# Patient Record
Sex: MALE | Race: WHITE | Employment: OTHER | URBAN - METROPOLITAN AREA
[De-identification: names, ages, dates, MRNs, and addresses within clinical notes are randomized per-mention and may not be internally consistent; named-entity substitution may affect disease eponyms.]

---

## 2017-06-26 PROBLEM — S46.112S STRAIN OF MUSCLE, FASCIA AND TENDON OF LONG HEAD OF BICEPS, LEFT ARM, SEQUELA: Status: ACTIVE | Noted: 2017-06-26

## 2017-06-26 PROBLEM — S46.012S: Status: ACTIVE | Noted: 2017-06-26

## 2017-06-26 PROBLEM — S43.432A SLAP LESION OF LEFT SHOULDER: Status: ACTIVE | Noted: 2017-06-26

## 2017-06-26 PROBLEM — M19.012 DEGENERATIVE JOINT DISEASE OF LEFT ACROMIOCLAVICULAR JOINT: Status: ACTIVE | Noted: 2017-06-26

## 2017-06-26 PROBLEM — M75.02 ADHESIVE CAPSULITIS OF LEFT SHOULDER: Status: ACTIVE | Noted: 2017-06-26

## 2017-06-26 NOTE — BRIEF OP NOTE
BRIEF OPERATIVE NOTE    Date of Procedure: 7/6/2017     Preoperative Diagnosis:  Strain of rotator cuff capsule, left, sequela [S46.012S]      Biceps strain, left, sequela [S46.112S]      SLAP TEAR LEFT SHOULDER  [S43.432S]      AC OA LEFT SHOULDER  [M19.012]      POST-OPERATIVE LEFT FROZEN SHOULDER  [M75.02]    Postoperative Diagnosis:  SAME    Procedure(s): EXAMINATION AND MANIPULATION LEFT SHOULDER ARTHROSCOPY LEFT SHOULDER ARTHROSCOPIC REVISION SUBACROMIAL DECOMPRESSION, DISTAL CLAVICLE RESECTION, LYSIS OF ADHESIONS, MINI OPEN REVISION ROTATOR CUFF REPAIR, BICEPS TENODESIS      Surgeon(s) and Role:     * Maikel Victor MD - Primary           Anesthesia: General WITH INTERSCALENE BLOCK    Complications: NONE    Implants:     Implant Name Type Inv.  Item Serial No.  Lot No. LRB No. Used Action   ICONIX 2.3MM ANCHOR FORCE FIBER SUTURE WITH NEEDLES   [facility] 36 [id number] Left 4 Implanted        Maikel Victor MD

## 2017-06-26 NOTE — H&P
[facility] HISTORY AND PHYSICAL    Subjective:     Patient is a 61 y.o. AMBIDEXTROUS MALE WITH LEFT SHOULDER PAIN. SEE OFFICE NOTE. Patient Active Problem List    Diagnosis Date Noted    Adhesive capsulitis of left shoulder 06/26/2017    Strain of muscle(s) and tendon(s) of the rotator cuff of left shoulder, sequela 06/26/2017    Strain of muscle, fascia and tendon of long head of biceps, left arm, sequela 06/26/2017    SLAP lesion of left shoulder 06/26/2017    Degenerative joint disease of left acromioclavicular joint 06/26/2017    Coronary atherosclerosis of native coronary vessel 11/15/2016    Abnormal EKG     History of tobacco abuse 07/11/2014    ESPINOZA (dyspnea on exertion) 07/11/2014    HTN (hypertension) 10/14/2013    Hyperlipidemia 10/14/2013    Inguinal hernia unilateral, non-recurrent 10/14/2013     Past Medical History:   Diagnosis Date    Abnormal EKG     1. Cardiac MRI (5/14/15):  EF 68%. The left ventricular myocardium appear symmetric at the base. There appears to be slightly thicker left ventricular myocardium at the mid and apical lateral wall compared to other segments. No wall motion advised. EF 68%. Calculated left ventricular mass is within normal limits.  Chronic obstructive pulmonary disease (HCC)     Contact dermatitis and other eczema, due to unspecified cause     Depression     daily meds    Hypercholesteremia     controlled by lipitor    Hyperlipidemia     Hypertension     daily meds    Inguinal hernia     Left ventricular hypertrophy     ECHO 2014, none since     Unstable angina ([facility] [address] 7/11/2014      Past Surgical History:   Procedure Laterality Date    CARDIAC SURG PROCEDURE UNLIST      CCL, clean/no interventions    HX HERNIA REPAIR Right 10/24/13    inguinal    HX ORTHOPAEDIC Left     carpal tunnel    HX ROTATOR CUFF REPAIR        Prior to Admission medications    Medication Sig Start Date End Date Taking?  Authorizing Provider atenolol (TENORMIN) 50 mg tablet Take 1 Tab by mouth daily. 5/8/17   Bhavna Mathis MD   AMOXICILLIN-POT CLAVULANATE 875 MG-125 MG TAB     Historical Provider   atorvastatin (LIPITOR) 10 mg tablet Take 1 Tab by mouth every morning. Indications: hypercholesterolemia 12/23/16   Bhavna Mathis MD   traZODone (DESYREL) 50 mg tablet Take 50 mg by mouth nightly. Historical Provider   sertraline (ZOLOFT) 50 mg tablet Take 50 mg by mouth every morning. Historical Provider   multivitamin (ONE A DAY) tablet Take 1 Tab by mouth daily. Historical Provider   omega-3 fatty acids (FISH OIL) cap Take  by mouth daily. Kolby Whatley MD   albuterol (VENTOLIN HFA) 90 mcg/actuation inhaler Take 2 Puffs by inhalation as needed for Wheezing. Kolby Whatley MD   benazepril (LOTENSIN) 20 mg tablet Take 20 mg by mouth every morning. Indications: HYPERTENSION    Historical Provider     No Known Allergies   Social History   Substance Use Topics    Smoking status: Former Smoker     Packs/day: 1.00     Years: 35.00     Quit date: 7/14/2013    Smokeless tobacco: Never Used    Alcohol use 12.0 oz/week     24 Cans of beer per week      Family History   Problem Relation Age of Onset    Heart Disease Sister     Cancer Brother      lymphoma    Lung Disease Mother     Hypertension Sister     Hypertension Sister     Heart Disease Brother       Review of Systems  A comprehensive review of systems was negative except for that written in the HPI. Objective:     No data found. There were no vitals taken for this visit. General:  Alert, cooperative, no distress, appears stated age. Head:  Normocephalic, without obvious abnormality, atraumatic. Back:   Symmetric, no curvature. ROM normal. No CVA tenderness. Lungs:   Clear to auscultation bilaterally. Chest wall:  No tenderness or deformity. Heart:  Regular rate and rhythm, S1, S2 normal, no murmur, click, rub or gallop. Extremities: Extremities normal, atraumatic, no cyanosis or edema. Pulses: 2+ and symmetric all extremities. Skin: Skin color, texture, turgor normal. No rashes or lesions   Lymph nodes: Cervical, supraclavicular, and axillary nodes normal.   Neurologic: CNII-XII intact. Normal strength, sensation and reflexes throughout. Assessment:   Principal Problem:    Adhesive capsulitis of left shoulder (6/26/2017)    Active Problems:    Strain of muscle(s) and tendon(s) of the rotator cuff of left shoulder, sequela (6/26/2017)      Strain of muscle, fascia and tendon of long head of biceps, left arm, sequela (6/26/2017)      SLAP lesion of left shoulder (6/26/2017)      Degenerative joint disease of left acromioclavicular joint (6/26/2017)        Plan:     The various methods of treatment have been discussed with the patient and family. PATIENT HAS EXHAUSTED NON-OPERATIVE MODALITIES. After consideration of risks, benefits and other options for treatment, the patient has consented to surgical intervention. SEE OFFICE NOTE.         Joseph Bashir MD

## 2017-06-29 ENCOUNTER — HOSPITAL ENCOUNTER (OUTPATIENT)
Dept: SURGERY | Age: 57
Discharge: HOME | End: 2017-06-29
Attending: ORTHOPAEDIC SURGERY

## 2017-06-29 VITALS
HEIGHT: 67 IN | DIASTOLIC BLOOD PRESSURE: 88 MMHG | WEIGHT: 172.6 LBS | SYSTOLIC BLOOD PRESSURE: 136 MMHG | TEMPERATURE: 96.8 F | OXYGEN SATURATION: 97 % | BODY MASS INDEX: 27.09 KG/M2 | HEART RATE: 76 BPM

## 2017-06-29 RX ORDER — MELOXICAM 15 MG/1
15 TABLET ORAL
COMMUNITY
End: 2018-01-04

## 2017-06-29 RX ORDER — ASPIRIN 81 MG/1
81 TABLET ORAL DAILY
COMMUNITY
End: 2020-01-09

## 2017-06-29 RX ORDER — FLUTICASONE PROPIONATE 50 MCG
2 SPRAY, SUSPENSION (ML) NASAL
COMMUNITY
End: 2018-01-04

## 2017-06-29 NOTE — PERIOP NOTES
Patient verified name, , and surgery as listed in [facility]. Type 1B surgery, walk in assessment complete. Labs per surgeon: none needed pre-op. HGB-A1C & FBS ordered for dos. Labs per anesthesia protocol: none needed. EKG: not needed per [facility] protocols. Most recent [facility] cardiology note dated 16 on chart for reference if needed. All other cardiac records in EMR if needed. Hibiclens and instructions given per hospital policy. Patient provided with handouts including Guide to Surgery, Pain Management, Hand Hygiene, Blood Transfusion Education, and [facility] Brochure. Patient answered medical/surgical history questions at their best of ability. All prior to admission medications documented in [facility]. Original medication prescription bottles not visualized during patient appointment. Patient instructed to hold all vitamins 7 days prior to surgery and NSAIDS 5 days prior to surgery, patient verbalized understanding. Medications to be held: aleve, vitamins, meloxicam.    Patient instructed to continue previous medications as prescribed prior to surgery and to take the following medications the day of surgery according to anesthesia guidelines with a small sip of water: ventolin if needed, 81 mg aspirin, atenolol, atorvastatin, sertraline. Instructed to bring inhaler dos. Patient taught back and verbalized understanding.

## 2017-07-05 ENCOUNTER — ANESTHESIA EVENT (OUTPATIENT)
Dept: SURGERY | Age: 57
End: 2017-07-05
Payer: OTHER MISCELLANEOUS

## 2017-07-06 ENCOUNTER — APPOINTMENT (OUTPATIENT)
Dept: GENERAL RADIOLOGY | Age: 57
End: 2017-07-06
Attending: ORTHOPAEDIC SURGERY
Payer: OTHER MISCELLANEOUS

## 2017-07-06 ENCOUNTER — HOSPITAL ENCOUNTER (OUTPATIENT)
Age: 57
Setting detail: OBSERVATION
Discharge: HOME | End: 2017-07-07
Attending: ORTHOPAEDIC SURGERY | Admitting: ORTHOPAEDIC SURGERY
Payer: OTHER MISCELLANEOUS

## 2017-07-06 ENCOUNTER — ANESTHESIA (OUTPATIENT)
Dept: SURGERY | Age: 57
End: 2017-07-06
Payer: OTHER MISCELLANEOUS

## 2017-07-06 PROBLEM — F32.A DEPRESSION: Status: ACTIVE | Noted: 2017-07-06

## 2017-07-06 LAB
EST. AVERAGE GLUCOSE BLD GHB EST-MCNC: 111 MG/DL
GLUCOSE BLD STRIP.AUTO-MCNC: 94 MG/DL (ref 65–100)
HBA1C MFR BLD: 5.5 % (ref 4.8–6)

## 2017-07-06 PROCEDURE — 77030003602 HC NDL NRV BLK BBMI -B: Performed by: ANESTHESIOLOGY

## 2017-07-06 PROCEDURE — 76060000034 HC ANESTHESIA 1.5 TO 2 HR: Performed by: ORTHOPAEDIC SURGERY

## 2017-07-06 PROCEDURE — 73030 X-RAY EXAM OF SHOULDER: CPT

## 2017-07-06 PROCEDURE — 77030032490 HC SLV COMPR SCD KNE COVD -B

## 2017-07-06 PROCEDURE — 77030033073 HC TBNG ARTHSC PMP OUTFLO STRY -B: Performed by: ORTHOPAEDIC SURGERY

## 2017-07-06 PROCEDURE — 77030006891 HC BLD SHV RESECT STRY -B: Performed by: ORTHOPAEDIC SURGERY

## 2017-07-06 PROCEDURE — 94760 N-INVAS EAR/PLS OXIMETRY 1: CPT

## 2017-07-06 PROCEDURE — 77030020269 HC MISC IMPL: Performed by: ORTHOPAEDIC SURGERY

## 2017-07-06 PROCEDURE — 74011250636 HC RX REV CODE- 250/636: Performed by: ANESTHESIOLOGY

## 2017-07-06 PROCEDURE — 74011000250 HC RX REV CODE- 250

## 2017-07-06 PROCEDURE — 77030033005 HC TBNG ARTHSC PMP STRY -B: Performed by: ORTHOPAEDIC SURGERY

## 2017-07-06 PROCEDURE — 74011250636 HC RX REV CODE- 250/636

## 2017-07-06 PROCEDURE — 77030002986 HC SUT PROL J&J -A: Performed by: ORTHOPAEDIC SURGERY

## 2017-07-06 PROCEDURE — 77030008703 HC TU ET UNCUF COVD -A: Performed by: ANESTHESIOLOGY

## 2017-07-06 PROCEDURE — 74011000258 HC RX REV CODE- 258: Performed by: ORTHOPAEDIC SURGERY

## 2017-07-06 PROCEDURE — 76010000162 HC OR TIME 1.5 TO 2 HR INTENSV-TIER 1: Performed by: ORTHOPAEDIC SURGERY

## 2017-07-06 PROCEDURE — 82962 GLUCOSE BLOOD TEST: CPT

## 2017-07-06 PROCEDURE — 74011250636 HC RX REV CODE- 250/636: Performed by: ORTHOPAEDIC SURGERY

## 2017-07-06 PROCEDURE — 77030027384 HC PRB ARTHSCP SERFAS STRY -C: Performed by: ORTHOPAEDIC SURGERY

## 2017-07-06 PROCEDURE — 74011000250 HC RX REV CODE- 250: Performed by: ORTHOPAEDIC SURGERY

## 2017-07-06 PROCEDURE — 77030006668 HC BLD SHV MENSCS STRY -B: Performed by: ORTHOPAEDIC SURGERY

## 2017-07-06 PROCEDURE — 77030020782 HC GWN BAIR PAWS FLX 3M -B: Performed by: ANESTHESIOLOGY

## 2017-07-06 PROCEDURE — 77030003666 HC NDL SPINAL BD -A: Performed by: ORTHOPAEDIC SURGERY

## 2017-07-06 PROCEDURE — 77030018836 HC SOL IRR NACL ICUM -A: Performed by: ORTHOPAEDIC SURGERY

## 2017-07-06 PROCEDURE — 77030008477 HC STYL SATN SLP COVD -A: Performed by: ANESTHESIOLOGY

## 2017-07-06 PROCEDURE — 76010010054 HC POST OP PAIN BLOCK: Performed by: ORTHOPAEDIC SURGERY

## 2017-07-06 PROCEDURE — 77030018986 HC SUT ETHBND4 J&J -B: Performed by: ORTHOPAEDIC SURGERY

## 2017-07-06 PROCEDURE — 74011250637 HC RX REV CODE- 250/637: Performed by: ORTHOPAEDIC SURGERY

## 2017-07-06 PROCEDURE — 76210000016 HC OR PH I REC 1 TO 1.5 HR: Performed by: ORTHOPAEDIC SURGERY

## 2017-07-06 PROCEDURE — 83036 HEMOGLOBIN GLYCOSYLATED A1C: CPT | Performed by: ORTHOPAEDIC SURGERY

## 2017-07-06 PROCEDURE — 77030031139 HC SUT VCRL2 J&J -A: Performed by: ORTHOPAEDIC SURGERY

## 2017-07-06 PROCEDURE — 77030004453 HC BUR SHV STRY -B: Performed by: ORTHOPAEDIC SURGERY

## 2017-07-06 PROCEDURE — 77030002916 HC SUT ETHLN J&J -A: Performed by: ORTHOPAEDIC SURGERY

## 2017-07-06 PROCEDURE — 99218 HC RM OBSERVATION: CPT

## 2017-07-06 PROCEDURE — 76942 ECHO GUIDE FOR BIOPSY: CPT | Performed by: ORTHOPAEDIC SURGERY

## 2017-07-06 RX ORDER — ONDANSETRON 2 MG/ML
INJECTION INTRAMUSCULAR; INTRAVENOUS AS NEEDED
Status: DISCONTINUED | OUTPATIENT
Start: 2017-07-06 | End: 2017-07-06 | Stop reason: HOSPADM

## 2017-07-06 RX ORDER — SUCCINYLCHOLINE CHLORIDE 20 MG/ML
INJECTION INTRAMUSCULAR; INTRAVENOUS AS NEEDED
Status: DISCONTINUED | OUTPATIENT
Start: 2017-07-06 | End: 2017-07-06 | Stop reason: HOSPADM

## 2017-07-06 RX ORDER — LIDOCAINE HYDROCHLORIDE 10 MG/ML
0.1 INJECTION INFILTRATION; PERINEURAL AS NEEDED
Status: DISCONTINUED | OUTPATIENT
Start: 2017-07-06 | End: 2017-07-06 | Stop reason: HOSPADM

## 2017-07-06 RX ORDER — SODIUM CHLORIDE, SODIUM LACTATE, POTASSIUM CHLORIDE, CALCIUM CHLORIDE 600; 310; 30; 20 MG/100ML; MG/100ML; MG/100ML; MG/100ML
75 INJECTION, SOLUTION INTRAVENOUS CONTINUOUS
Status: DISCONTINUED | OUTPATIENT
Start: 2017-07-06 | End: 2017-07-06 | Stop reason: HOSPADM

## 2017-07-06 RX ORDER — LIDOCAINE HYDROCHLORIDE 10 MG/ML
INJECTION INFILTRATION; PERINEURAL AS NEEDED
Status: DISCONTINUED | OUTPATIENT
Start: 2017-07-06 | End: 2017-07-06 | Stop reason: HOSPADM

## 2017-07-06 RX ORDER — HYDROMORPHONE HYDROCHLORIDE 2 MG/ML
0.5 INJECTION, SOLUTION INTRAMUSCULAR; INTRAVENOUS; SUBCUTANEOUS
Status: DISCONTINUED | OUTPATIENT
Start: 2017-07-06 | End: 2017-07-06 | Stop reason: HOSPADM

## 2017-07-06 RX ORDER — MIDAZOLAM HYDROCHLORIDE 1 MG/ML
2 INJECTION, SOLUTION INTRAMUSCULAR; INTRAVENOUS
Status: DISCONTINUED | OUTPATIENT
Start: 2017-07-06 | End: 2017-07-06 | Stop reason: HOSPADM

## 2017-07-06 RX ORDER — OXYCODONE HYDROCHLORIDE 5 MG/1
5 TABLET ORAL
Status: CANCELLED | OUTPATIENT
Start: 2017-07-06

## 2017-07-06 RX ORDER — NEOSTIGMINE METHYLSULFATE 1 MG/ML
INJECTION INTRAVENOUS AS NEEDED
Status: DISCONTINUED | OUTPATIENT
Start: 2017-07-06 | End: 2017-07-06 | Stop reason: HOSPADM

## 2017-07-06 RX ORDER — ATORVASTATIN CALCIUM 10 MG/1
10 TABLET, FILM COATED ORAL DAILY
Status: DISCONTINUED | OUTPATIENT
Start: 2017-07-07 | End: 2017-07-07 | Stop reason: HOSPADM

## 2017-07-06 RX ORDER — HYDROMORPHONE HYDROCHLORIDE 2 MG/1
2 TABLET ORAL
Status: DISCONTINUED | OUTPATIENT
Start: 2017-07-06 | End: 2017-07-07 | Stop reason: HOSPADM

## 2017-07-06 RX ORDER — NALOXONE HYDROCHLORIDE 0.4 MG/ML
0.2 INJECTION, SOLUTION INTRAMUSCULAR; INTRAVENOUS; SUBCUTANEOUS AS NEEDED
Status: DISCONTINUED | OUTPATIENT
Start: 2017-07-06 | End: 2017-07-06 | Stop reason: HOSPADM

## 2017-07-06 RX ORDER — OXYCODONE HYDROCHLORIDE 5 MG/1
5 TABLET ORAL
Status: DISCONTINUED | OUTPATIENT
Start: 2017-07-06 | End: 2017-07-06 | Stop reason: HOSPADM

## 2017-07-06 RX ORDER — MIDAZOLAM HYDROCHLORIDE 1 MG/ML
2 INJECTION, SOLUTION INTRAMUSCULAR; INTRAVENOUS
Status: COMPLETED | OUTPATIENT
Start: 2017-07-06 | End: 2017-07-06

## 2017-07-06 RX ORDER — LIDOCAINE HYDROCHLORIDE 20 MG/ML
INJECTION, SOLUTION EPIDURAL; INFILTRATION; INTRACAUDAL; PERINEURAL AS NEEDED
Status: DISCONTINUED | OUTPATIENT
Start: 2017-07-06 | End: 2017-07-06 | Stop reason: HOSPADM

## 2017-07-06 RX ORDER — HYDROMORPHONE HYDROCHLORIDE 2 MG/ML
0.5 INJECTION, SOLUTION INTRAMUSCULAR; INTRAVENOUS; SUBCUTANEOUS
Status: CANCELLED | OUTPATIENT
Start: 2017-07-06

## 2017-07-06 RX ORDER — GLYCOPYRROLATE 0.2 MG/ML
INJECTION INTRAMUSCULAR; INTRAVENOUS AS NEEDED
Status: DISCONTINUED | OUTPATIENT
Start: 2017-07-06 | End: 2017-07-06 | Stop reason: HOSPADM

## 2017-07-06 RX ORDER — CEFAZOLIN SODIUM IN 0.9 % NACL 2 G/50 ML
2 INTRAVENOUS SOLUTION, PIGGYBACK (ML) INTRAVENOUS ONCE
Status: COMPLETED | OUTPATIENT
Start: 2017-07-06 | End: 2017-07-06

## 2017-07-06 RX ORDER — DOCUSATE SODIUM 100 MG/1
100 CAPSULE, LIQUID FILLED ORAL 2 TIMES DAILY
Status: DISCONTINUED | OUTPATIENT
Start: 2017-07-07 | End: 2017-07-07 | Stop reason: HOSPADM

## 2017-07-06 RX ORDER — FLUMAZENIL 0.1 MG/ML
0.2 INJECTION INTRAVENOUS AS NEEDED
Status: CANCELLED | OUTPATIENT
Start: 2017-07-06

## 2017-07-06 RX ORDER — NALOXONE HYDROCHLORIDE 0.4 MG/ML
0.1 INJECTION, SOLUTION INTRAMUSCULAR; INTRAVENOUS; SUBCUTANEOUS
Status: CANCELLED | OUTPATIENT
Start: 2017-07-06

## 2017-07-06 RX ORDER — BENAZEPRIL HYDROCHLORIDE 10 MG/1
20 TABLET ORAL DAILY
Status: DISCONTINUED | OUTPATIENT
Start: 2017-07-07 | End: 2017-07-07 | Stop reason: HOSPADM

## 2017-07-06 RX ORDER — DEXAMETHASONE SODIUM PHOSPHATE 4 MG/ML
INJECTION, SOLUTION INTRA-ARTICULAR; INTRALESIONAL; INTRAMUSCULAR; INTRAVENOUS; SOFT TISSUE AS NEEDED
Status: DISCONTINUED | OUTPATIENT
Start: 2017-07-06 | End: 2017-07-06 | Stop reason: HOSPADM

## 2017-07-06 RX ORDER — PROPOFOL 10 MG/ML
INJECTION, EMULSION INTRAVENOUS AS NEEDED
Status: DISCONTINUED | OUTPATIENT
Start: 2017-07-06 | End: 2017-07-06 | Stop reason: HOSPADM

## 2017-07-06 RX ORDER — TEMAZEPAM 15 MG/1
15 CAPSULE ORAL
Status: DISCONTINUED | OUTPATIENT
Start: 2017-07-06 | End: 2017-07-07 | Stop reason: HOSPADM

## 2017-07-06 RX ORDER — SODIUM CHLORIDE, SODIUM LACTATE, POTASSIUM CHLORIDE, CALCIUM CHLORIDE 600; 310; 30; 20 MG/100ML; MG/100ML; MG/100ML; MG/100ML
75 INJECTION, SOLUTION INTRAVENOUS CONTINUOUS
Status: CANCELLED | OUTPATIENT
Start: 2017-07-06

## 2017-07-06 RX ORDER — BUPIVACAINE HYDROCHLORIDE 5 MG/ML
INJECTION, SOLUTION EPIDURAL; INTRACAUDAL AS NEEDED
Status: DISCONTINUED | OUTPATIENT
Start: 2017-07-06 | End: 2017-07-06 | Stop reason: HOSPADM

## 2017-07-06 RX ORDER — FENTANYL CITRATE 50 UG/ML
100 INJECTION, SOLUTION INTRAMUSCULAR; INTRAVENOUS ONCE
Status: DISCONTINUED | OUTPATIENT
Start: 2017-07-06 | End: 2017-07-06 | Stop reason: HOSPADM

## 2017-07-06 RX ORDER — ALBUTEROL SULFATE 0.83 MG/ML
2.5 SOLUTION RESPIRATORY (INHALATION)
Status: DISCONTINUED | OUTPATIENT
Start: 2017-07-06 | End: 2017-07-07 | Stop reason: HOSPADM

## 2017-07-06 RX ORDER — EPINEPHRINE 1 MG/ML
INJECTION, SOLUTION, CONCENTRATE INTRAVENOUS AS NEEDED
Status: DISCONTINUED | OUTPATIENT
Start: 2017-07-06 | End: 2017-07-06 | Stop reason: HOSPADM

## 2017-07-06 RX ORDER — OXYCODONE HYDROCHLORIDE 5 MG/1
10 TABLET ORAL
Status: CANCELLED | OUTPATIENT
Start: 2017-07-06

## 2017-07-06 RX ORDER — MIDAZOLAM HYDROCHLORIDE 1 MG/ML
2 INJECTION, SOLUTION INTRAMUSCULAR; INTRAVENOUS ONCE
Status: DISCONTINUED | OUTPATIENT
Start: 2017-07-06 | End: 2017-07-06 | Stop reason: HOSPADM

## 2017-07-06 RX ORDER — FACIAL-BODY WIPES
10 EACH TOPICAL DAILY PRN
Status: DISCONTINUED | OUTPATIENT
Start: 2017-07-06 | End: 2017-07-07 | Stop reason: HOSPADM

## 2017-07-06 RX ORDER — ATENOLOL 50 MG/1
50 TABLET ORAL DAILY
Status: DISCONTINUED | OUTPATIENT
Start: 2017-07-07 | End: 2017-07-07 | Stop reason: HOSPADM

## 2017-07-06 RX ORDER — DIPHENHYDRAMINE HYDROCHLORIDE 50 MG/ML
12.5 INJECTION, SOLUTION INTRAMUSCULAR; INTRAVENOUS
Status: CANCELLED | OUTPATIENT
Start: 2017-07-06

## 2017-07-06 RX ORDER — HYDROMORPHONE HYDROCHLORIDE 1 MG/ML
1 INJECTION, SOLUTION INTRAMUSCULAR; INTRAVENOUS; SUBCUTANEOUS
Status: DISCONTINUED | OUTPATIENT
Start: 2017-07-06 | End: 2017-07-07 | Stop reason: HOSPADM

## 2017-07-06 RX ORDER — SODIUM CHLORIDE 0.9 % (FLUSH) 0.9 %
5-10 SYRINGE (ML) INJECTION AS NEEDED
Status: DISCONTINUED | OUTPATIENT
Start: 2017-07-06 | End: 2017-07-07 | Stop reason: HOSPADM

## 2017-07-06 RX ORDER — PROMETHAZINE HYDROCHLORIDE 25 MG/1
25 TABLET ORAL
Status: DISCONTINUED | OUTPATIENT
Start: 2017-07-06 | End: 2017-07-07 | Stop reason: HOSPADM

## 2017-07-06 RX ORDER — HYDROMORPHONE HYDROCHLORIDE 4 MG/1
4 TABLET ORAL
Status: DISCONTINUED | OUTPATIENT
Start: 2017-07-06 | End: 2017-07-07 | Stop reason: HOSPADM

## 2017-07-06 RX ORDER — SODIUM CHLORIDE 9 MG/ML
75 INJECTION, SOLUTION INTRAVENOUS CONTINUOUS
Status: DISCONTINUED | OUTPATIENT
Start: 2017-07-06 | End: 2017-07-07 | Stop reason: HOSPADM

## 2017-07-06 RX ORDER — FENTANYL CITRATE 50 UG/ML
INJECTION, SOLUTION INTRAMUSCULAR; INTRAVENOUS AS NEEDED
Status: DISCONTINUED | OUTPATIENT
Start: 2017-07-06 | End: 2017-07-06 | Stop reason: HOSPADM

## 2017-07-06 RX ORDER — VECURONIUM BROMIDE FOR INJECTION 1 MG/ML
INJECTION, POWDER, LYOPHILIZED, FOR SOLUTION INTRAVENOUS AS NEEDED
Status: DISCONTINUED | OUTPATIENT
Start: 2017-07-06 | End: 2017-07-06 | Stop reason: HOSPADM

## 2017-07-06 RX ORDER — SERTRALINE HYDROCHLORIDE 50 MG/1
50 TABLET, FILM COATED ORAL DAILY
Status: DISCONTINUED | OUTPATIENT
Start: 2017-07-07 | End: 2017-07-07 | Stop reason: HOSPADM

## 2017-07-06 RX ADMIN — MIDAZOLAM HYDROCHLORIDE 2 MG: 1 INJECTION, SOLUTION INTRAMUSCULAR; INTRAVENOUS at 12:24

## 2017-07-06 RX ADMIN — HYDROMORPHONE HYDROCHLORIDE 4 MG: 4 TABLET ORAL at 23:23

## 2017-07-06 RX ADMIN — SODIUM CHLORIDE, SODIUM LACTATE, POTASSIUM CHLORIDE, AND CALCIUM CHLORIDE 75 ML/HR: 600; 310; 30; 20 INJECTION, SOLUTION INTRAVENOUS at 09:42

## 2017-07-06 RX ADMIN — CEFAZOLIN SODIUM 1 G: 1 INJECTION, POWDER, FOR SOLUTION INTRAMUSCULAR; INTRAVENOUS at 21:42

## 2017-07-06 RX ADMIN — GLYCOPYRROLATE 0.4 MG: 0.2 INJECTION INTRAMUSCULAR; INTRAVENOUS at 14:43

## 2017-07-06 RX ADMIN — PROPOFOL 180 MG: 10 INJECTION, EMULSION INTRAVENOUS at 13:28

## 2017-07-06 RX ADMIN — CEFAZOLIN 2 G: 1 INJECTION, POWDER, FOR SOLUTION INTRAMUSCULAR; INTRAVENOUS; PARENTERAL at 13:21

## 2017-07-06 RX ADMIN — VECURONIUM BROMIDE FOR INJECTION 1 MG: 1 INJECTION, POWDER, LYOPHILIZED, FOR SOLUTION INTRAVENOUS at 13:28

## 2017-07-06 RX ADMIN — FENTANYL CITRATE 100 MCG: 50 INJECTION, SOLUTION INTRAMUSCULAR; INTRAVENOUS at 13:28

## 2017-07-06 RX ADMIN — VECURONIUM BROMIDE FOR INJECTION 4 MG: 1 INJECTION, POWDER, LYOPHILIZED, FOR SOLUTION INTRAVENOUS at 13:35

## 2017-07-06 RX ADMIN — BUPIVACAINE HYDROCHLORIDE 30 ML: 5 INJECTION, SOLUTION EPIDURAL; INTRACAUDAL at 12:27

## 2017-07-06 RX ADMIN — LIDOCAINE HYDROCHLORIDE 60 MG: 20 INJECTION, SOLUTION EPIDURAL; INFILTRATION; INTRACAUDAL; PERINEURAL at 13:28

## 2017-07-06 RX ADMIN — SODIUM CHLORIDE, SODIUM LACTATE, POTASSIUM CHLORIDE, AND CALCIUM CHLORIDE: 600; 310; 30; 20 INJECTION, SOLUTION INTRAVENOUS at 13:40

## 2017-07-06 RX ADMIN — TEMAZEPAM 15 MG: 15 CAPSULE ORAL at 23:17

## 2017-07-06 RX ADMIN — ONDANSETRON 4 MG: 2 INJECTION INTRAMUSCULAR; INTRAVENOUS at 13:46

## 2017-07-06 RX ADMIN — DEXAMETHASONE SODIUM PHOSPHATE 10 MG: 4 INJECTION, SOLUTION INTRA-ARTICULAR; INTRALESIONAL; INTRAMUSCULAR; INTRAVENOUS; SOFT TISSUE at 13:44

## 2017-07-06 RX ADMIN — HYDROMORPHONE HYDROCHLORIDE 2 MG: 2 TABLET ORAL at 19:34

## 2017-07-06 RX ADMIN — SODIUM CHLORIDE 75 ML/HR: 900 INJECTION, SOLUTION INTRAVENOUS at 17:00

## 2017-07-06 RX ADMIN — SUCCINYLCHOLINE CHLORIDE 160 MG: 20 INJECTION INTRAMUSCULAR; INTRAVENOUS at 13:28

## 2017-07-06 RX ADMIN — NEOSTIGMINE METHYLSULFATE 3 MG: 1 INJECTION INTRAVENOUS at 14:43

## 2017-07-06 NOTE — ANESTHESIA PREPROCEDURE EVALUATION
Anesthetic History   No history of anesthetic complications            Review of Systems / Medical History  Patient summary reviewed and pertinent labs reviewed    Pulmonary    COPD: mild      Shortness of breath and smoker (26 yo)         Neuro/Psych         Psychiatric history (Depression)     Cardiovascular    Hypertension: well controlled          CAD (mild, non-obstructive 2014) and hyperlipidemia    Exercise tolerance: >4 METS  Comments: 2016 ECHO: Nml EF  Denies CP, SOB or changes in functional status   GI/Hepatic/Renal  Within defined limits              Endo/Other        Obesity and arthritis     Other Findings            Physical Exam    Airway  Mallampati: II  TM Distance: 4 - 6 cm  Neck ROM: normal range of motion   Mouth opening: Normal     Cardiovascular  Regular rate and rhythm,  S1 and S2 normal,  no murmur, click, rub, or gallop  Rhythm: regular  Rate: normal         Dental    Dentition: Upper partial plate     Pulmonary  Breath sounds clear to auscultation               Abdominal  GI exam deferred       Other Findings            Anesthetic Plan    ASA: 3  Anesthesia type: general      Post-op pain plan if not by surgeon: peripheral nerve block single    Induction: Intravenous  Anesthetic plan and risks discussed with: Patient and Son / Daughter      Pt took atenolol today.

## 2017-07-06 NOTE — H&P
Date of Surgery Update:  Bisi Reynolds was seen and examined. History and physical has been reviewed. The patient has been examined.  There have been no significant clinical changes since the completion of the originally dated History and Physical.    Signed By: Omar Perez MD     July 6, 2017 9:07 AM

## 2017-07-06 NOTE — CONSULTS
HOSPITALIST CONSULT  NAME:  Rodriguez Sierra   Age:  61 y.o.  :   1960   MRN:   [id number]  PCP: Nneka Saucedo MD  Consulting MD:  Treatment Team: Attending Provider: Jeri Restrepo MD; Consulting Provider: Coty Churchill MD  HPI:   Carmelina Springer is a 60 yo C M with pmhx of shoulder arthritis/adhesive capsulitis, nonobstructive CAD, HTN, depression, and COPD, who is POD 0 from a L shoulder arthroscopy. He is feeling well at the moment without pain. Seen with his son, ex-wife, and sister at bedside. His medication list was reviewed and he confirmed his medications. His blood pressure is currently well controlled and he denies any chest pain or shortness of breath. Hospitalist service consulted for history of hypertension and medication management. Complete ROS done and is as stated in HPI or otherwise negative  Past Medical History:   Diagnosis Date    Abnormal EKG     1. Cardiac MRI (5/14/15):  EF 68%. The left ventricular myocardium appear symmetric at the base. There appears to be slightly thicker left ventricular myocardium at the mid and apical lateral wall compared to other segments. No wall motion advised. EF 68%. Calculated left ventricular mass is within normal limits.     Arrhythmia     occasionally has heart palpitations     Chronic obstructive pulmonary disease (HCC)     ventolin prn     Contact dermatitis and other eczema, due to unspecified cause     Depression     Former smoker     Hypercholesteremia     controlled by lipitor    Hyperlipidemia     Hypertension     daily meds    Inguinal hernia     Left ventricular hypertrophy     ECHO 2016    Unstable angina ([address] 2014    cath in 2014=mild nonobstructive CAD      Past Surgical History:   Procedure Laterality Date    CARDIAC SURG PROCEDURE UNLIST      CCL, clean/no interventions    HX CARPAL TUNNEL RELEASE Left     HX HEART CATHETERIZATION  2014    no stents    HX HERNIA REPAIR Right 10/24/13 inguinal    HX ORTHOPAEDIC Left 10/2017    shoulder manipulation     HX SHOULDER ARTHROSCOPY Left 2016      Prior to Admission Medications   Prescriptions Last Dose Informant Patient Reported? Taking? NAPROXEN SODIUM (ALEVE PO) 2017 at Unknown time  Yes Yes   Sig: Take 2 Tabs by mouth daily as needed. albuterol (VENTOLIN HFA) 90 mcg/actuation inhaler Unknown at Unknown time  Yes No   Sig: Take 2 Puffs by inhalation as needed for Wheezing. aspirin delayed-release 81 mg tablet 2017 at Unknown time  Yes Yes   Sig: Take 81 mg by mouth daily. Take / use AM day of surgery  per anesthesia protocols. atenolol (TENORMIN) 50 mg tablet 2017 at 0700  No Yes   Sig: Take 1 Tab by mouth daily. atorvastatin (LIPITOR) 10 mg tablet 2017 at Unknown time  No Yes   Sig: Take 1 Tab by mouth every morning. Indications: hypercholesterolemia   benazepril (LOTENSIN) 20 mg tablet 2017 at Unknown time  Yes Yes   Sig: Take 20 mg by mouth every morning. Indications: HYPERTENSION   fluticasone (FLONASE) 50 mcg/actuation nasal spray 2017 at Unknown time  Yes Yes   Si Sprays by Both Nostrils route daily as needed for Rhinitis. meloxicam (MOBIC) 15 mg tablet 2017 at Unknown time  Yes Yes   Sig: Take 15 mg by mouth daily as needed for Pain.   multivitamin (ONE A DAY) tablet 2017 at Unknown time  Yes Yes   Sig: Take 1 Tab by mouth daily. omega-3 fatty acids (FISH OIL) cap 2017 at Unknown time  Yes Yes   Sig: Take 1 Cap by mouth daily. sertraline (ZOLOFT) 50 mg tablet   Yes No   Sig: Take 50 mg by mouth every morning.       Facility-Administered Medications: None     No Known Allergies   Social History   Substance Use Topics    Smoking status: Former Smoker     Packs/day: 1.00     Years: 35.00     Quit date: 2013    Smokeless tobacco: Never Used    Alcohol use 12.0 oz/week     24 Cans of beer per week      Family History   Problem Relation Age of Onset    Heart Disease Sister  Stroke Sister     Cancer Brother      lymphoma    Lung Disease Mother     Hypertension Sister     Hypertension Sister     Lung Disease Sister     Heart Disease Brother       Objective:     Visit Vitals    /77    Pulse 82    Temp 96 °F (35.6 °C)    Resp 16    Ht 5' 7\" (1.702 m)    Wt 78 kg (172 lb)    SpO2 95%    BMI 26.94 kg/m2      Temp (24hrs), Av.4 °F (36.3 °C), Min:96 °F (35.6 °C), Max:98.4 °F (36.9 °C)    Oxygen Therapy  O2 Sat (%): 95 % (17 1635)  O2 Device: Nasal cannula (17 1558)  O2 Flow Rate (L/min): 2 l/min (17 1558)  Physical Exam:  General:    Alert, cooperative, no distress, appears stated age. Head:   Normocephalic, without obvious abnormality, atraumatic. Nose:  Nares normal. No drainage or sinus tenderness. Lungs:   Clear to auscultation bilaterally. No Wheezing or Rhonchi. No rales. Heart:   Regular rate and rhythm,  no murmur, rub or gallop. Abdomen:   Soft, non-tender. Not distended. Bowel sounds normal.   Extremities: No cyanosis. No edema. No clubbing. L arm/shoulder in sling. Skin:     Texture, turgor normal. No rashes or lesions. Not Jaundiced  Neurologic: Alert and oriented x 3, no focal deficits   Data Review:   Recent Results (from the past 24 hour(s))   HEMOGLOBIN A1C WITH EAG    Collection Time: 17  9:45 AM   Result Value Ref Range    Hemoglobin A1c 5.5 4.8 - 6.0 %    Est. average glucose 111 mg/dL   GLUCOSE, POC    Collection Time: 17  9:46 AM   Result Value Ref Range    Glucose (POC) 94 65 - 100 mg/dL     Imaging /Procedures /Studies   XR SHOULDER LT AP/LAT MIN 2 V   Final Result   IMPRESSION: Status post shoulder arthroplasty. Assessment and Plan:      Active Hospital Problems    Diagnosis Date Noted    Depression 2017    Adhesive capsulitis of left shoulder 2017    Strain of muscle(s) and tendon(s) of the rotator cuff of left shoulder, sequela 2017    Strain of muscle, fascia and tendon of long head of biceps, left arm, sequela 06/26/2017    SLAP lesion of left shoulder 06/26/2017    Degenerative joint disease of left acromioclavicular joint 06/26/2017    Coronary atherosclerosis of native coronary vessel 11/15/2016     [facility] 7/2014: mild CAD up to 20% ostial LM and 30% ostial RCA, normal EF        HTN (hypertension) 10/14/2013    Hyperlipidemia 10/14/2013       PLAN  ·  HTN- continue home dose of metoprolol and benazepril. · CAD/HLD- continue atorvastatin. Restart aspirin when okay with ortho. · COPD- no issue currently. PRN albuterol nebs. · Depression- continue sertraline. Thank you for this consult. Will sign off. Please call if needed. Signed By: Sylvie Lobo MD     July 6, 2017

## 2017-07-06 NOTE — DISCHARGE SUMMARY
[facility] Discharge Summary      Patient ID:  Rodriguez Sierra  [id number]  56 y.o.  1960    Admit date: 7/6/2017    Discharge date and time: 7/7/2017    Admitting Physician: Jeri Restrepo MD     Discharge Physician: Jeri Restrepo MD      Admission Diagnoses: Strain of rotator cuff capsule, left, sequela [S46.012S]  Biceps strain, left, sequela [S46.112S]  Glenoid labrum tear, left, sequela [S43.432S]  Arthritis of shoulder region, left [M19.012]  Bursitis of shoulder, left, adhesive [M75.02]  Strain of rotator cuff capsule, left, sequela [S46.012S]  Biceps strain, left, sequela [S46.112S]  Glenoid labrum tear, left, sequela [S43.432S]  Arthritis of shoulder region, left [M19.012]  Bursitis of shoulder, left, adhesive [M75.02]    Discharge Diagnoses: Principal Problem:    Adhesive capsulitis of left shoulder (6/26/2017)    Active Problems:    HTN (hypertension) (10/14/2013)      Hyperlipidemia (10/14/2013)      Coronary atherosclerosis of native coronary vessel (11/15/2016)      Overview: [facility] 7/2014: mild CAD up to 20% ostial LM and 30% ostial RCA, normal EF            Strain of muscle(s) and tendon(s) of the rotator cuff of left shoulder, sequela (6/26/2017)      Strain of muscle, fascia and tendon of long head of biceps, left arm, sequela (6/26/2017)      SLAP lesion of left shoulder (6/26/2017)      Degenerative joint disease of left acromioclavicular joint (6/26/2017)      Depression (7/6/2017)      Hypomagnesemia (7/7/2017)        Surgeon: Jeri Restrepo MD                                Perioperative Antibiotics: Ancef  _X__                                                Vancomycin  ___          Post Op complications: none        Discharged to: Home    Discharge instructions:  -Resume pre hospital diet             -Resume home medications per medical continuation form     SLING LEFT SHOULDER  CONTINUE PHYSICAL THERAPY  -Follow up in office as scheduled       Signed:  Maureen Duarte Magdaleno Holt MD  7/7/2017  12:29 PM

## 2017-07-06 NOTE — ANESTHESIA PROCEDURE NOTES
Peripheral Block    Start time: 7/6/2017 12:24 PM  End time: 7/6/2017 12:27 PM  Performed by: Anshul Rebollar  Authorized by: Anshul Rebollar       Pre-procedure: Indications: at surgeon's request and post-op pain management    Preanesthetic Checklist: patient identified, risks and benefits discussed, site marked, timeout performed, anesthesia consent given and patient being monitored    Timeout Time: 12:24          Block Type:   Block Type:   Interscalene  Laterality:  Left  Monitoring:  Standard ASA monitoring, responsive to questions, continuous pulse ox, oxygen, frequent vital sign checks and heart rate  Injection Technique:  Single shot  Procedures: ultrasound guided and nerve stimulator    Patient Position: supine  Prep: chlorhexidine    Location:  Interscalene  Needle Type:  Stimuplex  Needle Gauge:  22 G  Needle Localization:  Nerve stimulator and ultrasound guidance  Motor Response: minimal motor response >0.4 mA    Medication Injected:  0.5%  bupivacaine  Adds:  Epi 1:200K  Volume (mL):  30    Assessment:  Number of attempts:  1  Injection Assessment:  Incremental injection every 5 mL, negative aspiration for CSF, ultrasound image on chart, no paresthesia, local visualized surrounding nerve on ultrasound, negative aspiration for blood and no intravascular symptoms  Patient tolerance:  Patient tolerated the procedure well with no immediate complications

## 2017-07-06 NOTE — PROGRESS NOTES
Admission Assessment Complete. Pt had Left shoulder manipulation and arthroscopy. Pt will be NPO at midnight for reblock in AM.. Pt is A&Ox 3.  +2 radial pulses with bilateral Upper Extremities pharmacologically. Dressing is clean, dry and intact. IVF inusing. Pt denies any pain or need at this time. Bed low and locked. Side rails x3. Call light with in reach. Pt verbalizes understanding of call light.

## 2017-07-06 NOTE — IP AVS SNAPSHOT
[address] 
[phone] Patient: Cas Henderson MRN: [id number] LYP:0/77/3427 You are allergic to the following No active allergies Recent Documentation Height Weight BMI Smoking Status 1.702 m 78 kg 26.94 kg/m2 Former Smoker Emergency Contacts Name Discharge Info Relation Home Work Mobile [place] CAREGIVER [3] Son [22]   [phone] About your hospitalization You were admitted on:  July 6, 2017 [facility] You were discharged on:  July 7, 2017 Unit phone number:  [phone] Why you were hospitalized Your primary diagnosis was: Adhesive Capsulitis Of Left Shoulder Your diagnoses also included:  Strain Of Muscle(S) And Tendon(S) Of The Rotator Cuff Of Left Shoulder, Sequela, Strain Of Muscle, Fascia And Tendon Of Long Head Of Biceps, Left Arm, Sequela, Slap Lesion Of Left Shoulder, Degenerative Joint Disease Of Left Acromioclavicular Joint, Coronary Atherosclerosis Of Native Coronary Vessel, Htn (Hypertension), Hyperlipidemia, Depression, Hypomagnesemia Providers Seen During Your Hospitalizations Provider Role Specialty Primary office phone Jaja Wei MD Attending Provider Orthopedic Surgery [phone] Your Primary Care Physician (PCP) Primary Care Physician Office Phone Office Fax Fanny Sood, 45 [facility] [phone] Follow-up Information Follow up With Details Comments Contact Info Emmanuel Molina MD  As needed [id number] [facility] [address] 
[phone] Jaja Wei MD In 2 weeks Call office if not already scheduled [facility] 10 [address] [place] [address] [address] Current Discharge Medication List  
  
ASK your doctor about these medications Dose & Instructions Dispensing Information Comments Morning Noon Evening Bedtime ALEVE PO Your next dose is:  Tomorrow Dose:  2 Tab Take 2 Tabs by mouth daily as needed. Refills:  0  
     
   
   
  
   
  
 aspirin delayed-release 81 mg tablet Your next dose is:  Tomorrow Dose:  81 mg Take 81 mg by mouth daily. Take / use AM day of surgery  per anesthesia protocols. Refills:  0  
     
  
   
   
   
  
 atenolol 50 mg tablet Commonly known as:  TENORMIN Your next dose is:  Tomorrow Dose:  50 mg Take 1 Tab by mouth daily. Quantity:  30 Tab Refills:  5  
     
  
   
   
   
  
 atorvastatin 10 mg tablet Commonly known as:  LIPITOR Your next dose is:  Tomorrow Dose:  10 mg Take 1 Tab by mouth every morning. Indications: hypercholesterolemia Quantity:  30 Tab Refills:  5  
     
  
   
   
   
  
 benazepril 20 mg tablet Commonly known as:  LOTENSIN Your next dose is:  Tomorrow Dose:  20 mg Take 20 mg by mouth every morning. Indications: HYPERTENSION Refills:  0  
     
  
   
   
   
  
 FISH OIL Cap Generic drug:  omega-3 fatty acids Your next dose is:  Tomorrow Dose:  1 Cap Take 1 Cap by mouth daily. Refills:  0  
     
  
   
   
   
  
 FLONASE 50 mcg/actuation nasal spray Generic drug:  fluticasone Your next dose is:  Tomorrow Dose:  2 Spray 2 Sprays by Both Nostrils route daily as needed for Rhinitis. Refills:  0  
     
  
   
   
   
  
 meloxicam 15 mg tablet Commonly known as:  MOBIC Your next dose is:  Tomorrow Dose:  15 mg Take 15 mg by mouth daily as needed for Pain. Refills:  0  
     
  
   
   
   
  
 multivitamin tablet Commonly known as:  ONE A DAY Your next dose is:  Tomorrow Dose:  1 Tab Take 1 Tab by mouth daily. Refills:  0  
     
  
   
   
   
  
 sertraline 50 mg tablet Commonly known as:  ZOLOFT Your next dose is:  Tomorrow Dose:  50 mg Take 50 mg by mouth every morning. Refills:  0 VENTOLIN HFA 90 mcg/actuation inhaler Generic drug:  albuterol Your next dose is:  Tomorrow Dose:  2 Puff Take 2 Puffs by inhalation as needed for Wheezing. Refills:  0 Discharge Instructions DISCHARGE SUMMARY from Nurse The following personal items collected during your admission are returned to you:  
Dental Appliance: Dental Appliances: Lowers; Other (comment) (bridge ) Vision: Visual Aid: Glasses Hearing Aid:   na 
Jewelry: Jewelry: None Clothing: Clothing: At bedside Other Valuables: Other Valuables: None Valuables sent to safe:   na 
 
 
 
 
PATIENT INSTRUCTIONS: 
 
New Medications: 
 
Dilaudid 2 mg tabs Take 1-2 tabs every 4-6 hrs as needed for pain. Toradol 10 mg tabs Take 1 tab every 6 hrs x 5 days. Phenergan 25 mg tabs Take 1 tab every 8 hrs as needed for nausea. Restoril 15 mg tabs Take 1 tab at bedtime as needed for sleep. After general anesthesia or intravenous sedation, for 24 hours or while taking prescription Narcotics: · Limit your activities · Do not drive and operate hazardous machinery · Do not make important personal or business decisions · Do  not drink alcoholic beverages · If you have not urinated within 8 hours after discharge, please contact your surgeon on call. Report the following to your surgeon: 
· Excessive pain, swelling, redness or odor of or around the surgical area · Temperature over 101 · Nausea and vomiting lasting longer than 4 hours or if unable to take medications · Any signs of decreased circulation or nerve impairment to extremity: change in color, persistent  numbness, tingling, coldness or increase pain · Any questions, call office @ [phone]. What to do at Home: 
Recommended activity: activity as tolerated, as instructed per Dr. Adonis Ham. Continue with exercises taught by Physical Therapy. Resume per hospital diet. Wear sling to left arm. Use ice and elevate arm to decrease pain and swelling. If you experience any of the following symptoms temp>101, pain unrelieved by meds, or persistent nausea or vomitting, please follow up with Dr. Lian Arias @ [phone]. *  Please give a list of your current medications to your Primary Care Provider. *  Please update this list whenever your medications are discontinued, doses are 
    changed, or new medications (including over-the-counter products) are added. *  Please carry medication information at all times in case of emergency situations. Shoulder Arthroscopy: What to Expect at Home Your Recovery Your arm may be in a sling. You will feel tired for several days. Your shoulder will be swollen, and you may notice that your skin is a different color near the cuts the doctor made (incisions). Your hand and arm may also be swollen. This is normal and will go away in a few days. Depending on the medicine you had during the surgery, your entire arm may feel numb or like you cannot move it. This goes away in 12 to 24 hours. When you can return to work or your usual routine will depend on your shoulder problem. Most people need 6 weeks or longer to recover. How much time you need depends on the surgery that was done. You may have to limit your activity until your shoulder strength and range of motion return to normal. You may also be in a rehabilitation program (rehab). If you have a desk job, you may be able to return to work a few days after the surgery. If you lift things at work, it may take months before you return to work. This care sheet gives you a general idea about how long it will take for you to recover. But each person recovers at a different pace. Follow the steps below to get better as quickly as possible. How can you care for yourself at home? Activity · Rest when you feel tired. Getting enough sleep will help you recover. You may be more comfortable if you sleep in a reclining chair. To make your arm and shoulder feel better, keep a thin pillow under the back of your arm while you are lying down. · Try to walk each day. Start by walking a little more than you did the day before. Bit by bit, increase the amount you walk. Walking boosts blood flow and helps prevent pneumonia and constipation. · For 2 to 3 weeks, avoid lifting anything heavier than a plate or a glass. You need to give your shoulder time to heal. 
· Your arm may be in a sling. You may need to use the sling for a few days to a few weeks. Your doctor will advise you on how long to wear the sling. · You may take the sling off when you dress or wash. · Do not use your arm for repeated movements. These include painting, vacuuming, or using a computer. Diet · You can eat your normal diet. If your stomach is upset, try bland, low-fat foods like plain rice, broiled chicken, toast, and yogurt. · Drink plenty of fluids, unless your doctor tells you not to. · You may notice that your bowel movements are not regular right after your surgery. This is common. Try to avoid constipation and straining with bowel movements. You may want to take a fiber supplement every day. If you have not had a bowel movement after a couple of days, ask your doctor about taking a mild laxative. Medicines · Take pain medicines exactly as directed. ¨ If the doctor gave you a prescription medicine for pain, take it as prescribed. ¨ If you are not taking a prescription pain medicine, ask your doctor if you can take an over-the-counter medicine. · If you think your pain medicine is making you sick to your stomach: 
¨ Take your medicine after meals (unless your doctor has told you not to). ¨ Ask your doctor for a different pain medicine. · If your doctor prescribed antibiotics, take them as directed. Do not stop taking them just because you feel better.  You need to take the full course of antibiotics. Incision care · If you have a dressing over your incision, keep it clean and dry. You may remove it 2 to 3 days after the surgery. · If your incision is open to the air, keep the area clean and dry. · If you have strips of tape on the incision, leave the tape on for a week or until it falls off. Exercise · You may need rehabilitation. This is a series of exercises you do after your surgery. Rehab helps you get back your shoulder's range of motion and strength. You will work with your doctor and physical therapist to plan this exercise program. To get the best results, you need to do the exercises correctly and as often and as long as your doctor tells you. Ice · To reduce swelling and pain, put ice or a cold pack on your shoulder for 10 to 20 minutes at a time. Do this every 1 to 2 hours. Put a thin cloth between the ice and your skin. Follow-up care is a key part of your treatment and safety. Be sure to make and go to all appointments, and call your doctor if you are having problems. It's also a good idea to know your test results and keep a list of the medicines you take. When should you call for help? Call 911 anytime you think you may need emergency care. For example, call if: 
· You passed out (lost consciousness). · You have severe trouble breathing. · You have sudden chest pain and shortness of breath, or you cough up blood. Call your doctor now or seek immediate medical care if: 
· Your hand is cool, pale, or numb, or it changes color. · You are unable to move your fingers, wrist, or elbow. · You are sick to your stomach or cannot keep fluids down. · You have pain that does not get better after you take pain medicine. · You have signs of infection, such as: 
¨ Increased pain, swelling, warmth, or redness. ¨ Red streaks leading from the incision. ¨ Pus draining from the incision. ¨ A fever. · You have loose stitches, or your incision comes open. · Your incision bleeds through your first dressing or is still bleeding 3 days after your surgery. Watch closely for changes in your health, and be sure to contact your doctor if: 
· Your sling feels too tight, and you cannot loosen it. · You have new or increased swelling in your arm. · You have new pain that develops in another area of the affected limb. For example, you have pain in your hand or elbow. · You do not have a bowel movement after taking a laxative. · You do not get better as expected. Where can you learn more? Go to Tapat[employer] Enter K873 in the search box to learn more about \"Shoulder Arthroscopy: What to Expect at Home. \"  
© 1592-4426 [employer]. Care instructions adapted under license by [employer] Perez (which disclaims liability or warranty for this information). This care instruction is for use with your licensed healthcare professional. If you have questions about a medical condition or this instruction, always ask your healthcare professional. [place] [address] any warranty or liability for your use of this information. Content Version: 52.4.972217; Current as of: June 4, 2014 These are general instructions for a healthy lifestyle: No smoking/ No tobacco products/ Avoid exposure to second hand smoke Surgeon General's Warning:  Quitting smoking now greatly reduces serious risk to your health. Obesity, smoking, and sedentary lifestyle greatly increases your risk for illness A healthy diet, regular physical exercise & weight monitoring are important for maintaining a healthy lifestyle You may be retaining fluid if you have a history of heart failure or if you experience any of the following symptoms:  Weight gain of 3 pounds or more overnight or 5 pounds in a week, increased swelling in our hands or feet or shortness of breath while lying flat in bed.   Please call your doctor as soon as you notice any of these symptoms; do not wait until your next office visit. Recognize signs and symptoms of STROKE: 
 
F-face looks uneven A-arms unable to move or move unevenly S-speech slurred or non-existent T-time-call 911 as soon as signs and symptoms begin-DO NOT go Back to bed or wait to see if you get better-TIME IS BRAIN. The discharge information has been reviewed with the patient. The patient verbalized understanding. Discharge Orders None Shooger Announcement We are excited to announce that we are making your provider's discharge notes available to you in Shooger. You will see these notes when they are completed and signed by the physician that discharged you from your recent hospital stay. If you have any questions or concerns about any information you see in Shooger, please call the Health Information Department where you were seen or reach out to your Primary Care Provider for more information about your plan of care. Introducing [facility]! Dear Elan Condon: Thank you for requesting a Shooger account. Our records indicate that you already have an active Shooger account. You can access your account anytime at [email or web address] Did you know that you can access your hospital and ER discharge instructions at any time in Shooger? You can also review all of your test results from your hospital stay or ER visit. Additional Information If you have questions, please visit the Frequently Asked Questions section of the Shooger website at [email or web address] Remember, Shooger is NOT to be used for urgent needs. For medical emergencies, dial 911. Now available from your iPhone and Android! General Information Please provide this summary of care documentation to your next provider.  
  
  
    
    
 Patient Signature: ____________________________________________________________ Date:  ____________________________________________________________  
  
Vi Presidio Provider Signature:  ____________________________________________________________ Date:  ____________________________________________________________

## 2017-07-06 NOTE — ANESTHESIA POSTPROCEDURE EVALUATION
Post-Anesthesia Evaluation and Assessment    Patient: Oscar Walker MRN: [id number]  SSN: [id number]    YOB: 1960  Age: 61 y.o. Sex: male       Cardiovascular Function/Vital Signs  Visit Vitals    /70 (BP 1 Location: Right arm, BP Patient Position: At rest)    Pulse 83    Temp 36.9 °C (98.4 °F)    Resp 18    Ht 5' 7\" (1.702 m)    Wt 78 kg (172 lb)    SpO2 100%    BMI 26.94 kg/m2       Patient is status post general anesthesia for Procedure(s):  LEFT SHOULDER EXAM/MANIPULATION  ARTHROSCOPY LEFT SHOULDER ARTHROSCOPIC REVISION SUBACROMIAL DECOMPRESSION, DISTAL CLAVICLE RESECTION, LYSIS OF ADHESIONS, MINI OPEN REVISION ROTATOR CUFF REPAIR, BICEPS TENODESIS. Nausea/Vomiting: None    Postoperative hydration reviewed and adequate. Pain:  Pain Scale 1: Visual (07/06/17 1543)  Pain Intensity 1: 0 (07/06/17 1543)   Managed    Neurological Status:   Neuro (WDL): Exceptions to WDL (07/06/17 1543)  Neuro  Neurologic State: Alert (07/06/17 1543)  Orientation Level: Oriented X4 (07/06/17 1543)  Cognition: Follows commands (07/06/17 1543)  LUE Motor Response: Numbness; Pharmacologically paralyzed (07/06/17 1543)  LLE Motor Response: Purposeful (07/06/17 1543)  RUE Motor Response: Purposeful (07/06/17 1543)  RLE Motor Response: Purposeful (07/06/17 1543)   At baseline    Mental Status and Level of Consciousness: Arousable    Pulmonary Status:   O2 Device: Nasal cannula (07/06/17 1543)   Adequate oxygenation and airway patent    Complications related to anesthesia: None    Post-anesthesia assessment completed.  No concerns    Signed By: Vipul Hinds MD     July 6, 2017

## 2017-07-06 NOTE — PROGRESS NOTES
TRANSFER - IN REPORT:    Verbal report received from [facility]) on Cas Constable  being received from [facility]) for routine post - op      Report consisted of patients Situation, Background, Assessment and   Recommendations(SBAR). Information from the following report(s) SBAR, Kardex, OR Summary, Procedure Summary, Intake/Output, MAR, Accordion and Recent Results was reviewed with the receiving nurse. Opportunity for questions and clarification was provided. Assessment completed upon patients arrival to unit and care assumed.

## 2017-07-06 NOTE — PERIOP NOTES
TRANSFER - OUT REPORT:    Verbal report given to [address]  on Ashley Geiger  being transferred to [facility]  for routine progression of care       Report consisted of patients Situation, Background, Assessment and   Recommendations(SBAR). Information from the following report(s) SBAR, Kardex, OR Summary, Intake/Output and MAR was reviewed with the receiving nurse. Lines:   Peripheral IV 07/06/17 Right Hand (Active)   Site Assessment Clean, dry, & intact 7/6/2017  3:45 PM   Phlebitis Assessment 0 7/6/2017  3:45 PM   Infiltration Assessment 0 7/6/2017  3:45 PM   Dressing Status Clean, dry, & intact 7/6/2017  3:45 PM   Dressing Type Tape;Transparent 7/6/2017  3:45 PM   Hub Color/Line Status Infusing 7/6/2017  3:45 PM        Opportunity for questions and clarification was provided.       Patient transported with:   O2 @ 2 liters

## 2017-07-07 VITALS
HEIGHT: 67 IN | SYSTOLIC BLOOD PRESSURE: 131 MMHG | RESPIRATION RATE: 22 BRPM | DIASTOLIC BLOOD PRESSURE: 93 MMHG | TEMPERATURE: 97.2 F | BODY MASS INDEX: 27 KG/M2 | HEART RATE: 95 BPM | WEIGHT: 172 LBS | OXYGEN SATURATION: 94 %

## 2017-07-07 PROBLEM — E83.42 HYPOMAGNESEMIA: Status: ACTIVE | Noted: 2017-07-07

## 2017-07-07 LAB
ANION GAP BLD CALC-SCNC: 11 MMOL/L (ref 7–16)
BUN SERPL-MCNC: 14 MG/DL (ref 6–23)
CALCIUM SERPL-MCNC: 8.6 MG/DL (ref 8.3–10.4)
CHLORIDE SERPL-SCNC: 101 MMOL/L (ref 98–107)
CO2 SERPL-SCNC: 24 MMOL/L (ref 21–32)
CREAT SERPL-MCNC: 0.97 MG/DL (ref 0.8–1.5)
GLUCOSE SERPL-MCNC: 129 MG/DL (ref 65–100)
MAGNESIUM SERPL-MCNC: 1.6 MG/DL (ref 1.8–2.4)
POTASSIUM SERPL-SCNC: 4.4 MMOL/L (ref 3.5–5.1)
SODIUM SERPL-SCNC: 136 MMOL/L (ref 136–145)

## 2017-07-07 PROCEDURE — G8979 MOBILITY GOAL STATUS: HCPCS

## 2017-07-07 PROCEDURE — G8978 MOBILITY CURRENT STATUS: HCPCS

## 2017-07-07 PROCEDURE — 99218 HC RM OBSERVATION: CPT

## 2017-07-07 PROCEDURE — 74011250637 HC RX REV CODE- 250/637: Performed by: ORTHOPAEDIC SURGERY

## 2017-07-07 PROCEDURE — 97161 PT EVAL LOW COMPLEX 20 MIN: CPT

## 2017-07-07 PROCEDURE — 97110 THERAPEUTIC EXERCISES: CPT

## 2017-07-07 PROCEDURE — 74011250636 HC RX REV CODE- 250/636: Performed by: ORTHOPAEDIC SURGERY

## 2017-07-07 PROCEDURE — 74011000258 HC RX REV CODE- 258: Performed by: ORTHOPAEDIC SURGERY

## 2017-07-07 PROCEDURE — 80048 BASIC METABOLIC PNL TOTAL CA: CPT | Performed by: ORTHOPAEDIC SURGERY

## 2017-07-07 PROCEDURE — 83735 ASSAY OF MAGNESIUM: CPT | Performed by: ORTHOPAEDIC SURGERY

## 2017-07-07 PROCEDURE — 36415 COLL VENOUS BLD VENIPUNCTURE: CPT | Performed by: ORTHOPAEDIC SURGERY

## 2017-07-07 PROCEDURE — 74011250637 HC RX REV CODE- 250/637: Performed by: INTERNAL MEDICINE

## 2017-07-07 PROCEDURE — G8980 MOBILITY D/C STATUS: HCPCS

## 2017-07-07 RX ORDER — SODIUM CHLORIDE 0.9 % (FLUSH) 0.9 %
5-10 SYRINGE (ML) INJECTION EVERY 8 HOURS
Status: DISCONTINUED | OUTPATIENT
Start: 2017-07-07 | End: 2017-07-07 | Stop reason: HOSPADM

## 2017-07-07 RX ORDER — MAGNESIUM SULFATE HEPTAHYDRATE 40 MG/ML
2 INJECTION, SOLUTION INTRAVENOUS ONCE
Status: COMPLETED | OUTPATIENT
Start: 2017-07-07 | End: 2017-07-07

## 2017-07-07 RX ADMIN — CEFAZOLIN SODIUM 1 G: 1 INJECTION, POWDER, FOR SOLUTION INTRAMUSCULAR; INTRAVENOUS at 04:21

## 2017-07-07 RX ADMIN — DOCUSATE SODIUM 100 MG: 100 CAPSULE, LIQUID FILLED ORAL at 08:02

## 2017-07-07 RX ADMIN — SERTRALINE HYDROCHLORIDE 50 MG: 50 TABLET ORAL at 08:03

## 2017-07-07 RX ADMIN — BENAZEPRIL HYDROCHLORIDE 20 MG: 10 TABLET ORAL at 08:02

## 2017-07-07 RX ADMIN — ATORVASTATIN CALCIUM 10 MG: 10 TABLET, FILM COATED ORAL at 08:03

## 2017-07-07 RX ADMIN — ATENOLOL 50 MG: 50 TABLET ORAL at 08:02

## 2017-07-07 RX ADMIN — MAGNESIUM SULFATE HEPTAHYDRATE 2 G: 40 INJECTION, SOLUTION INTRAVENOUS at 08:03

## 2017-07-07 RX ADMIN — HYDROMORPHONE HYDROCHLORIDE 4 MG: 4 TABLET ORAL at 08:02

## 2017-07-07 RX ADMIN — SODIUM CHLORIDE 75 ML/HR: 900 INJECTION, SOLUTION INTRAVENOUS at 04:30

## 2017-07-07 NOTE — OP NOTES
[facility] 64   OPERATIVE REPORT       Name:  Vikki Kwon   MR#:  [id number]   :  1960   Account #:  [de-identified]   Date of Adm:  2017       DATE OF SURGERY: 2017     PREOPERATIVE DIAGNOSES   1. Postoperative left frozen shoulder. 2. Rotator cuff strain, left shoulder. 3. Bicipital strain, left shoulder. 4. Superior labrum anterior and posterior tear, left shoulder. 5. Acromioclavicular joint arthritis, left shoulder. POSTOPERATIVE DIAGNOSES   1. Postoperative left frozen shoulder. 2. Rotator cuff strain, left shoulder. 3. Bicipital strain, left shoulder. 4. Superior labrum anterior and posterior tear, left shoulder. 5. Acromioclavicular joint arthritis, left shoulder. PROCEDURE: Examination and manipulation, left shoulder, under   anesthesia; arthroscopy, left shoulder; arthroscopic   revision; subacromial decompression; arthroscopic distal   clavicle resection; lysis of adhesions; mini open revision;   rotator cuff repair; and biceps tenodesis. OPERATIVE SURGEON: Carlitos Lebron. Tierney Blackman MD    ANESTHESIA: General.     PATHOLOGY   1. Type 2 acromion. 2. AC joint arthritis. 3. Type 1 SLAP tear. 4. Torn long head of the biceps tendon. 5. Rotator cuff tear. 6. Capsulitis. CURRENT PROCEDURAL TERMINOLOGY CODES: 37604 with a modifier 22   for a revision procedure, [id number], 45743, 05390, and 27124 with a   modifier 22 for a revision procedure. INTERNATIONAL CLASSIFICATION OF DISEASES 10 CODES: S46.012,   [id number], S43.432, M19.012, and M75.02. HARDWARE UTILIZED: Four Iconix 2.3 anchors. CERTIFIED FIRST ASSISTANT: Tristan Narayan First   Assistant. Use of certified first assistant was necessary due to   complexity of this operative procedure. Use of certified first   assistant was necessary to maximize the patient's safety and   optimize patient outcome.      INDICATIONS: Patient is a 65-year-old gentleman who injured his   left shoulder on the job. The patient was initially treated   elsewhere, underwent an arthroscopy of the left shoulder,   debridement, open anterior acromioplasty, and open rotator cuff   repair. Postop course was complicated by stiffness. The patient   underwent an EUA and manipulation of the left shoulder   elsewhere, without improvement. The patient was then transferred   care to me. Preoperative physical examination, radiographs, and an   MR demonstrate a type 2 acromion, degenerative changes in the [facility]   joint. The patient has evidence of what appears to be a recurrent   rotator cuff tear, significant biceps pathology, as well as pain   and stiffness. The patient has exhausted nonoperative modalities,   electively admitted for operative intervention. DESCRIPTION OF PROCEDURE: Following identification of the   patient, the patient was taken to the operating suite. Following   induction of general anesthesia and interscalene block for   postoperative pain control, 2 g of IV Ancef, and measurement of   a hemoglobin A1c and a preoperative fasting blood glucose which   were both normal at 5.5 and 94 respectively, the patient was   then positioned on the operating table in the supine fashion. The left shoulder was then examined under anesthesia. The patient   was noted to have 0 to 140 degrees of passive forward elevation,   20 degrees of external rotation at the side, and 45 degrees of   external and internal rotation in the 90 degree abducted   position. At this point, a gentle manipulation of the left   shoulder was then performed. I was able to achieve 0 to 180   degrees of passive forward elevation, 60 degrees of external   rotation to the side, and 90 degrees of external and internal   rotation in the 90 degree abducted position. At this point, the   patient was carefully positioned in the lateral decubitus   position, right side down. Axillary roll was placed. Bean bag   was inflated.  Care was taken to pad both dependent lower and   upper extremities. The left arm was then placed in the [employer]   traction device in 15 pounds of traction. Left shoulder was then   prepped and draped in a sterile fashion. Subacromial space was   then injected with 10 mL of 1% Xylocaine with epinephrine. Scope   was introduced into the shoulder. Diagnostic arthroscopy was   then commenced. Articular surfaces of the humeral head and   glenoid were visualized and intact. Anterior, posterior,   superior, and inferior labrum were visualized. The anterior,   posterior, inferior bands of the IGHL were intact. There was   some fraying of the superior labrum. The biceps was noted to be   torn. There was a full thickness rotator cuff tear with disruption   in the anatomic footprint with significant scar tissue. With the   use of a 4.5 full radius resector and an Oratec wand, all   adhesions were lysed in the glenohumeral space. There was abundant   mobility in the axillary recess. The stump of the torn long head   of the biceps tendon was excised in its entirety. Labrum was   debrided as well. Scope was then flip-flopped from the posterior   to anterior portal. Posterior cuff and labrum were visualized. The posterior cuff was intact. The labrum was intact. There was   diffuse capsulitis in the glenohumeral joint. Again, with the use   of a 4.5 full radius resector and an Oratec wand, all adhesions   were lysed in the glenohumeral joint. There was abundant mobility   in the axillary recess. Scope was then advanced into the   subacromial space. A lateral portal was then established. Hypertrophic, hemorrhagic bursal tissue was then resected. There   was significant bursal tissue. All adhesions were lysed. There   was evidence of a full-thickness cuff tear on the bursal   side. With the use of an Oratec wand and acromionizing thaddeus, an   arthroscopic subacromial decompression was then performed.  This   was taken down to the level of the deltoid fascia anteriorly, AC   joint posteriorly, and contoured from medial to lateral. Once   this was then complete, our attention was then turned to   resecting the distal clavicle. The distal 10 mm, 10 mm of distal   clavicle was then resected. Care was taken to preserve the   posterior and superior capsule. At this point, given the combination of pathology, it was   elected to perform a mini-open approach. The lateral portal was   extended to 3 cm. Deltoid split was carried up to the acromion. The stump of the biceps then was identified. It was dissected   free. It was brought out to length and tenodesed utilizing 1   Iconix 2.3 anchor. At this point, the rotator cuff tear was   identified. It was completely mobilized and it was repaired with   3 additional Iconix 2.3 anchors, recreating a double row   technique. All limbs of all sutures were passed and secured. This   yielded an excellent cuff repair which was stable back to the   greater tuberosity. The scope was then placed back in the   glenohumeral joint. The undersurface of the rotator cuff was   visualized. Anatomic footprint was reestablished. There was   abundant mobility in the glenohumeral joint. The scope was then   placed back into the subacromial space. Rotator cuff repair was   stable. At this point, with the procedure complete, arthroscopic   equipment was removed from the shoulder. The portals were   approximated using 2-0 nylon horizontal mattress sutures. The   lateral wound was closed with 0 Vicryl figure-of-eight sutures   and 2-0 Prolene subcuticular stitch. A sterile dressing was   applied. Cryo Cuff and swathe were applied. The patient was then   transferred to the recovery room in stable condition. This injury was secondary to workplace injury.         MD DANIELA Lea / LIBRA   D:  07/06/2017   15:10   T:  07/07/2017   08:41   Job #:  [id number]     Ranjeet Enriquez, Certified First Assistant

## 2017-07-07 NOTE — PROGRESS NOTES
Orthopedic Joint Progress Note    2017  Admit Date: 2017  Admit Diagnosis: Strain of rotator cuff capsule, left, sequela [S46.012S]  Biceps strain, left, sequela [S46.112S]  Glenoid labrum tear, left, sequela [S43.432S]  Arthritis of shoulder region, left [M19.012]  Bursitis of shoulder, left, adhesive [M75.02]  Strain of rotator cuff capsule, left, sequela [S46.012S]  Biceps strain, left, sequela [S46.112S]  Glenoid labrum tear, left, sequela [S43.432S]  Arthritis of shoulder region, left [M19.012]  Bursitis of shoulder, left, adhesive [M75.02]    1 Day Post-Op    Subjective: block still working no complaints     Lashell Ford     Review of Systems: Pertinent items are noted in HPI. Objective:     PT/OT:     PATIENT MOBILITY                           Vital Signs:    Blood pressure 114/84, pulse 91, temperature 96.3 °F (35.7 °C), resp. rate 16, height 5' 7\" (1.702 m), weight 78 kg (172 lb), SpO2 95 %.   Temp (24hrs), Av.4 °F (36.3 °C), Min:96 °F (35.6 °C), Max:98.6 °F (37 °C)      Pain Control:   Pain Assessment  Pain Scale 1: Numeric (0 - 10)  Pain Intensity 1: 5  Pain Onset 1: chronic for years  Pain Location 1: Shoulder  Pain Orientation 1: Left  Pain Description 1: Burning, Tingling  Pain Intervention(s) 1: Medication (see MAR)    Meds:  Current Facility-Administered Medications   Medication Dose Route Frequency    magnesium sulfate 2 g/50 ml IVPB (premix or compounded)  2 g IntraVENous ONCE    0.9% sodium chloride infusion  75 mL/hr IntraVENous CONTINUOUS    sodium chloride (NS) flush 5-10 mL  5-10 mL IntraVENous PRN    ceFAZolin (ANCEF) 1 g in 0.9% sodium chloride (MBP/ADV) 50 mL  1 g IntraVENous Q8H    bisacodyl (DULCOLAX) suppository 10 mg  10 mg Rectal DAILY PRN    sodium phosphate (FLEET'S) enema 118 mL  1 Enema Rectal PRN    promethazine (PHENERGAN) tablet 25 mg  25 mg Oral Q4H PRN    docusate sodium (COLACE) capsule 100 mg  100 mg Oral BID    temazepam (RESTORIL) capsule 15 mg  15 mg Oral QHS PRN    HYDROmorphone (PF) (DILAUDID) injection 1 mg  1 mg IntraVENous Q1H PRN    HYDROmorphone (DILAUDID) tablet 4 mg  4 mg Oral Q3H PRN    HYDROmorphone (DILAUDID) tablet 2 mg  2 mg Oral Q3H PRN    atenolol (TENORMIN) tablet 50 mg  50 mg Oral DAILY    atorvastatin (LIPITOR) tablet 10 mg  10 mg Oral DAILY    sertraline (ZOLOFT) tablet 50 mg  50 mg Oral DAILY    benazepril (LOTENSIN) tablet 20 mg  20 mg Oral DAILY    albuterol (PROVENTIL VENTOLIN) nebulizer solution 2.5 mg  2.5 mg Nebulization Q6H PRN        LAB:    No results found for: INR  Lab Results   Component Value Date/Time    HGB 15.9 07/12/2014 04:15 AM    HGB 16.6 07/11/2014 12:53 PM    HGB 16.5 10/17/2013 08:30 AM       Wound Shoulder Left (Active)   Number of days:353       Wound Shoulder Left (Active)   DRESSING STATUS Clean, dry, and intact 7/6/2017  4:36 PM   DRESSING TYPE Dry dressing 7/6/2017  4:36 PM   SPLINT TYPE/MATERIAL Shoulder Immobilizer 7/6/2017  4:36 PM   Number of days:1             Physical Exam:  No significant changes    Assessment:      Principal Problem:    Adhesive capsulitis of left shoulder (6/26/2017)    Active Problems:    HTN (hypertension) (10/14/2013)      Hyperlipidemia (10/14/2013)      Coronary atherosclerosis of native coronary vessel (11/15/2016)      Overview: [facility] 7/2014: mild CAD up to 20% ostial LM and 30% ostial RCA, normal EF            Strain of muscle(s) and tendon(s) of the rotator cuff of left shoulder, sequela (6/26/2017)      Strain of muscle, fascia and tendon of long head of biceps, left arm, sequela (6/26/2017)      SLAP lesion of left shoulder (6/26/2017)      Degenerative joint disease of left acromioclavicular joint (6/26/2017)      Depression (7/6/2017)      Hypomagnesemia (7/7/2017)         Plan:     Continue PT/OT/Rehab  No need for reblock  Replete magnesium  Discharge home after am physical therapy    Patient Expects to be Discharged to[de-identified] Private residence     Signed By: Merlin Daub., MD

## 2017-07-07 NOTE — PROGRESS NOTES
I have reviewed discharge instructions with the patient. The patient verbalized understanding. Signed discharge paper in paper chart. Pain and other medication prescription given to patient. Will discharge home with family.

## 2017-07-07 NOTE — PROGRESS NOTES
Problem: Mobility Impaired (Adult and Pediatric)  Goal: *Acute Goals and Plan of Care (Insert Text)  ALL GOALS MET 7/7/17 :  (1.) Patient will move from supine to sit and sit to supine in bed with MODIFIED INDEPENDENCE. (2.) Patient will transfer from bed to chair and chair to bed with INDEPENDENT using the least restrictive device. (3.) Patient will ambulate with INDEPENDENT for 500 feet. (4.) Patient will be safe with caregivers assist with shoulder HEP to increase range of motion per MD orders. ________________________________________________________________________________________________      PHYSICAL THERAPY: INITIAL ASSESSMENT, DISCHARGE, TREATMENT DAY: DAY OF ASSESSMENT, AM 7/7/2017  OBSERVATION: Hospital Day: 2  Payor: Marichuy Alvarado / Plan: [id number] [place] [address] / Product Type:  Workers Comp /      NAME/AGE/GENDER: Oscar Walker is a 61 y.o. male     PRIMARY DIAGNOSIS: Strain of rotator cuff capsule, left, sequela [S46.012S]  Biceps strain, left, sequela [S46.112S]  Glenoid labrum tear, left, sequela [S43.432S]  Arthritis of shoulder region, left [M19.012]  Bursitis of shoulder, left, adhesive [M75.02]  Strain of rotator cuff capsule, left, sequela [S46.012S]  Biceps strain, left, sequela [S46.112S]  Glenoid labrum tear, left, sequela [S43.432S]  Arthritis of shoulder region, left [M19.012]  Bursitis of shoulder, left, adhesive [M75.02] Adhesive capsulitis of left shoulder Adhesive capsulitis of left shoulder  Procedure(s) (LRB):  LEFT SHOULDER EXAM/MANIPULATION (Left)  ARTHROSCOPY LEFT SHOULDER ARTHROSCOPIC REVISION SUBACROMIAL DECOMPRESSION, DISTAL CLAVICLE RESECTION, LYSIS OF ADHESIONS, MINI OPEN REVISION ROTATOR CUFF REPAIR, BICEPS TENODESIS (Left)  1 Day Post-Op  ICD-10: Treatment Diagnosis:       · Pain in Left Shoulder (M25.512)  · Stiffness of Left Shoulder, Not elsewhere classified (M25.612)  · Generalized Muscle Weakness (M62.81)   Precaution/Allergies:  Review of patient's allergies indicates no known allergies. ASSESSMENT:      Mr. Zoe Severe presents with sore & stiff left shoulder along with general post op weakness. Pt reviewed & tolerated exercises per Rx, with written guidelines provided, without difficulty. Pt also performed gait, transfers & stair ambulation without limitation. This section established at most recent assessment   PROBLEM LIST (Impairments causing functional limitations):  1. Decreased [place] with Bed Mobility  2. Decreased [place] with Transfers  3. Decreased [place] with Ambulation  4. Decreased [place] with shoulder HEP    INTERVENTIONS PLANNED: (Benefits and precautions of physical therapy have been discussed with the patient.)  1. Bed Mobility Training  2. Transfer Training  3. Gait Training  4. Therapeutic Exercises per MD orders  5. Modalities for Pain      TREATMENT PLAN: Frequency/Duration: evaluation & treat 1 x  Rehabilitation Potential For Stated Goals: GOOD      RECOMMENDED REHABILITATION/EQUIPMENT: (at time of discharge pending progress): HEP & pt to follow up with put pt PT 7/10/17. HISTORY:   History of Present Injury/Illness (Reason for Referral):  Painful left shoulder  Past Medical History/Comorbidities:   Mr. Zoe Severe  has a past medical history of Abnormal EKG; Arrhythmia; Chronic obstructive pulmonary disease ([address] Contact dermatitis and other eczema, due to unspecified cause; Depression; Former smoker; Hypercholesteremia; Hyperlipidemia; Hypertension; Inguinal hernia; Left ventricular hypertrophy; and Unstable angina ([address] (07/11/2014). He also has no past medical history of Adverse effect of anesthesia; Aneurysm ([address] Asthma; Autoimmune disease ([address] Cancer ([address] Chronic kidney disease; Chronic pain; Coagulation disorder ([address] Diabetes ([address] Difficult intubation; Endocarditis; GERD (gastroesophageal reflux disease); Heart failure ([address]  Ill-defined condition; Liver disease; Malignant hyperthermia due to anesthesia; Morbid obesity ([facility] Nausea & vomiting; Nicotine vapor product user; Non-nicotine vapor product user; Pseudocholinesterase deficiency; PUD (peptic ulcer disease); Rheumatic fever; Seizures ([facility] Sleep apnea; Stroke [facility]); Thromboembolus ([facility] [address] or Thyroid disease. Mr. Kylah Luz  has a past surgical history that includes hernia repair (Right, 10/24/13); cardiac surg procedure unlist; carpal tunnel release (Left); shoulder arthroscopy (Left, 07/2016); orthopaedic (Left, 10/2017); and heart catheterization (2014). Social History/Living Environment:   Home Environment: Apartment  # Steps to Enter: 12  Rails to Enter: Yes  [employer] : Bilateral  One/Two Story Residence: One story  Living Alone: Yes  Support Systems: Child(stuart)  Patient Expects to be Discharged to[de-identified] Private residence  Current DME Used/Available at Home: None  Prior Level of Function/Work/Activity:  Pt was independent without an assistive device prior to this admission   Number of Personal Factors/Comorbidities that affect the Plan of Care: 1-2: MODERATE COMPLEXITY   EXAMINATION:   Most Recent Physical Functioning:   Gross Assessment:  AROM: Within functional limits (right UE & both LE's)  Strength: Within functional limits (right UE & both LE's)  Coordination: Within functional limits (right UE & both LE's)                     Balance:  Sitting: Intact; Without support  Standing: Intact; Without support Bed Mobility:  Supine to Sit: Modified independent  Sit to Supine:  (NT)  Scooting: Independent        Transfers:  Sit to Stand: Independent  Stand to Sit: Independent  Bed to Chair: Independent  Gait:     Gait Abnormalities: Decreased step clearance  Distance (ft): 500 Feet (ft)  Assistive Device:  (none)  Ambulation - Level of Assistance: Independent  Number of Stairs Trained: 12  Stairs - Level of Assistance: Modified independent  Rail Use: Right    Functional Mobility:         Gait/Ambulation:  Ind Transfers:  Ind        Bed Mobility:  Mod Ind   Body Structures Involved:  1. Joints  2. Muscles Body Functions Affected:  1. Sensory/Pain  2. Movement Related Activities and Participation Affected:  1. General Tasks and Demands  2. Mobility   Number of elements that affect the Plan of Care: 4+: HIGH COMPLEXITY   CLINICAL PRESENTATION:   Presentation: Stable and uncomplicated: LOW COMPLEXITY   CLINICAL DECISION MAKIN[address] [place] [address] AM-PAC 6 Clicks   Basic Mobility Inpatient Short Form  How much difficulty does the patient currently have. .. Unable A Lot A Little None   1. Turning over in bed (including adjusting bedclothes, sheets and blankets)? [ ] 1   [ ] 2   [ ] 3   [X] 4   2. Sitting down on and standing up from a chair with arms ( e.g., wheelchair, bedside commode, etc.)   [ ] 1   [ ] 2   [ ] 3   [X] 4   3. Moving from lying on back to sitting on the side of the bed? [ ] 1   [ ] 2   [ ] 3   [X] 4   How much help from another person does the patient currently need. .. Total A Lot A Little None   4. Moving to and from a bed to a chair (including a wheelchair)? [ ] 1   [ ] 2   [ ] 3   [X] 4   5. Need to walk in hospital room? [ ] 1   [ ] 2   [ ] 3   [X] 4   6. Climbing 3-5 steps with a railing? [ ] 1   [ ] 2   [ ] 3   [X] 4   © , Trustees of [address] [place] [address], under license to [employer]. All rights reserved    Score:  Initial: 24 Most Recent: X (Date: -- )     Interpretation of Tool:  Represents activities that are increasingly more difficult (i.e. Bed mobility, Transfers, Gait).        Score 24 23 22-20 19-15 14-10 9-7 6       Modifier CH CI CJ CK CL CM CN         · Mobility - Walking and Moving Around:               - CURRENT STATUS:    CH - 0% impaired, limited or restricted               - GOAL STATUS:           CH - 0% impaired, limited or restricted               - D/C STATUS:                       CH - 0% impaired, limited or restricted  Payor: Wyline January WORKERS COMPENSATION / Plan: [id number] / Product Type: Workers Comp /       Medical Necessity:     · no acute PT follow up. Reason for Services/Other Comments:  · no acute PT follow up. Use of outcome tool(s) and clinical judgement create a POC that gives a: Clear prediction of patient's progress: LOW COMPLEXITY                 TREATMENT:   (In addition to Assessment/Re-Assessment sessions the following treatments were rendered)   Pre-treatment Symptoms/Complaints:  none  Pain: Initial: visual scale  Pain Intensity 1: 3  Pain Location 1: Shoulder  Pain Orientation 1: Left  Pain Intervention(s) 1: Cold pack, Repositioned  Post Session:  3/10      Therapeutic Exercise: (15 Minutes):  Exercises per grid below to improve mobility and dynamic movement of arm - left to improve functional endurance. Required minimal verbal cues to promote proper body alignment and promote proper body mechanics.    Assessment/15 min           Date:  7/17 Date:    Date:      ACTIVITY/EXERCISE AM PM AM PM AM PM   Gripping 15aa             Wrist Flexion/Extension 15aa             Wrist Ulnar/Radial Deviation               Pronation/Supination 15aa             Elbow Flexion/Extension 15aa             Shoulder Flexion/Extension Flex 15p             Shoulder AB/ADduction               Shoulder IR/ER               Pulleys               Pendulums               Shrugs               Isometric:                 Flexion               Extension               ABduction               ADduction               Biceps/Triceps                               B = bilateral; AA = active assistive; A = active; P = passive  Education:  [X]  Home Exercises      [X]  Sling Application       [X]  Movement Precautions        [ ]  Pulleys          [X]  Use of Ice [ ]  Other:   Treatment/Session Assessment:    · Response to Treatment:  Tolerated well  · Interdisciplinary Collaboration:  · Registered Nurse  · After treatment position/precautions:  · Up in chair  · Bed/Chair-wheels locked  · RN notified  · Compliance with Program/Exercises: compliant all of the time.   · Recommendations/ Discharge PT services after evaluation  Total Treatment Duration:  PT Patient Time In/Time Out  Time In: 0830  Time Out: 0900     Yue Spencer PT

## 2017-07-07 NOTE — PROGRESS NOTES
Patient in bed. Left shoulder dressing dry and intact with immobilizer and cryocuff in place. Patient had a partial bed bath, teeth brushed, changed into personal clothes. Neurovascular status WDL to RUE, numbness to LUE. Palpable pulses. Strong  to right hand, no  to left hand. Instructed to call for assistance or any needs. Patient verbalized understanding. Call bell within reach. Side rails up x3. Bed low and locked. No distress noted.

## 2017-07-07 NOTE — PROGRESS NOTES
At mn vitals reviewed NPO status for reblock in the a.m. He stted understanding. Cont with numbness in hands. Irma Wolfe RN  2334:  Notified by MD the reblock has been cancelled as the first one is still effective.   Pt ordering his breakfast.  Irma Wolfe RN

## 2017-07-07 NOTE — DISCHARGE INSTRUCTIONS
DISCHARGE SUMMARY from Nurse    The following personal items collected during your admission are returned to you:   Dental Appliance: Dental Appliances: Lowers; Other (comment) (bridge )  Vision: Visual Aid: Glasses  Hearing Aid:   na  Jewelry: Jewelry: None  Clothing: Clothing: At bedside  Other Valuables: Other Valuables: None  Valuables sent to safe:   na          PATIENT INSTRUCTIONS:    New Medications:    Dilaudid 2 mg tabs Take 1-2 tabs every 4-6 hrs as needed for pain. Toradol 10 mg tabs Take 1 tab every 6 hrs x 5 days. Phenergan 25 mg tabs Take 1 tab every 8 hrs as needed for nausea. Restoril 15 mg tabs Take 1 tab at bedtime as needed for sleep. After general anesthesia or intravenous sedation, for 24 hours or while taking prescription Narcotics:  · Limit your activities  · Do not drive and operate hazardous machinery  · Do not make important personal or business decisions  · Do  not drink alcoholic beverages  · If you have not urinated within 8 hours after discharge, please contact your surgeon on call. Report the following to your surgeon:  · Excessive pain, swelling, redness or odor of or around the surgical area  · Temperature over 101  · Nausea and vomiting lasting longer than 4 hours or if unable to take medications  · Any signs of decreased circulation or nerve impairment to extremity: change in color, persistent  numbness, tingling, coldness or increase pain  · Any questions, call office @ [phone]. What to do at Home:  Recommended activity: activity as tolerated, as instructed per Dr. Jerry Alexandra. Continue with exercises taught by Physical Therapy. Resume per hospital diet. Wear sling to left arm. Use ice and elevate arm to decrease pain and swelling. If you experience any of the following symptoms temp>101, pain unrelieved by meds, or persistent nausea or vomitting, please follow up with Dr. Jerry Alexandra @ [phone].       *  Please give a list of your current medications to your Primary Care Provider. *  Please update this list whenever your medications are discontinued, doses are      changed, or new medications (including over-the-counter products) are added. *  Please carry medication information at all times in case of emergency situations. Shoulder Arthroscopy: What to Expect at Home  Your Recovery     Your arm may be in a sling. You will feel tired for several days. Your shoulder will be swollen, and you may notice that your skin is a different color near the cuts the doctor made (incisions). Your hand and arm may also be swollen. This is normal and will go away in a few days. Depending on the medicine you had during the surgery, your entire arm may feel numb or like you cannot move it. This goes away in 12 to 24 hours. When you can return to work or your usual routine will depend on your shoulder problem. Most people need 6 weeks or longer to recover. How much time you need depends on the surgery that was done. You may have to limit your activity until your shoulder strength and range of motion return to normal. You may also be in a rehabilitation program (rehab). If you have a desk job, you may be able to return to work a few days after the surgery. If you lift things at work, it may take months before you return to work. This care sheet gives you a general idea about how long it will take for you to recover. But each person recovers at a different pace. Follow the steps below to get better as quickly as possible. How can you care for yourself at home? Activity  · Rest when you feel tired. Getting enough sleep will help you recover. You may be more comfortable if you sleep in a reclining chair. To make your arm and shoulder feel better, keep a thin pillow under the back of your arm while you are lying down. · Try to walk each day. Start by walking a little more than you did the day before. Bit by bit, increase the amount you walk.  Walking boosts blood flow and helps prevent pneumonia and constipation. · For 2 to 3 weeks, avoid lifting anything heavier than a plate or a glass. You need to give your shoulder time to heal.  · Your arm may be in a sling. You may need to use the sling for a few days to a few weeks. Your doctor will advise you on how long to wear the sling. · You may take the sling off when you dress or wash. · Do not use your arm for repeated movements. These include painting, vacuuming, or using a computer. Diet  · You can eat your normal diet. If your stomach is upset, try bland, low-fat foods like plain rice, broiled chicken, toast, and yogurt. · Drink plenty of fluids, unless your doctor tells you not to. · You may notice that your bowel movements are not regular right after your surgery. This is common. Try to avoid constipation and straining with bowel movements. You may want to take a fiber supplement every day. If you have not had a bowel movement after a couple of days, ask your doctor about taking a mild laxative. Medicines  · Take pain medicines exactly as directed. ¨ If the doctor gave you a prescription medicine for pain, take it as prescribed. ¨ If you are not taking a prescription pain medicine, ask your doctor if you can take an over-the-counter medicine. · If you think your pain medicine is making you sick to your stomach:  ¨ Take your medicine after meals (unless your doctor has told you not to). ¨ Ask your doctor for a different pain medicine. · If your doctor prescribed antibiotics, take them as directed. Do not stop taking them just because you feel better. You need to take the full course of antibiotics. Incision care  · If you have a dressing over your incision, keep it clean and dry. You may remove it 2 to 3 days after the surgery. · If your incision is open to the air, keep the area clean and dry. · If you have strips of tape on the incision, leave the tape on for a week or until it falls off. Exercise  · You may need rehabilitation. This is a series of exercises you do after your surgery. Rehab helps you get back your shoulder's range of motion and strength. You will work with your doctor and physical therapist to plan this exercise program. To get the best results, you need to do the exercises correctly and as often and as long as your doctor tells you. Ice  · To reduce swelling and pain, put ice or a cold pack on your shoulder for 10 to 20 minutes at a time. Do this every 1 to 2 hours. Put a thin cloth between the ice and your skin. Follow-up care is a key part of your treatment and safety. Be sure to make and go to all appointments, and call your doctor if you are having problems. It's also a good idea to know your test results and keep a list of the medicines you take. When should you call for help? Call 911 anytime you think you may need emergency care. For example, call if:  · You passed out (lost consciousness). · You have severe trouble breathing. · You have sudden chest pain and shortness of breath, or you cough up blood. Call your doctor now or seek immediate medical care if:  · Your hand is cool, pale, or numb, or it changes color. · You are unable to move your fingers, wrist, or elbow. · You are sick to your stomach or cannot keep fluids down. · You have pain that does not get better after you take pain medicine. · You have signs of infection, such as:  ¨ Increased pain, swelling, warmth, or redness. ¨ Red streaks leading from the incision. ¨ Pus draining from the incision. ¨ A fever. · You have loose stitches, or your incision comes open. · Your incision bleeds through your first dressing or is still bleeding 3 days after your surgery. Watch closely for changes in your health, and be sure to contact your doctor if:  · Your sling feels too tight, and you cannot loosen it. · You have new or increased swelling in your arm. · You have new pain that develops in another area of the affected limb.  For example, you have pain in your hand or elbow. · You do not have a bowel movement after taking a laxative. · You do not get better as expected. Where can you learn more? Go to [email or web address]  Enter K591 in the search box to learn more about \"Shoulder Arthroscopy: What to Expect at Home. \"   © 4468-7158 [employer], [employer]. Care instructions adapted under license by Debbie Gallego (which disclaims liability or warranty for this information). This care instruction is for use with your licensed healthcare professional. If you have questions about a medical condition or this instruction, always ask your healthcare professional. [place] [address] any warranty or liability for your use of this information. Content Version: 39.3.161110; Current as of: June 4, 2014                      These are general instructions for a healthy lifestyle:    No smoking/ No tobacco products/ Avoid exposure to second hand smoke    Surgeon General's Warning:  Quitting smoking now greatly reduces serious risk to your health. Obesity, smoking, and sedentary lifestyle greatly increases your risk for illness    A healthy diet, regular physical exercise & weight monitoring are important for maintaining a healthy lifestyle    You may be retaining fluid if you have a history of heart failure or if you experience any of the following symptoms:  Weight gain of 3 pounds or more overnight or 5 pounds in a week, increased swelling in our hands or feet or shortness of breath while lying flat in bed. Please call your doctor as soon as you notice any of these symptoms; do not wait until your next office visit. Recognize signs and symptoms of STROKE:    F-face looks uneven    A-arms unable to move or move unevenly    S-speech slurred or non-existent    T-time-call 911 as soon as signs and symptoms begin-DO NOT go       Back to bed or wait to see if you get better-TIME IS BRAIN.         The discharge information has been reviewed with the patient. The patient verbalized understanding.

## 2017-07-07 NOTE — PROGRESS NOTES
Left shoulder dressing changed per order using sterile technique. Incision stiches intact with steri-strips in place. Minimal postop bruising and edema noted, with scant amount of sanguinous drainage noted to surgical puncture site. Covered using sterile gauze and Tegaderm. Shoulder immobilizer reapplied. Patient tolerated procedure well.

## 2017-07-10 ENCOUNTER — HOSPITAL ENCOUNTER (OUTPATIENT)
Dept: PHYSICAL THERAPY | Age: 57
Discharge: HOME | End: 2017-07-10
Payer: COMMERCIAL

## 2017-07-10 PROCEDURE — 97016 VASOPNEUMATIC DEVICE THERAPY: CPT

## 2017-07-10 PROCEDURE — 97161 PT EVAL LOW COMPLEX 20 MIN: CPT

## 2017-07-10 NOTE — DISCHARGE SUMMARY
[address] [facility]       Name:  Claudell Engel   MR#:  [id number]   :  1960   Account #:  [de-identified]   Date of Adm:  2017       ADMISSION DIAGNOSES   1. Adhesive capsulitis, left shoulder. 2. Acromioclavicular arthritis, left shoulder. 3. Rotator cuff tear, left shoulder. 4. Bicipital strain, left shoulder. 5. Superior labral tear from anterior to posterior, left   shoulder. DISCHARGE DIAGNOSES   1. Adhesive capsulitis, left shoulder. 2. Acromioclavicular arthritis, left shoulder. 3. Rotator cuff tear, left shoulder. 4. Bicipital strain, left shoulder. 5. Superior labral tear from anterior to posterior, left   shoulder. 6. Hypomagnesemia. Please see H and P, operative notes, and consult notes for   details. HISTORY OF PRESENT ILLNESS: The patient is a 59-year-old   gentleman who was admitted on 2017 and underwent an   uncomplicated manipulation of the left shoulder, arthroscopy of   left shoulder, revision ASD, ADCR, lysis of adhesions, mini open   rotator cuff repair, and biceps tenodesis. The patient was kept   overnight for observation. On postoperative day #1, his block was   still in effect. His magnesium was low, at 1.6, and magnesium was   repleted. It was elected not to reblock him, at this point, due   to the fact that his block was still in effect. He had physical   therapy in the morning, on postoperative day #1, 2017, and   he was discharged on postoperative day #1 with continued therapy   on the outside in my office 2 weeks. Please note, a hospitalist consult was obtained to manage his   medical issues and his hospital course.             MD DANIELA Wilson / SHARLENE   D:  2017   09:24   T:  07/10/2017   04:40   Job #:  [id number]

## 2017-07-10 NOTE — PROGRESS NOTES
Cristobal Friday  : 1960 [facility] CORNELIUS FINLEY  [address], [address], [place].  [phone]   [phone]       OUTPATIENT PHYSICAL THERAPY:Initial Assessment 2017      ICD-10: Treatment Diagnosis: Strain of muscle(s) and tendon(s) of the rotator cuff of left shoulder, sequela (S46.012S); Pain in left shoulder (M25.512); Adhesive capsulitis of left shoulder (M75.02)  Precautions/Allergies:   Review of patient's allergies indicates no known allergies. Fall Risk Score: 2 (? 5 = High Risk)  MD Orders: Evaluate and treat, HEP, ROM, Push ROM- do not force elbow extension MEDICAL/REFERRING DIAGNOSIS:  left shoulder   DATE OF ONSET: Surgery 17  REFERRING PHYSICIAN: Brittney Mcgregor MD  RETURN PHYSICIAN APPOINTMENT: 17     INITIAL ASSESSMENT:  Mr. Anam Bowers is 3 days s/p L shoulder mini open revision rotator cuff repair and bicep tenodesis. Post-op pain, swelling, wound healing, weakness and decreased ROM are limiting normalized use and function of left UE. He will benefit from PT for guided shoulder rehab per mini open rotator cuff protocol to promote safe return to normalized use of the UE with daily and work activities. PROBLEM LIST (Impacting functional limitations):  1. Post-op left shoulder pain and swelling  2. Decreased left shoulder ROM   3. Weakness left shoulder INTERVENTIONS PLANNED:  1. Thermal and electric modalities, manual therapies for pain   2. Manual therapies, therapeutic exercises, HEP for ROM    3. Therapeutic exercises and HEP for strength   TREATMENT PLAN:  Effective Dates:7-10-17 TO 10-6-17. Frequency/Duration: 3 times a week for 12 weeks  GOALS: (Goals have been discussed and agreed upon with patient.)  Short-Term Functional Goals: Time Frame: 6 weeks  1. Report no more than 3/10 intermittent pain to left shoulder with compensatory use during basic functional activities.   2. Left shoulder PROM forward elevation greater than 135 degrees and external rotation greater than 60 degrees to progress into functional ranges. 3. Demonstrate good left shoulder isometric strength with manual testing to progress into strength phase. 4. Independent with initial HEP. Discharge Goals: Time Frame: 12 weeks  1. No more than 1/10 intermittent pain left shoulder with return to normalized household and work activities, and score less than 25% on the DASH. 2. Left shoulder AROM forward elevation greater than 135 degrees, external rotation greater than 60 degrees, and strength to shoulder are grossly WNL's for safe use with normalized activities. 3. Demonstrate good functional shoulder strength and endurance for return to normalized household and work activities. 4. Independent with advanced shoulder HEP for continued self-management. Rehabilitation Potential For Stated Goals: Good  Regarding [address] [place] [address] Road therapy, I certify that the treatment plan above will be carried out by a therapist or under their direction. Thank you for this referral,    Anders Valencia PT       Referring Physician Signature: Deja Salvador MD              Date                      HISTORY:   History of Present Injury/Illness (Reason for Referral): Injury March 3rd 2016 was lifting something overhead and felt a pop. Had physical therapy and then decided to have surgery 7-19-17.  2-3 months after surgery his shoulder started to get stiff and had manipulation on 10-25-16. He went back to physical therapy and was not getting any better. Then Dr. Theodore Wilkes referred him to Dr. Lian Arias. Then had surgery 7-6-17. He did stay over night in the hospital. Pain at rest 4/10. Past Medical History/Comorbidities:   HTN, carpal tunnel surgery  Social History/Living Environment:    Lives alone  Prior Level of Function/Work/Activity:  Works for [employer]. Needs to be able to work overhead.  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 Dominant Side:         RIGHT handed but plays some sports left handed  Previous Treatment Approaches:          He has had therapy in the past before and after 1st rotator cuff surgery  Current Medications:     Benazepril, atenolol, atorvastatin, dilaudid, ventolin, Restoril    Date Last Reviewed:  7-11-17   Number of Personal Factors/Comorbidities that affect the Plan of Care: 1-2: MODERATE COMPLEXITY   EXAMINATION:   Observation/Orthostatic Postural Assessment:   Palpation: general tenderness around the shoulder     ROM:      LUE PROM  L Shoulder Flexion: 100  L Shoulder ABduction: 75  L Shoulder Internal Rotation: 50  L Shoulder External Rotation: 25 (at neutral)    RUE AROM  R Shoulder Flexion: 155  R Shoulder Internal Rotation:  (T7 behind back)  R Shoulder External Rotation: 90 (at neutral)         Strength: n/t secondary to post-op                 Special Tests: None  Functional Mobility:  Sit to/from Stand: independent. Bed Mobility: independent. Independent with basic mobility. independent with dressing and grooming ADL's. Body Structures Involved:  1. Joints  2. Muscles  3. Ligaments Body Functions Affected:  1. Neuromusculoskeletal Activities and Participation Affected:  1. Community, Social and [place]   Number of elements (examined above) that affect the Plan of Care: 3: MODERATE COMPLEXITY   CLINICAL PRESENTATION:   Presentation: Stable and uncomplicated: LOW COMPLEXITY   CLINICAL DECISION MAKING:   Outcome Measure: Tool Used: Disabilities of the Arm, Shoulder and Hand (DASH) Questionnaire - Quick Version  Score:  Initial: 50/55  Most Recent: X/55 (Date: -- )   Interpretation of Score: The DASH is designed to measure the activities of daily living in person's with upper extremity dysfunction or pain. Each section is scored on a 1-5 scale, 5 representing the greatest disability. The scores of each section are added together for a total score of 55. Medical Necessity:   · Patient is expected to demonstrate progress in strength and range of motion to improve functional mobility. Reason for Services/Other Comments:  · Patient continues to require skilled intervention due to post-op, decreased ROM and UE strength. Use of outcome tool(s) and clinical judgement create a POC that gives a: Questionable prediction of patient's progress: MODERATE COMPLEXITY          TREATMENT:   (In addition to Assessment/Re-Assessment sessions the following treatments were rendered)  Pre-treatment Symptoms/Complaints:  Pt reports he has a difficult time relaxing the arm in the sling and feels like he is holding his shoulder up. Pain: Initial:     4/10 Post Session:  4/10     Therapeutic Exercise ( 5 min):  Exercises to improve mobility. PROM Left shoulder x10 each to ER and IR in shoulder neutral, abduction, and forward elevation. Instruction and performance in post-op shoulder exercises, includin, pendulums, table walk-outs, AROM hand/wrist/forearm, and elbow. Education in rotator cuff precautions. patient verbalizes understanding. Required moderate verbal cues to promote proper body alignment. Therapeutic Modalities: vaso pneumatic compression ice pack to the Left shoulder with game ready with minimal compression x 10 min                       Left Shoulder Cold  Type: Cold/ice pack  Duration : 10 minutes  Patient Position: Sitting                                                                        HEP: Provided written HEP for the above exercises, use of ice for pain and swelling. Pt verbalizes understanding. Treatment/Session Assessment:    · Response to Treatment: Good return demonstration of HEP. · Compliance with Program/Exercises: Will assess as treatment progresses. · Recommendations/Intent for next treatment session: \"Next visit will focus on L shoulder ROM\".   Total Treatment Duration: 50  minutes  :PT Patient Time In/Time Out  Time In: 1520  Time Out: [address]

## 2017-07-11 NOTE — PROGRESS NOTES
Ambulatory/Rehab Services H2 Model Falls Risk Assessment    Risk Factor Pts. ·   Confusion/Disorientation/Impulsivity  []    4 ·   Symptomatic Depression  []   2 ·   Altered Elimination  []   1 ·   Dizziness/Vertigo  []   1 ·   Gender (Male)  [x]   1 ·   Any administered antiepileptics (anticonvulsants):  []   2 ·   Any administered benzodiazepines:  []   1 ·   Visual Impairment (specify):  []   1 ·   Portable Oxygen Use  []   1 ·   Orthostatic ? BP  []   1 ·   History of Recent Falls (within 3 mos.)  []   5     Ability to Rise from Chair (choose one) Pts. ·   Ability to rise in a single movement  [x]   0 ·   Pushes up, successful in one attempt  []   1 ·   Multiple attempts, but successful  []   3 ·   Unable to rise without assistance  []   4   Total: (5 or greater = High Risk) 1     Falls Prevention Plan:   []                Physical Limitations to Exercise (specify):   []                Mobility Assistance Device (type):   []                Exercise/Equipment Adaptation (specify):    ©2010 [facility] of [address] Patent #[id number].  Federal Law prohibits the replication, distribution or use without written permission from [facility] [employer]

## 2017-07-12 ENCOUNTER — HOSPITAL ENCOUNTER (OUTPATIENT)
Dept: PHYSICAL THERAPY | Age: 57
Discharge: HOME | End: 2017-07-12
Payer: COMMERCIAL

## 2017-07-12 PROCEDURE — 97016 VASOPNEUMATIC DEVICE THERAPY: CPT

## 2017-07-12 PROCEDURE — 97110 THERAPEUTIC EXERCISES: CPT

## 2017-07-12 NOTE — PROGRESS NOTES
Sheila Cervantes  : 1960 [facility] CORNELIUS FINLEY  [address] [facility], [address] [place] [address],[address], [facility] [address].  [phone]   [phone]       OUTPATIENT PHYSICAL THERAPY:Daily Note 2017      ICD-10: Treatment Diagnosis: Strain of muscle(s) and tendon(s) of the rotator cuff of left shoulder, sequela (S46.012S); Pain in left shoulder (M25.512); Adhesive capsulitis of left shoulder (M75.02)  Precautions/Allergies:   Review of patient's allergies indicates no known allergies. Fall Risk Score: 2 (? 5 = High Risk)  MD Orders: Evaluate and treat, HEP, ROM, Push ROM- do not force elbow extension MEDICAL/REFERRING DIAGNOSIS:  left shoulder   DATE OF ONSET: Surgery 17  REFERRING PHYSICIAN: Deja Salvador MD  RETURN PHYSICIAN APPOINTMENT: 17     INITIAL ASSESSMENT:  Mr. Sheri Eldridge is 3 days s/p L shoulder mini open revision rotator cuff repair and bicep tenodesis. Post-op pain, swelling, wound healing, weakness and decreased ROM are limiting normalized use and function of left UE. He will benefit from PT for guided shoulder rehab per mini open rotator cuff protocol to promote safe return to normalized use of the UE with daily and work activities. PROBLEM LIST (Impacting functional limitations):  1. Post-op left shoulder pain and swelling  2. Decreased left shoulder ROM   3. Weakness left shoulder INTERVENTIONS PLANNED:  1. Thermal and electric modalities, manual therapies for pain   2. Manual therapies, therapeutic exercises, HEP for ROM    3. Therapeutic exercises and HEP for strength   TREATMENT PLAN:  Effective Dates:7-10-17 TO 10-6-17. Frequency/Duration: 3 times a week for 12 weeks  GOALS: (Goals have been discussed and agreed upon with patient.)  Short-Term Functional Goals: Time Frame: 6 weeks  1. Report no more than 3/10 intermittent pain to left shoulder with compensatory use during basic functional activities.   2. Left shoulder PROM forward elevation greater than 135 degrees and external rotation greater than 60 degrees to progress into functional ranges. 3. Demonstrate good left shoulder isometric strength with manual testing to progress into strength phase. 4. Independent with initial HEP. Discharge Goals: Time Frame: 12 weeks  1. No more than 1/10 intermittent pain left shoulder with return to normalized household and work activities, and score less than 25% on the DASH. 2. Left shoulder AROM forward elevation greater than 135 degrees, external rotation greater than 60 degrees, and strength to shoulder are grossly WNL's for safe use with normalized activities. 3. Demonstrate good functional shoulder strength and endurance for return to normalized household and work activities. 4. Independent with advanced shoulder HEP for continued self-management. Rehabilitation Potential For Stated Goals: Good                HISTORY:   History of Present Injury/Illness (Reason for Referral): Injury March 3rd 2016 was lifting something overhead and felt a pop. Had physical therapy and then decided to have surgery 7-19-17.  2-3 months after surgery his shoulder started to get stiff and had manipulation on 10-25-16. He went back to physical therapy and was not getting any better. Then Dr. Soraya Mason referred him to Dr. Chu Bosch. Then had surgery 7-6-17. He did stay over night in the hospital. Pain at rest 4/10. Past Medical History/Comorbidities:   HTN, carpal tunnel surgery  Social History/Living Environment:    Lives alone  Prior Level of Function/Work/Activity:  Works for [employer]. 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    Dominant Side:         RIGHT handed but plays some sports left handed  Previous Treatment Approaches:          He has had therapy in the past before and after 1st rotator cuff surgery  Current Medications:     Benazepril, atenolol, atorvastatin, dilaudid, ventolin, Restoril    Date Last Reviewed:  7-11-17   Number of Personal Factors/Comorbidities that affect the Plan of Care: 1-2: MODERATE COMPLEXITY   EXAMINATION:   Observation/Orthostatic Postural Assessment:   Palpation: general tenderness around the shoulder     ROM:                     Strength: n/t secondary to post-op                 Special Tests: None  Functional Mobility:  Sit to/from Stand: independent. Bed Mobility: independent. Independent with basic mobility. independent with dressing and grooming ADL's. Body Structures Involved:  1. Joints  2. Muscles  3. Ligaments Body Functions Affected:  1. Neuromusculoskeletal Activities and Participation Affected:  1. Community, Social and [place]   Number of elements (examined above) that affect the Plan of Care: 3: MODERATE COMPLEXITY   CLINICAL PRESENTATION:   Presentation: Stable and uncomplicated: LOW COMPLEXITY   CLINICAL DECISION MAKING:   Outcome Measure: Tool Used: Disabilities of the Arm, Shoulder and Hand (DASH) Questionnaire - Quick Version  Score:  Initial: 50/55  Most Recent: X/55 (Date: -- )   Interpretation of Score: The DASH is designed to measure the activities of daily living in person's with upper extremity dysfunction or pain. Each section is scored on a 1-5 scale, 5 representing the greatest disability. The scores of each section are added together for a total score of 55. Medical Necessity:   · Patient is expected to demonstrate progress in strength and range of motion to improve functional mobility. Reason for Services/Other Comments:  · Patient continues to require skilled intervention due to post-op, decreased ROM and UE strength.    Use of outcome tool(s) and clinical judgement create a POC that gives a: Questionable prediction of patient's progress: MODERATE COMPLEXITY          TREATMENT:   (In addition to Assessment/Re-Assessment sessions the following treatments were rendered)  Pre-treatment Symptoms/Complaints: Pt reports he woke up this morning and his shoulder was very sore. Pain: Initial:     7-8/10 Post Session:  4/10     Therapeutic Exercise (25 Minutes):  Exercises to improve mobility. PROM Left shoulder to ER and IR in shoulder neutral, abduction, and forward elevation. Therapeutic Modalities: vaso pneumatic compression ice pack to the Left shoulder with game ready with minimal compression x 10 min                       Left Shoulder Cold  Type: Cold/ice pack  Duration : 15 minutes  Patient Position: Sitting                                                                        HEP: Provided written HEP for the above exercises, use of ice for pain and swelling. Pt verbalizes understanding. Treatment/Session Assessment:    · Response to Treatment: He does well relaxing with L shoulder PROM. · Compliance with Program/Exercises: Will assess as treatment progresses. · Recommendations/Intent for next treatment session: \"Next visit will focus on L shoulder ROM\".   Total Treatment Duration: 40  minutes  :PT Patient Time In/Time Out  Time In: 1835  Time Out: 65 PEDRO Narayanan

## 2017-07-14 ENCOUNTER — HOSPITAL ENCOUNTER (OUTPATIENT)
Dept: PHYSICAL THERAPY | Age: 57
Discharge: HOME | End: 2017-07-14
Payer: COMMERCIAL

## 2017-07-14 PROCEDURE — 97016 VASOPNEUMATIC DEVICE THERAPY: CPT

## 2017-07-14 PROCEDURE — 97110 THERAPEUTIC EXERCISES: CPT

## 2017-07-14 NOTE — PROGRESS NOTES
Elly Wilson  : 1960 [facility] CORNELIUS FINLEY  1101 [facility], [address] [place] [address],[address], [facility] [address].  [phone]   [phone]       OUTPATIENT PHYSICAL THERAPY:Daily Note 2017      ICD-10: Treatment Diagnosis: Strain of muscle(s) and tendon(s) of the rotator cuff of left shoulder, sequela (S46.012S); Pain in left shoulder (M25.512); Adhesive capsulitis of left shoulder (M75.02)  Precautions/Allergies:   Review of patient's allergies indicates no known allergies. Fall Risk Score: 2 (? 5 = High Risk)  MD Orders: Evaluate and treat, HEP, ROM, Push ROM- do not force elbow extension MEDICAL/REFERRING DIAGNOSIS:  left shoulder   DATE OF ONSET: Surgery 17  REFERRING PHYSICIAN: Reanna Higgins MD  RETURN PHYSICIAN APPOINTMENT: 17     INITIAL ASSESSMENT:  Mr. Kal Jolley is 3 days s/p L shoulder mini open revision rotator cuff repair and bicep tenodesis. Post-op pain, swelling, wound healing, weakness and decreased ROM are limiting normalized use and function of left UE. He will benefit from PT for guided shoulder rehab per mini open rotator cuff protocol to promote safe return to normalized use of the UE with daily and work activities. PROBLEM LIST (Impacting functional limitations):  1. Post-op left shoulder pain and swelling  2. Decreased left shoulder ROM   3. Weakness left shoulder INTERVENTIONS PLANNED:  1. Thermal and electric modalities, manual therapies for pain   2. Manual therapies, therapeutic exercises, HEP for ROM    3. Therapeutic exercises and HEP for strength   TREATMENT PLAN:  Effective Dates:7-10-17 TO 10-6-17. Frequency/Duration: 3 times a week for 12 weeks  GOALS: (Goals have been discussed and agreed upon with patient.)  Short-Term Functional Goals: Time Frame: 6 weeks  1. Report no more than 3/10 intermittent pain to left shoulder with compensatory use during basic functional activities.   2. Left shoulder PROM forward elevation greater than 135 degrees and external rotation greater than 60 degrees to progress into functional ranges. 3. Demonstrate good left shoulder isometric strength with manual testing to progress into strength phase. 4. Independent with initial HEP. Discharge Goals: Time Frame: 12 weeks  1. No more than 1/10 intermittent pain left shoulder with return to normalized household and work activities, and score less than 25% on the DASH. 2. Left shoulder AROM forward elevation greater than 135 degrees, external rotation greater than 60 degrees, and strength to shoulder are grossly WNL's for safe use with normalized activities. 3. Demonstrate good functional shoulder strength and endurance for return to normalized household and work activities. 4. Independent with advanced shoulder HEP for continued self-management. Rehabilitation Potential For Stated Goals: Good                HISTORY:   History of Present Injury/Illness (Reason for Referral): Injury March 3rd 2016 was lifting something overhead and felt a pop. Had physical therapy and then decided to have surgery 7-19-17.  2-3 months after surgery his shoulder started to get stiff and had manipulation on 10-25-16. He went back to physical therapy and was not getting any better. Then Dr. Jah Ulloa referred him to Dr. uJan Manuel Crenshaw. Then had surgery 7-6-17. He did stay over night in the hospital. Pain at rest 4/10. Past Medical History/Comorbidities:   HTN, carpal tunnel surgery  Social History/Living Environment:    Lives alone  Prior Level of Function/Work/Activity:  Works for [employer]. 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    Dominant Side:         RIGHT handed but plays some sports left handed  Previous Treatment Approaches:          He has had therapy in the past before and after 1st rotator cuff surgery  Current Medications:     Benazepril, atenolol, atorvastatin, dilaudid, ventolin, Restoril    Date Last Reviewed:  7-11-17   Number of Personal Factors/Comorbidities that affect the Plan of Care: 1-2: MODERATE COMPLEXITY   EXAMINATION:   Observation/Orthostatic Postural Assessment: dressing changed today. Sutures intact with no redness or drainage noted  Palpation: general tenderness around the shoulder     ROM:      LUE PROM  L Shoulder Flexion: 110  L Shoulder ABduction: 80  L Shoulder Internal Rotation: 50  L Shoulder External Rotation: 40 (at neutral)              Strength: n/t secondary to post-op                 Special Tests: None  Functional Mobility:  Sit to/from Stand: independent. Bed Mobility: independent. Independent with basic mobility. independent with dressing and grooming ADL's. Body Structures Involved:  1. Joints  2. Muscles  3. Ligaments Body Functions Affected:  1. Neuromusculoskeletal Activities and Participation Affected:  1. Community, Social and [place]   Number of elements (examined above) that affect the Plan of Care: 3: MODERATE COMPLEXITY   CLINICAL PRESENTATION:   Presentation: Stable and uncomplicated: LOW COMPLEXITY   CLINICAL DECISION MAKING:   Outcome Measure: Tool Used: Disabilities of the Arm, Shoulder and Hand (DASH) Questionnaire - Quick Version  Score:  Initial: 50/55  Most Recent: X/55 (Date: -- )   Interpretation of Score: The DASH is designed to measure the activities of daily living in person's with upper extremity dysfunction or pain. Each section is scored on a 1-5 scale, 5 representing the greatest disability. The scores of each section are added together for a total score of 55. Medical Necessity:   · Patient is expected to demonstrate progress in strength and range of motion to improve functional mobility. Reason for Services/Other Comments:  · Patient continues to require skilled intervention due to post-op, decreased ROM and UE strength.    Use of outcome tool(s) and clinical judgement create a POC that gives a: Questionable prediction of patient's progress: MODERATE COMPLEXITY          TREATMENT:   (In addition to Assessment/Re-Assessment sessions the following treatments were rendered)  Pre-treatment Symptoms/Complaints:  Pt reports his shoulder continues to feel very sore. Pain: Initial:     7-8/10 Post Session:  4/10     Therapeutic Exercise (25 Minutes):  Exercises to improve mobility. PROM Left shoulder to ER and IR in shoulder neutral, abduction, and forward elevation. Therapeutic Modalities: vaso pneumatic compression ice pack to the Left shoulder with game ready with minimal compression x 15 min                       Left Shoulder Cold  Type: Cold/ice pack  Duration : 15 minutes  Patient Position: Sitting                                                                        HEP: Provided written HEP for the above exercises, use of ice for pain and swelling. Pt verbalizes understanding. Treatment/Session Assessment:    · Response to Treatment: Improved L shoulder PROM today. · Compliance with Program/Exercises: appears complaint. · Recommendations/Intent for next treatment session: \"Next visit will focus on L shoulder ROM\".   Total Treatment Duration: 40  minutes  :PT Patient Time In/Time Out  Time In: 1025  Time Out: [address]

## 2017-07-17 ENCOUNTER — HOSPITAL ENCOUNTER (OUTPATIENT)
Dept: PHYSICAL THERAPY | Age: 57
Discharge: HOME | End: 2017-07-17
Payer: COMMERCIAL

## 2017-07-17 PROCEDURE — 97110 THERAPEUTIC EXERCISES: CPT

## 2017-07-17 PROCEDURE — 97016 VASOPNEUMATIC DEVICE THERAPY: CPT

## 2017-07-17 NOTE — PROGRESS NOTES
Ross Guevara  : 1960 [facility] CORNELIUS FINLEY  [address] [place] [address], [address] [place] [address],[address], [address] [facility]  [phone]   [phone]       OUTPATIENT PHYSICAL THERAPY:Daily Note 2017      ICD-10: Treatment Diagnosis: Strain of muscle(s) and tendon(s) of the rotator cuff of left shoulder, sequela (S46.012S); Pain in left shoulder (M25.512); Adhesive capsulitis of left shoulder (M75.02)  Precautions/Allergies:   Review of patient's allergies indicates no known allergies. Fall Risk Score: 2 (? 5 = High Risk)  MD Orders: Evaluate and treat, HEP, ROM, Push ROM- do not force elbow extension MEDICAL/REFERRING DIAGNOSIS:  left shoulder   DATE OF ONSET: Surgery 17  REFERRING PHYSICIAN: Maru Arias MD  RETURN PHYSICIAN APPOINTMENT: 17     INITIAL ASSESSMENT:  Mr. Jeremías Solis is 3 days s/p L shoulder mini open revision rotator cuff repair and bicep tenodesis. Post-op pain, swelling, wound healing, weakness and decreased ROM are limiting normalized use and function of left UE. He will benefit from PT for guided shoulder rehab per mini open rotator cuff protocol to promote safe return to normalized use of the UE with daily and work activities. PROBLEM LIST (Impacting functional limitations):  1. Post-op left shoulder pain and swelling  2. Decreased left shoulder ROM   3. Weakness left shoulder INTERVENTIONS PLANNED:  1. Thermal and electric modalities, manual therapies for pain   2. Manual therapies, therapeutic exercises, HEP for ROM    3. Therapeutic exercises and HEP for strength   TREATMENT PLAN:  Effective Dates:7-10-17 TO 10-6-17. Frequency/Duration: 3 times a week for 12 weeks  GOALS: (Goals have been discussed and agreed upon with patient.)  Short-Term Functional Goals: Time Frame: 6 weeks  1. Report no more than 3/10 intermittent pain to left shoulder with compensatory use during basic functional activities.   2. Left shoulder PROM forward elevation greater than 135 degrees and external rotation greater than 60 degrees to progress into functional ranges. 3. Demonstrate good left shoulder isometric strength with manual testing to progress into strength phase. 4. Independent with initial HEP. Discharge Goals: Time Frame: 12 weeks  1. No more than 1/10 intermittent pain left shoulder with return to normalized household and work activities, and score less than 25% on the DASH. 2. Left shoulder AROM forward elevation greater than 135 degrees, external rotation greater than 60 degrees, and strength to shoulder are grossly WNL's for safe use with normalized activities. 3. Demonstrate good functional shoulder strength and endurance for return to normalized household and work activities. 4. Independent with advanced shoulder HEP for continued self-management. Rehabilitation Potential For Stated Goals: Good                HISTORY:   History of Present Injury/Illness (Reason for Referral): Injury March 3rd 2016 was lifting something overhead and felt a pop. Had physical therapy and then decided to have surgery 7-19-17.  2-3 months after surgery his shoulder started to get stiff and had manipulation on 10-25-16. He went back to physical therapy and was not getting any better. Then Dr. Delfin Patel referred him to Dr. Ulisses Foreman. Then had surgery 7-6-17. He did stay over night in the hospital. Pain at rest 4/10. Past Medical History/Comorbidities:   HTN, carpal tunnel surgery  Social History/Living Environment:    Lives alone  Prior Level of Function/Work/Activity:  Works for [employer]. 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    Dominant Side:         RIGHT handed but plays some sports left handed  Previous Treatment Approaches:          He has had therapy in the past before and after 1st rotator cuff surgery  Current Medications:     Benazepril, atenolol, atorvastatin, dilaudid, ventolin, Restoril    Date Last Reviewed:  7-17-17   Number of Personal Factors/Comorbidities that affect the Plan of Care: 1-2: MODERATE COMPLEXITY   EXAMINATION:   Observation/Orthostatic Postural Assessment: dressing changed today. Sutures intact with no redness or drainage noted  Palpation: general tenderness around the shoulder     ROM:                     Strength: n/t secondary to post-op                 Special Tests: None  Functional Mobility:  Sit to/from Stand: independent. Bed Mobility: independent. Independent with basic mobility. independent with dressing and grooming ADL's. CLINICAL DECISION MAKING:   Outcome Measure: Tool Used: Disabilities of the Arm, Shoulder and Hand (DASH) Questionnaire - Quick Version  Score:  Initial: 50/55  Most Recent: X/55 (Date: -- )   Interpretation of Score: The DASH is designed to measure the activities of daily living in person's with upper extremity dysfunction or pain. Each section is scored on a 1-5 scale, 5 representing the greatest disability. The scores of each section are added together for a total score of 55. Medical Necessity:   · Patient is expected to demonstrate progress in strength and range of motion to improve functional mobility. Reason for Services/Other Comments:  · Patient continues to require skilled intervention due to post-op, decreased ROM and UE strength. Use of outcome tool(s) and clinical judgement create a POC that gives a: Questionable prediction of patient's progress: MODERATE COMPLEXITY          TREATMENT:   (In addition to Assessment/Re-Assessment sessions the following treatments were rendered)  Pre-treatment Symptoms/Complaints:  Pt reports his shoulder is always sore in the morning. Pain: Initial:     7-8/10 Post Session:  4/10     Therapeutic Exercise (25 Minutes):  Exercises to improve mobility. PROM Left shoulder to ER and IR in shoulder neutral, abduction, and forward elevation.     Therapeutic Modalities: vaso pneumatic compression ice pack to the Left shoulder with game ready with minimal compression x 15 min                       Left Shoulder Cold  Type: Cold/ice pack  Duration : 15 minutes  Patient Position: Sitting                                                                        HEP: continue current HEP    Treatment/Session Assessment:    · Response to Treatment: Pt seems to be progressing with L shoulder PROM. · Compliance with Program/Exercises: appears complaint. · Recommendations/Intent for next treatment session: \"Next visit will focus on L shoulder ROM\".   Total Treatment Duration: 40  minutes  :PT Patient Time In/Time Out  Time In: 7406  Time Out: [address] [facility]

## 2017-07-19 ENCOUNTER — HOSPITAL ENCOUNTER (OUTPATIENT)
Dept: PHYSICAL THERAPY | Age: 57
Discharge: HOME | End: 2017-07-19
Payer: COMMERCIAL

## 2017-07-19 PROCEDURE — 97016 VASOPNEUMATIC DEVICE THERAPY: CPT

## 2017-07-19 PROCEDURE — 97140 MANUAL THERAPY 1/> REGIONS: CPT

## 2017-07-19 NOTE — PROGRESS NOTES
Eduin Medina  : 1960 [facility] CORNELIUS FINLEY  [address] [facility], [address] [place] [address],[address], [facility] [address].  [phone]   [phone]       OUTPATIENT PHYSICAL THERAPY:Daily Note 2017      ICD-10: Treatment Diagnosis: Strain of muscle(s) and tendon(s) of the rotator cuff of left shoulder, sequela (S46.012S); Pain in left shoulder (M25.512); Adhesive capsulitis of left shoulder (M75.02)  Precautions/Allergies:   Review of patient's allergies indicates no known allergies. Fall Risk Score: 2 (? 5 = High Risk)  MD Orders: Evaluate and treat, HEP, ROM, Push ROM- do not force elbow extension MEDICAL/REFERRING DIAGNOSIS:  left shoulder   DATE OF ONSET: Surgery 17  REFERRING PHYSICIAN: Elena Newman MD  RETURN PHYSICIAN APPOINTMENT: 17     INITIAL ASSESSMENT:  Mr. Kylah Luz is 3 days s/p L shoulder mini open revision rotator cuff repair and bicep tenodesis. Post-op pain, swelling, wound healing, weakness and decreased ROM are limiting normalized use and function of left UE. He will benefit from PT for guided shoulder rehab per mini open rotator cuff protocol to promote safe return to normalized use of the UE with daily and work activities. PROBLEM LIST (Impacting functional limitations):  1. Post-op left shoulder pain and swelling  2. Decreased left shoulder ROM   3. Weakness left shoulder INTERVENTIONS PLANNED:  1. Thermal and electric modalities, manual therapies for pain   2. Manual therapies, therapeutic exercises, HEP for ROM    3. Therapeutic exercises and HEP for strength   TREATMENT PLAN:  Effective Dates:7-10-17 TO 10-6-17. Frequency/Duration: 3 times a week for 12 weeks  GOALS: (Goals have been discussed and agreed upon with patient.)  Short-Term Functional Goals: Time Frame: 6 weeks  1. Report no more than 3/10 intermittent pain to left shoulder with compensatory use during basic functional activities.   2. Left shoulder PROM forward elevation greater than 135 degrees and external rotation greater than 60 degrees to progress into functional ranges. 3. Demonstrate good left shoulder isometric strength with manual testing to progress into strength phase. 4. Independent with initial HEP. Discharge Goals: Time Frame: 12 weeks  1. No more than 1/10 intermittent pain left shoulder with return to normalized household and work activities, and score less than 25% on the DASH. 2. Left shoulder AROM forward elevation greater than 135 degrees, external rotation greater than 60 degrees, and strength to shoulder are grossly WNL's for safe use with normalized activities. 3. Demonstrate good functional shoulder strength and endurance for return to normalized household and work activities. 4. Independent with advanced shoulder HEP for continued self-management. Rehabilitation Potential For Stated Goals: Good                HISTORY:   History of Present Injury/Illness (Reason for Referral): Injury March 3rd 2016 was lifting something overhead and felt a pop. Had physical therapy and then decided to have surgery 7-19-17.  2-3 months after surgery his shoulder started to get stiff and had manipulation on 10-25-16. He went back to physical therapy and was not getting any better. Then Dr. Ambrose Hopper referred him to Dr. Albertina Mg. Then had surgery 7-6-17. He did stay over night in the hospital. Pain at rest 4/10. Past Medical History/Comorbidities:   HTN, carpal tunnel surgery  Social History/Living Environment:    Lives alone  Prior Level of Function/Work/Activity:  Works for [employer]. 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    Dominant Side:         RIGHT handed but plays some sports left handed  Previous Treatment Approaches:          He has had therapy in the past before and after 1st rotator cuff surgery  Current Medications:     Benazepril, atenolol, atorvastatin, dilaudid, ventolin, Restoril    Date Last Reviewed:  7-17-17   Number of Personal Factors/Comorbidities that affect the Plan of Care: 1-2: MODERATE COMPLEXITY   EXAMINATION:   Observation/Orthostatic Postural Assessment: dressing changed today. Sutures intact with no redness or drainage noted  Palpation: general tenderness around the shoulder     ROM:                     Strength: n/t secondary to post-op                 Special Tests: None  Functional Mobility:  Sit to/from Stand: independent. Bed Mobility: independent. Independent with basic mobility. independent with dressing and grooming ADL's. CLINICAL DECISION MAKING:   Outcome Measure: Tool Used: Disabilities of the Arm, Shoulder and Hand (DASH) Questionnaire - Quick Version  Score:  Initial: 50/55  Most Recent: X/55 (Date: -- )   Interpretation of Score: The DASH is designed to measure the activities of daily living in person's with upper extremity dysfunction or pain. Each section is scored on a 1-5 scale, 5 representing the greatest disability. The scores of each section are added together for a total score of 55. Medical Necessity:   · Patient is expected to demonstrate progress in strength and range of motion to improve functional mobility. Reason for Services/Other Comments:  · Patient continues to require skilled intervention due to post-op, decreased ROM and UE strength. Use of outcome tool(s) and clinical judgement create a POC that gives a: Questionable prediction of patient's progress: MODERATE COMPLEXITY          TREATMENT:   (In addition to Assessment/Re-Assessment sessions the following treatments were rendered)  Pre-treatment Symptoms/Complaints:  Pt reports his shoulder hurts in the morning but is not too bad right now. Pain: Initial:     5/10 Post Session:  4/10     Therapeutic Exercise (25 Minutes):  Exercises to improve mobility. PROM Left shoulder to ER and IR in shoulder neutral, abduction, and forward elevation.     Therapeutic Modalities: vaso pneumatic compression ice pack to the Left shoulder with game ready with minimal compression x 20 min                       Left Shoulder Cold  Type: Cold/ice pack  Duration : 20 minutes  Patient Position: Sitting                                                                        HEP: continue current HEP    Treatment/Session Assessment:    · Response to Treatment: He does well relaxing with L shoulder PROM and seems to be progressing with ROM. · Compliance with Program/Exercises: appears complaint. · Recommendations/Intent for next treatment session: \"Next visit will focus on L shoulder ROM\".   Total Treatment Duration: 45  minutes  :PT Patient Time In/Time Out  Time In: 9251  Time Out: 4022 [facility]

## 2017-07-20 ENCOUNTER — HOSPITAL ENCOUNTER (OUTPATIENT)
Dept: PHYSICAL THERAPY | Age: 57
Discharge: HOME | End: 2017-07-20
Payer: COMMERCIAL

## 2017-07-20 PROCEDURE — 97110 THERAPEUTIC EXERCISES: CPT

## 2017-07-20 PROCEDURE — 97016 VASOPNEUMATIC DEVICE THERAPY: CPT

## 2017-07-20 NOTE — PROGRESS NOTES
Gianni Bass  : 1960 [facility] CORNELIUS FINLEY  [address] [place] [address], [address] [place] [address],[address], [place] [address].  [phone]   [phone]       OUTPATIENT PHYSICAL THERAPY:Daily Note 2017      ICD-10: Treatment Diagnosis: Strain of muscle(s) and tendon(s) of the rotator cuff of left shoulder, sequela (S46.012S); Pain in left shoulder (M25.512); Adhesive capsulitis of left shoulder (M75.02)  Precautions/Allergies:   Review of patient's allergies indicates no known allergies. Fall Risk Score: 2 (? 5 = High Risk)  MD Orders: Evaluate and treat, HEP, ROM, Push ROM- do not force elbow extension MEDICAL/REFERRING DIAGNOSIS:  left shoulder   DATE OF ONSET: Surgery 17  REFERRING PHYSICIAN: Krystina Gonzáles MD  RETURN PHYSICIAN APPOINTMENT: 17     INITIAL ASSESSMENT:  Mr. Marlyn Branch is 3 days s/p L shoulder mini open revision rotator cuff repair and bicep tenodesis. Post-op pain, swelling, wound healing, weakness and decreased ROM are limiting normalized use and function of left UE. He will benefit from PT for guided shoulder rehab per mini open rotator cuff protocol to promote safe return to normalized use of the UE with daily and work activities. PROBLEM LIST (Impacting functional limitations):  1. Post-op left shoulder pain and swelling  2. Decreased left shoulder ROM   3. Weakness left shoulder INTERVENTIONS PLANNED:  1. Thermal and electric modalities, manual therapies for pain   2. Manual therapies, therapeutic exercises, HEP for ROM    3. Therapeutic exercises and HEP for strength   TREATMENT PLAN:  Effective Dates:7-10-17 TO 10-6-17. Frequency/Duration: 3 times a week for 12 weeks  GOALS: (Goals have been discussed and agreed upon with patient.)  Short-Term Functional Goals: Time Frame: 6 weeks  1. Report no more than 3/10 intermittent pain to left shoulder with compensatory use during basic functional activities.   2. Left shoulder PROM forward elevation greater than 135 degrees and external rotation greater than 60 degrees to progress into functional ranges. 3. Demonstrate good left shoulder isometric strength with manual testing to progress into strength phase. 4. Independent with initial HEP. Discharge Goals: Time Frame: 12 weeks  1. No more than 1/10 intermittent pain left shoulder with return to normalized household and work activities, and score less than 25% on the DASH. 2. Left shoulder AROM forward elevation greater than 135 degrees, external rotation greater than 60 degrees, and strength to shoulder are grossly WNL's for safe use with normalized activities. 3. Demonstrate good functional shoulder strength and endurance for return to normalized household and work activities. 4. Independent with advanced shoulder HEP for continued self-management. Rehabilitation Potential For Stated Goals: Good                HISTORY:   History of Present Injury/Illness (Reason for Referral): Injury March 3rd 2016 was lifting something overhead and felt a pop. Had physical therapy and then decided to have surgery 7-19-17.  2-3 months after surgery his shoulder started to get stiff and had manipulation on 10-25-16. He went back to physical therapy and was not getting any better. Then Dr. Ranjith Sanders referred him to Dr. Bi Mayo. Then had surgery 7-6-17. He did stay over night in the hospital. Pain at rest 4/10. 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    Dominant Side:         RIGHT handed but plays some sports left handed  Previous Treatment Approaches:          He has had therapy in the past before and after 1st rotator cuff surgery  Current Medications:     Benazepril, atenolol, atorvastatin, dilaudid, ventolin, Restoril    Date Last Reviewed:  7-17-17   Number of Personal Factors/Comorbidities that affect the Plan of Care: 1-2: MODERATE COMPLEXITY   EXAMINATION:   Observation/Orthostatic Postural Assessment: closed incision on left shoulder with minimal scabbing around incision. Palpation: general tenderness around the shoulder     ROM:                     Strength: n/t secondary to post-op                 Special Tests: None  Functional Mobility:  Sit to/from Stand: independent. Bed Mobility: independent. Independent with basic mobility. independent with dressing and grooming ADL's. CLINICAL DECISION MAKING:   Outcome Measure: Tool Used: Disabilities of the Arm, Shoulder and Hand (DASH) Questionnaire - Quick Version  Score:  Initial: 50/55  Most Recent: X/55 (Date: -- )   Interpretation of Score: The DASH is designed to measure the activities of daily living in person's with upper extremity dysfunction or pain. Each section is scored on a 1-5 scale, 5 representing the greatest disability. The scores of each section are added together for a total score of 55. Medical Necessity:   · Patient is expected to demonstrate progress in strength and range of motion to improve functional mobility. Reason for Services/Other Comments:  · Patient continues to require skilled intervention due to post-op, decreased ROM and UE strength. Use of outcome tool(s) and clinical judgement create a POC that gives a: Questionable prediction of patient's progress: MODERATE COMPLEXITY          TREATMENT:   (In addition to Assessment/Re-Assessment sessions the following treatments were rendered)  Pre-treatment Symptoms/Complaints:  Pt reports he returned to [place] yesterday and he wanted him to wean out of the sling. Pain: Initial:     5/10 Post Session:  4/10     Therapeutic Exercise (25 Minutes):  Exercises to improve mobility. PROM Left shoulder to ER and IR in shoulder neutral, abduction, and forward elevation. Therapeutic Modalities: vaso pneumatic compression ice pack to the Left shoulder with game ready with minimal compression x 20 min                       Left Shoulder Cold  Type: Cold/ice pack  Duration : 20 minutes  Patient Position: Sitting                                                                        HEP: continue current HEP    Treatment/Session Assessment:    · Response to Treatment: No complaints of pain with PROM. · Compliance with Program/Exercises: appears complaint. · Recommendations/Intent for next treatment session: \"Next visit will focus on L shoulder ROM\".   Total Treatment Duration: 45  minutes  :PT Patient Time In/Time Out  Time In: 8493  Time Out: 8441 [facility]

## 2017-07-26 ENCOUNTER — HOSPITAL ENCOUNTER (OUTPATIENT)
Dept: PHYSICAL THERAPY | Age: 57
Discharge: HOME | End: 2017-07-26
Payer: COMMERCIAL

## 2017-07-26 PROCEDURE — 97016 VASOPNEUMATIC DEVICE THERAPY: CPT

## 2017-07-26 PROCEDURE — 97110 THERAPEUTIC EXERCISES: CPT

## 2017-07-26 NOTE — PROGRESS NOTES
Bisi Reynolds  : 1960 [facility]IUS [facility]  1101 [facility], [address] [place] [address],[address], [facility] [address].  [phone]   [phone]       OUTPATIENT PHYSICAL THERAPY:Daily Note 2017      ICD-10: Treatment Diagnosis: Strain of muscle(s) and tendon(s) of the rotator cuff of left shoulder, sequela (S46.012S); Pain in left shoulder (M25.512); Adhesive capsulitis of left shoulder (M75.02)  Precautions/Allergies:   Review of patient's allergies indicates no known allergies. Fall Risk Score: 2 (? 5 = High Risk)  MD Orders: Evaluate and treat, HEP, ROM, Push ROM- do not force elbow extension MEDICAL/REFERRING DIAGNOSIS:  left shoulder   DATE OF ONSET: Surgery 17  REFERRING PHYSICIAN: Karina Mac MD  RETURN PHYSICIAN APPOINTMENT: 17     INITIAL ASSESSMENT:  Mr. Alondra Ba is 3 days s/p L shoulder mini open revision rotator cuff repair and bicep tenodesis. Post-op pain, swelling, wound healing, weakness and decreased ROM are limiting normalized use and function of left UE. He will benefit from PT for guided shoulder rehab per mini open rotator cuff protocol to promote safe return to normalized use of the UE with daily and work activities. PROBLEM LIST (Impacting functional limitations):  1. Post-op left shoulder pain and swelling  2. Decreased left shoulder ROM   3. Weakness left shoulder INTERVENTIONS PLANNED:  1. Thermal and electric modalities, manual therapies for pain   2. Manual therapies, therapeutic exercises, HEP for ROM    3. Therapeutic exercises and HEP for strength   TREATMENT PLAN:  Effective Dates:7-10-17 TO 10-6-17. Frequency/Duration: 3 times a week for 12 weeks  GOALS: (Goals have been discussed and agreed upon with patient.)  Short-Term Functional Goals: Time Frame: 6 weeks  1. Report no more than 3/10 intermittent pain to left shoulder with compensatory use during basic functional activities.   2. Left shoulder PROM forward elevation greater than 135 degrees and external rotation greater than 60 degrees to progress into functional ranges. 3. Demonstrate good left shoulder isometric strength with manual testing to progress into strength phase. 4. Independent with initial HEP. Discharge Goals: Time Frame: 12 weeks  1. No more than 1/10 intermittent pain left shoulder with return to normalized household and work activities, and score less than 25% on the DASH. 2. Left shoulder AROM forward elevation greater than 135 degrees, external rotation greater than 60 degrees, and strength to shoulder are grossly WNL's for safe use with normalized activities. 3. Demonstrate good functional shoulder strength and endurance for return to normalized household and work activities. 4. Independent with advanced shoulder HEP for continued self-management. Rehabilitation Potential For Stated Goals: Good                HISTORY:   History of Present Injury/Illness (Reason for Referral): Injury March 3rd 2016 was lifting something overhead and felt a pop. Had physical therapy and then decided to have surgery 7-19-17.  2-3 months after surgery his shoulder started to get stiff and had manipulation on 10-25-16. He went back to physical therapy and was not getting any better. Then Dr. Tameka Mueller referred him to Dr. Adrianne Silvestre. Then had surgery 7-6-17. He did stay over night in the hospital. Pain at rest 4/10. Past Medical History/Comorbidities:   HTN, carpal tunnel surgery  Social History/Living Environment:    Lives alone  Prior Level of Function/Work/Activity:  Works for [employer]. 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    Dominant Side:         RIGHT handed but plays some sports left handed  Previous Treatment Approaches:          He has had therapy in the past before and after 1st rotator cuff surgery  Current Medications:     Benazepril, atenolol, atorvastatin, dilaudid, ventolin, Restoril    Date Last Reviewed:  7-26-17   Number of Personal Factors/Comorbidities that affect the Plan of Care: 1-2: MODERATE COMPLEXITY   EXAMINATION:   Observation/Orthostatic Postural Assessment: closed incision on left shoulder with minimal scabbing around incision. Palpation: general tenderness around the shoulder     ROM:                     Strength: n/t secondary to post-op                 Special Tests: None  Functional Mobility:  Sit to/from Stand: independent. Bed Mobility: independent. Independent with basic mobility. independent with dressing and grooming ADL's. CLINICAL DECISION MAKING:   Outcome Measure: Tool Used: Disabilities of the Arm, Shoulder and Hand (DASH) Questionnaire - Quick Version  Score:  Initial: 50/55  Most Recent: X/55 (Date: -- )   Interpretation of Score: The DASH is designed to measure the activities of daily living in person's with upper extremity dysfunction or pain. Each section is scored on a 1-5 scale, 5 representing the greatest disability. The scores of each section are added together for a total score of 55. Medical Necessity:   · Patient is expected to demonstrate progress in strength and range of motion to improve functional mobility. Reason for Services/Other Comments:  · Patient continues to require skilled intervention due to post-op, decreased ROM and UE strength. Use of outcome tool(s) and clinical judgement create a POC that gives a: Questionable prediction of patient's progress: MODERATE COMPLEXITY          TREATMENT:   (In addition to Assessment/Re-Assessment sessions the following treatments were rendered)  Pre-treatment Symptoms/Complaints:  Pt reports he overslept on Monday and missed therapy. Pain: Initial:     5/10 Post Session:  4/10     Therapeutic Exercise (25 Minutes):  Exercises to improve mobility. PROM Left shoulder to ER and IR in shoulder neutral, abduction, and forward elevation.  Instructed in pulleys with arm in scapular plane x 10 reps. Issued pulleys for home use. Therapeutic Modalities: vaso pneumatic compression ice pack to the Left shoulder with game ready with minimal compression x 20 min                       Left Shoulder Cold  Type: Cold/ice pack  Duration : 20 minutes  Patient Position: Sitting                                                                        HEP: continue current HEP    Treatment/Session Assessment:    · Response to Treatment: He seems to be progressing with L shoulder ROM. · Compliance with Program/Exercises: appears complaint. · Recommendations/Intent for next treatment session: \"Next visit will focus on L shoulder ROM\".   Total Treatment Duration: 45  minutes  :PT Patient Time In/Time Out  Time In: 1105  Time Out: [address] [facility] [address]

## 2017-07-28 ENCOUNTER — HOSPITAL ENCOUNTER (OUTPATIENT)
Dept: PHYSICAL THERAPY | Age: 57
Discharge: HOME | End: 2017-07-28
Payer: COMMERCIAL

## 2017-07-28 PROCEDURE — 97140 MANUAL THERAPY 1/> REGIONS: CPT

## 2017-07-28 PROCEDURE — 97016 VASOPNEUMATIC DEVICE THERAPY: CPT

## 2017-07-28 PROCEDURE — 97110 THERAPEUTIC EXERCISES: CPT

## 2017-07-28 NOTE — PROGRESS NOTES
Mu Licona  : 1960 [facility] at [facility] CORNELIUS FINLEY  [address] [facility], [address] [place] [address],[address], [place] [address].  [phone]   [phone]       OUTPATIENT PHYSICAL THERAPY:Daily Note 2017      ICD-10: Treatment Diagnosis: Strain of muscle(s) and tendon(s) of the rotator cuff of left shoulder, sequela (S46.012S); Pain in left shoulder (M25.512); Adhesive capsulitis of left shoulder (M75.02)  Precautions/Allergies:   Review of patient's allergies indicates no known allergies. Fall Risk Score: 2 (? 5 = High Risk)  MD Orders: Evaluate and treat, HEP, ROM, Push ROM- do not force elbow extension MEDICAL/REFERRING DIAGNOSIS:  left shoulder   DATE OF ONSET: Surgery 17  REFERRING PHYSICIAN: Gildardo Kam MD  RETURN PHYSICIAN APPOINTMENT: 17     INITIAL ASSESSMENT:  Mr. Victorino Rizvi is 3 days s/p L shoulder mini open revision rotator cuff repair and bicep tenodesis. Post-op pain, swelling, wound healing, weakness and decreased ROM are limiting normalized use and function of left UE. He will benefit from PT for guided shoulder rehab per mini open rotator cuff protocol to promote safe return to normalized use of the UE with daily and work activities. PROBLEM LIST (Impacting functional limitations):  1. Post-op left shoulder pain and swelling  2. Decreased left shoulder ROM   3. Weakness left shoulder INTERVENTIONS PLANNED:  1. Thermal and electric modalities, manual therapies for pain   2. Manual therapies, therapeutic exercises, HEP for ROM    3. Therapeutic exercises and HEP for strength   TREATMENT PLAN:  Effective Dates:7-10-17 TO 10-6-17. Frequency/Duration: 3 times a week for 12 weeks  GOALS: (Goals have been discussed and agreed upon with patient.)  Short-Term Functional Goals: Time Frame: 6 weeks  1. Report no more than 3/10 intermittent pain to left shoulder with compensatory use during basic functional activities.   2. Left shoulder PROM forward elevation greater than 135 degrees and external rotation greater than 60 degrees to progress into functional ranges. 3. Demonstrate good left shoulder isometric strength with manual testing to progress into strength phase. 4. Independent with initial HEP. Discharge Goals: Time Frame: 12 weeks  1. No more than 1/10 intermittent pain left shoulder with return to normalized household and work activities, and score less than 25% on the DASH. 2. Left shoulder AROM forward elevation greater than 135 degrees, external rotation greater than 60 degrees, and strength to shoulder are grossly WNL's for safe use with normalized activities. 3. Demonstrate good functional shoulder strength and endurance for return to normalized household and work activities. 4. Independent with advanced shoulder HEP for continued self-management. Rehabilitation Potential For Stated Goals: Good                HISTORY:   History of Present Injury/Illness (Reason for Referral): Injury March 3rd 2016 was lifting something overhead and felt a pop. Had physical therapy and then decided to have surgery 7-19-17.  2-3 months after surgery his shoulder started to get stiff and had manipulation on 10-25-16. He went back to physical therapy and was not getting any better. Then Dr. Barney Ramirez referred him to Dr. Dina Fisher. Then had surgery 7-6-17. He did stay over night in the hospital. Pain at rest 4/10. Past Medical History/Comorbidities:   HTN, carpal tunnel surgery  Social History/Living Environment:    Lives alone  Prior Level of Function/Work/Activity:  Works for [employer]. 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    Dominant Side:         RIGHT handed but plays some sports left handed  Previous Treatment Approaches:          He has had therapy in the past before and after 1st rotator cuff surgery  Current Medications:     Benazepril, atenolol, atorvastatin, dilaudid, ventolin, Restoril    Date Last Reviewed:  7-26-17   Number of Personal Factors/Comorbidities that affect the Plan of Care: 1-2: MODERATE COMPLEXITY   EXAMINATION:   Observation/Orthostatic Postural Assessment: closed incision on left shoulder with minimal scabbing around incision. Palpation: general tenderness around the shoulder     ROM:                     Strength: n/t secondary to post-op                 Special Tests: None  Functional Mobility:  Sit to/from Stand: independent. Bed Mobility: independent. Independent with basic mobility. independent with dressing and grooming ADL's. CLINICAL DECISION MAKING:   Outcome Measure: Tool Used: Disabilities of the Arm, Shoulder and Hand (DASH) Questionnaire - Quick Version  Score:  Initial: 50/55  Most Recent: X/55 (Date: -- )   Interpretation of Score: The DASH is designed to measure the activities of daily living in person's with upper extremity dysfunction or pain. Each section is scored on a 1-5 scale, 5 representing the greatest disability. The scores of each section are added together for a total score of 55. Medical Necessity:   · Patient is expected to demonstrate progress in strength and range of motion to improve functional mobility. Reason for Services/Other Comments:  · Patient continues to require skilled intervention due to post-op, decreased ROM and UE strength. Use of outcome tool(s) and clinical judgement create a POC that gives a: Questionable prediction of patient's progress: MODERATE COMPLEXITY          TREATMENT:   (In addition to Assessment/Re-Assessment sessions the following treatments were rendered)  Pre-treatment Symptoms/Complaints:  Pt reports his shoulder was very sore this morning. Pain: Initial:     5/10 Post Session:  4/10     Therapeutic Exercise (25 Minutes):  Exercises to improve mobility. PROM Left shoulder to ER and IR in shoulder neutral, abduction, and forward elevation.  Manual isometrics with arm at neutral for IR and ER 5\"x10 ea, and elbow flexion and extension 5\"x10 ea  Therapeutic Modalities: vaso pneumatic compression ice pack to the Left shoulder with game ready with minimal compression x 20 min                                                                                                HEP: continue current HEP    Treatment/Session Assessment:    · Response to Treatment: He continues to report increase in pain in the shoulder in the morning and he may be doing too much at home since he does live alone. He does seem to be progressing with L shoulder PROM. · Compliance with Program/Exercises: appears complaint. · Recommendations/Intent for next treatment session: \"Next visit will focus on L shoulder ROM\".   Total Treatment Duration: 45  minutes  :PT Patient Time In/Time Out  Time In: 1100  Time Out: Mata 5

## 2017-07-31 ENCOUNTER — HOSPITAL ENCOUNTER (OUTPATIENT)
Dept: PHYSICAL THERAPY | Age: 57
Discharge: HOME | End: 2017-07-31
Payer: COMMERCIAL

## 2017-07-31 PROCEDURE — 97140 MANUAL THERAPY 1/> REGIONS: CPT

## 2017-07-31 PROCEDURE — 97016 VASOPNEUMATIC DEVICE THERAPY: CPT

## 2017-07-31 PROCEDURE — 97110 THERAPEUTIC EXERCISES: CPT

## 2017-07-31 NOTE — PROGRESS NOTES
Manisha Kamara  : 1960 [facility] at [facility] CORNELIUS FINLEY  [address] [facility], [address] [place] [address],[address], [place] [address].  [phone]   [phone]       OUTPATIENT PHYSICAL THERAPY:Daily Note 2017      ICD-10: Treatment Diagnosis: Strain of muscle(s) and tendon(s) of the rotator cuff of left shoulder, sequela (S46.012S); Pain in left shoulder (M25.512); Adhesive capsulitis of left shoulder (M75.02)  Precautions/Allergies:   Review of patient's allergies indicates no known allergies. Fall Risk Score: 2 (? 5 = High Risk)  MD Orders: Evaluate and treat, HEP, ROM, Push ROM- do not force elbow extension MEDICAL/REFERRING DIAGNOSIS:  left shoulder   DATE OF ONSET: Surgery 17  REFERRING PHYSICIAN: Florencio Villeda., MD  RETURN PHYSICIAN APPOINTMENT: 17     INITIAL ASSESSMENT:  Mr. Shantelle Leblanc is 3 days s/p L shoulder mini open revision rotator cuff repair and bicep tenodesis. Post-op pain, swelling, wound healing, weakness and decreased ROM are limiting normalized use and function of left UE. He will benefit from PT for guided shoulder rehab per mini open rotator cuff protocol to promote safe return to normalized use of the UE with daily and work activities. PROBLEM LIST (Impacting functional limitations):  1. Post-op left shoulder pain and swelling  2. Decreased left shoulder ROM   3. Weakness left shoulder INTERVENTIONS PLANNED:  1. Thermal and electric modalities, manual therapies for pain   2. Manual therapies, therapeutic exercises, HEP for ROM    3. Therapeutic exercises and HEP for strength   TREATMENT PLAN:  Effective Dates:7-10-17 TO 10-6-17. Frequency/Duration: 3 times a week for 12 weeks  GOALS: (Goals have been discussed and agreed upon with patient.)  Short-Term Functional Goals: Time Frame: 6 weeks  1. Report no more than 3/10 intermittent pain to left shoulder with compensatory use during basic functional activities.   2. Left shoulder PROM forward elevation greater than 135 degrees and external rotation greater than 60 degrees to progress into functional ranges. 3. Demonstrate good left shoulder isometric strength with manual testing to progress into strength phase. 4. Independent with initial HEP. Discharge Goals: Time Frame: 12 weeks  1. No more than 1/10 intermittent pain left shoulder with return to normalized household and work activities, and score less than 25% on the DASH. 2. Left shoulder AROM forward elevation greater than 135 degrees, external rotation greater than 60 degrees, and strength to shoulder are grossly WNL's for safe use with normalized activities. 3. Demonstrate good functional shoulder strength and endurance for return to normalized household and work activities. 4. Independent with advanced shoulder HEP for continued self-management. Rehabilitation Potential For Stated Goals: Good                HISTORY:   History of Present Injury/Illness (Reason for Referral): Injury March 3rd 2016 was lifting something overhead and felt a pop. Had physical therapy and then decided to have surgery 7-19-17.  2-3 months after surgery his shoulder started to get stiff and had manipulation on 10-25-16. He went back to physical therapy and was not getting any better. Then Dr. Delfin Patel referred him to Dr. Ulisses Foreman. Then had surgery 7-6-17. He did stay over night in the hospital. Pain at rest 4/10. 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    Dominant Side:         RIGHT handed but plays some sports left handed  Previous Treatment Approaches:          He has had therapy in the past before and after 1st rotator cuff surgery  Current Medications:     Benazepril, atenolol, atorvastatin, dilaudid, ventolin, Restoril    Date Last Reviewed:  7-31-17   Number of Personal Factors/Comorbidities that affect the Plan of Care: 1-2: MODERATE COMPLEXITY   EXAMINATION:   Observation/Orthostatic Postural Assessment: closed incision on left shoulder with minimal scabbing around incision. Palpation: general tenderness around the shoulder     ROM:      LUE PROM  L Shoulder Flexion: 125  L Shoulder ABduction: 90  L Shoulder Internal Rotation: 65  L Shoulder External Rotation: 50              Strength: n/t secondary to post-op                 Special Tests: None  Functional Mobility:  Sit to/from Stand: independent. Bed Mobility: independent. Independent with basic mobility. independent with dressing and grooming ADL's. CLINICAL DECISION MAKING:   Outcome Measure: Tool Used: Disabilities of the Arm, Shoulder and Hand (DASH) Questionnaire - Quick Version  Score:  Initial: 50/55  Most Recent: X/55 (Date: -- )   Interpretation of Score: The DASH is designed to measure the activities of daily living in person's with upper extremity dysfunction or pain. Each section is scored on a 1-5 scale, 5 representing the greatest disability. The scores of each section are added together for a total score of 55. Medical Necessity:   · Patient is expected to demonstrate progress in strength and range of motion to improve functional mobility. Reason for Services/Other Comments:  · Patient continues to require skilled intervention due to post-op, decreased ROM and UE strength. Use of outcome tool(s) and clinical judgement create a POC that gives a: Questionable prediction of patient's progress: MODERATE COMPLEXITY          TREATMENT:   (In addition to Assessment/Re-Assessment sessions the following treatments were rendered)  Pre-treatment Symptoms/Complaints:  Pt reports he slept in the recliner last night and that felt better.    Pain: Initial:     5/10 Post Session:  4/10   MANUAL THERAPY: (5 minutes): Joint mobilization was utilized and necessary because of the patient's restricted joint motion. Pt in side lying and scapular depression. Pt supine and MET for L 1st rib stiffness. Therapeutic Exercise (25 Minutes):  Exercises to improve mobility. PROM Left shoulder to ER and IR in shoulder neutral, abduction, and forward elevation. Manual isometrics with arm at neutral for IR and ER 5\"x10 ea, and elbow flexion and extension 5\"x10 ea  Therapeutic Modalities: vaso pneumatic compression ice pack to the Left shoulder with game ready with minimal compression x 20 min                       Left Shoulder Cold  Type: Cold/ice pack  Duration : 20 minutes  Patient Position: Sitting                                                                        HEP: continue current HEP    Treatment/Session Assessment:    · Response to Treatment: Improved L shoulder PROM today. · Compliance with Program/Exercises: appears complaint. · Recommendations/Intent for next treatment session: \"Next visit will focus on L shoulder ROM\".   Total Treatment Duration: 50  minutes  :PT Patient Time In/Time Out  Time In: 1030  Time Out: 3[address] Jonathan Clarke

## 2017-08-02 ENCOUNTER — HOSPITAL ENCOUNTER (OUTPATIENT)
Dept: PHYSICAL THERAPY | Age: 57
Discharge: HOME | End: 2017-08-02
Payer: COMMERCIAL

## 2017-08-02 PROCEDURE — 97110 THERAPEUTIC EXERCISES: CPT

## 2017-08-02 PROCEDURE — 97016 VASOPNEUMATIC DEVICE THERAPY: CPT

## 2017-08-02 NOTE — PROGRESS NOTES
Sheila Cervantes  : 1960 [facility] Center at [facility] CORNELIUS FINLEY  [address] [facility], [address] [place] [address],[address], [facility] [address] [facility].  [phone]   [phone]       OUTPATIENT PHYSICAL THERAPY:Daily Note 2017      ICD-10: Treatment Diagnosis: Strain of muscle(s) and tendon(s) of the rotator cuff of left shoulder, sequela (S46.012S); Pain in left shoulder (M25.512); Adhesive capsulitis of left shoulder (M75.02)  Precautions/Allergies:   Review of patient's allergies indicates no known allergies. Fall Risk Score: 2 (? 5 = High Risk)  MD Orders: Evaluate and treat, HEP, ROM, Push ROM- do not force elbow extension MEDICAL/REFERRING DIAGNOSIS:  left shoulder   DATE OF ONSET: Surgery 17  REFERRING PHYSICIAN: Deja Salvador MD  RETURN PHYSICIAN APPOINTMENT: 17     INITIAL ASSESSMENT:  Mr. Sheri Eldridge is 3 days s/p L shoulder mini open revision rotator cuff repair and bicep tenodesis. Post-op pain, swelling, wound healing, weakness and decreased ROM are limiting normalized use and function of left UE. He will benefit from PT for guided shoulder rehab per mini open rotator cuff protocol to promote safe return to normalized use of the UE with daily and work activities. PROBLEM LIST (Impacting functional limitations):  1. Post-op left shoulder pain and swelling  2. Decreased left shoulder ROM   3. Weakness left shoulder INTERVENTIONS PLANNED:  1. Thermal and electric modalities, manual therapies for pain   2. Manual therapies, therapeutic exercises, HEP for ROM    3. Therapeutic exercises and HEP for strength   TREATMENT PLAN:  Effective Dates:7-10-17 TO 10-6-17. Frequency/Duration: 3 times a week for 12 weeks  GOALS: (Goals have been discussed and agreed upon with patient.)  Short-Term Functional Goals: Time Frame: 6 weeks  1. Report no more than 3/10 intermittent pain to left shoulder with compensatory use during basic functional activities.   2. Left shoulder PROM forward elevation greater than 135 degrees and external rotation greater than 60 degrees to progress into functional ranges. 3. Demonstrate good left shoulder isometric strength with manual testing to progress into strength phase. 4. Independent with initial HEP. Discharge Goals: Time Frame: 12 weeks  1. No more than 1/10 intermittent pain left shoulder with return to normalized household and work activities, and score less than 25% on the DASH. 2. Left shoulder AROM forward elevation greater than 135 degrees, external rotation greater than 60 degrees, and strength to shoulder are grossly WNL's for safe use with normalized activities. 3. Demonstrate good functional shoulder strength and endurance for return to normalized household and work activities. 4. Independent with advanced shoulder HEP for continued self-management. Rehabilitation Potential For Stated Goals: Good                HISTORY:   History of Present Injury/Illness (Reason for Referral): Injury March 3rd 2016 was lifting something overhead and felt a pop. Had physical therapy and then decided to have surgery 7-19-17.  2-3 months after surgery his shoulder started to get stiff and had manipulation on 10-25-16. He went back to physical therapy and was not getting any better. Then Dr. Taryn Green referred him to Dr. Amador Rodriguez. Then had surgery 7-6-17. He did stay over night in the hospital. Pain at rest 4/10. Past Medical History/Comorbidities:   HTN, carpal tunnel surgery  Social History/Living Environment:    Lives alone  Prior Level of Function/Work/Activity:  Works for [employer]. 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    Dominant Side:         RIGHT handed but plays some sports left handed  Previous Treatment Approaches:          He has had therapy in the past before and after 1st rotator cuff surgery  Current Medications:     Benazepril, atenolol, atorvastatin, dilaudid, ventolin, Restoril    Date Last Reviewed:  7-31-17   Number of Personal Factors/Comorbidities that affect the Plan of Care: 1-2: MODERATE COMPLEXITY   EXAMINATION:   Observation/Orthostatic Postural Assessment: closed incision on left shoulder with minimal scabbing around incision. Palpation: general tenderness around the shoulder     ROM:                     Strength: n/t secondary to post-op                 Special Tests: None  Functional Mobility:  Sit to/from Stand: independent. Bed Mobility: independent. Independent with basic mobility. independent with dressing and grooming ADL's. CLINICAL DECISION MAKING:   Outcome Measure: Tool Used: Disabilities of the Arm, Shoulder and Hand (DASH) Questionnaire - Quick Version  Score:  Initial: 50/55  Most Recent: X/55 (Date: -- )   Interpretation of Score: The DASH is designed to measure the activities of daily living in person's with upper extremity dysfunction or pain. Each section is scored on a 1-5 scale, 5 representing the greatest disability. The scores of each section are added together for a total score of 55. Medical Necessity:   · Patient is expected to demonstrate progress in strength and range of motion to improve functional mobility. Reason for Services/Other Comments:  · Patient continues to require skilled intervention due to post-op, decreased ROM and UE strength. Use of outcome tool(s) and clinical judgement create a POC that gives a: Questionable prediction of patient's progress: MODERATE COMPLEXITY          TREATMENT:   (In addition to Assessment/Re-Assessment sessions the following treatments were rendered)  Pre-treatment Symptoms/Complaints:  Pt reports he slept in the recliner and thinks that is helping. Pain: Initial:     5/10 Post Session:  4/10   MANUAL THERAPY: (5 minutes): Joint mobilization was utilized and necessary because of the patient's restricted joint motion. Pt supine and MET for L 1st rib stiffness. Therapeutic Exercise (25 Minutes):  Exercises to improve mobility. PROM Left shoulder to ER and IR in shoulder neutral, abduction, and forward elevation. Manual isometrics with arm at neutral for IR and ER 5\"x10 ea, and elbow flexion and extension 5\"x10 ea  Therapeutic Modalities: vaso pneumatic compression ice pack to the Left shoulder with game ready with minimal compression x 20 min                       Left Shoulder Cold  Type: Cold/ice pack  Duration : 20 minutes  Patient Position: Sitting                                                                        HEP: continue current HEP    Treatment/Session Assessment:    · Response to Treatment: He seems to be progressing with ROM. No complaints of pain during ROM tx. · Compliance with Program/Exercises: appears complaint. · Recommendations/Intent for next treatment session: \"Next visit will focus on L shoulder ROM\".   Total Treatment Duration: 50  minutes  :PT Patient Time In/Time Out  Time In: 1015  Time Out: Mata 5

## 2017-08-04 ENCOUNTER — HOSPITAL ENCOUNTER (OUTPATIENT)
Dept: PHYSICAL THERAPY | Age: 57
Discharge: HOME | End: 2017-08-04
Payer: COMMERCIAL

## 2017-08-04 PROCEDURE — 97110 THERAPEUTIC EXERCISES: CPT

## 2017-08-04 PROCEDURE — 97016 VASOPNEUMATIC DEVICE THERAPY: CPT

## 2017-08-04 NOTE — PROGRESS NOTES
Mu Licona  : 1960 [facility] at [facility] CORNELIUS FINLEY  [address] [facility], [address], [address] [facility]  [phone]   [phone]       OUTPATIENT PHYSICAL THERAPY:Daily Note and Progress Report 2017      ICD-10: Treatment Diagnosis: Strain of muscle(s) and tendon(s) of the rotator cuff of left shoulder, sequela (S46.012S); Pain in left shoulder (M25.512); Adhesive capsulitis of left shoulder (M75.02)  Precautions/Allergies:   Review of patient's allergies indicates no known allergies. Fall Risk Score: 2 (? 5 = High Risk)  MD Orders: Evaluate and treat, HEP, ROM, Push ROM- do not force elbow extension MEDICAL/REFERRING DIAGNOSIS:  left shoulder   DATE OF ONSET: Surgery 17  REFERRING PHYSICIAN: Gildardo Kam MD  RETURN PHYSICIAN APPOINTMENT: 17     INITIAL ASSESSMENT:  Mr. Victorino Rizvi is s/p L shoulder mini open revision rotator cuff repair and bicep tenodesis with surgery 17. He is progressing with L shoulder PROM. He is able to control his shoulder pain with ice and pain medication. He will benefit from continuing PT for guided shoulder rehab per mini open rotator cuff protocol to promote safe return to normalized use of the UE with daily and work activities. PROBLEM LIST (Impacting functional limitations):  1. Post-op left shoulder pain and swelling  2. Decreased left shoulder ROM   3. Weakness left shoulder INTERVENTIONS PLANNED:  1. Thermal and electric modalities, manual therapies for pain   2. Manual therapies, therapeutic exercises, HEP for ROM    3. Therapeutic exercises and HEP for strength   TREATMENT PLAN:  Effective Dates:7-10-17 TO 10-6-17. Frequency/Duration: 3 times a week for 12 weeks  GOALS: (Goals have been discussed and agreed upon with patient.)  Short-Term Functional Goals: Time Frame: 6 weeks  1. Report no more than 3/10 intermittent pain to left shoulder with compensatory use during basic functional activities. progressing towards  2. Left shoulder PROM forward elevation greater than 135 degrees and external rotation greater than 60 degrees to progress into functional ranges. Progressing towards  3. Demonstrate good left shoulder isometric strength with manual testing to progress into strength phase. progressing towards  4. Independent with initial HEP. ongoing  Discharge Goals: Time Frame: 12 weeks  1. No more than 1/10 intermittent pain left shoulder with return to normalized household and work activities, and score less than 25% on the DASH. 2. Left shoulder AROM forward elevation greater than 135 degrees, external rotation greater than 60 degrees, and strength to shoulder are grossly WNL's for safe use with normalized activities. 3. Demonstrate good functional shoulder strength and endurance for return to normalized household and work activities. 4. Independent with advanced shoulder HEP for continued self-management. Rehabilitation Potential For Stated Goals: Good                HISTORY:   History of Present Injury/Illness (Reason for Referral): Injury March 3rd 2016 was lifting something overhead and felt a pop. Had physical therapy and then decided to have surgery 7-19-17.  2-3 months after surgery his shoulder started to get stiff and had manipulation on 10-25-16. He went back to physical therapy and was not getting any better. Then Dr. Edna Rice referred him to Dr. Huma Collado. Then had surgery 7-6-17. He did stay over night in the hospital. Pain at rest 4/10. Past Medical History/Comorbidities:   HTN, carpal tunnel surgery  Social History/Living Environment:    Lives alone  Prior Level of Function/Work/Activity:  Works for [employer]. 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    Dominant Side:         RIGHT handed but plays some sports left handed  Previous Treatment Approaches:          He has had therapy in the past before and after 1st rotator cuff surgery  Current Medications:     Benazepril, atenolol, atorvastatin, dilaudid, ventolin, Restoril    Date Last Reviewed:  7-31-17   Number of Personal Factors/Comorbidities that affect the Plan of Care: 1-2: MODERATE COMPLEXITY   EXAMINATION:   Observation/Orthostatic Postural Assessment: healed incision on the L shoulder  Palpation: general tenderness around the shoulder     ROM:      LUE PROM  L Shoulder Flexion: 130  L Shoulder ABduction: 90  L Shoulder Internal Rotation: 65  L Shoulder External Rotation: 45 (at neutral)              Strength: n/t secondary to post-op                 Special Tests: None  Functional Mobility:  Sit to/from Stand: independent. Bed Mobility: independent. Independent with basic mobility. independent with dressing and grooming ADL's. CLINICAL DECISION MAKING:   Outcome Measure: Tool Used: Disabilities of the Arm, Shoulder and Hand (DASH) Questionnaire - Quick Version  Score:  Initial: 50/55  Most Recent: X/55 (Date: -- )   Interpretation of Score: The DASH is designed to measure the activities of daily living in person's with upper extremity dysfunction or pain. Each section is scored on a 1-5 scale, 5 representing the greatest disability. The scores of each section are added together for a total score of 55. Medical Necessity:   · Patient is expected to demonstrate progress in strength and range of motion to improve functional mobility. Reason for Services/Other Comments:  · Patient continues to require skilled intervention due to post-op, decreased ROM and UE strength. Use of outcome tool(s) and clinical judgement create a POC that gives a: Questionable prediction of patient's progress: MODERATE COMPLEXITY          TREATMENT:   (In addition to Assessment/Re-Assessment sessions the following treatments were rendered)  Pre-treatment Symptoms/Complaints:  Pt reports he woke up this morning and his shoulder was very sore.  He did take some pain medicine this morning and that helped. Pt reports this morning pain 8/10  Pain: Initial:     4/10 Post Session:  4/10   MANUAL THERAPY: (5 minutes): Joint mobilization was utilized and necessary because of the patient's restricted joint motion. Pt supine and 1st rib mobilizations. Pt in side lying and scapular mobilizations and soft tissue to L upper trap. Therapeutic Exercise (30 Minutes):  Exercises to improve mobility. PROM Left shoulder to ER and IR in shoulder neutral, abduction, and forward elevation. Manual isometrics with arm at neutral for IR and ER 5\"x10 ea, and elbow flexion and extension 5\"x10 ea  Therapeutic Modalities: vaso pneumatic compression ice pack to the Left shoulder with game ready with minimal compression x 20 min                                                                                                HEP: continue current HEP    Treatment/Session Assessment:    · Response to Treatment: Improved forward elevation PROM today. · Compliance with Program/Exercises: appears complaint. · Recommendations/Intent for next treatment session: \"Next visit will focus on L shoulder ROM\".   Total Treatment Duration: 55  minutes  :PT Patient Time In/Time Out  Time In: 1015  Time Out: [address]

## 2017-08-07 ENCOUNTER — HOSPITAL ENCOUNTER (OUTPATIENT)
Dept: PHYSICAL THERAPY | Age: 57
Discharge: HOME | End: 2017-08-07
Payer: COMMERCIAL

## 2017-08-07 PROCEDURE — 97016 VASOPNEUMATIC DEVICE THERAPY: CPT

## 2017-08-07 PROCEDURE — 97110 THERAPEUTIC EXERCISES: CPT

## 2017-08-07 NOTE — PROGRESS NOTES
June Joseph  : 1960 Therapy [facility] at [facility] CORNELIUS FINLEY  [address] [facility], [address] [place] [address],[address], [place] [address].  [phone]   [phone]       OUTPATIENT PHYSICAL THERAPY:Daily Note 2017      ICD-10: Treatment Diagnosis: Strain of muscle(s) and tendon(s) of the rotator cuff of left shoulder, sequela (S46.012S); Pain in left shoulder (M25.512); Adhesive capsulitis of left shoulder (M75.02)  Precautions/Allergies:   Review of patient's allergies indicates no known allergies. Fall Risk Score: 2 (? 5 = High Risk)  MD Orders: Evaluate and treat, HEP, ROM, Push ROM- do not force elbow extension MEDICAL/REFERRING DIAGNOSIS:  left shoulder   DATE OF ONSET: Surgery 17  REFERRING PHYSICIAN: Naren Frost MD  RETURN PHYSICIAN APPOINTMENT: 17     INITIAL ASSESSMENT:  Mr. Froylan Wylie is s/p L shoulder mini open revision rotator cuff repair and bicep tenodesis with surgery 17. He is progressing with L shoulder PROM. He is able to control his shoulder pain with ice and pain medication. He will benefit from continuing PT for guided shoulder rehab per mini open rotator cuff protocol to promote safe return to normalized use of the UE with daily and work activities. PROBLEM LIST (Impacting functional limitations):  1. Post-op left shoulder pain and swelling  2. Decreased left shoulder ROM   3. Weakness left shoulder INTERVENTIONS PLANNED:  1. Thermal and electric modalities, manual therapies for pain   2. Manual therapies, therapeutic exercises, HEP for ROM    3. Therapeutic exercises and HEP for strength   TREATMENT PLAN:  Effective Dates:7-10-17 TO 10-6-17. Frequency/Duration: 3 times a week for 12 weeks  GOALS: (Goals have been discussed and agreed upon with patient.)  Short-Term Functional Goals: Time Frame: 6 weeks  1. Report no more than 3/10 intermittent pain to left shoulder with compensatory use during basic functional activities. progressing towards  2.  Left shoulder PROM forward elevation greater than 135 degrees and external rotation greater than 60 degrees to progress into functional ranges. Progressing towards  3. Demonstrate good left shoulder isometric strength with manual testing to progress into strength phase. progressing towards  4. Independent with initial HEP. ongoing  Discharge Goals: Time Frame: 12 weeks  1. No more than 1/10 intermittent pain left shoulder with return to normalized household and work activities, and score less than 25% on the DASH. 2. Left shoulder AROM forward elevation greater than 135 degrees, external rotation greater than 60 degrees, and strength to shoulder are grossly WNL's for safe use with normalized activities. 3. Demonstrate good functional shoulder strength and endurance for return to normalized household and work activities. 4. Independent with advanced shoulder HEP for continued self-management. Rehabilitation Potential For Stated Goals: Good                HISTORY:   History of Present Injury/Illness (Reason for Referral): Injury March 3rd 2016 was lifting something overhead and felt a pop. Had physical therapy and then decided to have surgery 7-19-17.  2-3 months after surgery his shoulder started to get stiff and had manipulation on 10-25-16. He went back to physical therapy and was not getting any better. Then Dr. Tameka Mueller referred him to Dr. Adrianne Silvestre. Then had surgery 7-6-17. He did stay over night in the hospital. Pain at rest 4/10. Past Medical History/Comorbidities:   HTN, carpal tunnel surgery  Social History/Living Environment:    Lives alone  Prior Level of Function/Work/Activity:  Works for [employer]. 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    Dominant Side:         RIGHT handed but plays some sports left handed  Previous Treatment Approaches:          He has had therapy in the past before and after 1st rotator cuff surgery  Current Medications:     Benazepril, atenolol, atorvastatin, dilaudid, ventolin, Restoril    Date Last Reviewed:  8-7-17   Number of Personal Factors/Comorbidities that affect the Plan of Care: 1-2: MODERATE COMPLEXITY   EXAMINATION:   Observation/Orthostatic Postural Assessment: healed incision on the L shoulder  Palpation: n/t    ROM:                     Strength: n/t secondary to post-op                 Special Tests: None  Functional Mobility:  Sit to/from Stand: independent. Bed Mobility: independent. Independent with basic mobility. independent with dressing and grooming ADL's. CLINICAL DECISION MAKING:   Outcome Measure: Tool Used: Disabilities of the Arm, Shoulder and Hand (DASH) Questionnaire - Quick Version  Score:  Initial: 50/55  Most Recent: X/55 (Date: -- )   Interpretation of Score: The DASH is designed to measure the activities of daily living in person's with upper extremity dysfunction or pain. Each section is scored on a 1-5 scale, 5 representing the greatest disability. The scores of each section are added together for a total score of 55. Medical Necessity:   · Patient is expected to demonstrate progress in strength and range of motion to improve functional mobility. Reason for Services/Other Comments:  · Patient continues to require skilled intervention due to post-op, decreased ROM and UE strength. Use of outcome tool(s) and clinical judgement create a POC that gives a: Questionable prediction of patient's progress: MODERATE COMPLEXITY          TREATMENT:   (In addition to Assessment/Re-Assessment sessions the following treatments were rendered)  Pre-treatment Symptoms/Complaints:  Pt reports this morning is not too bad. Pain: Initial:     4/10 Post Session:  4/10   MANUAL THERAPY: (5 minutes): Joint mobilization was utilized and necessary because of the patient's restricted joint motion. Pt supine and 1st rib mobilizations.  Pt in side lying and scapular mobilizations and soft tissue to L upper trap. Therapeutic Exercise (25 Minutes):  Exercises to improve mobility. PROM Left shoulder to ER and IR in shoulder neutral, ER at 45 deg abd, abduction, and forward elevation. Manual isometrics with arm at neutral for IR and ER 5\"x10 ea, and elbow flexion and extension 5\"x10 ea  Therapeutic Modalities: vaso pneumatic compression ice pack to the Left shoulder with game ready with minimal compression x 20 min                       Left Shoulder Cold  Type: Cold/ice pack  Duration : 20 minutes  Patient Position: Sitting                                                                        HEP: continue current HEP    Treatment/Session Assessment:    · Response to Treatment: pain at end range shoulder flexion PROM today. · Compliance with Program/Exercises: appears complaint. · Recommendations/Intent for next treatment session: \"Next visit will focus on L shoulder ROM\".   Total Treatment Duration: 50  minutes  :PT Patient Time In/Time Out  Time In: 1025  Time Out: [address]

## 2017-08-10 ENCOUNTER — HOSPITAL ENCOUNTER (OUTPATIENT)
Dept: PHYSICAL THERAPY | Age: 57
Discharge: HOME | End: 2017-08-10
Payer: COMMERCIAL

## 2017-08-10 PROCEDURE — 97140 MANUAL THERAPY 1/> REGIONS: CPT

## 2017-08-10 PROCEDURE — 97016 VASOPNEUMATIC DEVICE THERAPY: CPT

## 2017-08-10 NOTE — PROGRESS NOTES
Emily Tavares  : 1960 [facility] Center at [facility] CORNELIUS FINLEY  [address] [facility], [address] [place] [address],[address], [place] [address].  [phone]   [phone]       OUTPATIENT PHYSICAL THERAPY:Daily Note 8/10/2017      ICD-10: Treatment Diagnosis: Strain of muscle(s) and tendon(s) of the rotator cuff of left shoulder, sequela (S46.012S); Pain in left shoulder (M25.512); Adhesive capsulitis of left shoulder (M75.02)  Precautions/Allergies:   Review of patient's allergies indicates no known allergies. Fall Risk Score: 2 (? 5 = High Risk)  MD Orders: Evaluate and treat, HEP, ROM, Push ROM- do not force elbow extension MEDICAL/REFERRING DIAGNOSIS:  left shoulder   DATE OF ONSET: Surgery 17  REFERRING PHYSICIAN: Keily Stroud MD  RETURN PHYSICIAN APPOINTMENT: 17     INITIAL ASSESSMENT:  Mr. Zonia Banegas is s/p L shoulder mini open revision rotator cuff repair and bicep tenodesis with surgery 17. He is progressing with L shoulder PROM. He is able to control his shoulder pain with ice and pain medication. He will benefit from continuing PT for guided shoulder rehab per mini open rotator cuff protocol to promote safe return to normalized use of the UE with daily and work activities. PROBLEM LIST (Impacting functional limitations):  1. Post-op left shoulder pain and swelling  2. Decreased left shoulder ROM   3. Weakness left shoulder INTERVENTIONS PLANNED:  1. Thermal and electric modalities, manual therapies for pain   2. Manual therapies, therapeutic exercises, HEP for ROM    3. Therapeutic exercises and HEP for strength   TREATMENT PLAN:  Effective Dates:7-10-17 TO 10-6-17. Frequency/Duration: 3 times a week for 12 weeks  GOALS: (Goals have been discussed and agreed upon with patient.)  Short-Term Functional Goals: Time Frame: 6 weeks  1. Report no more than 3/10 intermittent pain to left shoulder with compensatory use during basic functional activities. progressing towards  2.  Left shoulder PROM forward elevation greater than 135 degrees and external rotation greater than 60 degrees to progress into functional ranges. Progressing towards  3. Demonstrate good left shoulder isometric strength with manual testing to progress into strength phase. progressing towards  4. Independent with initial HEP. ongoing  Discharge Goals: Time Frame: 12 weeks  1. No more than 1/10 intermittent pain left shoulder with return to normalized household and work activities, and score less than 25% on the DASH. 2. Left shoulder AROM forward elevation greater than 135 degrees, external rotation greater than 60 degrees, and strength to shoulder are grossly WNL's for safe use with normalized activities. 3. Demonstrate good functional shoulder strength and endurance for return to normalized household and work activities. 4. Independent with advanced shoulder HEP for continued self-management. Rehabilitation Potential For Stated Goals: Good                HISTORY:   History of Present Injury/Illness (Reason for Referral): Injury March 3rd 2016 was lifting something overhead and felt a pop. Had physical therapy and then decided to have surgery 7-19-17.  2-3 months after surgery his shoulder started to get stiff and had manipulation on 10-25-16. He went back to physical therapy and was not getting any better. Then Dr. Margaret Reece referred him to Dr. Eldon Worley. Then had surgery 7-6-17. He did stay over night in the hospital. Pain at rest 4/10. Past Medical History/Comorbidities:   HTN, carpal tunnel surgery  Social History/Living Environment:    Lives alone  Prior Level of Function/Work/Activity:  Works for [employer]. 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    Dominant Side:         RIGHT handed but plays some sports left handed  Previous Treatment Approaches:          He has had therapy in the past before and after 1st rotator cuff surgery  Current Medications:     Benazepril, atenolol, atorvastatin, dilaudid, ventolin, Restoril    Date Last Reviewed:  8-7-17   Number of Personal Factors/Comorbidities that affect the Plan of Care: 1-2: MODERATE COMPLEXITY   EXAMINATION:   Observation/Orthostatic Postural Assessment: healed incision on the L shoulder  Palpation: n/t    ROM:                     Strength: n/t secondary to post-op                 Special Tests: None  Functional Mobility:  Sit to/from Stand: independent. Bed Mobility: independent. Independent with basic mobility. independent with dressing and grooming ADL's. CLINICAL DECISION MAKING:   Outcome Measure: Tool Used: Disabilities of the Arm, Shoulder and Hand (DASH) Questionnaire - Quick Version  Score:  Initial: 50/55  Most Recent: X/55 (Date: -- )   Interpretation of Score: The DASH is designed to measure the activities of daily living in person's with upper extremity dysfunction or pain. Each section is scored on a 1-5 scale, 5 representing the greatest disability. The scores of each section are added together for a total score of 55. Medical Necessity:   · Patient is expected to demonstrate progress in strength and range of motion to improve functional mobility. Reason for Services/Other Comments:  · Patient continues to require skilled intervention due to post-op, decreased ROM and UE strength. Use of outcome tool(s) and clinical judgement create a POC that gives a: Questionable prediction of patient's progress: MODERATE COMPLEXITY          TREATMENT:   (In addition to Assessment/Re-Assessment sessions the following treatments were rendered)  Pre-treatment Symptoms/Complaints:  Pt reports reports his arm was very sore yesterday. He missed yesterday's appointment due to car trouble. Pain: Initial:     4/10 Post Session:  4/10   MANUAL THERAPY: (25 minutes): Joint mobilization was utilized and necessary because of the patient's restricted joint motion.   Pt supine and 1st rib mobilizations. Pt in side lying and scapular mobilizations and soft tissue to L upper trap. PROM to physiological mobilization to the Left shoulder to ER and IR in shoulder neutral, ER at 45 deg abd, abduction, and forward elevation. Therapeutic Exercise ( ):    Therapeutic Modalities: vaso pneumatic compression ice pack to the Left shoulder with game ready with minimal compression x 20 min                       Left Shoulder Cold  Type: Cold/ice pack  Duration : 20 minutes  Patient Position: Sitting                                                                        HEP: continue current HEP    Treatment/Session Assessment:    · Response to Treatment: Minimal complaints of pain during treatment. · Compliance with Program/Exercises: appears complaint. · Recommendations/Intent for next treatment session: \"Next visit will focus on L shoulder ROM\".   Total Treatment Duration: 45  minutes  :PT Patient Time In/Time Out  Time In: 1355  Time Out: [address]

## 2017-08-11 ENCOUNTER — HOSPITAL ENCOUNTER (OUTPATIENT)
Dept: PHYSICAL THERAPY | Age: 57
Discharge: HOME | End: 2017-08-11
Payer: COMMERCIAL

## 2017-08-11 PROCEDURE — 97140 MANUAL THERAPY 1/> REGIONS: CPT

## 2017-08-11 PROCEDURE — 97016 VASOPNEUMATIC DEVICE THERAPY: CPT

## 2017-08-11 NOTE — PROGRESS NOTES
Ashley Geiger  : 1960 [facility] at [facility] CORNELIUS FINLEY  [address] [facility], [address] [place] [address] [address], [address]  [phone]   [phone]       OUTPATIENT PHYSICAL THERAPY:Daily Note 2017      ICD-10: Treatment Diagnosis: Strain of muscle(s) and tendon(s) of the rotator cuff of left shoulder, sequela (S46.012S); Pain in left shoulder (M25.512); Adhesive capsulitis of left shoulder (M75.02)  Precautions/Allergies:   Review of patient's allergies indicates no known allergies. Fall Risk Score: 2 (? 5 = High Risk)  MD Orders: Evaluate and treat, HEP, ROM, Push ROM- do not force elbow extension MEDICAL/REFERRING DIAGNOSIS:  left shoulder   DATE OF ONSET: Surgery 17  REFERRING PHYSICIAN: Luca Bettencourt MD  RETURN PHYSICIAN APPOINTMENT: 17     INITIAL ASSESSMENT:  Mr. Louisa Reveles is s/p L shoulder mini open revision rotator cuff repair and bicep tenodesis with surgery 17. He is progressing with L shoulder PROM. He is able to control his shoulder pain with ice and pain medication. He will benefit from continuing PT for guided shoulder rehab per mini open rotator cuff protocol to promote safe return to normalized use of the UE with daily and work activities. PROBLEM LIST (Impacting functional limitations):  1. Post-op left shoulder pain and swelling  2. Decreased left shoulder ROM   3. Weakness left shoulder INTERVENTIONS PLANNED:  1. Thermal and electric modalities, manual therapies for pain   2. Manual therapies, therapeutic exercises, HEP for ROM    3. Therapeutic exercises and HEP for strength   TREATMENT PLAN:  Effective Dates:7-10-17 TO 10-6-17. Frequency/Duration: 3 times a week for 12 weeks  GOALS: (Goals have been discussed and agreed upon with patient.)  Short-Term Functional Goals: Time Frame: 6 weeks  1. Report no more than 3/10 intermittent pain to left shoulder with compensatory use during basic functional activities. progressing towards  2.  Left shoulder PROM forward elevation greater than 135 degrees and external rotation greater than 60 degrees to progress into functional ranges. Progressing towards  3. Demonstrate good left shoulder isometric strength with manual testing to progress into strength phase. progressing towards  4. Independent with initial HEP. ongoing  Discharge Goals: Time Frame: 12 weeks  1. No more than 1/10 intermittent pain left shoulder with return to normalized household and work activities, and score less than 25% on the DASH. 2. Left shoulder AROM forward elevation greater than 135 degrees, external rotation greater than 60 degrees, and strength to shoulder are grossly WNL's for safe use with normalized activities. 3. Demonstrate good functional shoulder strength and endurance for return to normalized household and work activities. 4. Independent with advanced shoulder HEP for continued self-management. Rehabilitation Potential For Stated Goals: Good                HISTORY:   History of Present Injury/Illness (Reason for Referral): Injury March 3rd 2016 was lifting something overhead and felt a pop. Had physical therapy and then decided to have surgery 7-19-17.  2-3 months after surgery his shoulder started to get stiff and had manipulation on 10-25-16. He went back to physical therapy and was not getting any better. Then Dr. Soraya Mason referred him to Dr. Chu Bosch. Then had surgery 7-6-17. He did stay over night in the hospital. Pain at rest 4/10. Past Medical History/Comorbidities:   HTN, carpal tunnel surgery  Social History/Living Environment:    Lives alone  Prior Level of Function/Work/Activity:  Works for "[employer]. 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    Dominant Side:         RIGHT handed but plays some sports left handed  Previous Treatment Approaches:          He has had therapy in the past before and after 1st rotator cuff surgery  Current Medications:     Benazepril, atenolol, atorvastatin, dilaudid, ventolin, Restoril    Date Last Reviewed:  8-7-17   Number of Personal Factors/Comorbidities that affect the Plan of Care: 1-2: MODERATE COMPLEXITY   EXAMINATION:   Observation/Orthostatic Postural Assessment: healed incision on the L shoulder  Palpation: n/t    ROM:                     Strength: n/t secondary to post-op                 Special Tests: None  Functional Mobility:  Sit to/from Stand: independent. Bed Mobility: independent. Independent with basic mobility. independent with dressing and grooming ADL's. CLINICAL DECISION MAKING:   Outcome Measure: Tool Used: Disabilities of the Arm, Shoulder and Hand (DASH) Questionnaire - Quick Version  Score:  Initial: 50/55  Most Recent: X/55 (Date: -- )   Interpretation of Score: The DASH is designed to measure the activities of daily living in person's with upper extremity dysfunction or pain. Each section is scored on a 1-5 scale, 5 representing the greatest disability. The scores of each section are added together for a total score of 55. Medical Necessity:   · Patient is expected to demonstrate progress in strength and range of motion to improve functional mobility. Reason for Services/Other Comments:  · Patient continues to require skilled intervention due to post-op, decreased ROM and UE strength. Use of outcome tool(s) and clinical judgement create a POC that gives a: Questionable prediction of patient's progress: MODERATE COMPLEXITY          TREATMENT:   (In addition to Assessment/Re-Assessment sessions the following treatments were rendered)  Pre-treatment Symptoms/Complaints:  Pt reports reports he did not sleep well last night due to arm pain. Pain: Initial:     4/10 Post Session:  4/10   MANUAL THERAPY: (25 minutes): Joint mobilization was utilized and necessary because of the patient's restricted joint motion. Sid NAVARRO to physiological mobilization to the Left shoulder to ER and IR in shoulder neutral, ER at 45 deg abd, abduction, and forward elevation. Cervical distraction with pt supine and cervical side glides. Cervical AP glide to ~C4-C6 for mobility. Therapeutic Exercise ( ):    Therapeutic Modalities: vaso pneumatic compression ice pack to the Left shoulder with game ready with minimal compression x 20 min                       Left Shoulder Cold  Type: Cold/ice pack  Duration : 20 minutes  Patient Position: Sitting                                                                        HEP: continue current HEP    Treatment/Session Assessment:    · Response to Treatment: He seems to be progressing with L shoulder PROM. · Compliance with Program/Exercises: appears complaint. · Recommendations/Intent for next treatment session: \"Next visit will focus on L shoulder ROM\".   Total Treatment Duration: 45  minutes  :PT Patient Time In/Time Out  Time In: 1015  Time Out: [address] [facility]

## 2017-08-15 ENCOUNTER — HOSPITAL ENCOUNTER (OUTPATIENT)
Dept: PHYSICAL THERAPY | Age: 57
Discharge: HOME | End: 2017-08-15
Payer: COMMERCIAL

## 2017-08-15 PROCEDURE — 97140 MANUAL THERAPY 1/> REGIONS: CPT

## 2017-08-15 PROCEDURE — 97110 THERAPEUTIC EXERCISES: CPT

## 2017-08-15 PROCEDURE — 97016 VASOPNEUMATIC DEVICE THERAPY: CPT

## 2017-08-15 NOTE — PROGRESS NOTES
Giannieduardo Bass  : 1960 [facility] at [facility] CORNELIUS FINLEY  [address] [place] [address], [address] [place] [address] [address], [address] [facility]  [phone]   [phone]       OUTPATIENT PHYSICAL THERAPY:Daily Note 8/15/2017      ICD-10: Treatment Diagnosis: Strain of muscle(s) and tendon(s) of the rotator cuff of left shoulder, sequela (S46.012S); Pain in left shoulder (M25.512); Adhesive capsulitis of left shoulder (M75.02)  Precautions/Allergies:   Review of patient's allergies indicates no known allergies. Fall Risk Score: 2 (? 5 = High Risk)  MD Orders: Evaluate and treat, HEP, ROM, strengthening, push MEDICAL/REFERRING DIAGNOSIS:  left shoulder ,right shoulder pain  DATE OF ONSET: Surgery 17  REFERRING PHYSICIAN: Krystina Gonzáles MD  RETURN PHYSICIAN APPOINTMENT: 17     INITIAL ASSESSMENT:  Mr. Marlyn Branch is s/p L shoulder mini open revision rotator cuff repair and bicep tenodesis with surgery 17. He is progressing with L shoulder PROM. He is able to control his shoulder pain with ice and pain medication. He will benefit from continuing PT for guided shoulder rehab per mini open rotator cuff protocol to promote safe return to normalized use of the UE with daily and work activities. PROBLEM LIST (Impacting functional limitations):  1. Post-op left shoulder pain and swelling  2. Decreased left shoulder ROM   3. Weakness left shoulder INTERVENTIONS PLANNED:  1. Thermal and electric modalities, manual therapies for pain   2. Manual therapies, therapeutic exercises, HEP for ROM    3. Therapeutic exercises and HEP for strength   TREATMENT PLAN:  Effective Dates:7-10-17 TO 10-6-17. Frequency/Duration: 3 times a week for 12 weeks  GOALS: (Goals have been discussed and agreed upon with patient.)  Short-Term Functional Goals: Time Frame: 6 weeks  1. Report no more than 3/10 intermittent pain to left shoulder with compensatory use during basic functional activities. progressing towards  2.  Left shoulder PROM forward elevation greater than 135 degrees and external rotation greater than 60 degrees to progress into functional ranges. Progressing towards  3. Demonstrate good left shoulder isometric strength with manual testing to progress into strength phase. progressing towards  4. Independent with initial HEP. ongoing  Discharge Goals: Time Frame: 12 weeks  1. No more than 1/10 intermittent pain left shoulder with return to normalized household and work activities, and score less than 25% on the DASH. 2. Left shoulder AROM forward elevation greater than 135 degrees, external rotation greater than 60 degrees, and strength to shoulder are grossly WNL's for safe use with normalized activities. 3. Demonstrate good functional shoulder strength and endurance for return to normalized household and work activities. 4. Independent with advanced shoulder HEP for continued self-management. Rehabilitation Potential For Stated Goals: Good                HISTORY:   History of Present Injury/Illness (Reason for Referral): Injury March 3rd 2016 was lifting something overhead and felt a pop. Had physical therapy and then decided to have surgery 7-19-17.  2-3 months after surgery his shoulder started to get stiff and had manipulation on 10-25-16. He went back to physical therapy and was not getting any better. Then Dr. Vera Gu referred him to Dr. Cristal Quesada. Then had surgery 7-6-17. He did stay over night in the hospital. Pain at rest 4/10. 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    Dominant Side:         RIGHT handed but plays some sports left handed  Previous Treatment Approaches:          He has had therapy in the past before and after 1st rotator cuff surgery  Current Medications:     Benazepril, atenolol, atorvastatin, dilaudid, ventolin, Restoril    Date Last Reviewed:  8-15-17   Number of Personal Factors/Comorbidities that affect the Plan of Care: 1-2: MODERATE COMPLEXITY   EXAMINATION:   Observation/Orthostatic Postural Assessment: healed incision on the L shoulder  Palpation: n/t    ROM:                     Strength: n/t secondary to post-op                 Special Tests: None  Functional Mobility:  Sit to/from Stand: independent. Bed Mobility: independent. Independent with basic mobility. independent with dressing and grooming ADL's. CLINICAL DECISION MAKING:   Outcome Measure: Tool Used: Disabilities of the Arm, Shoulder and Hand (DASH) Questionnaire - Quick Version  Score:  Initial: 50/55  Most Recent: X/55 (Date: -- )   Interpretation of Score: The DASH is designed to measure the activities of daily living in person's with upper extremity dysfunction or pain. Each section is scored on a 1-5 scale, 5 representing the greatest disability. The scores of each section are added together for a total score of 55. Medical Necessity:   · Patient is expected to demonstrate progress in strength and range of motion to improve functional mobility. Reason for Services/Other Comments:  · Patient continues to require skilled intervention due to post-op, decreased ROM and UE strength. Use of outcome tool(s) and clinical judgement create a POC that gives a: Questionable prediction of patient's progress: MODERATE COMPLEXITY          TREATMENT:   (In addition to Assessment/Re-Assessment sessions the following treatments were rendered)  Pre-treatment Symptoms/Complaints:  Pt reports reports he did not sleep well last night due to arm pain. Pain: Initial:     4/10 Post Session:  4/10   MANUAL THERAPY: (20 minutes): Joint mobilization was utilized and necessary because of the patient's restricted joint motion. Eura Lisandra  PROM to physiological mobilization to the Left shoulder to ER and IR in shoulder neutral, ER at 45 deg abd, abduction, and forward elevation. Cervical distraction with pt supine and cervical side glides. R shoulder physiological mobilizations for ER at neutral, ER at 90 deg abd, flexion, abduction. Glenohumeral inferior glides 30\"x3  Therapeutic Exercise (15 Minutes): For muscle activation. L shoulder rhythmic stabilization with arm at neutral 30\"x2 and FE to 90 deg flexion 30\"x2, serratus punch 2x10, prone shoulder extension 2x10. Pt in side lying and arm supported with scapular retraction 2x10 and scapular depression 2x10. Therapeutic Modalities: vaso pneumatic compression ice pack to the Left shoulder with game ready with minimal compression x 20 min                       Left Shoulder Cold  Type: Cold/ice pack  Duration : 15 minutes  Patient Position: Sitting                                                                        HEP: continue current HEP    Treatment/Session Assessment:    · Response to Treatment: Progressed muscle activation exercises today and pt did well with no complaints of pain. · Compliance with Program/Exercises: appears complaint. · Recommendations/Intent for next treatment session: \"Next visit will focus on L shoulder ROM\".   Total Treatment Duration: 50  minutes  :PT Patient Time In/Time Out  Time In: 1440  Time Out: [address]

## 2017-08-16 ENCOUNTER — HOSPITAL ENCOUNTER (OUTPATIENT)
Dept: PHYSICAL THERAPY | Age: 57
Discharge: HOME | End: 2017-08-16
Payer: COMMERCIAL

## 2017-08-16 PROCEDURE — 97110 THERAPEUTIC EXERCISES: CPT

## 2017-08-16 PROCEDURE — 97140 MANUAL THERAPY 1/> REGIONS: CPT

## 2017-08-16 NOTE — PROGRESS NOTES
Manisha Kamara  : 1960 [facility] at [facility] CORNELIUS FINLEY  [address] [facility], [address] [place] [address],[address], [facility] [address]  [phone]   [phone]       OUTPATIENT PHYSICAL THERAPY:Daily Note 2017      ICD-10: Treatment Diagnosis: Strain of muscle(s) and tendon(s) of the rotator cuff of left shoulder, sequela (S46.012S); Pain in left shoulder (M25.512); Adhesive capsulitis of left shoulder (M75.02)  Precautions/Allergies:   Review of patient's allergies indicates no known allergies. Fall Risk Score: 2 (? 5 = High Risk)  MD Orders: Evaluate and treat, HEP, ROM, strengthening, push MEDICAL/REFERRING DIAGNOSIS:  left shoulder ,right shoulder pain  DATE OF ONSET: Surgery 17  REFERRING PHYSICIAN: Florencio Villeda., MD  RETURN PHYSICIAN APPOINTMENT: 17     INITIAL ASSESSMENT:  Mr. Shantelle Leblanc is s/p L shoulder mini open revision rotator cuff repair and bicep tenodesis with surgery 17. He is progressing with L shoulder PROM. He is able to control his shoulder pain with ice and pain medication. He will benefit from continuing PT for guided shoulder rehab per mini open rotator cuff protocol to promote safe return to normalized use of the UE with daily and work activities. PROBLEM LIST (Impacting functional limitations):  1. Post-op left shoulder pain and swelling  2. Decreased left shoulder ROM   3. Weakness left shoulder INTERVENTIONS PLANNED:  1. Thermal and electric modalities, manual therapies for pain   2. Manual therapies, therapeutic exercises, HEP for ROM    3. Therapeutic exercises and HEP for strength   TREATMENT PLAN:  Effective Dates:7-10-17 TO 10-6-17. Frequency/Duration: 3 times a week for 12 weeks  GOALS: (Goals have been discussed and agreed upon with patient.)  Short-Term Functional Goals: Time Frame: 6 weeks  1. Report no more than 3/10 intermittent pain to left shoulder with compensatory use during basic functional activities. progressing towards  2.  Left shoulder PROM forward elevation greater than 135 degrees and external rotation greater than 60 degrees to progress into functional ranges. Progressing towards  3. Demonstrate good left shoulder isometric strength with manual testing to progress into strength phase. progressing towards  4. Independent with initial HEP. ongoing  Discharge Goals: Time Frame: 12 weeks  1. No more than 1/10 intermittent pain left shoulder with return to normalized household and work activities, and score less than 25% on the DASH. 2. Left shoulder AROM forward elevation greater than 135 degrees, external rotation greater than 60 degrees, and strength to shoulder are grossly WNL's for safe use with normalized activities. 3. Demonstrate good functional shoulder strength and endurance for return to normalized household and work activities. 4. Independent with advanced shoulder HEP for continued self-management. Rehabilitation Potential For Stated Goals: Good                HISTORY:   History of Present Injury/Illness (Reason for Referral): Injury March 3rd 2016 was lifting something overhead and felt a pop. Had physical therapy and then decided to have surgery 7-19-17.  2-3 months after surgery his shoulder started to get stiff and had manipulation on 10-25-16. He went back to physical therapy and was not getting any better. Then Dr. Edna Rice referred him to Dr. Huma Collado. Then had surgery 7-6-17. He did stay over night in the hospital. Pain at rest 4/10. 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    Dominant Side:         RIGHT handed but plays some sports left handed  Previous Treatment Approaches:          He has had therapy in the past before and after 1st rotator cuff surgery  Current Medications:     Benazepril, atenolol, atorvastatin, dilaudid, ventolin, Restoril    Date Last Reviewed:  8-15-17   Number of Personal Factors/Comorbidities that affect the Plan of Care: 1-2: MODERATE COMPLEXITY   EXAMINATION:   Observation/Orthostatic Postural Assessment: healed incision on the L shoulder  Palpation: n/t    ROM:                     Strength: n/t secondary to post-op                 Special Tests: None  Functional Mobility:  Sit to/from Stand: independent. Bed Mobility: independent. Independent with basic mobility. independent with dressing and grooming ADL's. CLINICAL DECISION MAKING:   Outcome Measure: Tool Used: Disabilities of the Arm, Shoulder and Hand (DASH) Questionnaire - Quick Version  Score:  Initial: 50/55  Most Recent: X/55 (Date: -- )   Interpretation of Score: The DASH is designed to measure the activities of daily living in person's with upper extremity dysfunction or pain. Each section is scored on a 1-5 scale, 5 representing the greatest disability. The scores of each section are added together for a total score of 55. Medical Necessity:   · Patient is expected to demonstrate progress in strength and range of motion to improve functional mobility. Reason for Services/Other Comments:  · Patient continues to require skilled intervention due to post-op, decreased ROM and UE strength. Use of outcome tool(s) and clinical judgement create a POC that gives a: Questionable prediction of patient's progress: MODERATE COMPLEXITY          TREATMENT:   (In addition to Assessment/Re-Assessment sessions the following treatments were rendered)  Pre-treatment Symptoms/Complaints:  Pt reports reports he was a little sore last night but not too bad. Pain: Initial:     4/10 Post Session:  4/10   MANUAL THERAPY: (15 minutes): Joint mobilization was utilized and necessary because of the patient's restricted joint motion. Raleigh Dials  PROM to physiological mobilization to the Left shoulder to ER and IR in shoulder neutral, ER at 45 deg abd, abduction, and forward elevation. R shoulder physiological mobilizations for ER at neutral, ER at 90 deg abd, flexion, abduction. Glenohumeral inferior glides 30\"x3  Therapeutic Exercise (23 Minutes): For muscle activation. L shoulder rhythmic stabilization with arm at neutral 30\"x2 and FE to 90 deg flexion 30\"x2, serratus punch 2x10, prone shoulder extension 2x10. Prone middle and lower trap 2x8 with tactile cues on the scapula. Band IR and ER 10x ea with each UE. Therapeutic Modalities: vaso pneumatic compression ice pack to the Left shoulder with game ready with minimal compression x 15 min                       Left Shoulder Cold  Type: Cold/ice pack  Duration : 15 minutes  Patient Position: Sitting                                                                        HEP: issued new HEP with beginning strengthening    Treatment/Session Assessment:    · Response to Treatment: He did well with increasing muscle activation exercises today with minimal complaints of pain. · Compliance with Program/Exercises: appears complaint. · Recommendations/Intent for next treatment session: \"Next visit will focus on L shoulder ROM\".   Total Treatment Duration: 53 minutes  :PT Patient Time In/Time Out  Time In: 0850  Time Out: Jaron

## 2017-08-22 ENCOUNTER — HOSPITAL ENCOUNTER (OUTPATIENT)
Dept: PHYSICAL THERAPY | Age: 57
Discharge: HOME | End: 2017-08-22
Payer: COMMERCIAL

## 2017-08-22 PROCEDURE — 97110 THERAPEUTIC EXERCISES: CPT

## 2017-08-22 PROCEDURE — 97140 MANUAL THERAPY 1/> REGIONS: CPT

## 2017-08-22 NOTE — PROGRESS NOTES
Mu Licona  : 1960 Therapy [facility] at [facility] CORNELIUS FINLEY  [address] [facility], [address] [place] [address],[address], [address] [facility]  [phone]   [phone]       OUTPATIENT PHYSICAL THERAPY:Daily Note 2017      ICD-10: Treatment Diagnosis: Strain of muscle(s) and tendon(s) of the rotator cuff of left shoulder, sequela (S46.012S); Pain in left shoulder (M25.512); Adhesive capsulitis of left shoulder (M75.02)  Precautions/Allergies:   Review of patient's allergies indicates no known allergies. Fall Risk Score: 2 (? 5 = High Risk)  MD Orders: Evaluate and treat, HEP, ROM, strengthening, push MEDICAL/REFERRING DIAGNOSIS:  left shoulder ,right shoulder pain  DATE OF ONSET: Surgery 17  REFERRING PHYSICIAN: Gildardo Kam MD  RETURN PHYSICIAN APPOINTMENT: 17     INITIAL ASSESSMENT:  Mr. Victorino Rizvi is s/p L shoulder mini open revision rotator cuff repair and bicep tenodesis with surgery 17. He is progressing with L shoulder PROM. He is able to control his shoulder pain with ice and pain medication. He will benefit from continuing PT for guided shoulder rehab per mini open rotator cuff protocol to promote safe return to normalized use of the UE with daily and work activities. PROBLEM LIST (Impacting functional limitations):  1. Post-op left shoulder pain and swelling  2. Decreased left shoulder ROM   3. Weakness left shoulder INTERVENTIONS PLANNED:  1. Thermal and electric modalities, manual therapies for pain   2. Manual therapies, therapeutic exercises, HEP for ROM    3. Therapeutic exercises and HEP for strength   TREATMENT PLAN:  Effective Dates:7-10-17 TO 10-6-17. Frequency/Duration: 3 times a week for 12 weeks  GOALS: (Goals have been discussed and agreed upon with patient.)  Short-Term Functional Goals: Time Frame: 6 weeks  1. Report no more than 3/10 intermittent pain to left shoulder with compensatory use during basic functional activities. progressing towards  2.  Left shoulder PROM forward elevation greater than 135 degrees and external rotation greater than 60 degrees to progress into functional ranges. Progressing towards  3. Demonstrate good left shoulder isometric strength with manual testing to progress into strength phase. progressing towards  4. Independent with initial HEP. ongoing  Discharge Goals: Time Frame: 12 weeks  1. No more than 1/10 intermittent pain left shoulder with return to normalized household and work activities, and score less than 25% on the DASH. 2. Left shoulder AROM forward elevation greater than 135 degrees, external rotation greater than 60 degrees, and strength to shoulder are grossly WNL's for safe use with normalized activities. 3. Demonstrate good functional shoulder strength and endurance for return to normalized household and work activities. 4. Independent with advanced shoulder HEP for continued self-management. Rehabilitation Potential For Stated Goals: Good                HISTORY:   History of Present Injury/Illness (Reason for Referral): Injury 2016 was lifting something overhead and felt a pop. Had physical therapy and then decided to have surgery 17.  2-3 months after surgery his shoulder started to get stiff and had manipulation on 10-25-16. He went back to physical therapy and was not getting any better. Then Dr. Murali Littlejohn referred him to Dr. Perdomo Monday. Then had surgery 17. He did stay over night in the hospital. Pain at rest 4/10. Past Medical History/Comorbidities:   HTN, carpal tunnel surgery  Social History/Living Environment:    Lives alone  Prior Level of Function/Work/Activity:  Works for [employer]. 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    Dominant Side:         RIGHT handed but plays some sports left handed  Previous Treatment Approaches:          He has had therapy in the past before and after 1st rotator cuff surgery  Current Medications:     Benazepril, atenolol, atorvastatin, dilaudid, ventolin, Restoril    Date Last Reviewed:  8-15-17   Number of Personal Factors/Comorbidities that affect the Plan of Care: 1-2: MODERATE COMPLEXITY   EXAMINATION:   Observation/Orthostatic Postural Assessment: healed incision on the L shoulder  Palpation: n/t    ROM:                     Strength: n/t secondary to post-op                 Special Tests: None  Functional Mobility:  Sit to/from Stand: independent. Bed Mobility: independent. Independent with basic mobility. independent with dressing and grooming ADL's. CLINICAL DECISION MAKING:   Outcome Measure: Tool Used: Disabilities of the Arm, Shoulder and Hand (DASH) Questionnaire - Quick Version  Score:  Initial: 50/55  Most Recent: X/55 (Date: -- )   Interpretation of Score: The DASH is designed to measure the activities of daily living in person's with upper extremity dysfunction or pain. Each section is scored on a 1-5 scale, 5 representing the greatest disability. The scores of each section are added together for a total score of 55. Medical Necessity:   · Patient is expected to demonstrate progress in strength and range of motion to improve functional mobility. Reason for Services/Other Comments:  · Patient continues to require skilled intervention due to post-op, decreased ROM and UE strength. Use of outcome tool(s) and clinical judgement create a POC that gives a: Questionable prediction of patient's progress: MODERATE COMPLEXITY          TREATMENT:   (In addition to Assessment/Re-Assessment sessions the following treatments were rendered)  Pre-treatment Symptoms/Complaints:  Pt reports he was sore over the weekend from doing the new HEP. Pain: Initial:     2/10 Post Session:  3/10   MANUAL THERAPY: (15 minutes): Joint mobilization was utilized and necessary because of the patient's restricted joint motion. Eura Lisandra  PROM to physiological mobilization to the Left shoulder to ER and IR in shoulder neutral, ER at 45 deg abd, abduction, and forward elevation. R shoulder physiological mobilizations for ER at neutral, ER at 90 deg abd, flexion, abduction. Glenohumeral inferior glides 30\"x3  Therapeutic Exercise (25 Minutes): For muscle activation and UE strengthening. L shoulder rhythmic stabilization with arm at neutral 30\"x2 and FE to 90 deg flexion 30\"x2   Date:  8-22-17 Date:   Date:     Activity/Exercise Parameters Parameters Parameters   Serratus punch 1#2x10     Side lying ER 2x15     Prone shoulder extension 2x15     Prone middle trap 2x10     Prone lower trap 2x10     Band IR and ER Yellow 2x10               Therapeutic Modalities: vaso pneumatic compression ice pack to the Left shoulder with game ready with minimal compression x 15 min                       Left Shoulder Cold  Type: Cold/ice pack  Duration : 15 minutes  Patient Position: Sitting                                                                        HEP: issued new HEP with beginning strengthening    Treatment/Session Assessment:    · Response to Treatment: No complaints of pain with exercises. · Compliance with Program/Exercises: appears complaint. · Recommendations/Intent for next treatment session: \"Next visit will focus on L shoulder ROM\".   Total Treatment Duration: 55 minutes  :PT Patient Time In/Time Out  Time In: 1030  Time Out: [address]

## 2017-08-25 ENCOUNTER — HOSPITAL ENCOUNTER (OUTPATIENT)
Dept: PHYSICAL THERAPY | Age: 57
Discharge: HOME | End: 2017-08-25
Payer: COMMERCIAL

## 2017-08-25 PROCEDURE — 97110 THERAPEUTIC EXERCISES: CPT

## 2017-08-25 NOTE — PROGRESS NOTES
Timbo Hdez  : 1960 Therapy Center at [facility] CORNELIUS FINLEY  [address] [facility], [address] [place] [address],[address], [place] [address].  [phone]   [phone]       OUTPATIENT PHYSICAL THERAPY:Daily Note 2017      ICD-10: Treatment Diagnosis: Strain of muscle(s) and tendon(s) of the rotator cuff of left shoulder, sequela (S46.012S); Pain in left shoulder (M25.512); Adhesive capsulitis of left shoulder (M75.02)  Precautions/Allergies:   Review of patient's allergies indicates no known allergies. Fall Risk Score: 2 (? 5 = High Risk)  MD Orders: Evaluate and treat, HEP, ROM, strengthening, push MEDICAL/REFERRING DIAGNOSIS:  left shoulder ,right shoulder pain  DATE OF ONSET: Surgery 17  REFERRING PHYSICIAN: Stephen Lovett MD  RETURN PHYSICIAN APPOINTMENT: 17     INITIAL ASSESSMENT:  Mr. Otilia Mcintyre is s/p L shoulder mini open revision rotator cuff repair and bicep tenodesis with surgery 17. He is progressing with L shoulder PROM. He is able to control his shoulder pain with ice and pain medication. He will benefit from continuing PT for guided shoulder rehab per mini open rotator cuff protocol to promote safe return to normalized use of the UE with daily and work activities. PROBLEM LIST (Impacting functional limitations):  1. Post-op left shoulder pain and swelling  2. Decreased left shoulder ROM   3. Weakness left shoulder INTERVENTIONS PLANNED:  1. Thermal and electric modalities, manual therapies for pain   2. Manual therapies, therapeutic exercises, HEP for ROM    3. Therapeutic exercises and HEP for strength   TREATMENT PLAN:  Effective Dates:7-10-17 TO 10-6-17. Frequency/Duration: 3 times a week for 12 weeks  GOALS: (Goals have been discussed and agreed upon with patient.)  Short-Term Functional Goals: Time Frame: 6 weeks  1. Report no more than 3/10 intermittent pain to left shoulder with compensatory use during basic functional activities. progressing towards  2.  Left shoulder PROM forward elevation greater than 135 degrees and external rotation greater than 60 degrees to progress into functional ranges. Progressing towards  3. Demonstrate good left shoulder isometric strength with manual testing to progress into strength phase. progressing towards  4. Independent with initial HEP. ongoing  Discharge Goals: Time Frame: 12 weeks  1. No more than 1/10 intermittent pain left shoulder with return to normalized household and work activities, and score less than 25% on the DASH. 2. Left shoulder AROM forward elevation greater than 135 degrees, external rotation greater than 60 degrees, and strength to shoulder are grossly WNL's for safe use with normalized activities. 3. Demonstrate good functional shoulder strength and endurance for return to normalized household and work activities. 4. Independent with advanced shoulder HEP for continued self-management. Rehabilitation Potential For Stated Goals: Good                HISTORY:   History of Present Injury/Illness (Reason for Referral): Injury March 3rd 2016 was lifting something overhead and felt a pop. Had physical therapy and then decided to have surgery 7-19-17.  2-3 months after surgery his shoulder started to get stiff and had manipulation on 10-25-16. He went back to physical therapy and was not getting any better. Then Dr. Delfin Patel referred him to Dr. Ulisses Foreman. Then had surgery 7-6-17. He did stay over night in the hospital. Pain at rest 4/10. Past Medical History/Comorbidities:   HTN, carpal tunnel surgery  Social History/Living Environment:    Lives alone  Prior Level of Function/Work/Activity:  Works for [employer]. 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    Dominant Side:         RIGHT handed but plays some sports left handed  Previous Treatment Approaches:          He has had therapy in the past before and after 1st rotator cuff surgery  Current Medications:     Benazepril, atenolol, atorvastatin, dilaudid, ventolin, Restoril, ultram    Date Last Reviewed:  8-25-17   Number of Personal Factors/Comorbidities that affect the Plan of Care: 1-2: MODERATE COMPLEXITY   EXAMINATION:   Observation/Orthostatic Postural Assessment: healed incision on the L shoulder  Palpation: n/t    ROM:                     Strength: n/t secondary to post-op                 Special Tests: None  Functional Mobility:  Sit to/from Stand: independent. Bed Mobility: independent. Independent with basic mobility. independent with dressing and grooming ADL's. CLINICAL DECISION MAKING:   Outcome Measure: Tool Used: Disabilities of the Arm, Shoulder and Hand (DASH) Questionnaire - Quick Version  Score:  Initial: 50/55  Most Recent: X/55 (Date: -- )   Interpretation of Score: The DASH is designed to measure the activities of daily living in person's with upper extremity dysfunction or pain. Each section is scored on a 1-5 scale, 5 representing the greatest disability. The scores of each section are added together for a total score of 55. Medical Necessity:   · Patient is expected to demonstrate progress in strength and range of motion to improve functional mobility. Reason for Services/Other Comments:  · Patient continues to require skilled intervention due to post-op, decreased ROM and UE strength. Use of outcome tool(s) and clinical judgement create a POC that gives a: Questionable prediction of patient's progress: MODERATE COMPLEXITY          TREATMENT:   (In addition to Assessment/Re-Assessment sessions the following treatments were rendered)  Pre-treatment Symptoms/Complaints:  Pt reports he was sore from doing the new HEP with a 3# weight. Complains of tingling in the right hand that has gotten so bad that he feels like a cup of coffee will drop.    Pain: Initial:   Pain Intensity 1: 4 /10 Post Session:  1/10   MANUAL THERAPY: (15 minutes): Joint mobilization was utilized and necessary because of the patient's restricted joint motion. Aleah Watkins PROM to physiological mobilization to the Left shoulder to ER and IR in shoulder neutral, ER at 45 deg abd, abduction, and forward elevation. R shoulder glenohumeral inferior glides 30\"x3, R upper limb nerve glides. Therapeutic Exercise (40 Minutes): For muscle activation and UE strengthening. Cueing needed for technique, posture correction and use of towel for UE positioning. L shoulder rhythmic stabilization ER/IR with arm at neutral and 50 deg abd, FE to 90 deg flexion 30\"x2   Date:  8-22-17 Date:  8/25/17 Date:     Activity/Exercise Parameters Parameters Parameters   Serratus punch 1#2x10 1# B x12 x10    Side lying ER 2x15 2x15    Prone shoulder extension 2x15 1# x10,0# x15    Prone middle trap 2x10 x12 x10    Prone lower trap 2x10 2x10    Band IR and ER Yellow 2x10 Yellow 2x10              Therapeutic Modalities: none per pt ok                                                                                                HEP: advised to continue current HEP without 3# wt until reviewing with treating therapist.    Treatment/Session Assessment: Positive R ULTT for median nerve. · Response to Treatment: toerated exercises without pain. · Compliance with Program/Exercises: yes per pt. · Recommendations/Intent for next treatment session: \"Next visit will focus on L shoulder ROM\".   Total Treatment Duration:  40 minutes  :PT Patient Time In/Time Out  Time In: 1230  Time Out: Timothy 430, PT

## 2017-08-28 ENCOUNTER — HOSPITAL ENCOUNTER (OUTPATIENT)
Dept: PHYSICAL THERAPY | Age: 57
Discharge: HOME | End: 2017-08-28
Payer: COMMERCIAL

## 2017-08-28 PROCEDURE — 97140 MANUAL THERAPY 1/> REGIONS: CPT

## 2017-08-28 PROCEDURE — 97110 THERAPEUTIC EXERCISES: CPT

## 2017-08-28 NOTE — PROGRESS NOTES
Rodriguez Chol  : 1960 Therapy Center at [facility] CORNELIUS FINLEY  1101 [facility], [address] [place] [address],[address], [facility] [address]  [phone]   [phone]       OUTPATIENT PHYSICAL THERAPY:Daily Note 2017      ICD-10: Treatment Diagnosis: Strain of muscle(s) and tendon(s) of the rotator cuff of left shoulder, sequela (S46.012S); Pain in left shoulder (M25.512); Adhesive capsulitis of left shoulder (M75.02)  Precautions/Allergies:   Review of patient's allergies indicates no known allergies. Fall Risk Score: 2 (? 5 = High Risk)  MD Orders: Evaluate and treat, HEP, ROM, strengthening, push MEDICAL/REFERRING DIAGNOSIS:  left shoulder ,right shoulder pain  DATE OF ONSET: Surgery 17  REFERRING PHYSICIAN: Joanie Guadarrama MD  RETURN PHYSICIAN APPOINTMENT: 17     INITIAL ASSESSMENT:  Mr. Bill Lozano is s/p L shoulder mini open revision rotator cuff repair and bicep tenodesis with surgery 17. He is progressing with L shoulder PROM. He is able to control his shoulder pain with ice and pain medication. He will benefit from continuing PT for guided shoulder rehab per mini open rotator cuff protocol to promote safe return to normalized use of the UE with daily and work activities. PROBLEM LIST (Impacting functional limitations):  1. Post-op left shoulder pain and swelling  2. Decreased left shoulder ROM   3. Weakness left shoulder INTERVENTIONS PLANNED:  1. Thermal and electric modalities, manual therapies for pain   2. Manual therapies, therapeutic exercises, HEP for ROM    3. Therapeutic exercises and HEP for strength   TREATMENT PLAN:  Effective Dates:7-10-17 TO 10-6-17. Frequency/Duration: 3 times a week for 12 weeks  GOALS: (Goals have been discussed and agreed upon with patient.)  Short-Term Functional Goals: Time Frame: 6 weeks  1. Report no more than 3/10 intermittent pain to left shoulder with compensatory use during basic functional activities. progressing towards  2.  Left shoulder PROM forward elevation greater than 135 degrees and external rotation greater than 60 degrees to progress into functional ranges. Progressing towards  3. Demonstrate good left shoulder isometric strength with manual testing to progress into strength phase. progressing towards  4. Independent with initial HEP. ongoing  Discharge Goals: Time Frame: 12 weeks  1. No more than 1/10 intermittent pain left shoulder with return to normalized household and work activities, and score less than 25% on the DASH. 2. Left shoulder AROM forward elevation greater than 135 degrees, external rotation greater than 60 degrees, and strength to shoulder are grossly WNL's for safe use with normalized activities. 3. Demonstrate good functional shoulder strength and endurance for return to normalized household and work activities. 4. Independent with advanced shoulder HEP for continued self-management. Rehabilitation Potential For Stated Goals: Good                HISTORY:   History of Present Injury/Illness (Reason for Referral): Injury March 3rd 2016 was lifting something overhead and felt a pop. Had physical therapy and then decided to have surgery 7-19-17.  2-3 months after surgery his shoulder started to get stiff and had manipulation on 10-25-16. He went back to physical therapy and was not getting any better. Then Dr. Tameka Mueller referred him to Dr. Adrianne Silvestre. Then had surgery 7-6-17. He did stay over night in the hospital. Pain at rest 4/10. Past Medical History/Comorbidities:   HTN, carpal tunnel surgery  Social History/Living Environment:    Lives alone  Prior Level of Function/Work/Activity:  Works for [employer]. 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    Dominant Side:         RIGHT handed but plays some sports left handed  Previous Treatment Approaches:          He has had therapy in the past before and after 1st rotator cuff surgery  Current Medications:     Benazepril, atenolol, atorvastatin, dilaudid, ventolin, Restoril, ultram    Date Last Reviewed:  8-25-17   Number of Personal Factors/Comorbidities that affect the Plan of Care: 1-2: MODERATE COMPLEXITY   EXAMINATION:   Observation/Orthostatic Postural Assessment: healed incision on the L shoulder  Palpation: n/t    ROM:                     Strength: n/t secondary to post-op                 Special Tests: None  Functional Mobility:  Sit to/from Stand: independent. Bed Mobility: independent. Independent with basic mobility. independent with dressing and grooming ADL's. CLINICAL DECISION MAKING:   Outcome Measure: Tool Used: Disabilities of the Arm, Shoulder and Hand (DASH) Questionnaire - Quick Version  Score:  Initial: 50/55  Most Recent: X/55 (Date: -- )   Interpretation of Score: The DASH is designed to measure the activities of daily living in person's with upper extremity dysfunction or pain. Each section is scored on a 1-5 scale, 5 representing the greatest disability. The scores of each section are added together for a total score of 55. Medical Necessity:   · Patient is expected to demonstrate progress in strength and range of motion to improve functional mobility. Reason for Services/Other Comments:  · Patient continues to require skilled intervention due to post-op, decreased ROM and UE strength. Use of outcome tool(s) and clinical judgement create a POC that gives a: Questionable prediction of patient's progress: MODERATE COMPLEXITY          TREATMENT:   (In addition to Assessment/Re-Assessment sessions the following treatments were rendered)  Pre-treatment Symptoms/Complaints:  Pt reports he was sore all weekend. Pain: Initial:     5/10 Post Session:  4/10   MANUAL THERAPY: (15 minutes): Joint mobilization was utilized and necessary because of the patient's restricted joint motion. Benna Him  PROM to physiological mobilization to the Left shoulder to ER and IR in shoulder neutral, ER at 45 deg abd, abduction, and forward elevation. R shoulder glenohumeral inferior glides 30\"x3, R upper limb nerve glides. Cervical side glides with pt supine. 1st rib MET. Therapeutic Exercise (25 Minutes): For muscle activation and UE strengthening. Cueing needed for technique, posture correction and use of towel for UE positioning. L shoulder rhythmic stabilization ER/IR with arm at neutral and 50 deg abd, FE to 90 deg flexion 30\"x2   Date:  8-22-17 Date:  8/25/17 Date:  8-28-17   Activity/Exercise Parameters Parameters Parameters   Serratus punch 1#2x10 1# B 1x12, 1x10 1# B 2x15   Side lying ER 2x15 2x15 2x20   Prone shoulder extension 2x15 1# 1x10,0# 1x15 1# 2x10   Prone middle trap 2x10 1x12, 1x10 2x10   Prone lower trap 2x10 2x10 2x10   Band IR and ER Yellow 2x10 Yellow 2x10 Yellow 2x15             Therapeutic Modalities:ice pack to the left shoulder with game ready x 15 min                       Left Shoulder Cold  Type: Cold/ice pack  Duration : 15 minutes  Patient Position: Sitting                                                                        HEP: advised to continue current HEP with can soup    Treatment/Session Assessment:  · Response to Treatment: Difficulty to assess neural tension in L UE due decreased L shoulder ROM and shoulder pain with full ER at 90 deg abd. · Compliance with Program/Exercises: yes per pt. · Recommendations/Intent for next treatment session: \"Next visit will focus on L shoulder ROM\".   Total Treatment Duration:  55 minutes  :PT Patient Time In/Time Out  Time In: 1015  Time Out: [address]

## 2017-08-30 ENCOUNTER — HOSPITAL ENCOUNTER (OUTPATIENT)
Dept: PHYSICAL THERAPY | Age: 57
Discharge: HOME | End: 2017-08-30
Payer: COMMERCIAL

## 2017-08-30 PROCEDURE — 97110 THERAPEUTIC EXERCISES: CPT

## 2017-08-30 PROCEDURE — 97140 MANUAL THERAPY 1/> REGIONS: CPT

## 2017-08-30 NOTE — PROGRESS NOTES
Giannieduardo Bass  : 1960 Therapy Center at [facility]NELIUS [facility]LEY  [address] [facility], [address] [place] [address],[address], [facility] [address].  [phone]   [phone]       OUTPATIENT PHYSICAL THERAPY:Daily Note 2017      ICD-10: Treatment Diagnosis: Strain of muscle(s) and tendon(s) of the rotator cuff of left shoulder, sequela (S46.012S); Pain in left shoulder (M25.512); Adhesive capsulitis of left shoulder (M75.02)  Precautions/Allergies:   Review of patient's allergies indicates no known allergies. Fall Risk Score: 2 (? 5 = High Risk)  MD Orders: Evaluate and treat, HEP, ROM, strengthening, push MEDICAL/REFERRING DIAGNOSIS:  left shoulder ,right shoulder pain  DATE OF ONSET: Surgery 17  REFERRING PHYSICIAN: Krystina Gonzáles MD  RETURN PHYSICIAN APPOINTMENT: 17     INITIAL ASSESSMENT:  Mr. Marlyn Branch is s/p L shoulder mini open revision rotator cuff repair and bicep tenodesis with surgery 17. He is progressing with L shoulder PROM. He is able to control his shoulder pain with ice and pain medication. He will benefit from continuing PT for guided shoulder rehab per mini open rotator cuff protocol to promote safe return to normalized use of the UE with daily and work activities. PROBLEM LIST (Impacting functional limitations):  1. Post-op left shoulder pain and swelling  2. Decreased left shoulder ROM   3. Weakness left shoulder INTERVENTIONS PLANNED:  1. Thermal and electric modalities, manual therapies for pain   2. Manual therapies, therapeutic exercises, HEP for ROM    3. Therapeutic exercises and HEP for strength   TREATMENT PLAN:  Effective Dates:7-10-17 TO 10-6-17. Frequency/Duration: 3 times a week for 12 weeks  GOALS: (Goals have been discussed and agreed upon with patient.)  Short-Term Functional Goals: Time Frame: 6 weeks  1. Report no more than 3/10 intermittent pain to left shoulder with compensatory use during basic functional activities. progressing towards  2.  Left shoulder PROM forward elevation greater than 135 degrees and external rotation greater than 60 degrees to progress into functional ranges. Progressing towards  3. Demonstrate good left shoulder isometric strength with manual testing to progress into strength phase. progressing towards  4. Independent with initial HEP. ongoing  Discharge Goals: Time Frame: 12 weeks  1. No more than 1/10 intermittent pain left shoulder with return to normalized household and work activities, and score less than 25% on the DASH. 2. Left shoulder AROM forward elevation greater than 135 degrees, external rotation greater than 60 degrees, and strength to shoulder are grossly WNL's for safe use with normalized activities. 3. Demonstrate good functional shoulder strength and endurance for return to normalized household and work activities. 4. Independent with advanced shoulder HEP for continued self-management. Rehabilitation Potential For Stated Goals: Good                HISTORY:   History of Present Injury/Illness (Reason for Referral): Injury March 3rd 2016 was lifting something overhead and felt a pop. Had physical therapy and then decided to have surgery 7-19-17.  2-3 months after surgery his shoulder started to get stiff and had manipulation on 10-25-16. He went back to physical therapy and was not getting any better. Then Dr. Elias Rodney referred him to Dr. Vanesa Chapman. Then had surgery 7-6-17. He did stay over night in the hospital. Pain at rest 4/10. Past Medical History/Comorbidities:   HTN, carpal tunnel surgery  Social History/Living Environment:    Lives alone  Prior Level of Function/Work/Activity:  Works for [employer]. 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    Dominant Side:         RIGHT handed but plays some sports left handed  Previous Treatment Approaches:          He has had therapy in the past before and after 1st rotator cuff surgery  Current Medications:     Benazepril, atenolol, atorvastatin, dilaudid, ventolin, Restoril, ultram    Date Last Reviewed:  8-25-17   Number of Personal Factors/Comorbidities that affect the Plan of Care: 1-2: MODERATE COMPLEXITY   EXAMINATION:   Observation/Orthostatic Postural Assessment: healed incision on the L shoulder  Palpation: n/t    ROM:                     Strength: n/t secondary to post-op                 Special Tests: None  Functional Mobility:  Sit to/from Stand: independent. Bed Mobility: independent. Independent with basic mobility. independent with dressing and grooming ADL's. CLINICAL DECISION MAKING:   Outcome Measure: Tool Used: Disabilities of the Arm, Shoulder and Hand (DASH) Questionnaire - Quick Version  Score:  Initial: 50/55  Most Recent: X/55 (Date: -- )   Interpretation of Score: The DASH is designed to measure the activities of daily living in person's with upper extremity dysfunction or pain. Each section is scored on a 1-5 scale, 5 representing the greatest disability. The scores of each section are added together for a total score of 55. Medical Necessity:   · Patient is expected to demonstrate progress in strength and range of motion to improve functional mobility. Reason for Services/Other Comments:  · Patient continues to require skilled intervention due to post-op, decreased ROM and UE strength. Use of outcome tool(s) and clinical judgement create a POC that gives a: Questionable prediction of patient's progress: MODERATE COMPLEXITY          TREATMENT:   (In addition to Assessment/Re-Assessment sessions the following treatments were rendered)  Pre-treatment Symptoms/Complaints:  Pt reports he was sore all weekend. Pain: Initial:     5/10 Post Session:  4/10   MANUAL THERAPY: (25 minutes): Joint mobilization was utilized and necessary because of the patient's restricted joint motion. Raymundo Bowling  PROM to physiological mobilization to the Left shoulder to ER and IR in shoulder neutral, ER at 45 deg abd, abduction, and forward elevation. R shoulder glenohumeral inferior and posterior glides 30\"x3, R upper limb nerve glides. Cervical side glides with pt supine. 1st rib MET. Therapeutic Exercise (20 Minutes): For muscle activation and UE strengthening. Cueing needed for technique, posture correction and use of towel for UE positioning. L shoulder rhythmic stabilization ER/IR with arm at neutral and 50 deg abd, FE to 90 deg flexion 30\"x2   Date:  8-22-17 Date:  8/25/17 Date:  8-28-17 Date  8-30-17   Activity/Exercise Parameters Parameters Parameters parameters   Serratus punch 1#2x10 1# B 1x12, 1x10 1# B 2x15 2#2x10   Side lying ER 2x15 2x15 2x20 L 1#2x10  R 1#2x15   Prone shoulder extension 2x15 1# 1x10,0# 1x15 1# 2x10 1# 2x15   Prone middle trap 2x10 1x12, 1x10 2x10 Side lying lift off 10x   Prone lower trap 2x10 2x10 2x10 Side lying lift off 10x   Band IR and ER Yellow 2x10 Yellow 2x10 Yellow 2x15 Yellow 2x20 L; red 2x15 R   Side lying IR - - - R 2# 2x15  L 1# 2x15       Therapeutic Modalities:ice pack to the left shoulder with game ready x 20 min                       Left Shoulder Cold  Type: Cold/ice pack  Duration : 20 minutes  Patient Position: Sitting                                                                        HEP: advised to continue current HEP with can soup    Treatment/Session Assessment:  · Response to Treatment: He seems to overuse upper trap with exercises so downgraded middle and lower trap to prone today. · Compliance with Program/Exercises: yes per pt. · Recommendations/Intent for next treatment session: \"Next visit will focus on L shoulder ROM\".   Total Treatment Duration:  65 minutes  :PT Patient Time In/Time Out  Time In: 1015  Time Out: [address]

## 2017-09-01 ENCOUNTER — HOSPITAL ENCOUNTER (OUTPATIENT)
Dept: PHYSICAL THERAPY | Age: 57
Discharge: HOME | End: 2017-09-01
Payer: COMMERCIAL

## 2017-09-01 PROCEDURE — 97110 THERAPEUTIC EXERCISES: CPT

## 2017-09-01 PROCEDURE — 97140 MANUAL THERAPY 1/> REGIONS: CPT

## 2017-09-01 NOTE — PROGRESS NOTES
Cristobal Friday  : 1960 Therapy Center at [facility]IUS FINLEY  [address] [facility], [address] [place] [address],[address], [facility] [address].  [phone]   [phone]       OUTPATIENT PHYSICAL THERAPY:Daily Note 2017      ICD-10: Treatment Diagnosis: Strain of muscle(s) and tendon(s) of the rotator cuff of left shoulder, sequela (S46.012S); Pain in left shoulder (M25.512); Adhesive capsulitis of left shoulder (M75.02)  Precautions/Allergies:   Review of patient's allergies indicates no known allergies. Fall Risk Score: 2 (? 5 = High Risk)  MD Orders: Evaluate and treat, HEP, ROM, strengthening, push MEDICAL/REFERRING DIAGNOSIS:  left shoulder ,right shoulder pain  DATE OF ONSET: Surgery 17  REFERRING PHYSICIAN: Brittney Mcgregor MD  RETURN PHYSICIAN APPOINTMENT: 17     INITIAL ASSESSMENT:  Mr. Anam Bowers is s/p L shoulder mini open revision rotator cuff repair and bicep tenodesis with surgery 17. He is progressing with L shoulder PROM. He is able to control his shoulder pain with ice and pain medication. He will benefit from continuing PT for guided shoulder rehab per mini open rotator cuff protocol to promote safe return to normalized use of the UE with daily and work activities. PROBLEM LIST (Impacting functional limitations):  1. Post-op left shoulder pain and swelling  2. Decreased left shoulder ROM   3. Weakness left shoulder INTERVENTIONS PLANNED:  1. Thermal and electric modalities, manual therapies for pain   2. Manual therapies, therapeutic exercises, HEP for ROM    3. Therapeutic exercises and HEP for strength   TREATMENT PLAN:  Effective Dates:7-10-17 TO 10-6-17. Frequency/Duration: 3 times a week for 12 weeks  GOALS: (Goals have been discussed and agreed upon with patient.)  Short-Term Functional Goals: Time Frame: 6 weeks  1. Report no more than 3/10 intermittent pain to left shoulder with compensatory use during basic functional activities. progressing towards  2.  Left shoulder PROM forward elevation greater than 135 degrees and external rotation greater than 60 degrees to progress into functional ranges. Progressing towards  3. Demonstrate good left shoulder isometric strength with manual testing to progress into strength phase. progressing towards  4. Independent with initial HEP. ongoing  Discharge Goals: Time Frame: 12 weeks  1. No more than 1/10 intermittent pain left shoulder with return to normalized household and work activities, and score less than 25% on the DASH. 2. Left shoulder AROM forward elevation greater than 135 degrees, external rotation greater than 60 degrees, and strength to shoulder are grossly WNL's for safe use with normalized activities. 3. Demonstrate good functional shoulder strength and endurance for return to normalized household and work activities. 4. Independent with advanced shoulder HEP for continued self-management. Rehabilitation Potential For Stated Goals: Good                HISTORY:   History of Present Injury/Illness (Reason for Referral): Injury March 3rd 2016 was lifting something overhead and felt a pop. Had physical therapy and then decided to have surgery 7-19-17.  2-3 months after surgery his shoulder started to get stiff and had manipulation on 10-25-16. He went back to physical therapy and was not getting any better. Then Dr. Reid Romero referred him to Dr. Pretty Chawla. Then had surgery 7-6-17. He did stay over night in the hospital. Pain at rest 4/10. Past Medical History/Comorbidities:   HTN, carpal tunnel surgery  Social History/Living Environment:    Lives alone  Prior Level of Function/Work/Activity:  Works for [employer]. 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    Dominant Side:         RIGHT handed but plays some sports left handed  Previous Treatment Approaches:          He has had therapy in the past before and after 1st rotator cuff surgery  Current Medications:     Benazepril, atenolol, atorvastatin, dilaudid, ventolin, Restoril, ultram    Date Last Reviewed:  8-25-17   Number of Personal Factors/Comorbidities that affect the Plan of Care: 1-2: MODERATE COMPLEXITY   EXAMINATION:   Observation/Orthostatic Postural Assessment: healed incision on the L shoulder  Palpation: n/t    ROM:                     Strength: n/t secondary to post-op                 Special Tests: None  Functional Mobility:  Sit to/from Stand: independent. Bed Mobility: independent. Independent with basic mobility. independent with dressing and grooming ADL's. CLINICAL DECISION MAKING:   Outcome Measure: Tool Used: Disabilities of the Arm, Shoulder and Hand (DASH) Questionnaire - Quick Version  Score:  Initial: 50/55  Most Recent: X/55 (Date: -- )   Interpretation of Score: The DASH is designed to measure the activities of daily living in person's with upper extremity dysfunction or pain. Each section is scored on a 1-5 scale, 5 representing the greatest disability. The scores of each section are added together for a total score of 55. Medical Necessity:   · Patient is expected to demonstrate progress in strength and range of motion to improve functional mobility. Reason for Services/Other Comments:  · Patient continues to require skilled intervention due to post-op, decreased ROM and UE strength. Use of outcome tool(s) and clinical judgement create a POC that gives a: Questionable prediction of patient's progress: MODERATE COMPLEXITY          TREATMENT:   (In addition to Assessment/Re-Assessment sessions the following treatments were rendered)  Pre-treatment Symptoms/Complaints:  Pt reports he is feeling a little bit better today. Pain: Initial:     5/10 Post Session:  4/10   MANUAL THERAPY: (25 minutes): Joint mobilization was utilized and necessary because of the patient's restricted joint motion. Douglas Caldera  PROM to physiological mobilization to the Left shoulder to ER and IR in shoulder neutral, ER at 45 deg abd, abduction, and forward elevation. R shoulder glenohumeral inferior and posterior glides 30\"x3, cervical manual distraction with pt supine. Therapeutic Exercise (20 Minutes): For muscle activation and UE strengthening. Cueing needed for technique, posture correction and use of towel for UE positioning. L shoulder rhythmic stabilization ER/IR with arm at neutral and 50 deg abd, FE to 90 deg flexion 30\"x2   Date:  8-22-17 Date:  8/25/17 Date:  8-28-17 Date  8-30-17 Date  9-1-17   Activity/Exercise Parameters Parameters Parameters parameters parameters   Serratus punch 1#2x10 1# B 1x12, 1x10 1# B 2x15 2#2x10 2#2x10   Side lying ER 2x15 2x15 2x20 L 1#2x10  R 1#2x15 L 1# 2x10  R 1# 2x15   Prone shoulder extension 2x15 1# 1x10,0# 1x15 1# 2x10 1# 2x15 1# 2x15   Prone middle trap 2x10 1x12, 1x10 2x10 Side lying lift off 10x Side lying lift off 10x   Prone lower trap 2x10 2x10 2x10 Side lying lift off 10x Side lying lift off 10x   Band IR and ER Yellow 2x10 Yellow 2x10 Yellow 2x15 Yellow 2x20 L; red 2x15 R Yellow 2x10L red 2x15 R   Side lying IR - - - R 2# 2x15  L 1# 2x15 R 2# 2x15  L 1#2x15       Therapeutic Modalities:ice pack to the left shoulder with game ready x 20 min                       Left Shoulder Cold  Type: Cold/ice pack  Duration : 20 minutes  Patient Position: Sitting                                                                        HEP: advised to continue current HEP with can soup    Treatment/Session Assessment:  · Response to Treatment: Minimal complaints of pain with exercises today. He seems to be slowly progressing with UE strength. · Compliance with Program/Exercises: yes per pt. · Recommendations/Intent for next treatment session: \"Next visit will focus on L shoulder ROM\".   Total Treatment Duration:  65 minutes  :PT Patient Time In/Time Out  Time In: 0955  Time Out: [address] [facility]

## 2017-09-05 ENCOUNTER — HOSPITAL ENCOUNTER (OUTPATIENT)
Dept: PHYSICAL THERAPY | Age: 57
Discharge: HOME | End: 2017-09-05
Payer: COMMERCIAL

## 2017-09-05 PROCEDURE — 97110 THERAPEUTIC EXERCISES: CPT

## 2017-09-05 PROCEDURE — 97140 MANUAL THERAPY 1/> REGIONS: CPT

## 2017-09-05 NOTE — PROGRESS NOTES
Mu Licona  : 1960 [facility] Center at [facility] beds at [facility] CORNELIUS FINLEY  [address] [facility], [address] [place] [address],[address], [place] [address].  [phone]   [phone]       OUTPATIENT PHYSICAL THERAPY:Daily Note and Progress Report 2017      ICD-10: Treatment Diagnosis: Strain of muscle(s) and tendon(s) of the rotator cuff of left shoulder, sequela (S46.012S); Pain in left shoulder (M25.512); Adhesive capsulitis of left shoulder (M75.02)  Precautions/Allergies:   Review of patient's allergies indicates no known allergies. Fall Risk Score: 2 (? 5 = High Risk)  MD Orders: Evaluate and treat, HEP, ROM, strengthening, push MEDICAL/REFERRING DIAGNOSIS:  left shoulder ,right shoulder pain  DATE OF ONSET: Surgery 17  REFERRING PHYSICIAN: Gildardo Kam MD  RETURN PHYSICIAN APPOINTMENT: 17     INITIAL ASSESSMENT:  Mr. Victorino Rizvi is s/p L shoulder mini open revision rotator cuff repair and bicep tenodesis with surgery 17. He is progressing with L shoulder active and passive ROM and UE strength. He does also complain of R shoulder pain and have been working on R UE strength and ROM. He would benefit from continuing skilled physical therapy to continue to progress functional mobility. PROBLEM LIST (Impacting functional limitations):  1. Post-op left shoulder pain and swelling  2. Decreased left shoulder ROM   3. Weakness left shoulder INTERVENTIONS PLANNED:  1. Thermal and electric modalities, manual therapies for pain   2. Manual therapies, therapeutic exercises, HEP for ROM    3. Therapeutic exercises and HEP for strength   TREATMENT PLAN:  Effective Dates:7-10-17 TO 10-6-17. Frequency/Duration: 3 times a week for 12 weeks  GOALS: (Goals have been discussed and agreed upon with patient.)  Short-Term Functional Goals: Time Frame: 6 weeks  1. Report no more than 3/10 intermittent pain to left shoulder with compensatory use during basic functional activities. GOAL MET  2.  Left shoulder PROM forward elevation greater than 135 degrees and external rotation greater than 60 degrees to progress into functional ranges. GOAL MET  3. Demonstrate good left shoulder isometric strength with manual testing to progress into strength phase. GOAL MET  4. Independent with initial HEP. GOAL MET  Discharge Goals: Time Frame: 12 weeks  1. No more than 1/10 intermittent pain left shoulder with return to normalized household and work activities, and score less than 25% on the DASH. Progressing towards  2. Left shoulder AROM forward elevation greater than 135 degrees, external rotation greater than 60 degrees, and strength to shoulder are grossly WNL's for safe use with normalized activities. Progressing towards  3. Demonstrate good functional shoulder strength and endurance for return to normalized household and work activities. Progressing towards  4. Independent with advanced shoulder HEP for continued self-management. Progressing towards  Rehabilitation Potential For Stated Goals: Good                HISTORY:   History of Present Injury/Illness (Reason for Referral): Injury March 3rd 2016 was lifting something overhead and felt a pop. Had physical therapy and then decided to have surgery 7-19-17.  2-3 months after surgery his shoulder started to get stiff and had manipulation on 10-25-16. He went back to physical therapy and was not getting any better. Then Dr. Theodore Wilkes referred him to Dr. Lian Arias. Then had surgery 7-6-17. He did stay over night in the hospital. Pain at rest 4/10. Past Medical History/Comorbidities:   HTN, carpal tunnel surgery  Social History/Living Environment:    Lives alone  Prior Level of Function/Work/Activity:  Works for [employer]. 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    Dominant Side:         RIGHT handed but plays some sports left handed  Previous Treatment Approaches:          He has had therapy in the past before and after 1st rotator cuff surgery  Current Medications:     Benazepril, atenolol, atorvastatin, dilaudid, ventolin, Restoril, ultram    Date Last Reviewed:  9-5-17   Number of Personal Factors/Comorbidities that affect the Plan of Care: 1-2: MODERATE COMPLEXITY   EXAMINATION:   Observation/Orthostatic Postural Assessment: healed incision on the L shoulder  Palpation: n/t    ROM:      LUE AROM  L Shoulder Flexion: 115  LUE PROM  L Shoulder Flexion: 140  L Shoulder ABduction: 95  L Shoulder Internal Rotation: 70  L Shoulder External Rotation: 65 (at neutral, 70 at 90 deg abd)              Strength:   LUE Strength  L Shoulder Flexion: 3-  L Shoulder Internal Rotation: 4  L Shoulder External Rotation: 4              Special Tests: None  Functional Mobility:  Sit to/from Stand: independent. Bed Mobility: independent. Independent with basic mobility. independent with dressing and grooming ADL's. CLINICAL DECISION MAKING:   Outcome Measure: Tool Used: Disabilities of the Arm, Shoulder and Hand (DASH) Questionnaire - Quick Version  Score:  Initial: 50/55  Most Recent: X/55 (Date: -- )   Interpretation of Score: The DASH is designed to measure the activities of daily living in person's with upper extremity dysfunction or pain. Each section is scored on a 1-5 scale, 5 representing the greatest disability. The scores of each section are added together for a total score of 55. Medical Necessity:   · Patient is expected to demonstrate progress in strength and range of motion to improve functional mobility. Reason for Services/Other Comments:  · Patient continues to require skilled intervention due to post-op, decreased ROM and UE strength.    Use of outcome tool(s) and clinical judgement create a POC that gives a: Questionable prediction of patient's progress: MODERATE COMPLEXITY          TREATMENT:   (In addition to Assessment/Re-Assessment sessions the following treatments were rendered)  Pre-treatment Symptoms/Complaints:  Pt reports he is feeling pretty good today. Pain: Initial:     2/10 Post Session:  2/10; R shoulder 0/10   MANUAL THERAPY: (25 minutes): Joint mobilization was utilized and necessary because of the patient's restricted joint motion. Heddie Pattison PROM to physiological mobilization to the Left shoulder to ER and IR in shoulder neutral, ER at 45 deg abd, abduction, and forward elevation. R shoulder glenohumeral posterior glides 30\"x3, cervical manual distraction with pt supine. Therapeutic Exercise (20 Minutes): For muscle activation and UE strengthening. Cueing needed for technique, posture correction and use of towel for UE positioning.   L shoulder rhythmic stabilization ER/IR with arm at neutral and 50 deg abd, FE to 90 deg flexion 30\"x2   Date:  8-22-17 Date:  8/25/17 Date:  8-28-17 Date  8-30-17 Date  9-1-17 Date  9-5-17   Activity/Exercise Parameters Parameters Parameters parameters parameters parameters   Serratus punch 1#2x10 1# B 1x12, 1x10 1# B 2x15 2#2x10 2#2x10 2#2x15   Side lying ER 2x15 2x15 2x20 L 1#2x10  R 1#2x15 L 1# 2x10  R 1# 2x15 L 1# 2x15  R 2# 2x10   Prone shoulder extension 2x15 1# 1x10,0# 1x15 1# 2x10 1# 2x15 1# 2x15 1# 2x15   Prone middle trap 2x10 1x12, 1x10 2x10 Side lying lift off 10x Side lying lift off 10x Prone 2x10   Prone lower trap 2x10 2x10 2x10 Side lying lift off 10x Side lying lift off 10x Prone 2x10   Band IR and ER Yellow 2x10 Yellow 2x10 Yellow 2x15 Yellow 2x20 L; red 2x15 R Yellow 2x10L red 2x15 R L red 2x10 ea   Side lying IR - - - R 2# 2x15  L 1# 2x15 R 2# 2x15  L 1#2x15 R 3# 2x15  L 2#2x10   Wall slides - - - - - 2x10       Therapeutic Modalities:ice pack to the left shoulder with game ready x 20 min                       Left Shoulder Cold  Type: Cold/ice pack  Duration : 20 minutes  Patient Position: Sitting                                                                        HEP: advised to continue current HEP with can martell    Treatment/Session Assessment:  · Response to Treatment: Improved L shoulder PROM today. · Compliance with Program/Exercises: yes per pt. · Recommendations/Intent for next treatment session: \"Next visit will focus on L shoulder ROM\".   Total Treatment Duration:  65 minutes  :PT Patient Time In/Time Out  Time In: 1015  Time Out: [address]

## 2017-09-06 ENCOUNTER — APPOINTMENT (OUTPATIENT)
Dept: PHYSICAL THERAPY | Age: 57
End: 2017-09-06
Payer: COMMERCIAL

## 2017-09-08 ENCOUNTER — APPOINTMENT (OUTPATIENT)
Dept: PHYSICAL THERAPY | Age: 57
End: 2017-09-08
Payer: COMMERCIAL

## 2017-09-11 ENCOUNTER — HOSPITAL ENCOUNTER (OUTPATIENT)
Dept: PHYSICAL THERAPY | Age: 57
Discharge: HOME | End: 2017-09-11
Payer: COMMERCIAL

## 2017-09-11 PROCEDURE — 97140 MANUAL THERAPY 1/> REGIONS: CPT

## 2017-09-11 PROCEDURE — 97110 THERAPEUTIC EXERCISES: CPT

## 2017-09-11 NOTE — PROGRESS NOTES
Dixie Cruz  : 1960 [facility] at [facility] CORNELIUS FINLEY  [address] [facility], [address] [place] [address],[address], [place] [address].  [phone]   [phone]       OUTPATIENT PHYSICAL THERAPY:Daily Note 2017      ICD-10: Treatment Diagnosis: Strain of muscle(s) and tendon(s) of the rotator cuff of left shoulder, sequela (S46.012S); Pain in left shoulder (M25.512); Adhesive capsulitis of left shoulder (M75.02)  Precautions/Allergies:   Review of patient's allergies indicates no known allergies. Fall Risk Score: 2 (? 5 = High Risk)  MD Orders: Evaluate and treat, HEP, ROM, strengthening, push MEDICAL/REFERRING DIAGNOSIS:  left shoulder ,right shoulder pain  DATE OF ONSET: Surgery 17  REFERRING PHYSICIAN: Thomas Oseguera MD  RETURN PHYSICIAN APPOINTMENT: 17     INITIAL ASSESSMENT:  Mr. Zoe Severe is s/p L shoulder mini open revision rotator cuff repair and bicep tenodesis with surgery 17. He is progressing with L shoulder active and passive ROM and UE strength. He does also complain of R shoulder pain and have been working on R UE strength and ROM. He would benefit from continuing skilled physical therapy to continue to progress functional mobility. PROBLEM LIST (Impacting functional limitations):  1. Post-op left shoulder pain and swelling  2. Decreased left shoulder ROM   3. Weakness left shoulder INTERVENTIONS PLANNED:  1. Thermal and electric modalities, manual therapies for pain   2. Manual therapies, therapeutic exercises, HEP for ROM    3. Therapeutic exercises and HEP for strength   TREATMENT PLAN:  Effective Dates:7-10-17 TO 10-6-17. Frequency/Duration: 3 times a week for 12 weeks  GOALS: (Goals have been discussed and agreed upon with patient.)  Short-Term Functional Goals: Time Frame: 6 weeks  1. Report no more than 3/10 intermittent pain to left shoulder with compensatory use during basic functional activities. GOAL MET  2.  Left shoulder PROM forward elevation greater than 135 degrees and external rotation greater than 60 degrees to progress into functional ranges. GOAL MET  3. Demonstrate good left shoulder isometric strength with manual testing to progress into strength phase. GOAL MET  4. Independent with initial HEP. GOAL MET  Discharge Goals: Time Frame: 12 weeks  1. No more than 1/10 intermittent pain left shoulder with return to normalized household and work activities, and score less than 25% on the DASH. Progressing towards  2. Left shoulder AROM forward elevation greater than 135 degrees, external rotation greater than 60 degrees, and strength to shoulder are grossly WNL's for safe use with normalized activities. Progressing towards  3. Demonstrate good functional shoulder strength and endurance for return to normalized household and work activities. Progressing towards  4. Independent with advanced shoulder HEP for continued self-management. Progressing towards  Rehabilitation Potential For Stated Goals: Good                HISTORY:   History of Present Injury/Illness (Reason for Referral): Injury March 3rd 2016 was lifting something overhead and felt a pop. Had physical therapy and then decided to have surgery 7-19-17.  2-3 months after surgery his shoulder started to get stiff and had manipulation on 10-25-16. He went back to physical therapy and was not getting any better. Then Dr. Mathews April referred him to Dr. Pretty Chawla. Then had surgery 7-6-17. He did stay over night in the hospital. Pain at rest 4/10. Past Medical History/Comorbidities:   HTN, carpal tunnel surgery  Social History/Living Environment:    Lives alone  Prior Level of Function/Work/Activity:  Works for [employer]. 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    Dominant Side:         RIGHT handed but plays some sports left handed  Previous Treatment Approaches:          He has had therapy in the past before and after 1st rotator cuff surgery  Current Medications:     Benazepril, atenolol, atorvastatin, dilaudid, ventolin, Restoril, ultram    Date Last Reviewed:  9-11-17   Number of Personal Factors/Comorbidities that affect the Plan of Care: 1-2: MODERATE COMPLEXITY   EXAMINATION:   Observation/Orthostatic Postural Assessment: healed incision on the L shoulder  Palpation: n/t    ROM:                     Strength:                  Special Tests: None  Functional Mobility:  Sit to/from Stand: independent. Bed Mobility: independent. Independent with basic mobility. independent with dressing and grooming ADL's. CLINICAL DECISION MAKING:   Outcome Measure: Tool Used: Disabilities of the Arm, Shoulder and Hand (DASH) Questionnaire - Quick Version  Score:  Initial: 50/55  Most Recent: X/55 (Date: -- )   Interpretation of Score: The DASH is designed to measure the activities of daily living in person's with upper extremity dysfunction or pain. Each section is scored on a 1-5 scale, 5 representing the greatest disability. The scores of each section are added together for a total score of 55. Medical Necessity:   · Patient is expected to demonstrate progress in strength and range of motion to improve functional mobility. Reason for Services/Other Comments:  · Patient continues to require skilled intervention due to post-op, decreased ROM and UE strength. Use of outcome tool(s) and clinical judgement create a POC that gives a: Questionable prediction of patient's progress: MODERATE COMPLEXITY          TREATMENT:   (In addition to Assessment/Re-Assessment sessions the following treatments were rendered)  Pre-treatment Symptoms/Complaints:  Pt reports he finds himself guarding his arm and using his neck. Pain: Initial: L 2/10, R 0/10    Post Session: L 2/10; R 0/10   MANUAL THERAPY: (30 minutes): Joint mobilization was utilized and necessary because of the patient's restricted joint motion.    PROM to physiological mobilization to the Left shoulder to ER and IR in shoulder neutral, ER at 45 deg abd, abduction, and forward elevation. Bilateral Glenohumeral Inferior and Lateral Distraction, and Inferior and Posterior Glides with motion (FLXand ER respectively). First Rib Inferior Mobilization with MET breathing. Therapeutic Exercise (20 Minutes): For muscle activation and UE strengthening. Cueing needed for technique, posture correction and use of towel for UE positioning. Date:  8-28-17 Date  8-30-17 Date  9-1-17 Date  9-5-17 Date  9-11-17   Activity/Exercise Parameters parameters parameters parameters parameters   Serratus punch 1# B 2x15 2#2x10 2#2x10 2#2x15 3# 2x12r   Side lying ER 2x20 L 1#2x10  R 1#2x15 L 1# 2x10  R 1# 2x15 L 1# 2x15  R 2# 2x10 L 2# 2x10r  R 2# 2x10r   Prone shoulder extension 1# 2x10 1# 2x15 1# 2x15 1# 2x15 2# 2x12r   Prone middle trap 2x10 Side lying lift off 10x Side lying lift off 10x Prone 2x10 Prone 2x12r   Prone lower trap 2x10 Side lying lift off 10x Side lying lift off 10x Prone 2x10 Prone 2x12r   Band IR and ER Yellow 2x15 Yellow 2x20 L; red 2x15 R Yellow 2x10L red 2x15 R L red 2x10 ea Red 2x12r B   Side lying IR - R 2# 2x15  L 1# 2x15 R 2# 2x15  L 1#2x15 R 3# 2x15  L 2#2x10 R 2# 2x12r  L 2# 2x12r   Wall slides - - - 2x10 2x10r B         Therapeutic Modalities: (15 min)  ice pack to the left shoulder with game ready x 15 min                                                                                                HEP: advised to continue current HEP with can soup    Treatment/Session Assessment:  · Response to Treatment: Shoulder and Scapular muscle strength seems to be improving (more set, reps, weight). Patient can tell that Scapular muscles are weaker on L.  · Compliance with Program/Exercises: yes per pt. · Recommendations/Intent for next treatment session: \"Next visit will focus on B shoulder ROM, mobility, and strength\".   Total Treatment Duration:  65 minutes    PT Patient Time In/Time Out  Time In: 1015  Time Out: 1600 Juan Ricks J, SPT  Julia Heller, PT

## 2017-09-12 ENCOUNTER — HOSPITAL ENCOUNTER (OUTPATIENT)
Dept: PHYSICAL THERAPY | Age: 57
Discharge: HOME | End: 2017-09-12
Payer: COMMERCIAL

## 2017-09-12 PROCEDURE — 97110 THERAPEUTIC EXERCISES: CPT

## 2017-09-12 PROCEDURE — 97140 MANUAL THERAPY 1/> REGIONS: CPT

## 2017-09-12 NOTE — PROGRESS NOTES
Malka Darbyp  : 1960 [facility] at [facility] CORNELIUS FINLEY  [address] [facility], [address] [place] [address],[address], [place] [address].  [phone]   [phone]       OUTPATIENT PHYSICAL THERAPY:Daily Note 2017      ICD-10: Treatment Diagnosis: Strain of muscle(s) and tendon(s) of the rotator cuff of left shoulder, sequela (S46.012S); Pain in left shoulder (M25.512); Adhesive capsulitis of left shoulder (M75.02)  Precautions/Allergies:   Review of patient's allergies indicates no known allergies. Fall Risk Score: 2 (? 5 = High Risk)  MD Orders: Evaluate and treat, HEP, ROM, strengthening, push MEDICAL/REFERRING DIAGNOSIS:  left shoulder ,right shoulder pain  DATE OF ONSET: Surgery 17  REFERRING PHYSICIAN: Lizet Barrera MD  RETURN PHYSICIAN APPOINTMENT: 17     INITIAL ASSESSMENT:  Mr. Honey Mcdonnell is s/p L shoulder mini open revision rotator cuff repair and bicep tenodesis with surgery 17. He is progressing with L shoulder active and passive ROM and UE strength. He does also complain of R shoulder pain and have been working on R UE strength and ROM. He would benefit from continuing skilled physical therapy to continue to progress functional mobility. PROBLEM LIST (Impacting functional limitations):  1. Post-op left shoulder pain and swelling  2. Decreased left shoulder ROM   3. Weakness left shoulder INTERVENTIONS PLANNED:  1. Thermal and electric modalities, manual therapies for pain   2. Manual therapies, therapeutic exercises, HEP for ROM    3. Therapeutic exercises and HEP for strength   TREATMENT PLAN:  Effective Dates:7-10-17 TO 10-6-17. Frequency/Duration: 3 times a week for 12 weeks  GOALS: (Goals have been discussed and agreed upon with patient.)  Short-Term Functional Goals: Time Frame: 6 weeks  1. Report no more than 3/10 intermittent pain to left shoulder with compensatory use during basic functional activities. GOAL MET  2.  Left shoulder PROM forward elevation greater than 135 degrees and external rotation greater than 60 degrees to progress into functional ranges. GOAL MET  3. Demonstrate good left shoulder isometric strength with manual testing to progress into strength phase. GOAL MET  4. Independent with initial HEP. GOAL MET  Discharge Goals: Time Frame: 12 weeks  1. No more than 1/10 intermittent pain left shoulder with return to normalized household and work activities, and score less than 25% on the DASH. Progressing towards  2. Left shoulder AROM forward elevation greater than 135 degrees, external rotation greater than 60 degrees, and strength to shoulder are grossly WNL's for safe use with normalized activities. Progressing towards  3. Demonstrate good functional shoulder strength and endurance for return to normalized household and work activities. Progressing towards  4. Independent with advanced shoulder HEP for continued self-management. Progressing towards  Rehabilitation Potential For Stated Goals: Good                HISTORY:   History of Present Injury/Illness (Reason for Referral): Injury March 3rd 2016 was lifting something overhead and felt a pop. Had physical therapy and then decided to have surgery 7-19-17.  2-3 months after surgery his shoulder started to get stiff and had manipulation on 10-25-16. He went back to physical therapy and was not getting any better. Then Dr. Cindy Malone referred him to Dr. Adonis Ham. Then had surgery 7-6-17. He did stay over night in the hospital. Pain at rest 4/10. Past Medical History/Comorbidities:   HTN, carpal tunnel surgery  Social History/Living Environment:    Lives alone  Prior Level of Function/Work/Activity:  Works for [employer]. 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    Dominant Side:         RIGHT handed but plays some sports left handed  Previous Treatment Approaches:          He has had therapy in the past before and after 1st rotator cuff surgery  Current Medications:     Benazepril, atenolol, atorvastatin, dilaudid, ventolin, Restoril, ultram    Date Last Reviewed:  9-11-17   Number of Personal Factors/Comorbidities that affect the Plan of Care: 1-2: MODERATE COMPLEXITY   EXAMINATION:   Observation/Orthostatic Postural Assessment: healed incision on the L shoulder  Palpation: n/t    ROM:                     Strength:                  Special Tests: None  Functional Mobility:  Sit to/from Stand: independent. Bed Mobility: independent. Independent with basic mobility. independent with dressing and grooming ADL's. CLINICAL DECISION MAKING:   Outcome Measure: Tool Used: Disabilities of the Arm, Shoulder and Hand (DASH) Questionnaire - Quick Version  Score:  Initial: 50/55  Most Recent: X/55 (Date: -- )   Interpretation of Score: The DASH is designed to measure the activities of daily living in person's with upper extremity dysfunction or pain. Each section is scored on a 1-5 scale, 5 representing the greatest disability. The scores of each section are added together for a total score of 55. Medical Necessity:   · Patient is expected to demonstrate progress in strength and range of motion to improve functional mobility. Reason for Services/Other Comments:  · Patient continues to require skilled intervention due to post-op, decreased ROM and UE strength. Use of outcome tool(s) and clinical judgement create a POC that gives a: Questionable prediction of patient's progress: MODERATE COMPLEXITY          TREATMENT:   (In addition to Assessment/Re-Assessment sessions the following treatments were rendered)  Pre-treatment Symptoms/Complaints:  Pt reports his muscles seem sore from yesterday's treatment session. Pain: Initial: L 2/10, R 0/10    Post Session: L 2/10; R 0/10   MANUAL THERAPY: (10 minutes): Joint mobilization was utilized and necessary because of the patient's restricted joint motion.    PROM to physiological mobilization to the Left shoulder to ER and IR in shoulder neutral, ER and IR at 45 deg abd, abduction, and forward elevation. Therapeutic Exercise (35 Minutes): For muscle activation and UE strengthening. Cueing needed for technique, posture correction and use of towel for UE positioning. Date:  8-28-17 Date  8-30-17 Date  9-1-17 Date  9-5-17 Date  9-11-17 Date  9-11-17   Activity/Exercise Parameters parameters parameters parameters parameters parameters   Serratus punch 1# B 2x15 2#2x10 2#2x10 2#2x15 3# 2x12r 3# 2x12r B   Side lying ER 2x20 L 1#2x10  R 1#2x15 L 1# 2x10  R 1# 2x15 L 1# 2x15  R 2# 2x10 B 2# 2x10r 2# 2x12r B   Prone shoulder extension 1# 2x10 1# 2x15 1# 2x15 1# 2x15 2# 2x12r Standing  Yellow Band  3# 2x12r B   Prone middle trap 2x10 Side lying lift off 10x Side lying lift off 10x Prone 2x10 Prone 2x12r Prone On Bench   1# 2x12r B   Prone lower trap 2x10 Side lying lift off 10x Side lying lift off 10x Prone 2x10 Prone 2x12r Prone On Bench   0# 2x12r B   Band IR and ER Yellow 2x15 Yellow 2x20 L; red 2x15 R Yellow 2x10L red 2x15 R L red 2x10 ea Red 2x12r B Double Red 2x12r B   Side lying IR - R 2# 2x15  L 1# 2x15 R 2# 2x15  L 1#2x15 R 3# 2x15  L 2#2x10 B 2# 2x12r    2# 2x12r B   Wall slides - - - 2x10 2x10r B + Lift off 2x12r B + Lift off         Therapeutic Modalities: (20 min)  ice pack to the left shoulder with game ready x 20 min                                                                                                HEP: advised to continue current HEP with can soup    Treatment/Session Assessment:  · Response to Treatment:  He is developing motor control of his Scapular Muscles (specificallly Middle and Lower Traps) to stabilize the shoulder girdle during motion (especially elevation). · Compliance with Program/Exercises: yes per pt. · Recommendations/Intent for next treatment session: \"Next visit will focus on B shoulder ROM, mobility, and strength\".   Total Treatment Duration: 65 minutes    PT Patient Time In/Time Out  Time In: 1015  Time Out: 1600 [facility] J, SPT  Julia Heller, PT

## 2017-09-18 ENCOUNTER — HOSPITAL ENCOUNTER (OUTPATIENT)
Dept: PHYSICAL THERAPY | Age: 57
Discharge: HOME | End: 2017-09-18
Payer: COMMERCIAL

## 2017-09-18 PROCEDURE — 97110 THERAPEUTIC EXERCISES: CPT

## 2017-09-18 PROCEDURE — 97140 MANUAL THERAPY 1/> REGIONS: CPT

## 2017-09-20 ENCOUNTER — APPOINTMENT (OUTPATIENT)
Dept: PHYSICAL THERAPY | Age: 57
End: 2017-09-20
Payer: COMMERCIAL

## 2017-09-20 ENCOUNTER — HOSPITAL ENCOUNTER (OUTPATIENT)
Dept: PHYSICAL THERAPY | Age: 57
Discharge: HOME | End: 2017-09-20
Payer: COMMERCIAL

## 2017-09-20 PROCEDURE — 97140 MANUAL THERAPY 1/> REGIONS: CPT

## 2017-09-20 PROCEDURE — 97110 THERAPEUTIC EXERCISES: CPT

## 2017-09-20 NOTE — PROGRESS NOTES
Brittni Franco  : 1960 [facility] CORNELIUS FINLEY  [address] [facility], [address] [place] [address],[address], [address] [facility]  [phone]   [phone]       OUTPATIENT PHYSICAL THERAPY:Daily Note 2017      ICD-10: Treatment Diagnosis: Strain of muscle(s) and tendon(s) of the rotator cuff of left shoulder, sequela (S46.012S); Pain in left shoulder (M25.512); Adhesive capsulitis of left shoulder (M75.02)  Precautions/Allergies:   Review of patient's allergies indicates no known allergies. Fall Risk Score: 2 (? 5 = High Risk)  MD Orders: Evaluate and treat, HEP, ROM, strengthening, push MEDICAL/REFERRING DIAGNOSIS:  left shoulder ,right shoulder pain  DATE OF ONSET: Surgery 17  REFERRING PHYSICIAN: Kary Mccloud MD  RETURN PHYSICIAN APPOINTMENT: 17     INITIAL ASSESSMENT:  Mr. Ros Celaya is s/p L shoulder mini open revision rotator cuff repair and bicep tenodesis with surgery 17. He is progressing with L shoulder active and passive ROM and UE strength. He does also complain of R shoulder pain and have been working on R UE strength and ROM. He would benefit from continuing skilled physical therapy to continue to progress functional mobility. PROBLEM LIST (Impacting functional limitations):  1. Post-op left shoulder pain and swelling  2. Decreased left shoulder ROM   3. Weakness left shoulder INTERVENTIONS PLANNED:  1. Thermal and electric modalities, manual therapies for pain   2. Manual therapies, therapeutic exercises, HEP for ROM    3. Therapeutic exercises and HEP for strength   TREATMENT PLAN:  Effective Dates:7-10-17 TO 10-6-17. Frequency/Duration: 3 times a week for 12 weeks  GOALS: (Goals have been discussed and agreed upon with patient.)  Short-Term Functional Goals: Time Frame: 6 weeks  1. Report no more than 3/10 intermittent pain to left shoulder with compensatory use during basic functional activities. GOAL MET  2.  Left shoulder PROM forward elevation greater than 135 degrees and external rotation greater than 60 degrees to progress into functional ranges. GOAL MET  3. Demonstrate good left shoulder isometric strength with manual testing to progress into strength phase. GOAL MET  4. Independent with initial HEP. GOAL MET  Discharge Goals: Time Frame: 12 weeks  1. No more than 1/10 intermittent pain left shoulder with return to normalized household and work activities, and score less than 25% on the DASH. Progressing towards  2. Left shoulder AROM forward elevation greater than 135 degrees, external rotation greater than 60 degrees, and strength to shoulder are grossly WNL's for safe use with normalized activities. Progressing towards  3. Demonstrate good functional shoulder strength and endurance for return to normalized household and work activities. Progressing towards  4. Independent with advanced shoulder HEP for continued self-management. Progressing towards  Rehabilitation Potential For Stated Goals: Good                HISTORY:   History of Present Injury/Illness (Reason for Referral): Injury March 3rd 2016 was lifting something overhead and felt a pop. Had physical therapy and then decided to have surgery 7-19-17.  2-3 months after surgery his shoulder started to get stiff and had manipulation on 10-25-16. He went back to physical therapy and was not getting any better. Then Dr. Anjum Oliva referred him to Dr. Roberto Valderrama. Then had surgery 7-6-17. He did stay over night in the hospital. Pain at rest 4/10. Past Medical History/Comorbidities:   HTN, carpal tunnel surgery  Social History/Living Environment:    Lives alone  Prior Level of Function/Work/Activity:  Works for [employer]. 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    Dominant Side:         RIGHT handed but plays some sports left handed  Previous Treatment Approaches:          He has had therapy in the past before and after 1st rotator cuff surgery  Current Medications:     Benazepril, atenolol, atorvastatin, dilaudid, ventolin, Restoril, ultram    Date Last Reviewed:  9-20-17   Number of Personal Factors/Comorbidities that affect the Plan of Care: 1-2: MODERATE COMPLEXITY   EXAMINATION:   Observation/Orthostatic Postural Assessment: healed incision on the L shoulder  Palpation: n/t    ROM:                     Strength:                  Special Tests: None  Functional Mobility:  Sit to/from Stand: independent. Bed Mobility: independent. Independent with basic mobility. independent with dressing and grooming ADL's. CLINICAL DECISION MAKING:   Outcome Measure: Tool Used: Disabilities of the Arm, Shoulder and Hand (DASH) Questionnaire - Quick Version  Score:  Initial: 50/55  Most Recent: X/55 (Date: -- )   Interpretation of Score: The DASH is designed to measure the activities of daily living in person's with upper extremity dysfunction or pain. Each section is scored on a 1-5 scale, 5 representing the greatest disability. The scores of each section are added together for a total score of 55. Medical Necessity:   · Patient is expected to demonstrate progress in strength and range of motion to improve functional mobility. Reason for Services/Other Comments:  · Patient continues to require skilled intervention due to post-op, decreased ROM and UE strength. Use of outcome tool(s) and clinical judgement create a POC that gives a: Questionable prediction of patient's progress: MODERATE COMPLEXITY          TREATMENT:   (In addition to Assessment/Re-Assessment sessions the following treatments were rendered)  Pre-treatment Symptoms/Complaints: He reports soreness in neck and shoulders and claims it is from exercises at PT. He wonders if he will always have some level of discomfort and soreness.   Pain: Initial: L 2/10, R 0/10    Post Session: L 2/10; R 0/10   MANUAL THERAPY: (10 minutes): Joint mobilization was utilized and necessary because of the patient's restricted joint motion. PROM to physiological mobilization to the Left shoulder to ER and IR in shoulder neutral, ER and IR at 45 deg abd, abduction, and forward elevation. PROM, Positional Release, and passive stretching of R Biceps      Therapeutic Exercise (30 Minutes): For muscle activation and UE strengthening. Cueing needed for technique, posture correction and use of towel for UE positioning.      Date  9-1-17 Date  9-5-17 Date  9-11-17 Date  9-11-17 Date  9-18-17 Date  9-20-17   Activity/Exercise parameters parameters parameters parameters parameters parameters   Serratus punch 2#2x10 2#2x15 3# 2x12r 3# 2x12r B 4# 2x10r B 4# 2x12r    Side lying ER L 1# 2x10  R 1# 2x15 L 1# 2x15  R 2# 2x10 B 2# 2x10r 2# 2x12r B 3# 2x10r B 3# 2x12r   Prone shoulder extension 1# 2x15 1# 2x15 2# 2x12r Standing  Yellow Band 2x12r B Standing Double  Red Band  2x10r B Standing Double  Green Band  2x12r B   Prone middle trap Side lying lift off 10x Prone 2x10 Prone 2x12r Incline Prone On Bench   1# 2x12r B Incline Prone On Bench   2# 2x10r B Incline Prone On Bench   2.5# 2x12r B   Prone lower trap Side lying lift off 10x Prone 2x10 Prone 2x12r Incline Prone On Bench   0# 2x12r B Incline Prone On Bench   0# 2x10r B Incline Prone On Bench   1# 2x12r B   Band IR and ER Yellow 2x10L red 2x15 R L red 2x10 ea Red 2x12r B Red 2x12r B Standing Double  Red Band  2x10r B Standing Double  Red Band  2x12r B   Side lying IR R 2# 2x15  L 1#2x15 R 3# 2x15  L 2#2x10 B 2# 2x12r    2# 2x12r B 3# 2x10r B 3# 2x12r   Wall slides - 2x10 2x10r B + Lift off 2x12r B + Lift off 1# 2x10r B + Lift off with R Slide up (on wall), lift off, lower down (off wall)  2x12r*   Seated ER @ 90 Abd - - - - 3# 2x10r B 3# 2x10r B   Shoulder Elevation on Incline Bench - - - - Elbow Flx on ascent, Elbow Ext on descent  2x10r B      * blue band used to provide a inferior-posterior force on left shoulder (held with PT's hand and then with patient's right hand)    Therapeutic Modalities: (20 min)  ice to the left shoulder with game ready x 20 min                                                                                                HEP: advised to continue current HEP with can soup    Treatment/Session Assessment:  · Response to Treatment: He is progressing well with strengthening exercises (more weight, reps, sets) and experiencing some soreness the days after his sessions. His left shoulder tends to be hiked when elevated the arm (wall slides, middle and lower trap exercises). We are working on activating Scapular muscles to bring down and stabilize the Scapula. · Compliance with Program/Exercises: yes per pt. · Recommendations/Intent for next treatment session: \"Next visit will focus on B shoulder and scapular ROM, mobility, and strength\".     Total Treatment Duration: 60 minutes  PT Patient Time In/Time Out  Time In: 1125  Time Out: 2026 Qamar Preston, SPT  Julia Goins, PT

## 2017-09-22 ENCOUNTER — APPOINTMENT (OUTPATIENT)
Dept: PHYSICAL THERAPY | Age: 57
End: 2017-09-22
Payer: COMMERCIAL

## 2017-09-22 ENCOUNTER — HOSPITAL ENCOUNTER (OUTPATIENT)
Dept: PHYSICAL THERAPY | Age: 57
Discharge: HOME | End: 2017-09-22
Payer: COMMERCIAL

## 2017-09-22 PROCEDURE — 97140 MANUAL THERAPY 1/> REGIONS: CPT

## 2017-09-22 PROCEDURE — 97110 THERAPEUTIC EXERCISES: CPT

## 2017-09-22 NOTE — PROGRESS NOTES
Mu Licona  : 1960 [facility] CORNELIUS FINLEY  [address] [facility], [address] [place] [address],[address], [facility].  [phone]   [phone]       OUTPATIENT PHYSICAL THERAPY:Daily Note 2017      ICD-10: Treatment Diagnosis: Strain of muscle(s) and tendon(s) of the rotator cuff of left shoulder, sequela (S46.012S); Pain in left shoulder (M25.512); Adhesive capsulitis of left shoulder (M75.02)  Precautions/Allergies:   Review of patient's allergies indicates no known allergies. Fall Risk Score: 2 (? 5 = High Risk)  MD Orders: Evaluate and treat, HEP, ROM, strengthening, push MEDICAL/REFERRING DIAGNOSIS:  left shoulder ,right shoulder pain  DATE OF ONSET: Surgery 17  REFERRING PHYSICIAN: Gildardo Kam MD  RETURN PHYSICIAN APPOINTMENT: 17     INITIAL ASSESSMENT:  Mr. Victorino Rizvi is s/p L shoulder mini open revision rotator cuff repair and bicep tenodesis with surgery 17. He is progressing with L shoulder active and passive ROM and UE strength. He does also complain of R shoulder pain and have been working on R UE strength and ROM. He would benefit from continuing skilled physical therapy to continue to progress functional mobility. PROBLEM LIST (Impacting functional limitations):  1. Post-op left shoulder pain and swelling  2. Decreased left shoulder ROM   3. Weakness left shoulder INTERVENTIONS PLANNED:  1. Thermal and electric modalities, manual therapies for pain   2. Manual therapies, therapeutic exercises, HEP for ROM    3. Therapeutic exercises and HEP for strength   TREATMENT PLAN:  Effective Dates:7-10-17 TO 10-6-17. Frequency/Duration: 3 times a week for 12 weeks  GOALS: (Goals have been discussed and agreed upon with patient.)  Short-Term Functional Goals: Time Frame: 6 weeks  1. Report no more than 3/10 intermittent pain to left shoulder with compensatory use during basic functional activities. GOAL MET  2.  Left shoulder PROM forward elevation greater than 135 degrees and external rotation greater than 60 degrees to progress into functional ranges. GOAL MET  3. Demonstrate good left shoulder isometric strength with manual testing to progress into strength phase. GOAL MET  4. Independent with initial HEP. GOAL MET  Discharge Goals: Time Frame: 12 weeks  1. No more than 1/10 intermittent pain left shoulder with return to normalized household and work activities, and score less than 25% on the DASH. Progressing towards  2. Left shoulder AROM forward elevation greater than 135 degrees, external rotation greater than 60 degrees, and strength to shoulder are grossly WNL's for safe use with normalized activities. Progressing towards  3. Demonstrate good functional shoulder strength and endurance for return to normalized household and work activities. Progressing towards  4. Independent with advanced shoulder HEP for continued self-management. Progressing towards  Rehabilitation Potential For Stated Goals: Good                HISTORY:   History of Present Injury/Illness (Reason for Referral): Injury March 3rd 2016 was lifting something overhead and felt a pop. Had physical therapy and then decided to have surgery 7-19-17.  2-3 months after surgery his shoulder started to get stiff and had manipulation on 10-25-16. He went back to physical therapy and was not getting any better. Then Dr. Gillermina Dakin referred him to Dr. Ale Bai. Then had surgery 7-6-17. He did stay over night in the hospital. Pain at rest 4/10. Past Medical History/Comorbidities:   HTN, carpal tunnel surgery  Social History/Living Environment:    Lives alone  Prior Level of Function/Work/Activity:  Works for [employer]. 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    Dominant Side:         RIGHT handed but plays some sports left handed  Previous Treatment Approaches:          He has had therapy in the past before and after 1st rotator cuff surgery  Current Medications:     Benazepril, atenolol, atorvastatin, dilaudid, ventolin, Restoril, ultram    Date Last Reviewed:  9-20-17   Number of Personal Factors/Comorbidities that affect the Plan of Care: 1-2: MODERATE COMPLEXITY   EXAMINATION:   Observation/Orthostatic Postural Assessment: healed incision on the L shoulder  Palpation: n/t    ROM:                     Strength:                  Special Tests: None  Functional Mobility:  Sit to/from Stand: independent. Bed Mobility: independent. Independent with basic mobility. independent with dressing and grooming ADL's. CLINICAL DECISION MAKING:   Outcome Measure: Tool Used: Disabilities of the Arm, Shoulder and Hand (DASH) Questionnaire - Quick Version  Score:  Initial: 50/55  Most Recent: X/55 (Date: -- )   Interpretation of Score: The DASH is designed to measure the activities of daily living in person's with upper extremity dysfunction or pain. Each section is scored on a 1-5 scale, 5 representing the greatest disability. The scores of each section are added together for a total score of 55. Medical Necessity:   · Patient is expected to demonstrate progress in strength and range of motion to improve functional mobility. Reason for Services/Other Comments:  · Patient continues to require skilled intervention due to post-op, decreased ROM and UE strength. Use of outcome tool(s) and clinical judgement create a POC that gives a: Questionable prediction of patient's progress: MODERATE COMPLEXITY          TREATMENT:   (In addition to Assessment/Re-Assessment sessions the following treatments were rendered)  Pre-treatment Symptoms/Complaints: He reports soreness and aching in his shoulder and claims it is from exercises at PT. Pain: Initial: L 2/10, R 0/10    Post Session: L 2/10; R 0/10   MANUAL THERAPY: (15 minutes): Joint mobilization was utilized and necessary because of the patient's restricted joint motion. PROM to physiological mobilization to the Left shoulder to ER and IR in shoulder neutral, ER and IR at 45 deg abd, abduction, and forward elevation. Inferior [address] Glides with movement, Posterior GH Glides, GH Distraction  Positional Release and passive stretching of shoulder extenders      Therapeutic Exercise (40 Minutes): For muscle activation and UE strengthening. Cueing needed for technique, posture correction and use of towel for UE positioning.      Date  9-11-17 Date  9-11-17 Date  9-18-17 Date  9-20-17 Date  9-22-17   Activity/Exercise parameters parameters parameters parameters parameters   Serratus punch 3# 2x12r 3# 2x12r B 4# 2x10r B 4# 2x12r  4# 2x15r   Side lying ER B 2# 2x10r 2# 2x12r B 3# 2x10r B 3# 2x12r -   Prone shoulder extension 2# 2x12r Standing  Yellow Band 2x12r B Standing Double  Red Band  2x10r B Standing Double  Green Band  2x12r B Standing Double  Green Band  2x15r B   Prone middle trap Prone 2x12r Incline Prone On Bench   1# 2x12r B Incline Prone On Bench   2# 2x10r B Incline Prone On Bench   2.5# 2x12r B Incline Prone On Bench   2.5# 2x15r B   Prone lower trap Prone 2x12r Incline Prone On Bench   0# 2x12r B Incline Prone On Bench   0# 2x10r B Incline Prone On Bench   1# 2x12r B Incline Prone On Bench   1# 2x15r B   Band IR and ER Red 2x12r B Red 2x12r B Standing Double  Red Band  2x10r B Standing Double  Red Band  2x12r B Standing Double  Red Band  2x15r B   Side lying IR B 2# 2x12r    2# 2x12r B 3# 2x10r B 3# 2x12r -   Wall slides 2x10r B + Lift off 2x12r B + Lift off 1# 2x10r B + Lift off with R Slide up (on wall), lift off, lower down (off wall)  2x12r* Slide up (on wall), lift off, lower down (off wall)  2x15r*   Seated ER &  IR @ 80-90 Abd - - 3# 2x10r B 3# 2x10r B ER: 1# 2x10r  IR: Yellow Band 2x10r   Shoulder Elevation on Incline Bench - - Elbow Flx on ascent, Elbow Ext on descent  2x10r B - -   Standing or Seated Scaption - - - - 0# 2x15r     * blue band used to provide a inferior-posterior force on left shoulder (held with patient's right hand)    Therapeutic Modalities: (20 min)  ice to the left shoulder with game ready x 20 min                                                                                                HEP: advised to continue current HEP with can soup    Treatment/Session Assessment:  · Response to Treatment: He continues to have difficulty with keeping scapula depressed with middle and lower trap and forward elevation. Difficulty with standing t-band ER at 90 deg abduction and keep scapula depressed and did better with arm supported on the table at 90 deg abd. · Compliance with Program/Exercises: yes per pt. · Recommendations/Intent for next treatment session: \"Next visit will focus on B shoulder and scapular ROM, mobility, and strength\".     Total Treatment Duration: 75 minutes  PT Patient Time In/Time Out  Time In: 0800  Time Out: Timothy 430, SPT  Julia Trujillo, PT

## 2017-09-25 ENCOUNTER — HOSPITAL ENCOUNTER (OUTPATIENT)
Dept: PHYSICAL THERAPY | Age: 57
Discharge: HOME | End: 2017-09-25
Payer: COMMERCIAL

## 2017-09-25 PROCEDURE — 97110 THERAPEUTIC EXERCISES: CPT

## 2017-09-25 PROCEDURE — 97140 MANUAL THERAPY 1/> REGIONS: CPT

## 2017-09-25 NOTE — PROGRESS NOTES
Rodriguez Chol  : 1960 [facility] at [facility] CORNELIUS FINLEY  [address] [facility], [address] [place] [address],[address], [place] [address].  [phone]   [phone]       OUTPATIENT PHYSICAL THERAPY:Daily Note 2017      ICD-10: Treatment Diagnosis: Strain of muscle(s) and tendon(s) of the rotator cuff of left shoulder, sequela (S46.012S); Pain in left shoulder (M25.512); Adhesive capsulitis of left shoulder (M75.02)  Precautions/Allergies:   Review of patient's allergies indicates no known allergies. Fall Risk Score: 2 (? 5 = High Risk)  MD Orders: Evaluate and treat, HEP, ROM, strengthening, push MEDICAL/REFERRING DIAGNOSIS:  left shoulder ,right shoulder pain  DATE OF ONSET: Surgery 17  REFERRING PHYSICIAN: Joanie Guadarrama MD  RETURN PHYSICIAN APPOINTMENT: 17     INITIAL ASSESSMENT:  Mr. Bill Lozano is s/p L shoulder mini open revision rotator cuff repair and bicep tenodesis with surgery 17. He is progressing with L shoulder active and passive ROM and UE strength. He does also complain of R shoulder pain and have been working on R UE strength and ROM. He would benefit from continuing skilled physical therapy to continue to progress functional mobility. PROBLEM LIST (Impacting functional limitations):  1. Post-op left shoulder pain and swelling  2. Decreased left shoulder ROM   3. Weakness left shoulder INTERVENTIONS PLANNED:  1. Thermal and electric modalities, manual therapies for pain   2. Manual therapies, therapeutic exercises, HEP for ROM    3. Therapeutic exercises and HEP for strength   TREATMENT PLAN:  Effective Dates:7-10-17 TO 10-6-17. Frequency/Duration: 3 times a week for 12 weeks  GOALS: (Goals have been discussed and agreed upon with patient.)  Short-Term Functional Goals: Time Frame: 6 weeks  1. Report no more than 3/10 intermittent pain to left shoulder with compensatory use during basic functional activities. GOAL MET  2.  Left shoulder PROM forward elevation greater than 135 degrees and external rotation greater than 60 degrees to progress into functional ranges. GOAL MET  3. Demonstrate good left shoulder isometric strength with manual testing to progress into strength phase. GOAL MET  4. Independent with initial HEP. GOAL MET  Discharge Goals: Time Frame: 12 weeks  1. No more than 1/10 intermittent pain left shoulder with return to normalized household and work activities, and score less than 25% on the DASH. Progressing towards  2. Left shoulder AROM forward elevation greater than 135 degrees, external rotation greater than 60 degrees, and strength to shoulder are grossly WNL's for safe use with normalized activities. Progressing towards  3. Demonstrate good functional shoulder strength and endurance for return to normalized household and work activities. Progressing towards  4. Independent with advanced shoulder HEP for continued self-management. Progressing towards  Rehabilitation Potential For Stated Goals: Good                HISTORY:   History of Present Injury/Illness (Reason for Referral): Injury March 3rd 2016 was lifting something overhead and felt a pop. Had physical therapy and then decided to have surgery 7-19-17.  2-3 months after surgery his shoulder started to get stiff and had manipulation on 10-25-16. He went back to physical therapy and was not getting any better. Then Dr. Ramin Tuttle referred him to Dr. Anjum Marrero. Then had surgery 7-6-17. He did stay over night in the hospital. Pain at rest 4/10. Past Medical History/Comorbidities:   HTN, carpal tunnel surgery  Social History/Living Environment:    Lives alone  Prior Level of Function/Work/Activity:  Works for [employer]. 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    Dominant Side:         RIGHT handed but plays some sports left handed  Previous Treatment Approaches:          He has had therapy in the past before and after 1st rotator cuff surgery  Current Medications:     Benazepril, atenolol, atorvastatin, dilaudid, ventolin, Restoril, ultram    Date Last Reviewed:  9-25-17   Number of Personal Factors/Comorbidities that affect the Plan of Care: 1-2: MODERATE COMPLEXITY   EXAMINATION:   Observation/Orthostatic Postural Assessment: healed incision on the L shoulder  Palpation: n/t    ROM:      LUE AROM  L Shoulder Flexion: 135  L Shoulder ABduction: 100              Strength:                  Special Tests: None  Functional Mobility:  Sit to/from Stand: independent. Bed Mobility: independent. Independent with basic mobility. independent with dressing and grooming ADL's. CLINICAL DECISION MAKING:   Outcome Measure: Tool Used: Disabilities of the Arm, Shoulder and Hand (DASH) Questionnaire - Quick Version  Score:  Initial: 50/55  Most Recent: X/55 (Date: -- )   Interpretation of Score: The DASH is designed to measure the activities of daily living in person's with upper extremity dysfunction or pain. Each section is scored on a 1-5 scale, 5 representing the greatest disability. The scores of each section are added together for a total score of 55. Medical Necessity:   · Patient is expected to demonstrate progress in strength and range of motion to improve functional mobility. Reason for Services/Other Comments:  · Patient continues to require skilled intervention due to post-op, decreased ROM and UE strength. Use of outcome tool(s) and clinical judgement create a POC that gives a: Questionable prediction of patient's progress: MODERATE COMPLEXITY          TREATMENT:   (In addition to Assessment/Re-Assessment sessions the following treatments were rendered)  Pre-treatment Symptoms/Complaints: He reports that some days it is sore in the shoulder joint and others it is sore in the upper shoulder muscles and others it is sore on the posterior-lateral surface of the shoulder.   Pain: Initial: L 2/10, R 0/10    Post Session: L 2/10; R 0/10   MANUAL THERAPY: (20 minutes): Joint mobilization was utilized and necessary because of the patient's restricted joint motion. PROM to physiological mobilization to the Left shoulder to ER and IR in shoulder neutral, ER and IR at 45 deg abd, abduction, and forward elevation. Inferior [address] Glides with movement, Posterior GH Glides, GH Distraction  Positional Release and passive stretching of Shoulder extenders and elevators  - Seated Upper Trap Stretch  - Seated Levator Stretch  - Seated Lat & Teres Major (Prayer) Stretch with ER  - Seated Lat and & Teres Major Stretch with Elbows Extended and Palms up    Therapeutic Exercise (30 Minutes): For muscle activation and UE strengthening. Cueing needed for technique, posture correction and use of towel for UE positioning.      Date  9-11-17 Date  9-11-17 Date  9-18-17 Date  9-20-17 Date  9-22-17 Date  9-25-17   Activity/Exercise parameters parameters parameters parameters parameters parameters   Serratus punch 3# 2x12r 3# 2x12r B 4# 2x10r B 4# 2x12r  4# 2x15r 5# 2x10r   Side lying ER B 2# 2x10r 2# 2x12r B 3# 2x10r B 3# 2x12r - -   Prone shoulder extension 2# 2x12r Standing  Yellow Band 2x12r B Standing Double  Red Band  2x10r B Standing Double  Green Band  2x12r B Standing Double  Green Band  2x15r B Standing Double  Green Band  3x10r B   Prone middle trap Prone 2x12r Incline Prone On Bench   1# 2x12r B Incline Prone On Bench   2# 2x10r B Incline Prone On Bench   2.5# 2x12r B Incline Prone On Bench   2.5# 2x15r B Incline Prone On Bench   3# 2x10r B   Prone lower trap Prone 2x12r Incline Prone On Bench   0# 2x12r B Incline Prone On Bench   0# 2x10r B Incline Prone On Bench   1# 2x12r B Incline Prone On Bench   1# 2x15r B Incline Prone On Bench   1# 3x10r B   Band IR and ER Red 2x12r B Red 2x12r B Standing Double  Red Band  2x10r B Standing Double  Red Band  2x12r B Standing Double  Red Band  2x15r B Standing Double  Red Band  3x10r B   Side lying IR B 2# 2x12r    2# 2x12r B 3# 2x10r B 3# 2x12r - -   Wall slides 2x10r B + Lift off 2x12r B + Lift off 1# 2x10r B + Lift off with R Slide up (on wall), lift off, lower down (off wall)  2x12r* Slide up (on wall), lift off, lower down (off wall)  2x15r* Slide up (on wall), lift off, lower down (off wall)  1x10r*   Seated ER &  IR @ 80-90 Abd - - 3# 2x10r B 3# 2x10r B ER: 1# 2x10r  IR: Yellow Band 2x10r ER: 1# 2x12r  IR: Yellow Band 2x12r   Shoulder Elevation on Incline Bench - - Elbow Flx on ascent, Elbow Ext on descent  2x10r B - - -   Standing Scaption - - - - 0# 2x15r 1# 2x10r   * blue band used to provide a inferior-posterior force on left shoulder (held with patient's right hand)    Therapeutic Modalities: (20 min)  ice to the left shoulder with game ready x 20 min                                                                                                HEP: advised to continue current HEP with can soup    Treatment/Session Assessment:  · Response to Treatment: He continues to have difficulty with keeping scapula depressed with prone on bench middle and lower trap exercises and standing scaption and forward elevation. He was able to recognize, both visually in the mirror and with proprioception, when his shoulder girdle begins to elevate with scaption and forward elevation. · Compliance with Program/Exercises: yes per pt. · Recommendations/Intent for next treatment session: \"Next visit will focus on B shoulder and scapular ROM, mobility, and strength\".     Total Treatment Duration: 70 minutes  PT Patient Time In/Time Out  Time In: 1300  Time Out: Benedict Reis, SPT  Julia Venegas, PT

## 2017-09-27 ENCOUNTER — HOSPITAL ENCOUNTER (OUTPATIENT)
Dept: PHYSICAL THERAPY | Age: 57
Discharge: HOME | End: 2017-09-27
Payer: COMMERCIAL

## 2017-09-27 PROCEDURE — 97140 MANUAL THERAPY 1/> REGIONS: CPT

## 2017-09-27 PROCEDURE — 97110 THERAPEUTIC EXERCISES: CPT

## 2017-09-27 NOTE — PROGRESS NOTES
[facility]  : 1960 Therapy Center at [facility] CORNELIUS FINLEY  [address] [facility], [address] [place] [address] 83[address], [address] [facility]  [phone]   [phone]       OUTPATIENT PHYSICAL THERAPY:Daily Note 2017      ICD-10: Treatment Diagnosis: Strain of muscle(s) and tendon(s) of the rotator cuff of left shoulder, sequela (S46.012S); Pain in left shoulder (M25.512); Adhesive capsulitis of left shoulder (M75.02)  Precautions/Allergies:   Review of patient's allergies indicates no known allergies. Fall Risk Score: 2 (? 5 = High Risk)  MD Orders: Evaluate and treat, HEP, ROM, strengthening, push MEDICAL/REFERRING DIAGNOSIS:  left shoulder ,right shoulder pain  DATE OF ONSET: Surgery 17  REFERRING PHYSICIAN: Kelsey Hebert MD  RETURN PHYSICIAN APPOINTMENT: 17     INITIAL ASSESSMENT:  Mr. Hermelindo Escamilla is s/p L shoulder mini open revision rotator cuff repair and bicep tenodesis with surgery 17. He is progressing with L shoulder active and passive ROM and UE strength. He does also complain of R shoulder pain and have been working on R UE strength and ROM. He would benefit from continuing skilled physical therapy to continue to progress functional mobility. PROBLEM LIST (Impacting functional limitations):  1. Post-op left shoulder pain and swelling  2. Decreased left shoulder ROM   3. Weakness left shoulder INTERVENTIONS PLANNED:  1. Thermal and electric modalities, manual therapies for pain   2. Manual therapies, therapeutic exercises, HEP for ROM    3. Therapeutic exercises and HEP for strength   TREATMENT PLAN:  Effective Dates:7-10-17 TO 10-6-17. Frequency/Duration: 3 times a week for 12 weeks  GOALS: (Goals have been discussed and agreed upon with patient.)  Short-Term Functional Goals: Time Frame: 6 weeks  1. Report no more than 3/10 intermittent pain to left shoulder with compensatory use during basic functional activities. GOAL MET  2.  Left shoulder PROM forward elevation greater than 135 degrees and external rotation greater than 60 degrees to progress into functional ranges. GOAL MET  3. Demonstrate good left shoulder isometric strength with manual testing to progress into strength phase. GOAL MET  4. Independent with initial HEP. GOAL MET  Discharge Goals: Time Frame: 12 weeks  1. No more than 1/10 intermittent pain left shoulder with return to normalized household and work activities, and score less than 25% on the DASH. Progressing towards  2. Left shoulder AROM forward elevation greater than 135 degrees, external rotation greater than 60 degrees, and strength to shoulder are grossly WNL's for safe use with normalized activities. Progressing towards  3. Demonstrate good functional shoulder strength and endurance for return to normalized household and work activities. Progressing towards  4. Independent with advanced shoulder HEP for continued self-management. Progressing towards  Rehabilitation Potential For Stated Goals: Good                HISTORY:   History of Present Injury/Illness (Reason for Referral): Injury March 3rd 2016 was lifting something overhead and felt a pop. Had physical therapy and then decided to have surgery 7-19-17.  2-3 months after surgery his shoulder started to get stiff and had manipulation on 10-25-16. He went back to physical therapy and was not getting any better. Then Dr. Debbie Zhang referred him to Dr. Mani Ricketts. Then had surgery 7-6-17. He did stay over night in the hospital. Pain at rest 4/10. Past Medical History/Comorbidities:   HTN, carpal tunnel surgery  Social History/Living Environment:    Lives alone  Prior Level of Function/Work/Activity:  Works for [email or web address]. 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    Dominant Side:         RIGHT handed but plays some sports left handed  Previous Treatment Approaches:          He has had therapy in the past before and after 1st rotator cuff surgery  Current Medications:     Benazepril, atenolol, atorvastatin, dilaudid, ventolin, Restoril, ultram    Date Last Reviewed:  9-25-17   Number of Personal Factors/Comorbidities that affect the Plan of Care: 1-2: MODERATE COMPLEXITY   EXAMINATION:   Observation/Orthostatic Postural Assessment: healed incision on the L shoulder  Palpation: n/t    ROM:                     Strength:                  Special Tests: None  Functional Mobility:  Sit to/from Stand: independent. Bed Mobility: independent. Independent with basic mobility. independent with dressing and grooming ADL's. CLINICAL DECISION MAKING:   Outcome Measure: Tool Used: Disabilities of the Arm, Shoulder and Hand (DASH) Questionnaire - Quick Version  Score:  Initial: 50/55  Most Recent: X/55 (Date: -- )   Interpretation of Score: The DASH is designed to measure the activities of daily living in person's with upper extremity dysfunction or pain. Each section is scored on a 1-5 scale, 5 representing the greatest disability. The scores of each section are added together for a total score of 55. Medical Necessity:   · Patient is expected to demonstrate progress in strength and range of motion to improve functional mobility. Reason for Services/Other Comments:  · Patient continues to require skilled intervention due to post-op, decreased ROM and UE strength. Use of outcome tool(s) and clinical judgement create a POC that gives a: Questionable prediction of patient's progress: MODERATE COMPLEXITY          TREATMENT:   (In addition to Assessment/Re-Assessment sessions the following treatments were rendered)  Pre-treatment Symptoms/Complaints: He reports that his shouldr is tight in the morning and after he sits in his recliner for a while (arms abducted to ~70-80*). He has some soreness the day or two after PT with it mostly being muscle soreness and some joint soreness.   Pain: Initial: L 1/10, R 0/10    Post Session: L 1/10; R 0/10   MANUAL THERAPY: (20 minutes): Joint mobilization was utilized and necessary because of the patient's restricted joint motion. AROM + active push at [facility] to physiological mobilization to the Left shoulder to ER and IR in 90 ABD, ABD, FLX, and HORZ ADD  Inferior [address] with movement, Posterior GH Glides, GH Distraction  Positional Release and passive stretching   - Standing Shoulder Flexion Stretches with Grasping Bar, Palms Down, Trunk Flexion, and Sitting Back  - Standing Shoulder Extension Stretches Grasping Bar, Palms Down, Squatting to Increase Extension    Therapeutic Exercise (30 Minutes): For muscle activation and UE strengthening. Cueing needed for technique, posture correction and use of towel for UE positioning.      Date  9-11-17 Date  9-11-17 Date  9-18-17 Date  9-20-17 Date  9-22-17 Date  9-25-17 Date  9-27-17   Activity/Exercise parameters parameters parameters parameters parameters parameters parameters   Serratus punch 3# 2x12r 3# 2x12r B 4# 2x10r B 4# 2x12r  4# 2x15r 5# 2x10r Cables 3# 3x10r   Side lying ER B 2# 2x10r 2# 2x12r B 3# 2x10r B 3# 2x12r - - -   Shoulder extension 2# 2x12r Standing  Yellow Band 2x12r B Standing Double  Red Band  2x10r B Standing Double  Green Band  2x12r B Standing Double  Green Band  2x15r B Standing Double  Green Band  3x10r B Cables 3# 3x10r   Prone middle trap Prone 2x12r Incline Prone On Bench   1# 2x12r B Incline Prone On Bench   2# 2x10r B Incline Prone On Bench   2.5# 2x12r B Incline Prone On Bench   2.5# 2x15r B Incline Prone On Bench   3# 2x10r B Incline Prone On Bench   3# 3x10r B   Prone lower trap Prone 2x12r Incline Prone On Bench   0# 2x12r B Incline Prone On Bench   0# 2x10r B Incline Prone On Bench   1# 2x12r B Incline Prone On Bench   1# 2x15r B Incline Prone On Bench   1# 3x10r B Incline Prone On Bench   1# 3x10r B   Shoulder IR and ER Red 2x12r B Red 2x12r B Standing Double  Red Band  2x10r B Standing Double  Red Band  2x12r B Standing Double  Red Band  2x15r B Standing Double  Red Band  3x10r B Cables 3# 3x10r   Side lying IR B 2# 2x12r    2# 2x12r B 3# 2x10r B 3# 2x12r - - -   Wall slides 2x10r B + Lift off 2x12r B + Lift off 1# 2x10r B + Lift off with R Slide up (on wall), lift off, lower down (off wall)  2x12r* Slide up (on wall), lift off, lower down (off wall)  2x15r* Slide up (on wall), lift off, lower down (off wall)  1x10r* -   Seated ER &  IR @ 80-90 Abd - - 3# 2x10r B 3# 2x10r B ER: 1# 2x10r  IR: Yellow Band 2x10r ER: 1# 2x12r  IR: Yellow Band 2x12r ER: 1# 3x10r  IR: Yellow Band 3x10r   Shoulder Elevation on Incline Bench - - Elbow Flx on ascent, Elbow Ext on descent  2x10r B - - - -   Standing Scaption - - - - 0# 2x15r 1# 2x10r 1# 3x10r   * blue band used to provide a inferior-posterior force on left shoulder (held with patient's right hand)    Therapeutic Modalities: (20 min)  ice to the left shoulder with game ready x 20 min                                                                                                HEP: advised to continue current HEP with can soup    Treatment/Session Assessment:  · Response to Treatment: He is getting better at controlling his Scapula (proper depression, rotation, retraction, etc.), but often requires tactile, verbal, and visual cues. He reports that the stretches were moderately intense, but felt right. By the end of the session his arms, \"felt like jello. \"  · Compliance with Program/Exercises: yes per pt. · Recommendations/Intent for next treatment session: \"Next visit will focus on B shoulder and scapular ROM, mobility, and strength\".     Total Treatment Duration: 70 minutes  PT Patient Time In/Time Out  Time In: 9352  Time Out: [facility]  Julia Sweet, PT

## 2017-09-29 ENCOUNTER — HOSPITAL ENCOUNTER (OUTPATIENT)
Dept: PHYSICAL THERAPY | Age: 57
Discharge: HOME | End: 2017-09-29
Payer: COMMERCIAL

## 2017-09-29 PROCEDURE — 97140 MANUAL THERAPY 1/> REGIONS: CPT

## 2017-09-29 PROCEDURE — 97110 THERAPEUTIC EXERCISES: CPT

## 2017-09-29 NOTE — PROGRESS NOTES
Kathrin Hernandez  : 1960 [facility] at [facility] CORNELIUS FINLEY  [address] [facility], [address] [place] [address],[address], [place] [address].  [phone]   [phone]       OUTPATIENT PHYSICAL THERAPY:Daily Note 2017      ICD-10: Treatment Diagnosis: Strain of muscle(s) and tendon(s) of the rotator cuff of left shoulder, sequela (S46.012S); Pain in left shoulder (M25.512); Adhesive capsulitis of left shoulder (M75.02)  Precautions/Allergies:   Review of patient's allergies indicates no known allergies. Fall Risk Score: 2 (? 5 = High Risk)  MD Orders: Evaluate and treat, HEP, ROM, strengthening, push MEDICAL/REFERRING DIAGNOSIS:  left shoulder ,right shoulder pain  DATE OF ONSET: Surgery 17  REFERRING PHYSICIAN: Norman Onofre MD  RETURN PHYSICIAN APPOINTMENT: 17     INITIAL ASSESSMENT:  Mr. Nat Arellano is s/p L shoulder mini open revision rotator cuff repair and bicep tenodesis with surgery 17. He is progressing with L shoulder active and passive ROM and UE strength. He does also complain of R shoulder pain and have been working on R UE strength and ROM. He would benefit from continuing skilled physical therapy to continue to progress functional mobility. PROBLEM LIST (Impacting functional limitations):  1. Post-op left shoulder pain and swelling  2. Decreased left shoulder ROM   3. Weakness left shoulder INTERVENTIONS PLANNED:  1. Thermal and electric modalities, manual therapies for pain   2. Manual therapies, therapeutic exercises, HEP for ROM    3. Therapeutic exercises and HEP for strength   TREATMENT PLAN:  Effective Dates:7-10-17 TO 10-6-17. Frequency/Duration: 3 times a week for 12 weeks  GOALS: (Goals have been discussed and agreed upon with patient.)  Short-Term Functional Goals: Time Frame: 6 weeks  1. Report no more than 3/10 intermittent pain to left shoulder with compensatory use during basic functional activities. GOAL MET  2.  Left shoulder PROM forward elevation greater than 135 degrees and external rotation greater than 60 degrees to progress into functional ranges. GOAL MET  3. Demonstrate good left shoulder isometric strength with manual testing to progress into strength phase. GOAL MET  4. Independent with initial HEP. GOAL MET  Discharge Goals: Time Frame: 12 weeks  1. No more than 1/10 intermittent pain left shoulder with return to normalized household and work activities, and score less than 25% on the DASH. Progressing towards  2. Left shoulder AROM forward elevation greater than 135 degrees, external rotation greater than 60 degrees, and strength to shoulder are grossly WNL's for safe use with normalized activities. Progressing towards  3. Demonstrate good functional shoulder strength and endurance for return to normalized household and work activities. Progressing towards  4. Independent with advanced shoulder HEP for continued self-management. Progressing towards  Rehabilitation Potential For Stated Goals: Good                HISTORY:   History of Present Injury/Illness (Reason for Referral): Injury March 3rd 2016 was lifting something overhead and felt a pop. Had physical therapy and then decided to have surgery 7-19-17.  2-3 months after surgery his shoulder started to get stiff and had manipulation on 10-25-16. He went back to physical therapy and was not getting any better. Then Dr. Niya Hicks referred him to Dr. Lisandro Blank. Then had surgery 7-6-17. He did stay over night in the hospital. Pain at rest 4/10. Past Medical History/Comorbidities:   HTN, carpal tunnel surgery  Social History/Living Environment:    Lives alone  Prior Level of Function/Work/Activity:  Works for [email or web address]. 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    Dominant Side:         RIGHT handed but plays some sports left handed  Previous Treatment Approaches:          He has had therapy in the past before and after 1st rotator cuff surgery  Current Medications:     Benazepril, atenolol, atorvastatin, dilaudid, ventolin, Restoril, ultram    Date Last Reviewed:  9-25-17   Number of Personal Factors/Comorbidities that affect the Plan of Care: 1-2: MODERATE COMPLEXITY   EXAMINATION:   Observation/Orthostatic Postural Assessment: healed incision on the L shoulder  Palpation: n/t    ROM:                     Strength:                  Special Tests: None  Functional Mobility:  Sit to/from Stand: independent. Bed Mobility: independent. Independent with basic mobility. independent with dressing and grooming ADL's. CLINICAL DECISION MAKING:   Outcome Measure: Tool Used: Disabilities of the Arm, Shoulder and Hand (DASH) Questionnaire - Quick Version  Score:  Initial: 50/55  Most Recent: X/55 (Date: -- )   Interpretation of Score: The DASH is designed to measure the activities of daily living in person's with upper extremity dysfunction or pain. Each section is scored on a 1-5 scale, 5 representing the greatest disability. The scores of each section are added together for a total score of 55. Medical Necessity:   · Patient is expected to demonstrate progress in strength and range of motion to improve functional mobility. Reason for Services/Other Comments:  · Patient continues to require skilled intervention due to post-op, decreased ROM and UE strength. Use of outcome tool(s) and clinical judgement create a POC that gives a: Questionable prediction of patient's progress: MODERATE COMPLEXITY          TREATMENT:   (In addition to Assessment/Re-Assessment sessions the following treatments were rendered)  Pre-treatment Symptoms/Complaints: He reports he felt good on Wednesday and was a little sore Wednesday night after therapy but was very sore on Thursday.    Pain: Initial: L 1/10, R 0/10    Post Session: L 1/10; R 0/10   MANUAL THERAPY: (10 minutes): Joint mobilization was utilized and necessary because of the patient's restricted joint motion. AROM + active push at [facility] to physiological mobilization to the Left shoulder to ER and IR in 90 ABD, ABD, FLX, and HORZ ADD  Inferior [address] with movement, Posterior GH Glides, GH Distraction    Therapeutic Exercise (35 Minutes): For muscle activation and UE strengthening. Cueing needed for technique, posture correction and use of towel for UE positioning.      Date  9-18-17 Date  9-20-17 Date  9-22-17 Date  9-25-17 Date  9-27-17 Date  9-29-17   Activity/Exercise parameters parameters parameters parameters parameters parameters   Serratus punch 4# 2x10r B 4# 2x12r  4# 2x15r 5# 2x10r Cables 3# 3x10r 5# 2x12r    Side lying ER 3# 2x10r B 3# 2x12r - - - 3# 2x15r   Shoulder extension Standing Double  Red Band  2x10r B Standing Double  Green Band  2x12r B Standing Double  Green Band  2x15r B Standing Double  Green Band  3x10r B Cables 3# 3x10r Cables 7# 3x12r   Prone middle trap Incline Prone On Bench   2# 2x10r B Incline Prone On Bench   2.5# 2x12r B Incline Prone On Bench   2.5# 2x15r B Incline Prone On Bench   3# 2x10r B Incline Prone On Bench   3# 3x10r B Incline Prone On Bench   3# 3x12r B   Prone lower trap Incline Prone On Bench   0# 2x10r B Incline Prone On Bench   1# 2x12r B Incline Prone On Bench   1# 2x15r B Incline Prone On Bench   1# 3x10r B Incline Prone On Bench   1# 3x10r B Incline Prone On Bench   1# 3x12r B   Shoulder IR and ER Standing Double  Red Band  2x10r B Standing Double  Red Band  2x12r B Standing Double  Red Band  2x15r B Standing Double  Red Band  3x10r B Cables 3# 3x10r -   Side lying IR 3# 2x10r B 3# 2x12r - - - 3# 2x15r   Wall slides 1# 2x10r B + Lift off with R Slide up (on wall), lift off, lower down (off wall)  2x12r* Slide up (on wall), lift off, lower down (off wall)  2x15r* Slide up (on wall), lift off, lower down (off wall)  1x10r* - Slide up with lift off 2x10   Seated ER &  IR @ 80-90 Abd 3# 2x10r B 3# 2x10r B ER: 1# 2x10r  IR: Yellow Band 2x10r ER: 1# 2x12r  IR: Yellow Band 2x12r ER: 1# 3x10r  IR: Yellow Band 3x10r ER: 1# 3x12 r  IR: Yellow Band 3x12r   Shoulder Elevation on Incline Bench Elbow Flx on ascent, Elbow Ext on descent  2x10r B - - - - -   Standing Scaption - - 0# 2x15r 1# 2x10r 1# 3x10r 1# 2x15 r   * blue band used to provide a inferior-posterior force on left shoulder (held with patient's right hand)    Therapeutic Modalities: (20 min)  ice to the left shoulder with game ready x 20 min                                                                                                HEP: advised to continue current HEP with can soup    Treatment/Session Assessment:  · Response to Treatment: He continues to have difficulty keeping scapula depressed with shoulder elevation but seems to be improving. · Compliance with Program/Exercises: yes per pt. · Recommendations/Intent for next treatment session: \"Next visit will focus on B shoulder and scapular ROM, mobility, and strength\".     Total Treatment Duration: 65 minutes  PT Patient Time In/Time Out  Time In: 0845  Time Out: 114[address] [email or web address] Darshan, JARAD

## 2017-10-02 ENCOUNTER — HOSPITAL ENCOUNTER (OUTPATIENT)
Dept: PHYSICAL THERAPY | Age: 57
Discharge: HOME | End: 2017-10-02
Payer: COMMERCIAL

## 2017-10-02 PROCEDURE — 97110 THERAPEUTIC EXERCISES: CPT

## 2017-10-02 PROCEDURE — 97140 MANUAL THERAPY 1/> REGIONS: CPT

## 2017-10-02 NOTE — PROGRESS NOTES
Mary Jane Pino  : 1960 [facility] beds at the [facility] CORNELIUS FINLEY  [address] [facility], [address] [place] [address],[address], [facility] [address]  [phone]   [phone]       OUTPATIENT PHYSICAL THERAPY:Daily Note 10/2/2017      ICD-10: Treatment Diagnosis: Strain of muscle(s) and tendon(s) of the rotator cuff of left shoulder, sequela (S46.012S); Pain in left shoulder (M25.512); Adhesive capsulitis of left shoulder (M75.02)  Precautions/Allergies:   Review of patient's allergies indicates no known allergies. Fall Risk Score: 2 (? 5 = High Risk)  MD Orders: Evaluate and treat, HEP, ROM, strengthening, push MEDICAL/REFERRING DIAGNOSIS:  left shoulder ,right shoulder pain  DATE OF ONSET: Surgery 17  REFERRING PHYSICIAN: Mara Jon MD  RETURN PHYSICIAN APPOINTMENT: 17     INITIAL ASSESSMENT:  Mr. Gilberto Mcintyre is s/p L shoulder mini open revision rotator cuff repair and bicep tenodesis with surgery 17. He is progressing with L shoulder active and passive ROM and UE strength. He does also complain of R shoulder pain and have been working on R UE strength and ROM. He would benefit from continuing skilled physical therapy to continue to progress functional mobility. PROBLEM LIST (Impacting functional limitations):  1. Post-op left shoulder pain and swelling  2. Decreased left shoulder ROM   3. Weakness left shoulder INTERVENTIONS PLANNED:  1. Thermal and electric modalities, manual therapies for pain   2. Manual therapies, therapeutic exercises, HEP for ROM    3. Therapeutic exercises and HEP for strength   TREATMENT PLAN:  Effective Dates:7-10-17 TO 10-6-17. Frequency/Duration: 3 times a week for 12 weeks  GOALS: (Goals have been discussed and agreed upon with patient.)  Short-Term Functional Goals: Time Frame: 6 weeks  1. Report no more than 3/10 intermittent pain to left shoulder with compensatory use during basic functional activities. GOAL MET  2.  Left shoulder PROM forward elevation greater than 135 degrees and external rotation greater than 60 degrees to progress into functional ranges. GOAL MET  3. Demonstrate good left shoulder isometric strength with manual testing to progress into strength phase. GOAL MET  4. Independent with initial HEP. GOAL MET  Discharge Goals: Time Frame: 12 weeks  1. No more than 1/10 intermittent pain left shoulder with return to normalized household and work activities, and score less than 25% on the DASH. Progressing towards  2. Left shoulder AROM forward elevation greater than 135 degrees, external rotation greater than 60 degrees, and strength to shoulder are grossly WNL's for safe use with normalized activities. Progressing towards  3. Demonstrate good functional shoulder strength and endurance for return to normalized household and work activities. Progressing towards  4. Independent with advanced shoulder HEP for continued self-management. Progressing towards  Rehabilitation Potential For Stated Goals: Good                HISTORY:   History of Present Injury/Illness (Reason for Referral): Injury March 3rd 2016 was lifting something overhead and felt a pop. Had physical therapy and then decided to have surgery 7-19-17.  2-3 months after surgery his shoulder started to get stiff and had manipulation on 10-25-16. He went back to physical therapy and was not getting any better. Then Dr. Madhav Crowley referred him to Dr. Greg Bond. Then had surgery 7-6-17. He did stay over night in the hospital. Pain at rest 4/10. Past Medical History/Comorbidities:   HTN, carpal tunnel surgery  Social History/Living Environment:    Lives alone  Prior Level of Function/Work/Activity:  Works for [employer]. 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    Dominant Side:         RIGHT handed but plays some sports left handed  Previous Treatment Approaches:          He has had therapy in the past before and after 1st rotator cuff surgery  Current Medications:     Benazepril, atenolol, atorvastatin, dilaudid, ventolin, Restoril, ultram    Date Last Reviewed:  9-25-17   Number of Personal Factors/Comorbidities that affect the Plan of Care: 1-2: MODERATE COMPLEXITY   EXAMINATION:   Observation/Orthostatic Postural Assessment: healed incision on the L shoulder  Palpation: n/t    ROM:                     Strength:                  Special Tests: None  Functional Mobility:  Sit to/from Stand: independent. Bed Mobility: independent. Independent with basic mobility. independent with dressing and grooming ADL's. CLINICAL DECISION MAKING:   Outcome Measure: Tool Used: Disabilities of the Arm, Shoulder and Hand (DASH) Questionnaire - Quick Version  Score:  Initial: 50/55  Most Recent: X/55 (Date: -- )   Interpretation of Score: The DASH is designed to measure the activities of daily living in person's with upper extremity dysfunction or pain. Each section is scored on a 1-5 scale, 5 representing the greatest disability. The scores of each section are added together for a total score of 55. Medical Necessity:   · Patient is expected to demonstrate progress in strength and range of motion to improve functional mobility. Reason for Services/Other Comments:  · Patient continues to require skilled intervention due to post-op, decreased ROM and UE strength. Use of outcome tool(s) and clinical judgement create a POC that gives a: Questionable prediction of patient's progress: MODERATE COMPLEXITY          TREATMENT:   (In addition to Assessment/Re-Assessment sessions the following treatments were rendered)  Pre-treatment Symptoms/Complaints: He reports soreness after Friday's treatment session, but no problems on Saturday. Sunday he was sore and achey and believeshe may have slept on it in a aggravating position.   Pain: Initial: L 2 /10, R 0/10    Post Session: L 1/10; R 0/10   MANUAL THERAPY: (15 minutes): Joint mobilization was utilized and necessary because of the patient's restricted joint motion. AROM + active push at [facility] to physiological mobilization to the Left shoulder to ER and IR in 90 ABD, ABD, FLX, and HORZ ADD  Inferior [address] Glides with movement, Posterior GH Glides, GH Distraction  Supine Pec Minor and Pec Major Stretches    Therapeutic Exercise (35 Minutes): For muscle activation and UE strengthening. Cueing needed for technique, posture correction and use of towel for UE positioning.      Date  9-18-17 Date  9-20-17 Date  9-22-17 Date  9-25-17 Date  9-27-17 Date  9-29-17 Date  10-2-17   Activity/Exercise parameters parameters parameters parameters parameters parameters parameters   Serratus punch 4# 2x10r B 4# 2x12r  4# 2x15r 5# 2x10r Cables 3# 3x10r 5# 2x12r  5# 2x15r    Side lying ER 3# 2x10r B 3# 2x12r - - - 3# 2x15r -   Shoulder extension Standing Double  Red Band  2x10r B Standing Double  Green Band  2x12r B Standing Double  Green Band  2x15r B Standing Double  Green Band  3x10r B Cables 3# 3x10r Cables 7# 3x12r Cables 7# 3x15r   Prone middle trap Incline Prone On Bench   2# 2x10r B Incline Prone On Bench   2.5# 2x12r B Incline Prone On Bench   2.5# 2x15r B Incline Prone On Bench   3# 2x10r B Incline Prone On Bench   3# 3x10r B Incline Prone On Bench   3# 3x12r B Incline Prone On Bench   3# 3x15r B   Prone lower trap Incline Prone On Bench   0# 2x10r B Incline Prone On Bench   1# 2x12r B Incline Prone On Bench   1# 2x15r B Incline Prone On Bench   1# 3x10r B Incline Prone On Bench   1# 3x10r B Incline Prone On Bench   1# 3x12r B Incline Prone On Bench   1# 3x15r B    Shoulder IR and ER Standing Double  Red Band  2x10r B Standing Double  Red Band  2x12r B Standing Double  Red Band  2x15r B Standing Double  Red Band  3x10r B Cables 3# 3x10r - Standing Double  Red Band  3x15r B   Side lying IR 3# 2x10r B 3# 2x12r - - - 3# 2x15r -   Wall slides 1# 2x10r B + Lift off with R Slide up (on wall), lift off, lower down (off wall)  2x12r* Slide up (on wall), lift off, lower down (off wall)  2x15r* Slide up (on wall), lift off, lower down (off wall)  1x10r* - Slide up (on wall), lift off, lower down (off wall)   2x10r -     Seated ER & IR @ 80-90 Abd 3# 2x10r B 3# 2x10r B ER: 1# 2x10r  IR: Yellow Band 2x10r ER: 1# 2x12r  IR: Yellow Band 2x12r ER: 1# 3x10r  IR: Yellow Band 3x10r ER: 1# 3x12r  IR: Yellow Band 3x12r -   Shoulder Elevation on Incline Bench Elbow Flx on ascent, Elbow Ext on descent  2x10r B - - - - - -   Standing Scaption - - 0# 2x15r 1# 2x10r 1# 3x10r 1# 2x15r 1# 3x15r   * blue band used to provide a inferior-posterior force on left shoulder (held with patient's right hand)    Therapeutic Modalities: (20 min)  ice to the left shoulder with game ready, minimal vasopneumatic pressure                       Left Shoulder Cold  Type: Cold/ice pack  Duration : 20 minutes  Patient Position: Sitting                                                                        HEP: advised to continue current HEP with can soup    Treatment/Session Assessment:  · Response to Treatment: Muscular strength and endurance is improving but the involved (left) shoulder is still weaker than the non-involved (right). He has now has minimal difficulty keeping the left scapular depressed and stabilized during exercises, but sometimes requires audelia verbal, visual, tactile, or manual cues. Pec Minors expected to be tight due to forward tipping of both Scapulae and difficulty finding Coracoid Processes. Tenderness and tightness with Pec Major and Pec Minor Stretching. Some popping and clicking in left anterior shouldr with IR exercises. · Compliance with Program/Exercises: yes per pt. · Recommendations/Intent for next treatment session: \"Next visit will focus on B shoulder and scapular ROM, mobility, and strength\".     Total Treatment Duration: 70 minutes  PT Patient Time In/Time Out  Time In: 1020  Time Out: 700 [facility] SPT  Lottie Gallardo, PT

## 2017-10-04 ENCOUNTER — HOSPITAL ENCOUNTER (OUTPATIENT)
Dept: PHYSICAL THERAPY | Age: 57
Discharge: HOME | End: 2017-10-04
Payer: COMMERCIAL

## 2017-10-04 PROCEDURE — 97110 THERAPEUTIC EXERCISES: CPT

## 2017-10-04 PROCEDURE — 97140 MANUAL THERAPY 1/> REGIONS: CPT

## 2017-10-04 NOTE — PROGRESS NOTES
Wally Sam  : 1960 [facility] at [facility] CORNELIUS FINLEY  [address] [facility], [address] [place] [address],[address], [facility] [address].  [phone]   [phone]       OUTPATIENT PHYSICAL THERAPY:Daily Note, Progress Report and Recertification       ICD-10: Treatment Diagnosis: Strain of muscle(s) and tendon(s) of the rotator cuff of left shoulder, sequela (S46.012S); Pain in left shoulder (M25.512); Adhesive capsulitis of left shoulder (M75.02)  Precautions/Allergies:   Review of patient's allergies indicates no known allergies. Fall Risk Score: 2 (? 5 = High Risk)  MD Orders: Evaluate and treat, HEP, ROM, strengthening, push MEDICAL/REFERRING DIAGNOSIS:  left shoulder ,right shoulder pain  DATE OF ONSET: Surgery 17  REFERRING PHYSICIAN: Nandini Harrison MD  RETURN PHYSICIAN APPOINTMENT: ~10-11-17      ASSESSMENT:  Mr. Page Molina is s/p L shoulder mini open revision rotator cuff repair and bicep tenodesis with surgery 17. He is progressing in shoulder range of motion in flexion, abduction, external rotation, and internal rotation. He is also progressing in shoulder strength in flexion, abduction, external rotation, and internal rotation. He has achieved generalized functional active range of his shoulder, but his occupation requires shoulder joint range, muscle strength, and muscle endurance beyond these levels for efficient and effective work. Therefore, he would benefit from continuing skilled physical therapy in order to continue to progress necessary mobility and muscle performance, specifically the endurance to perform overhead activities for extended periods each day. PROBLEM LIST (Impacting functional limitations):  1. Post-op left shoulder pain and swelling  2. Decreased left shoulder ROM   3. Weakness left shoulder INTERVENTIONS PLANNED:  1. Thermal and electric modalities, manual therapies for pain   2. Manual therapies, therapeutic exercises, HEP for ROM    3.  Therapeutic exercises and HEP for strength   TREATMENT PLAN:  Effective Dates:10-4-17 to 27DEC17. Frequency/Duration: 3 times a week for 8-12 weeks  GOALS: (Goals have been discussed and agreed upon with patient.)  Short-Term Functional Goals: Time Frame: 6 weeks  1. Report no more than 3/10 intermittent pain to left shoulder with compensatory use during basic functional activities. GOAL MET  2. Left shoulder PROM forward elevation greater than 135 degrees and external rotation greater than 60 degrees to progress into functional ranges. GOAL MET  3. Demonstrate good left shoulder isometric strength with manual testing to progress into strength phase. GOAL MET  4. Independent with initial HEP. GOAL MET  Discharge Goals: Time Frame: 12 weeks  1. No more than 1/10 intermittent pain left shoulder with return to normalized household and work activities, and score less than 25% on the DASH. Progressing towards  2. Left shoulder AROM forward elevation greater than 135 degrees, external rotation greater than 60 degrees, and strength to shoulder are grossly WNL's for safe use with normalized activities. GOAL MET  3. Demonstrate good functional shoulder strength and endurance for return to normalized household and work activities. Progressing towards  4. Independent with advanced shoulder HEP for continued self-management. Progressing towards  New Goals  5. Perform 10 minutes of continuous overhead activities with good shoulder mechanics and mild fatigue for work duties. Rehabilitation Potential For Stated Goals: Good    Regarding [address] [place] [address] Road therapy, I certify that the treatment plan above will be carried out by a therapist or under their direction. Thank you for this referral,    La Velez PT       Referring Physician Signature: Alejandro Sheridan MD          Date                          HISTORY:   History of Present Injury/Illness (Reason for Referral):   Injury March 3rd 2016 was lifting something overhead and felt a pop. Had physical therapy and then decided to have surgery 7-19-17.  2-3 months after surgery his shoulder started to get stiff and had manipulation on 10-25-16. He went back to physical therapy and was not getting any better. Then Dr. Elvia Ferreira referred him to Dr. Little Duff. Then had surgery 7-6-17. He did stay over night in the hospital. Pain at rest 4/10. Past Medical History/Comorbidities:   HTN, carpal tunnel surgery  Social History/Living Environment:    Lives alone  Prior Level of Function/Work/Activity:  Works for [employer]. 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    Dominant Side:         RIGHT handed but plays some sports left handed  Previous Treatment Approaches:          He has had therapy in the past before and after 1st rotator cuff surgery  Current Medications:     Benazepril, atenolol, atorvastatin, dilaudid, ventolin, Restoril, ultram    Date Last Reviewed:  10-4-17   Number of Personal Factors/Comorbidities that affect the Plan of Care: 1-2: MODERATE COMPLEXITY   EXAMINATION:   Observation/Orthostatic Postural Assessment: healed incision on the L shoulder  Palpation: n/t    ROM:      LUE AROM  L Shoulder Flexion: 140  L Shoulder ABduction: 105  L Shoulder Internal Rotation:  (T11 behind back)  L Shoulder External Rotation: 65 (at neutral)  LUE PROM  L Shoulder Flexion: 150  L Shoulder ABduction: 120  L Shoulder Internal Rotation: 75 (at 45 deg abd)  L Shoulder External Rotation: 60 (at neutral, 65 at 90 deg abd)    RUE AROM  R Shoulder Flexion: 170  R Shoulder ABduction: 165  R Shoulder Internal Rotation:  (T6 behind back)  R Shoulder External Rotation: 90 (reaching behind back)  RUE PROM  R Shoulder Flexion: 180  R Shoulder ABduction: 145  R Shoulder Internal Rotation: 85 (at 45 deg abd)  R Shoulder External Rotation: 95 (at 45 deg abd, 100 at 90 deg abd)         Strength:   LUE Strength  L Shoulder Flexion: 4-  L Shoulder ABduction: 4  L Shoulder Internal Rotation: 5  L Shoulder External Rotation: 4+  L Elbow Flexion: 5  L Elbow Extension: 5    RUE Strength  R Shoulder External Rotation: 4+         Special Tests: None  Functional Mobility:  Sit to/from Stand: independent. Bed Mobility: independent. Independent with basic mobility. independent with dressing and grooming ADL's. CLINICAL DECISION MAKING:   Outcome Measure: Tool Used: Disabilities of the Arm, Shoulder and Hand (DASH) Questionnaire - Quick Version  Score:  Initial: 50/55  Most Recent: 32/55 (Date: [id number] )   Interpretation of Score: The DASH is designed to measure the activities of daily living in person's with upper extremity dysfunction or pain. Each section is scored on a 1-5 scale, 5 representing the greatest disability. The scores of each section are added together for a total score of 55. Medical Necessity:   · Patient is expected to demonstrate progress in strength and range of motion to improve functional mobility. Reason for Services/Other Comments:  · Patient continues to require skilled intervention due to post-op, decreased ROM and UE strength. Use of outcome tool(s) and clinical judgement create a POC that gives a: Questionable prediction of patient's progress: MODERATE COMPLEXITY          TREATMENT:   (In addition to Assessment/Re-Assessment sessions the following treatments were rendered)  Pre-treatment Symptoms/Complaints: His shoulder has not had much pain or soreness since last visit. Pain: Initial: 1/10    Post Session: L 1/10; R 0/10   MANUAL THERAPY: (15 minutes): Joint mobilization was utilized and necessary because of the patient's restricted joint motion. AROM + active push at [facility] to physiological mobilization to the Left shoulder to ER and IR in 90 ABD, ABD, FLX, and HORZ ADD  Inferior [address] with movement, Posterior GH Glides, GH Distraction    Therapeutic Exercise (30 Minutes):   For muscle activation and UE strengthening. Cueing needed for technique, posture correction and use of towel for UE positioning.      Date  9-20-17 Date  9-22-17 Date  9-25-17 Date  9-27-17 Date  9-29-17 Date  10-2-17 Date  10-2-17   Activity/Exercise parameters parameters parameters parameters parameters parameters parameters   Serratus punch 4# 2x12r  4# 2x15r 5# 2x10r Cables 3# 3x10r 5# 2x12r  5# 2x15r   -   Side lying ER 3# 2x12r - - - 3# 2x15r - -   Shoulder extension Standing Double  Green Band  2x12r B Standing Double  Green Band  2x15r B Standing Double  Green Band  3x10r B Cables 3# 3x10r Cables 7# 3x12r Cables 7# 3x15r -   Prone middle trap Incline Prone On Bench   2.5# 2x12r B Incline Prone On Bench   2.5# 2x15r B Incline Prone On Bench   3# 2x10r B Incline Prone On Bench   3# 3x10r B Incline Prone On Bench   3# 3x12r B Incline Prone On Bench   3# 3x15r B Incline Prone On Bench   3# 3x12r B   Prone lower trap Incline Prone On Bench   1# 2x12r B Incline Prone On Bench   1# 2x15r B Incline Prone On Bench   1# 3x10r B Incline Prone On Bench   1# 3x10r B Incline Prone On Bench   1# 3x12r B Incline Prone On Bench   1# 3x15r B  Incline Prone On Bench   1# 3x12r B   Shoulder IR and ER Standing Double  Red Band  2x12r B Standing Double  Red Band  2x15r B Standing Double  Red Band  3x10r B Cables 3# 3x10r - Standing Double  Red Band  3x15r B -   Side lying IR 3# 2x12r - - - 3# 2x15r - -   Wall slides Slide up (on wall), lift off, lower down (off wall)  2x12r* Slide up (on wall), lift off, lower down (off wall)  2x15r* Slide up (on wall), lift off, lower down (off wall)  1x10r* - Slide up (on wall), lift off, lower down (off wall)   2x10r -   Slide up (on wall), lift off, lower down (off wall)   2x10r   Seated ER & IR @ 80-90 Abd 3# 2x10r B ER: 1# 2x10r  IR: Yellow Band 2x10r ER: 1# 2x12r  IR: Yellow Band 2x12r ER: 1# 3x10r  IR: Yellow Band 3x10r ER: 1# 3x12r  IR: Yellow Band 3x12r - ER: 2# 3x10r  IR: Red Band 3x10r Shoulder Elevation on Incline Bench - - - - - - -   Standing Scaption - 0# 2x15r 1# 2x10r 1# 3x10r 1# 2x15r 1# 3x15r -   Cable hand over hand pull down - - - - - - 7# 10x   Overhead endurance - - - - - - Yellow band shoulder pulls  3x60s   Standing Cable Rows - - - - - - 7# 2x10r   Standing Bicep Curls - - - - - - 8# 2x10r   * blue band used to provide a inferior-posterior force on left shoulder (held with patient's right hand)    Therapeutic Modalities: (20 min)  ice to the left shoulder with game ready, minimal vasopneumatic pressure                                                                                                HEP: advised to continue current HEP with can soup    Treatment/Session Assessment:  · Response to Treatment: Left and right shoulder motion has improved since last measurements. His strength and endurance has also improved. Today his overhead endurance was worked to fatigued. Regular exercises following overhead endurance exercises were more strenuous as a result. · Compliance with Program/Exercises: yes per pt. · Recommendations/Intent for next treatment session: \"Next visit will focus on B shoulder and scapular ROM, mobility, and strength\".     Total Treatment Duration: 65 minutes  PT Patient Time In/Time Out  Time In: 1020  Time Out: 700 [facility], [facility]  Julia Osei, PT

## 2017-10-06 ENCOUNTER — HOSPITAL ENCOUNTER (OUTPATIENT)
Dept: PHYSICAL THERAPY | Age: 57
Discharge: HOME | End: 2017-10-06
Payer: COMMERCIAL

## 2017-10-06 PROCEDURE — 97110 THERAPEUTIC EXERCISES: CPT

## 2017-10-06 NOTE — PROGRESS NOTES
Maranda Andre  : 1960 [facility]  [address], [address] [place] [address],[address], [address] [facility]  [phone]   [phone]       OUTPATIENT PHYSICAL THERAPY:Daily Note 10/6/2017      ICD-10: Treatment Diagnosis: Strain of muscle(s) and tendon(s) of the rotator cuff of left shoulder, sequela (S46.012S); Pain in left shoulder (M25.512); Adhesive capsulitis of left shoulder (M75.02)  Precautions/Allergies:   Review of patient's allergies indicates no known allergies. Fall Risk Score: 2 (? 5 = High Risk)  MD Orders: Evaluate and treat, HEP, ROM, strengthening, push MEDICAL/REFERRING DIAGNOSIS:  left shoulder ,right shoulder pain  DATE OF ONSET: Surgery 17  REFERRING PHYSICIAN: Candis Dunne MD  RETURN PHYSICIAN APPOINTMENT: ~10-11-17      ASSESSMENT:  Mr. Lucio Brown is s/p L shoulder mini open revision rotator cuff repair and bicep tenodesis with surgery 17. He is progressing in shoulder range of motion in flexion, abduction, external rotation, and internal rotation. He is also progressing in shoulder strength in flexion, abduction, external rotation, and internal rotation. He has achieved generalized functional active range of his shoulder, but his occupation requires shoulder joint range, muscle strength, and muscle endurance beyond these levels for efficient and effective work. Therefore, he would benefit from continuing skilled physical therapy in order to continue to progress necessary mobility and muscle performance, specifically the endurance to perform overhead activities for extended periods each day. PROBLEM LIST (Impacting functional limitations):  1. Post-op left shoulder pain and swelling  2. Decreased left shoulder ROM   3. Weakness left shoulder INTERVENTIONS PLANNED:  1. Thermal and electric modalities, manual therapies for pain   2. Manual therapies, therapeutic exercises, HEP for ROM    3.  Therapeutic exercises and HEP for strength   TREATMENT PLAN:  Effective Dates:10-4-17 to 27DEC17. Frequency/Duration: 3 times a week for 8-12 weeks  GOALS: (Goals have been discussed and agreed upon with patient.)  Short-Term Functional Goals: Time Frame: 6 weeks  1. Report no more than 3/10 intermittent pain to left shoulder with compensatory use during basic functional activities. GOAL MET  2. Left shoulder PROM forward elevation greater than 135 degrees and external rotation greater than 60 degrees to progress into functional ranges. GOAL MET  3. Demonstrate good left shoulder isometric strength with manual testing to progress into strength phase. GOAL MET  4. Independent with initial HEP. GOAL MET  Discharge Goals: Time Frame: 12 weeks  1. No more than 1/10 intermittent pain left shoulder with return to normalized household and work activities, and score less than 25% on the DASH. Progressing towards  2. Left shoulder AROM forward elevation greater than 135 degrees, external rotation greater than 60 degrees, and strength to shoulder are grossly WNL's for safe use with normalized activities. GOAL MET  3. Demonstrate good functional shoulder strength and endurance for return to normalized household and work activities. Progressing towards  4. Independent with advanced shoulder HEP for continued self-management. Progressing towards  New Goals  5. Perform 10 minutes of continuous overhead activities with good shoulder mechanics and mild fatigue for work duties. Rehabilitation Potential For Stated Goals: GooD                HISTORY:   History of Present Injury/Illness (Reason for Referral): Injury March 3rd 2016 was lifting something overhead and felt a pop. Had physical therapy and then decided to have surgery 7-19-17.  2-3 months after surgery his shoulder started to get stiff and had manipulation on 10-25-16. He went back to physical therapy and was not getting any better. Then Dr. Henry Gonzalez referred him to Dr. Jonel Arteaga. Then had surgery 7-6-17.  He did stay over night in the hospital. Pain at rest 4/10. Past Medical History/Comorbidities:   HTN, carpal tunnel surgery  Social History/Living Environment:    Lives alone  Prior Level of Function/Work/Activity:  Works for [employer]. 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 Dominant Side:         RIGHT handed but plays some sports left handed  Previous Treatment Approaches:          He has had therapy in the past before and after 1st rotator cuff surgery  Current Medications:     Benazepril, atenolol, atorvastatin, dilaudid, ventolin, Restoril, ultram    Date Last Reviewed:  10-4-17   Number of Personal Factors/Comorbidities that affect the Plan of Care: 1-2: MODERATE COMPLEXITY   EXAMINATION:   Observation/Orthostatic Postural Assessment: healed incision on the L shoulder  Palpation: n/t    ROM:                     Strength:                  Special Tests: None  Functional Mobility:  Sit to/from Stand: independent. Bed Mobility: independent. Independent with basic mobility. independent with dressing and grooming ADL's. CLINICAL DECISION MAKING:   Outcome Measure: Tool Used: Disabilities of the Arm, Shoulder and Hand (DASH) Questionnaire - Quick Version  Score:  Initial: 50/55  Most Recent: 32/55 (Date: [id number] )   Interpretation of Score: The DASH is designed to measure the activities of daily living in person's with upper extremity dysfunction or pain. Each section is scored on a 1-5 scale, 5 representing the greatest disability. The scores of each section are added together for a total score of 55. Medical Necessity:   · Patient is expected to demonstrate progress in strength and range of motion to improve functional mobility. Reason for Services/Other Comments:  · Patient continues to require skilled intervention due to post-op, decreased ROM and UE strength.    Use of outcome tool(s) and clinical judgement create a POC that gives a: Questionable prediction of patient's progress: MODERATE COMPLEXITY          TREATMENT:   (In addition to Assessment/Re-Assessment sessions the following treatments were rendered)  Pre-treatment Symptoms/Complaints: His say he was sore the day after the last session, but the day after that he fell good. Pain: Initial: 1/10    Post Session: L 1/10; R 0/10     Therapeutic Exercise (45 Minutes): For muscle activation and UE strengthening. Cueing needed for technique, posture correction and use of towel for UE positioning.     UBE: 6 minutes, alternating backward and forward cycling each minute  Wand ROM: supine Flx and ER     Date  9-22-17 Date  9-25-17 Date  9-27-17 Date  9-29-17 Date  10-2-17 Date  10-2-17 Date  10-6-17   Activity/Exercise parameters parameters parameters parameters parameters parameters parameters   Serratus punch 4# 2x15r 5# 2x10r Cables 3# 3x10r 5# 2x12r  5# 2x15r   - 5# 3x15r    Side lying ER - - - 3# 2x15r - - -   Shoulder extension Standing Double  Green Band  2x15r B Standing Double  Green Band  3x10r B Cables 3# 3x10r Cables 7# 3x12r Cables 7# 3x15r - Cables 10# 3x15r   Prone middle trap Incline Prone On Bench   2.5# 2x15r B Incline Prone On Bench   3# 2x10r B Incline Prone On Bench   3# 3x10r B Incline Prone On Bench   3# 3x12r B Incline Prone On Bench   3# 3x15r B Incline Prone On Bench   3# 3x12r B    Prone lower trap Incline Prone On Bench   1# 2x15r B Incline Prone On Bench   1# 3x10r B Incline Prone On Bench   1# 3x10r B Incline Prone On Bench   1# 3x12r B Incline Prone On Bench   1# 3x15r B  Incline Prone On Bench   1# 3x12r B    Shoulder IR and ER Standing Double  Red Band  2x15r B Standing Double  Red Band  3x10r B Cables 3# 3x10r - Standing Double  Red Band  3x15r B - Standing Double  Green Band  3x15r B   Side lying IR - - - 3# 2x15r - - -   Wall slides Slide up (on wall), lift off, lower down (off wall)  2x15r* Slide up (on wall), lift off, lower down (off wall)  1x10r* - Slide up (on wall), lift off, lower down (off wall)   2x10r -   Slide up (on wall), lift off, lower down (off wall)   2x10r    Seated ER & IR @ 80-90 Abd ER: 1# 2x10r  IR: Yellow Band 2x10r ER: 1# 2x12r  IR: Yellow Band 2x12r ER: 1# 3x10r  IR: Yellow Band 3x10r ER: 1# 3x12r  IR: Yellow Band 3x12r - ER: 2# 3x10r  IR: Red Band 3x10r    Standing Scaption 0# 2x15r 1# 2x10r 1# 3x10r 1# 2x15r 1# 3x15r - 2# 3x15r   Cable hand over hand pull down - - - - - 7# 10x    Overhead endurance - - - - - Yellow band shoulder pulls  3x60s    Standing Cable Rows - - - - - 7# 2x10r 7# 3x15r   Standing Bicep Curls - - - - - 8# 2x10r 8# 3x15r   Standing Elbow Extensions       7# 3x15r   * blue band used to provide a inferior-posterior force on left shoulder (held with patient's right hand)    Therapeutic Modalities: (20 min)  ice to the left shoulder with game ready, minimal vasopneumatic pressure                                                                                                HEP: advised to continue current HEP with can soup    Treatment/Session Assessment:   · Response to Treatment: Patient is able to perform with higher weight, reps, and sets in all exercises. He appreciates the ability to warm up (UBE) and stretch (wand ROM) on his own. He felt that his neck and upper shoulder were tight during today's session. · Compliance with Program/Exercises: yes per pt. · Recommendations/Intent for next treatment session: \"Next visit will focus on B shoulder and scapular ROM, mobility, and strength\".     Total Treatment Duration: 65 minutes  PT Patient Time In/Time Out  Time In: 1025  Time Out: 306 [facility], [facility]  Julia Rush, PT

## 2017-10-09 ENCOUNTER — HOSPITAL ENCOUNTER (OUTPATIENT)
Dept: PHYSICAL THERAPY | Age: 57
Discharge: HOME | End: 2017-10-09
Payer: COMMERCIAL

## 2017-10-09 PROCEDURE — 97110 THERAPEUTIC EXERCISES: CPT

## 2017-10-09 NOTE — PROGRESS NOTES
Geraldine Liang  : 1960 [facility] at [facility] CORNELIUS FINLEY  [address] [facility], [address] [place] [address] [address], [address] [facility]  [phone]   [phone]       OUTPATIENT PHYSICAL THERAPY:Daily Note 10/9/2017      ICD-10: Treatment Diagnosis: Strain of muscle(s) and tendon(s) of the rotator cuff of left shoulder, sequela (S46.012S); Pain in left shoulder (M25.512); Adhesive capsulitis of left shoulder (M75.02)  Precautions/Allergies:   Review of patient's allergies indicates no known allergies. Fall Risk Score: 2 (? 5 = High Risk)  MD Orders: Evaluate and treat, HEP, ROM, strengthening, push MEDICAL/REFERRING DIAGNOSIS:  left shoulder ,right shoulder pain  DATE OF ONSET: Surgery 17  REFERRING PHYSICIAN: Juan Brunner., MD  RETURN PHYSICIAN APPOINTMENT: ~10-11-17      ASSESSMENT:  Mr. Gloria Kimball is s/p L shoulder mini open revision rotator cuff repair and bicep tenodesis with surgery 17. He is progressing in shoulder range of motion in flexion, abduction, external rotation, and internal rotation. He is also progressing in shoulder strength in flexion, abduction, external rotation, and internal rotation. He has achieved generalized functional active range of his shoulder, but his occupation requires shoulder joint range, muscle strength, and muscle endurance beyond these levels for efficient and effective work. Therefore, he would benefit from continuing skilled physical therapy in order to continue to progress necessary mobility and muscle performance, specifically the endurance to perform overhead activities for extended periods each day. PROBLEM LIST (Impacting functional limitations):  1. Post-op left shoulder pain and swelling  2. Decreased left shoulder ROM   3. Weakness left shoulder INTERVENTIONS PLANNED:  1. Thermal and electric modalities, manual therapies for pain   2. Manual therapies, therapeutic exercises, HEP for ROM    3.  Therapeutic exercises and HEP for strength   TREATMENT PLAN:  Effective Dates:10-4-17 to 27DEC17. Frequency/Duration: 3 times a week for 8-12 weeks  GOALS: (Goals have been discussed and agreed upon with patient.)  Short-Term Functional Goals: Time Frame: 6 weeks  1. Report no more than 3/10 intermittent pain to left shoulder with compensatory use during basic functional activities. GOAL MET  2. Left shoulder PROM forward elevation greater than 135 degrees and external rotation greater than 60 degrees to progress into functional ranges. GOAL MET  3. Demonstrate good left shoulder isometric strength with manual testing to progress into strength phase. GOAL MET  4. Independent with initial HEP. GOAL MET  Discharge Goals: Time Frame: 12 weeks  1. No more than 1/10 intermittent pain left shoulder with return to normalized household and work activities, and score less than 25% on the DASH. Progressing towards  2. Left shoulder AROM forward elevation greater than 135 degrees, external rotation greater than 60 degrees, and strength to shoulder are grossly WNL's for safe use with normalized activities. GOAL MET  3. Demonstrate good functional shoulder strength and endurance for return to normalized household and work activities. Progressing towards  4. Independent with advanced shoulder HEP for continued self-management. Progressing towards  New Goals  5. Perform 10 minutes of continuous overhead activities with good shoulder mechanics and mild fatigue for work duties. Rehabilitation Potential For Stated Goals: GooD                HISTORY:   History of Present Injury/Illness (Reason for Referral): Injury March 3rd 2016 was lifting something overhead and felt a pop. Had physical therapy and then decided to have surgery 7-19-17.  2-3 months after surgery his shoulder started to get stiff and had manipulation on 10-25-16. He went back to physical therapy and was not getting any better. Then Dr. Roxana Howard referred him to Dr. Maksim Garcia. Then had surgery 7-6-17.  He did stay over night in the hospital. Pain at rest 4/10. Past Medical History/Comorbidities:   HTN, carpal tunnel surgery  Social History/Living Environment:    Lives alone  Prior Level of Function/Work/Activity:  Works for [employer]. 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 Dominant Side:         RIGHT handed but plays some sports left handed  Previous Treatment Approaches:          He has had therapy in the past before and after 1st rotator cuff surgery  Current Medications:     Benazepril, atenolol, atorvastatin, dilaudid, ventolin, Restoril, ultram    Date Last Reviewed:  10-4-17   Number of Personal Factors/Comorbidities that affect the Plan of Care: 1-2: MODERATE COMPLEXITY   EXAMINATION:   Observation/Orthostatic Postural Assessment: healed incision on the L shoulder  Palpation: n/t    ROM:                     Strength:                  Special Tests: None  Functional Mobility:  Sit to/from Stand: independent. Bed Mobility: independent. Independent with basic mobility. independent with dressing and grooming ADL's. CLINICAL DECISION MAKING:   Outcome Measure: Tool Used: Disabilities of the Arm, Shoulder and Hand (DASH) Questionnaire - Quick Version  Score:  Initial: 50/55  Most Recent: 32/55 (Date: [id number] )   Interpretation of Score: The DASH is designed to measure the activities of daily living in person's with upper extremity dysfunction or pain. Each section is scored on a 1-5 scale, 5 representing the greatest disability. The scores of each section are added together for a total score of 55. Medical Necessity:   · Patient is expected to demonstrate progress in strength and range of motion to improve functional mobility. Reason for Services/Other Comments:  · Patient continues to require skilled intervention due to post-op, decreased ROM and UE strength.    Use of outcome tool(s) and clinical judgement create a POC that gives a: Questionable prediction of patient's progress: MODERATE COMPLEXITY          TREATMENT:   (In addition to Assessment/Re-Assessment sessions the following treatments were rendered)  Pre-treatment Symptoms/Complaints: He reports that the ache in his shoulder comes and goes and is sharp only with certain movements. Pain: Initial: 1/10    Post Session: 1/10     Therapeutic Exercise (45 Minutes): For muscle activation and UE strengthening. Cueing needed for technique, posture correction and use of towel for UE positioning.     UBE: 6 minutes, resistance 1, alternating backward and forward cycling each minute  Wand ROM: Standing Flexion, Scaption, Extension, IR, and ER     Date  9-25-17 Date  9-27-17 Date  9-29-17 Date  10-2-17 Date  10-2-17 Date  10-6-17 Date  10-9-17   Activity/Exercise parameters parameters parameters parameters parameters parameters parameters   Serratus punch 5# 2x10r Cables 3# 3x10r 5# 2x12r  5# 2x15r   - 5# 3x15r  -   Shoulder extension Standing Double  Green Band  3x10r B Cables 3# 3x10r Cables 7# 3x12r Cables 7# 3x15r - Cables 10# 3x15r -   Prone middle trap Incline Prone On Bench   3# 2x10r B Incline Prone On Bench   3# 3x10r B Incline Prone On Bench   3# 3x12r B Incline Prone On Bench   3# 3x15r B Incline Prone On Bench   3# 3x12r B - Incline Prone On Bench   3# 3x15r B   Prone lower trap Incline Prone On Bench   1# 3x10r B Incline Prone On Bench   1# 3x10r B Incline Prone On Bench   1# 3x12r B Incline Prone On Bench   1# 3x15r B  Incline Prone On Bench   1# 3x12r B - Incline Prone On Bench   1# 3x15r B   Shoulder IR and ER Standing Double  Red Band  3x10r B Cables 3# 3x10r - Standing Double  Red Band  3x15r B - Standing Double  Green Band  3x15r B -   Side lying IR - - 3# 2x15r - - -    Wall slides Slide up (on wall), lift off, lower down (off wall)  1x10r* - Slide up (on wall), lift off, lower down (off wall)   2x10r -   Slide up (on wall), lift off, lower down (off wall)   2x10r - Slide up (on wall), lift off, lower down (off wall)   3x15r   Seated ER & IR @ 80-90 Abd ER: 1# 2x12r  IR: Yellow Band 2x12r ER: 1# 3x10r  IR: Yellow Band 3x10r ER: 1# 3x12r  IR: Yellow Band 3x12r - ER: 2# 3x10r  IR: Red Band 3x10r - ER: 2# 3x15r  IR: Red Band 3x15r   Standing Scaption 1# 2x10r 1# 3x10r 1# 2x15r 1# 3x15r - 2# 3x15r -   Cable hand over hand pull down - - - - 7# 10x - 7# 1x15   10# 1x15    Overhead endurance - - - - Yellow band shoulder pulls  3x60s - Yellow band shoulder pulls  2x90s   Standing Cable Rows - - - - 7# 2x10r 7# 3x15r -   Standing Bicep Curls - - - - 8# 2x10r 8# 3x15r -   Standing Elbow Extensions      7# 3x15r -   * blue band used to provide a inferior-posterior force on left shoulder (held with patient's right hand)    Therapeutic Modalities: (20 min)  ice to the left shoulder with game ready, minimal vasopneumatic pressure                                                                                                HEP: advised to continue current HEP with can soup    Treatment/Session Assessment:   · Response to Treatment: He performed with higher reps in all exercises. Wand ROM Exercises were smooth and controlled. Strengthening exercises did fatigue his muscles, but were performed with good control and form. · Compliance with Program/Exercises: yes per pt. · Recommendations/Intent for next treatment session: \"Next visit will focus on B shoulder and scapular ROM, mobility, and strength\".     Total Treatment Duration: 65 minutes  PT Patient Time In/Time Out  Time In: 1015  Time Out: [address] [facility], [facility]  Julia Dailey, PT

## 2017-10-11 ENCOUNTER — HOSPITAL ENCOUNTER (OUTPATIENT)
Dept: PHYSICAL THERAPY | Age: 57
Discharge: HOME | End: 2017-10-11
Payer: COMMERCIAL

## 2017-10-11 PROCEDURE — 97110 THERAPEUTIC EXERCISES: CPT

## 2017-10-11 NOTE — PROGRESS NOTES
Lyndajosueluiz Norman  : 1960 [facility] at [facility] CORNELIUS FINLEY  [address] [facility], [address] [place] [address],[address], [place] [address].  [phone]   [phone]       OUTPATIENT PHYSICAL THERAPY:Daily Note 10/11/2017      ICD-10: Treatment Diagnosis: Strain of muscle(s) and tendon(s) of the rotator cuff of left shoulder, sequela (S46.012S); Pain in left shoulder (M25.512); Adhesive capsulitis of left shoulder (M75.02)  Precautions/Allergies:   Review of patient's allergies indicates no known allergies. Fall Risk Score: 2 (? 5 = High Risk)  MD Orders: Evaluate and treat, HEP, ROM, strengthening, push MEDICAL/REFERRING DIAGNOSIS:  left shoulder ,right shoulder pain  DATE OF ONSET: Surgery 17  REFERRING PHYSICIAN: Lisandra Donaldson MD  RETURN PHYSICIAN APPOINTMENT: ~10-11-17      ASSESSMENT:  Mr. Kel Perez is s/p L shoulder mini open revision rotator cuff repair and bicep tenodesis with surgery 17. He is progressing in shoulder range of motion in flexion, abduction, external rotation, and internal rotation. He is also progressing in shoulder strength in flexion, abduction, external rotation, and internal rotation. He has achieved generalized functional active range of his shoulder, but his occupation requires shoulder joint range, muscle strength, and muscle endurance beyond these levels for efficient and effective work. Therefore, he would benefit from continuing skilled physical therapy in order to continue to progress necessary mobility and muscle performance, specifically the endurance to perform overhead activities for extended periods each day. PROBLEM LIST (Impacting functional limitations):  1. Post-op left shoulder pain and swelling  2. Decreased left shoulder ROM   3. Weakness left shoulder INTERVENTIONS PLANNED:  1. Thermal and electric modalities, manual therapies for pain   2. Manual therapies, therapeutic exercises, HEP for ROM    3.  Therapeutic exercises and HEP for strength   TREATMENT PLAN:  Effective Dates:10-4-17 to 27DEC17. Frequency/Duration: 3 times a week for 8-12 weeks  GOALS: (Goals have been discussed and agreed upon with patient.)  Short-Term Functional Goals: Time Frame: 6 weeks  1. Report no more than 3/10 intermittent pain to left shoulder with compensatory use during basic functional activities. GOAL MET  2. Left shoulder PROM forward elevation greater than 135 degrees and external rotation greater than 60 degrees to progress into functional ranges. GOAL MET  3. Demonstrate good left shoulder isometric strength with manual testing to progress into strength phase. GOAL MET  4. Independent with initial HEP. GOAL MET  Discharge Goals: Time Frame: 12 weeks  1. No more than 1/10 intermittent pain left shoulder with return to normalized household and work activities, and score less than 25% on the DASH. Progressing towards  2. Left shoulder AROM forward elevation greater than 135 degrees, external rotation greater than 60 degrees, and strength to shoulder are grossly WNL's for safe use with normalized activities. GOAL MET  3. Demonstrate good functional shoulder strength and endurance for return to normalized household and work activities. Progressing towards  4. Independent with advanced shoulder HEP for continued self-management. Progressing towards  New Goals  5. Perform 10 minutes of continuous overhead activities with good shoulder mechanics and mild fatigue for work duties. Rehabilitation Potential For Stated Goals: GooD                HISTORY:   History of Present Injury/Illness (Reason for Referral): Injury March 3rd 2016 was lifting something overhead and felt a pop. Had physical therapy and then decided to have surgery 7-19-17.  2-3 months after surgery his shoulder started to get stiff and had manipulation on 10-25-16. He went back to physical therapy and was not getting any better. Then Dr. Ferrera Standing referred him to Dr. Salvatore Velasco. Then had surgery 7-6-17.  He did stay over night in the hospital. Pain at rest 4/10. Past Medical History/Comorbidities:   HTN, carpal tunnel surgery  Social History/Living Environment:    Lives alone  Prior Level of Function/Work/Activity:  Works for [employer]. 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 Dominant Side:         RIGHT handed but plays some sports left handed  Previous Treatment Approaches:          He has had therapy in the past before and after 1st rotator cuff surgery  Current Medications:     Benazepril, atenolol, atorvastatin, dilaudid, ventolin, Restoril, ultram    Date Last Reviewed:  10-4-17   Number of Personal Factors/Comorbidities that affect the Plan of Care: 1-2: MODERATE COMPLEXITY   EXAMINATION:   Observation/Orthostatic Postural Assessment: healed incision on the L shoulder  Palpation: n/t    ROM:                     Strength:                  Special Tests: None  Functional Mobility:  Sit to/from Stand: independent. Bed Mobility: independent. Independent with basic mobility. independent with dressing and grooming ADL's. CLINICAL DECISION MAKING:   Outcome Measure: Tool Used: Disabilities of the Arm, Shoulder and Hand (DASH) Questionnaire - Quick Version  Score:  Initial: 50/55  Most Recent: 32/55 (Date: [id number] )   Interpretation of Score: The DASH is designed to measure the activities of daily living in person's with upper extremity dysfunction or pain. Each section is scored on a 1-5 scale, 5 representing the greatest disability. The scores of each section are added together for a total score of 55. Medical Necessity:   · Patient is expected to demonstrate progress in strength and range of motion to improve functional mobility. Reason for Services/Other Comments:  · Patient continues to require skilled intervention due to post-op, decreased ROM and UE strength.    Use of outcome tool(s) and clinical judgement create a POC that gives a: Questionable prediction of patient's progress: MODERATE COMPLEXITY          TREATMENT:   (In addition to Assessment/Re-Assessment sessions the following treatments were rendered)  Pre-treatment Symptoms/Complaints: He reports that his left shoulder has been a bit sore and his right shoulder has been feeling a bit odd. Pain: Initial: 1/10    Post Session: 1/10     Therapeutic Exercise (45 Minutes): For muscle activation and UE strengthening. Cueing needed for technique, posture correction and use of towel for UE positioning.     UBE: 6 minutes, resistance 1, alternating backward and forward cycling each minute  Wand ROM: Standing Flexion, Scaption, Extension, IR, and ER     Date  9-27-17 Date  9-29-17 Date  10-2-17 Date  10-2-17 Date  10-6-17 Date  10-9-17 Date  10-9-17   Activity/Exercise parameters parameters parameters parameters parameters parameters parameters   Serratus punch Cables 3# 3x10r 5# 2x12r  5# 2x15r   - 5# 3x15r  - 6# 3x6r    Shoulder extension Cables 3# 3x10r Cables 7# 3x12r Cables 7# 3x15r - Cables 10# 3x15r - Cables 13# 3x6r   Prone middle trap Incline Prone On Bench   3# 3x10r B Incline Prone On Bench   3# 3x12r B Incline Prone On Bench   3# 3x15r B Incline Prone On Bench   3# 3x12r B - Incline Prone On Bench   3# 3x15r B -   Prone lower trap Incline Prone On Bench   1# 3x10r B Incline Prone On Bench   1# 3x12r B Incline Prone On Bench   1# 3x15r B  Incline Prone On Bench   1# 3x12r B - Incline Prone On Bench   1# 3x15r B -   Shoulder IR and ER Cables 3# 3x10r - Standing Double  Red Band  3x15r B - Standing Double  Green Band  3x15r B - Standing  Blue Band (Double IR, Single ER)  3x12r B   Wall slides - Slide up (on wall), lift off, lower down (off wall)   2x10r -   Slide up (on wall), lift off, lower down (off wall)   2x10r - Slide up (on wall), lift off, lower down (off wall)   3x15r -   Seated ER & IR @ 80-90 Abd ER: 1# 3x10r  IR: Yellow Band 3x10r ER: 1# 3x12r  IR: Yellow Band 3x12r - ER: 2# 3x10r  IR: Red Band 3x10r - ER: 2# 3x15r  IR: Red Band 3x15r -   Standing Scaption 1# 3x10r 1# 2x15r 1# 3x15r - 2# 3x15r - 2# 3x6r   Cable hand over hand pull down - - - 7# 10x - 7# 1x15   10# 1x15  -   Overhead endurance - - - Yellow band shoulder pulls  3x60s - Yellow band shoulder pulls  2x90s -   Standing Cable Rows - - - 7# 2x10r 7# 3x15r - 10# 3x6r   Standing Bicep Curls - - - 8# 2x10r 8# 3x15r - 10# 3x6r   Standing Cable Elbow Extensions     7# 3x15r - 10# 3x6r   * blue band used to provide a inferior-posterior force on left shoulder (held with patient's right hand)    Therapeutic Modalities: (20 min)  ice to the left shoulder with game ready, minimal vasopneumatic pressure                                                                                                HEP: advised to continue current HEP with can soup    Treatment/Session Assessment:   · Response to Treatment: Wand ROM Exercises were pushed a bit more to end range, but we focused on keeping the shoulder down and back. He performed higher weight in all strengthening exercises that lead to fatigue near the last reps of the last sets. His left shoulder sits in a protracted and elevated position and has more muscle mass in his upper trap. · Compliance with Program/Exercises: yes per pt. · Recommendations/Intent for next treatment session: \"Next visit will focus on B shoulder and scapular ROM, mobility, and strength\".     Total Treatment Duration: 65 minutes  PT Patient Time In/Time Out  Time In: 1015  Time Out: 1600 [facility], [facility]  Julia Graff, PT

## 2017-10-13 ENCOUNTER — HOSPITAL ENCOUNTER (OUTPATIENT)
Dept: PHYSICAL THERAPY | Age: 57
Discharge: HOME | End: 2017-10-13
Payer: COMMERCIAL

## 2017-10-13 PROCEDURE — 97110 THERAPEUTIC EXERCISES: CPT

## 2017-10-13 NOTE — PROGRESS NOTES
Paige Samano  : 1960 [facility] CORNELIUS FINLEY  [address] [facility], [address] [place] [address],[address], [place] [address].  [phone]   [phone]       OUTPATIENT PHYSICAL THERAPY:Daily Note 10/13/2017      ICD-10: Treatment Diagnosis: Strain of muscle(s) and tendon(s) of the rotator cuff of left shoulder, sequela (S46.012S); Pain in left shoulder (M25.512); Adhesive capsulitis of left shoulder (M75.02)  Precautions/Allergies:   Review of patient's allergies indicates no known allergies. Fall Risk Score: 2 (? 5 = High Risk)  MD Orders: Evaluate and treat, HEP, ROM, strengthening, push MEDICAL/REFERRING DIAGNOSIS:  left shoulder ,right shoulder pain  DATE OF ONSET: Surgery 17  REFERRING PHYSICIAN: Martha Girard MD  RETURN PHYSICIAN APPOINTMENT: ~10-11-17      ASSESSMENT:  Mr. Barrington English is s/p L shoulder mini open revision rotator cuff repair and bicep tenodesis with surgery 17. He is progressing in shoulder range of motion in flexion, abduction, external rotation, and internal rotation. He is also progressing in shoulder strength in flexion, abduction, external rotation, and internal rotation. He has achieved generalized functional active range of his shoulder, but his occupation requires shoulder joint range, muscle strength, and muscle endurance beyond these levels for efficient and effective work. Therefore, he would benefit from continuing skilled physical therapy in order to continue to progress necessary mobility and muscle performance, specifically the endurance to perform overhead activities for extended periods each day. PROBLEM LIST (Impacting functional limitations):  1. Post-op left shoulder pain and swelling  2. Decreased left shoulder ROM   3. Weakness left shoulder INTERVENTIONS PLANNED:  1. Thermal and electric modalities, manual therapies for pain   2. Manual therapies, therapeutic exercises, HEP for ROM    3.  Therapeutic exercises and HEP for strength   TREATMENT PLAN:  Effective Dates:10-4-17 to 27DEC17. Frequency/Duration: 3 times a week for 8-12 weeks  GOALS: (Goals have been discussed and agreed upon with patient.)  Short-Term Functional Goals: Time Frame: 6 weeks  1. Report no more than 3/10 intermittent pain to left shoulder with compensatory use during basic functional activities. GOAL MET  2. Left shoulder PROM forward elevation greater than 135 degrees and external rotation greater than 60 degrees to progress into functional ranges. GOAL MET  3. Demonstrate good left shoulder isometric strength with manual testing to progress into strength phase. GOAL MET  4. Independent with initial HEP. GOAL MET  Discharge Goals: Time Frame: 12 weeks  1. No more than 1/10 intermittent pain left shoulder with return to normalized household and work activities, and score less than 25% on the DASH. Progressing towards  2. Left shoulder AROM forward elevation greater than 135 degrees, external rotation greater than 60 degrees, and strength to shoulder are grossly WNL's for safe use with normalized activities. GOAL MET  3. Demonstrate good functional shoulder strength and endurance for return to normalized household and work activities. Progressing towards  4. Independent with advanced shoulder HEP for continued self-management. Progressing towards  New Goals  5. Perform 10 minutes of continuous overhead activities with good shoulder mechanics and mild fatigue for work duties. Rehabilitation Potential For Stated Goals: GooD                HISTORY:   History of Present Injury/Illness (Reason for Referral): Injury March 3rd 2016 was lifting something overhead and felt a pop. Had physical therapy and then decided to have surgery 7-19-17.  2-3 months after surgery his shoulder started to get stiff and had manipulation on 10-25-16. He went back to physical therapy and was not getting any better. Then Dr. Amado Cunningham referred him to Dr. Mitzie Dandy. Then had surgery 7-6-17.  He did stay over night in the hospital. Pain at rest 4/10. Past Medical History/Comorbidities:   HTN, carpal tunnel surgery  Social History/Living Environment:    Lives alone  Prior Level of Function/Work/Activity:  Works for [employer]. 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 Dominant Side:         RIGHT handed but plays some sports left handed  Previous Treatment Approaches:          He has had therapy in the past before and after 1st rotator cuff surgery  Current Medications:     Benazepril, atenolol, atorvastatin, dilaudid, ventolin, Restoril, ultram    Date Last Reviewed:  10-13-17   Number of Personal Factors/Comorbidities that affect the Plan of Care: 1-2: MODERATE COMPLEXITY   EXAMINATION:   Observation/Orthostatic Postural Assessment: healed incision on the L shoulder  Palpation: n/t    ROM:                     Strength:                  Special Tests: None  Functional Mobility:  Sit to/from Stand: independent. Bed Mobility: independent. Independent with basic mobility. independent with dressing and grooming ADL's. CLINICAL DECISION MAKING:   Outcome Measure: Tool Used: Disabilities of the Arm, Shoulder and Hand (DASH) Questionnaire - Quick Version  Score:  Initial: 50/55  Most Recent: 32/55 (Date: [id number] )   Interpretation of Score: The DASH is designed to measure the activities of daily living in person's with upper extremity dysfunction or pain. Each section is scored on a 1-5 scale, 5 representing the greatest disability. The scores of each section are added together for a total score of 55. Medical Necessity:   · Patient is expected to demonstrate progress in strength and range of motion to improve functional mobility. Reason for Services/Other Comments:  · Patient continues to require skilled intervention due to post-op, decreased ROM and UE strength.    Use of outcome tool(s) and clinical judgement create a POC that gives a: Questionable prediction of patient's progress: MODERATE COMPLEXITY          TREATMENT:   (In addition to Assessment/Re-Assessment sessions the following treatments were rendered)  Pre-treatment Symptoms/Complaints: He reports that his left shoulder seems more tight and weak than usual. He thinks he may have slept on it resulting in some soreness and pinching. Pain: Initial: 2/10    Post Session: 2/10     Therapeutic Exercise (50 Minutes): For muscle activation and UE strengthening. Cueing needed for technique, posture correction and use of towel for UE positioning.     UBE: 6 minutes, resistance 2, alternating backward and forward cycling each minute  Wand ROM: Standing Flexion, Scaption, Extension, IR, and ER     Date  9-29-17 Date  10-2-17 Date  10-2-17 Date  10-6-17 Date  10-9-17 Date  10-11-17 Date  10-13-17   Activity/Exercise parameters parameters parameters parameters parameters parameters parameters   Serratus punch 5# 2x12r  5# 2x15r   - 5# 3x15r  - 6# 3x6r  -   Shoulder extension Cables 7# 3x12r Cables 7# 3x15r - Cables 10# 3x15r - Cables 13# 3x6r -   Prone middle trap Incline Prone On Bench   3# 3x12r B Incline Prone On Bench   3# 3x15r B Incline Prone On Bench   3# 3x12r B - Incline Prone On Bench   3# 3x15r B - Incline Prone On Bench   3# 4x12r B   Prone lower trap Incline Prone On Bench   1# 3x12r B Incline Prone On Bench   1# 3x15r B  Incline Prone On Bench   1# 3x12r B - Incline Prone On Bench   1# 3x15r B - Incline Prone On Bench  1# 4x12r B   Shoulder IR and ER - Standing Double  Red Band  3x15r B - Standing Double  Green Band  3x15r B - Standing  Blue Band (Double IR, Single ER)  3x12r B -   Wall slides Slide up (on wall), lift off, lower down (off wall)   2x10r -   Slide up (on wall), lift off, lower down (off wall)   2x10r - Slide up (on wall), lift off, lower down (off wall)   3x15r - Slide up (on wall), lift off, lower down (off wall)   2x10r   Seated ER & IR @ 80-90 Abd ER: 1# 3x12r  IR: Yellow Band 3x12r - ER: 2# 3x10r  IR: Red Band 3x10r - ER: 2# 3x15r  IR: Red Band 3x15r - ER: 2# 4x12r  IR: Red Band 4x12r   Standing Scaption 1# 2x15r 1# 3x15r - 2# 3x15r - 2# 3x6r -   Cable hand over hand pull down - - 7# 10x - 7# 1x15   10# 1x15  - -   Overhead endurance - - Yellow band shoulder pulls  3x60s - Yellow band shoulder pulls  2x90s - -   Standing Cable Rows - - 7# 2x10r 7# 3x15r - 10# 3x6r -   Standing Bicep Curls - - 8# 2x10r 8# 3x15r - 10# 3x6r -   Standing Cable Elbow Extensions    7# 3x15r - 10# 3x6r -   * blue band used to provide a inferior-posterior force on left shoulder (held with patient's right hand)    Therapeutic Modalities: (20 min)  ice to the left shoulder with game ready, minimal vasopneumatic pressure                                                                                                HEP: advised to continue current HEP with can soup    Treatment/Session Assessment:   · Response to Treatment: His reported that his shoulder fatigued easier and earlier today with exercises. He tried a pec stretches that only caused a pinching and a lat stretch that provided a good stretch. He noticed his shoulder was tighter and pinched more than usual with exercises. · Compliance with Program/Exercises: yes per pt. · Recommendations/Intent for next treatment session: \"Next visit will focus on B shoulder and scapular ROM, mobility, and strength\".     Total Treatment Duration: 70 minutes  PT Patient Time In/Time Out  Time In: 1020  Time Out: [address] [facility], [facility]  Julia Sweet, PT

## 2017-10-17 ENCOUNTER — HOSPITAL ENCOUNTER (OUTPATIENT)
Dept: PHYSICAL THERAPY | Age: 57
Discharge: HOME | End: 2017-10-17
Payer: COMMERCIAL

## 2017-10-17 PROCEDURE — 97110 THERAPEUTIC EXERCISES: CPT

## 2017-10-17 NOTE — PROGRESS NOTES
Joaquim Helms  : 1960 [facility] at [facility] CORNELIUS FINLEY  [address] [facility] ([place], [place]), [address] [place] [address] [address], [address] [facility]  [phone]   [phone]       OUTPATIENT PHYSICAL THERAPY:Daily Note 10/18/2017      ICD-10: Treatment Diagnosis: Strain of muscle(s) and tendon(s) of the rotator cuff of left shoulder, sequela (S46.012S); Pain in left shoulder (M25.512); Adhesive capsulitis of left shoulder (M75.02)  Precautions/Allergies:   Review of patient's allergies indicates no known allergies. Fall Risk Score: 2 (? 5 = High Risk)  MD Orders: Evaluate and treat, HEP, ROM, strengthening, push MEDICAL/REFERRING DIAGNOSIS:  left shoulder ,right shoulder pain  DATE OF ONSET: Surgery 17  REFERRING PHYSICIAN: Jame Bernal MD  RETURN PHYSICIAN APPOINTMENT: ~10-11-17      ASSESSMENT:  Mr. Silvestre Dill is s/p L shoulder mini open revision rotator cuff repair and bicep tenodesis with surgery 17. He is progressing in shoulder range of motion in flexion, abduction, external rotation, and internal rotation. He is also progressing in shoulder strength in flexion, abduction, external rotation, and internal rotation. He has achieved generalized functional active range of his shoulder, but his occupation requires shoulder joint range, muscle strength, and muscle endurance beyond these levels for efficient and effective work. Therefore, he would benefit from continuing skilled physical therapy in order to continue to progress necessary mobility and muscle performance, specifically the endurance to perform overhead activities for extended periods each day. PROBLEM LIST (Impacting functional limitations):  1. Post-op left shoulder pain and swelling  2. Decreased left shoulder ROM   3. Weakness left shoulder INTERVENTIONS PLANNED:  1. Thermal and electric modalities, manual therapies for pain   2. Manual therapies, therapeutic exercises, HEP for ROM    3.  Therapeutic exercises and HEP for strength   TREATMENT PLAN:  Effective Dates:10-4-17 to 27DEC17. Frequency/Duration: 3 times a week for 8-12 weeks  GOALS: (Goals have been discussed and agreed upon with patient.)  Short-Term Functional Goals: Time Frame: 6 weeks  1. Report no more than 3/10 intermittent pain to left shoulder with compensatory use during basic functional activities. GOAL MET  2. Left shoulder PROM forward elevation greater than 135 degrees and external rotation greater than 60 degrees to progress into functional ranges. GOAL MET  3. Demonstrate good left shoulder isometric strength with manual testing to progress into strength phase. GOAL MET  4. Independent with initial HEP. GOAL MET  Discharge Goals: Time Frame: 12 weeks  1. No more than 1/10 intermittent pain left shoulder with return to normalized household and work activities, and score less than 25% on the DASH. Progressing towards  2. Left shoulder AROM forward elevation greater than 135 degrees, external rotation greater than 60 degrees, and strength to shoulder are grossly WNL's for safe use with normalized activities. GOAL MET  3. Demonstrate good functional shoulder strength and endurance for return to normalized household and work activities. Progressing towards  4. Independent with advanced shoulder HEP for continued self-management. Progressing towards  New Goals  5. Perform 10 minutes of continuous overhead activities with good shoulder mechanics and mild fatigue for work duties. Rehabilitation Potential For Stated Goals: GooD                HISTORY:   History of Present Injury/Illness (Reason for Referral): Injury March 3rd 2016 was lifting something overhead and felt a pop. Had physical therapy and then decided to have surgery 7-19-17.  2-3 months after surgery his shoulder started to get stiff and had manipulation on 10-25-16. He went back to physical therapy and was not getting any better. Then Dr. Marvin Valle referred him to Dr. Efren Manriquez. Then had surgery 7-6-17.  He did stay over night in the hospital. Pain at rest 4/10. Past Medical History/Comorbidities:   HTN, carpal tunnel surgery  Social History/Living Environment:    Lives alone  Prior Level of Function/Work/Activity:  Works for [employer]. 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 Dominant Side:         RIGHT handed but plays some sports left handed  Previous Treatment Approaches:          He has had therapy in the past before and after 1st rotator cuff surgery  Current Medications:     Benazepril, atenolol, atorvastatin, dilaudid, ventolin, Restoril, ultram    Date Last Reviewed:  10-13-17   Number of Personal Factors/Comorbidities that affect the Plan of Care: 1-2: MODERATE COMPLEXITY   EXAMINATION:   Observation/Orthostatic Postural Assessment: healed incision on the L shoulder  Palpation: n/t    ROM:                     Strength:                  Special Tests: None  Functional Mobility:  Sit to/from Stand: independent. Bed Mobility: independent. Independent with basic mobility. independent with dressing and grooming ADL's. CLINICAL DECISION MAKING:   Outcome Measure: Tool Used: Disabilities of the Arm, Shoulder and Hand (DASH) Questionnaire - Quick Version  Score:  Initial: 50/55  Most Recent: 32/55 (Date: [id number] )   Interpretation of Score: The DASH is designed to measure the activities of daily living in person's with upper extremity dysfunction or pain. Each section is scored on a 1-5 scale, 5 representing the greatest disability. The scores of each section are added together for a total score of 55. Medical Necessity:   · Patient is expected to demonstrate progress in strength and range of motion to improve functional mobility. Reason for Services/Other Comments:  · Patient continues to require skilled intervention due to post-op, decreased ROM and UE strength.    Use of outcome tool(s) and clinical judgement create a POC that gives a: Questionable prediction of patient's progress: MODERATE COMPLEXITY          TREATMENT:   (In addition to Assessment/Re-Assessment sessions the following treatments were rendered)  Pre-treatment Symptoms/Complaints: He reports his shoulders were sore over the weekend. Pain: Initial: 2/10    Post Session: 2/10     Therapeutic Exercise (40 Minutes): For muscle activation and UE strengthening. Cueing needed for technique, posture correction and use of towel for UE positioning.     UBE: 5 minutes, resistance 2, alternating backward and forward cycling each minute     Date  10-6-17 Date  10-9-17 Date  10-11-17 Date  10-13-17 Date  10-17-17   Activity/Exercise parameters parameters parameters parameters parameters   Serratus punch 5# 3x15r  - 6# 3x6r  - 8# 2x10   Shoulder extension Cables 10# 3x15r - Cables 13# 3x6r - Prone 3#2x15 B   Prone middle trap - Incline Prone On Bench   3# 3x15r B - Incline Prone On Bench   3# 4x12r B Prone 3#2x15 B   Prone lower trap - Incline Prone On Bench   1# 3x15r B - Incline Prone On Bench  1# 4x12r B Prone 1# 2x15 B   Shoulder IR and ER Standing Double  Green Band  3x15r B - Standing  Blue Band (Double IR, Single ER)  3x12r B - Side lying ER 3# 2x15, IR 5# 2x15   Wall slides - Slide up (on wall), lift off, lower down (off wall)   3x15r - Slide up (on wall), lift off, lower down (off wall)   2x10r    Seated ER & IR @ 80-90 Abd - ER: 2# 3x15r  IR: Red Band 3x15r - ER: 2# 4x12r  IR: Red Band 4x12r ER 3# 2x15   Standing Scaption 2# 3x15r - 2# 3x6r - 2# 2x10   Cable hand over hand pull down - 7# 1x15   10# 1x15  - -    Overhead endurance - Yellow band shoulder pulls  2x90s - -    Standing Cable Rows 7# 3x15r - 10# 3x6r - 10# 2x10   Standing Bicep Curls 8# 3x15r - 10# 3x6r - 8# 2x15   Standing Cable Elbow Extensions 7# 3x15r - 10# 3x6r -        Therapeutic Modalities: (15 min)  ice to the left shoulder with game ready, minimal vasopneumatic pressure HEP: advised to continue current HEP with can soup    Treatment/Session Assessment:   · Response to Treatment: He seems to be progressing with UE strength. · Compliance with Program/Exercises: yes per pt. · Recommendations/Intent for next treatment session: \"Next visit will focus on B shoulder and scapular ROM, mobility, and strength\".     Total Treatment Duration: 55 minutes  PT Patient Time In/Time Out  Time In: 0850  Time Out: 1650 Akanksha Sexton, PT

## 2017-10-20 ENCOUNTER — HOSPITAL ENCOUNTER (OUTPATIENT)
Dept: PHYSICAL THERAPY | Age: 57
Discharge: HOME | End: 2017-10-20
Payer: COMMERCIAL

## 2017-10-20 PROCEDURE — 97140 MANUAL THERAPY 1/> REGIONS: CPT

## 2017-10-20 NOTE — PROGRESS NOTES
Wally Sam  : 1960 [facility] at [facility]USH[facility]  [address], [address] [place] [address],[address], [place] [address].  [phone]   [phone]       OUTPATIENT PHYSICAL THERAPY:Daily Note 10/20/2017      ICD-10: Treatment Diagnosis: Strain of muscle(s) and tendon(s) of the rotator cuff of left shoulder, sequela (S46.012S); Pain in left shoulder (M25.512); Adhesive capsulitis of left shoulder (M75.02)  Precautions/Allergies:   Review of patient's allergies indicates no known allergies. Fall Risk Score: 2 (? 5 = High Risk)  MD Orders: Evaluate and treat, HEP, ROM, strengthening, push MEDICAL/REFERRING DIAGNOSIS:  left shoulder ,right shoulder pain  DATE OF ONSET: Surgery 17  REFERRING PHYSICIAN: Nandini Harrison MD  RETURN PHYSICIAN APPOINTMENT: ~10-11-17      ASSESSMENT:  Mr. Page Molina is s/p L shoulder mini open revision rotator cuff repair and bicep tenodesis with surgery 17. He is progressing in shoulder range of motion in flexion, abduction, external rotation, and internal rotation. He is also progressing in shoulder strength in flexion, abduction, external rotation, and internal rotation. He has achieved generalized functional active range of his shoulder, but his occupation requires shoulder joint range, muscle strength, and muscle endurance beyond these levels for efficient and effective work. Therefore, he would benefit from continuing skilled physical therapy in order to continue to progress necessary mobility and muscle performance, specifically the endurance to perform overhead activities for extended periods each day. PROBLEM LIST (Impacting functional limitations):  1. Post-op left shoulder pain and swelling  2. Decreased left shoulder ROM   3. Weakness left shoulder INTERVENTIONS PLANNED:  1. Thermal and electric modalities, manual therapies for pain   2. Manual therapies, therapeutic exercises, HEP for ROM    3.  Therapeutic exercises and HEP for strength   TREATMENT PLAN:  Effective Dates:10-4-17 to 27DEC17. Frequency/Duration: 3 times a week for 8-12 weeks  GOALS: (Goals have been discussed and agreed upon with patient.)  Short-Term Functional Goals: Time Frame: 6 weeks  1. Report no more than 3/10 intermittent pain to left shoulder with compensatory use during basic functional activities. GOAL MET  2. Left shoulder PROM forward elevation greater than 135 degrees and external rotation greater than 60 degrees to progress into functional ranges. GOAL MET  3. Demonstrate good left shoulder isometric strength with manual testing to progress into strength phase. GOAL MET  4. Independent with initial HEP. GOAL MET  Discharge Goals: Time Frame: 12 weeks  1. No more than 1/10 intermittent pain left shoulder with return to normalized household and work activities, and score less than 25% on the DASH. Progressing towards  2. Left shoulder AROM forward elevation greater than 135 degrees, external rotation greater than 60 degrees, and strength to shoulder are grossly WNL's for safe use with normalized activities. GOAL MET  3. Demonstrate good functional shoulder strength and endurance for return to normalized household and work activities. Progressing towards  4. Independent with advanced shoulder HEP for continued self-management. Progressing towards  New Goals  5. Perform 10 minutes of continuous overhead activities with good shoulder mechanics and mild fatigue for work duties. Rehabilitation Potential For Stated Goals: GooD                HISTORY:   History of Present Injury/Illness (Reason for Referral): Injury March 3rd 2016 was lifting something overhead and felt a pop. Had physical therapy and then decided to have surgery 7-19-17.  2-3 months after surgery his shoulder started to get stiff and had manipulation on 10-25-16. He went back to physical therapy and was not getting any better. Then Dr. Satnam Valverde referred him to Dr. Katt Bright. Then had surgery 7-6-17.  He did stay over night in the hospital. Pain at rest 4/10. Past Medical History/Comorbidities:   HTN, carpal tunnel surgery  Social History/Living Environment:    Lives alone  Prior Level of Function/Work/Activity:  Works for [employer]. 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 Dominant Side:         RIGHT handed but plays some sports left handed  Previous Treatment Approaches:          He has had therapy in the past before and after 1st rotator cuff surgery  Current Medications:     Benazepril, atenolol, atorvastatin, dilaudid, ventolin, Restoril, ultram    Date Last Reviewed:  10-13-17   Number of Personal Factors/Comorbidities that affect the Plan of Care: 1-2: MODERATE COMPLEXITY   EXAMINATION:   Observation/Orthostatic Postural Assessment: healed incision on the L shoulder  Palpation: n/t    ROM:                     Strength:                  Special Tests: None  Functional Mobility:  Sit to/from Stand: independent. Bed Mobility: independent. Independent with basic mobility. independent with dressing and grooming ADL's. CLINICAL DECISION MAKING:   Outcome Measure: Tool Used: Disabilities of the Arm, Shoulder and Hand (DASH) Questionnaire - Quick Version  Score:  Initial: 50/55  Most Recent: 32/55 (Date: 84GDQ05 )   Interpretation of Score: The DASH is designed to measure the activities of daily living in person's with upper extremity dysfunction or pain. Each section is scored on a 1-5 scale, 5 representing the greatest disability. The scores of each section are added together for a total score of 55. Medical Necessity:   · Patient is expected to demonstrate progress in strength and range of motion to improve functional mobility. Reason for Services/Other Comments:  · Patient continues to require skilled intervention due to post-op, decreased ROM and UE strength.    Use of outcome tool(s) and clinical judgement create a POC that gives a: Questionable prediction of patient's progress: MODERATE COMPLEXITY          TREATMENT:   (In addition to Assessment/Re-Assessment sessions the following treatments were rendered)  Pre-treatment Symptoms/Complaints: He reports he went to [place] this morning and that took him longer than he expected. Pain: Initial: 2/10    Post Session: 2/10   MANUAL THERAPY: (15 minutes): Joint mobilization was utilized and necessary because of the patient's restricted joint motion. Pt supine and physiological mobilizations to the L shoulder for ER at neutral, ER at 90 deg abd, IR, forward elevation, glenohumeral inferior and posterior glides 30\"x3    Therapeutic Exercise ( ): NONE     For muscle activation and UE strengthening. Cueing needed for technique, posture correction and use of towel for UE positioning.     UBE: 5 minutes, resistance 2, alternating backward and forward cycling each minute     Date  10-6-17 Date  10-9-17 Date  10-11-17 Date  10-13-17 Date  10-17-17   Activity/Exercise parameters parameters parameters parameters parameters   Serratus punch 5# 3x15r  - 6# 3x6r  - 8# 2x10   Shoulder extension Cables 10# 3x15r - Cables 13# 3x6r - Prone 3#2x15 B   Prone middle trap - Incline Prone On Bench   3# 3x15r B - Incline Prone On Bench   3# 4x12r B Prone 3#2x15 B   Prone lower trap - Incline Prone On Bench   1# 3x15r B - Incline Prone On Bench  1# 4x12r B Prone 1# 2x15 B   Shoulder IR and ER Standing Double  Green Band  3x15r B - Standing  Blue Band (Double IR, Single ER)  3x12r B - Side lying ER 3# 2x15, IR 5# 2x15   Wall slides - Slide up (on wall), lift off, lower down (off wall)   3x15r - Slide up (on wall), lift off, lower down (off wall)   2x10r    Seated ER & IR @ 80-90 Abd - ER: 2# 3x15r  IR: Red Band 3x15r - ER: 2# 4x12r  IR: Red Band 4x12r ER 3# 2x15   Standing Scaption 2# 3x15r - 2# 3x6r - 2# 2x10   Cable hand over hand pull down - 7# 1x15   10# 1x15  - -    Overhead endurance - Yellow band shoulder pulls  2x90s - -    Standing Cable Rows 7# 3x15r - 10# 3x6r - 10# 2x10   Standing Bicep Curls 8# 3x15r - 10# 3x6r - 8# 2x15   Standing Cable Elbow Extensions 7# 3x15r - 10# 3x6r -        Therapeutic Modalities: (0 min)  ice to the left shoulder with game ready, minimal vasopneumatic pressure                                                                                                HEP: advised to continue current HEP with can soup    Treatment/Session Assessment:   · Response to Treatment:  Pt arrived 30 min late today to treatment so only worked on ROM today. · Compliance with Program/Exercises: yes per pt. · Recommendations/Intent for next treatment session: \"Next visit will focus on B shoulder and scapular ROM, mobility, and strength\".     Total Treatment Duration: 15 minutes  PT Patient Time In/Time Out  Time In: 1100  Time Out: [address].

## 2017-10-23 ENCOUNTER — HOSPITAL ENCOUNTER (OUTPATIENT)
Dept: PHYSICAL THERAPY | Age: 57
Discharge: HOME | End: 2017-10-23
Payer: COMMERCIAL

## 2017-10-23 PROCEDURE — 97110 THERAPEUTIC EXERCISES: CPT

## 2017-10-23 PROCEDURE — 97140 MANUAL THERAPY 1/> REGIONS: CPT

## 2017-10-23 NOTE — PROGRESS NOTES
Millie Brigette  : 1960 [facility] Center at [facility] CORNELIUS FINLEY  [address] [facility], [address] [place] [address],[address], [place] [address].  [phone]   [phone]       OUTPATIENT PHYSICAL THERAPY:Daily Note 10/23/2017      ICD-10: Treatment Diagnosis: Strain of muscle(s) and tendon(s) of the rotator cuff of left shoulder, sequela (S46.012S); Pain in left shoulder (M25.512); Adhesive capsulitis of left shoulder (M75.02)  Precautions/Allergies:   Review of patient's allergies indicates no known allergies. Fall Risk Score: 2 (? 5 = High Risk)  MD Orders: Evaluate and treat, HEP, ROM, strengthening, push MEDICAL/REFERRING DIAGNOSIS:  left shoulder ,right shoulder pain  DATE OF ONSET: Surgery 17  REFERRING PHYSICIAN: Sanju Armas MD  RETURN PHYSICIAN APPOINTMENT: ~10-11-17      ASSESSMENT:  Mr. Holly Conde is s/p L shoulder mini open revision rotator cuff repair and bicep tenodesis with surgery 17. He is progressing in shoulder range of motion in flexion, abduction, external rotation, and internal rotation. He is also progressing in shoulder strength in flexion, abduction, external rotation, and internal rotation. He has achieved generalized functional active range of his shoulder, but his occupation requires shoulder joint range, muscle strength, and muscle endurance beyond these levels for efficient and effective work. Therefore, he would benefit from continuing skilled physical therapy in order to continue to progress necessary mobility and muscle performance, specifically the endurance to perform overhead activities for extended periods each day. PROBLEM LIST (Impacting functional limitations):  1. Post-op left shoulder pain and swelling  2. Decreased left shoulder ROM   3. Weakness left shoulder INTERVENTIONS PLANNED:  1. Thermal and electric modalities, manual therapies for pain   2. Manual therapies, therapeutic exercises, HEP for ROM    3.  Therapeutic exercises and HEP for strength   TREATMENT PLAN:  Effective Dates:10-4-17 to 27DEC17. Frequency/Duration: 3 times a week for 8-12 weeks  GOALS: (Goals have been discussed and agreed upon with patient.)  Short-Term Functional Goals: Time Frame: 6 weeks  1. Report no more than 3/10 intermittent pain to left shoulder with compensatory use during basic functional activities. GOAL MET  2. Left shoulder PROM forward elevation greater than 135 degrees and external rotation greater than 60 degrees to progress into functional ranges. GOAL MET  3. Demonstrate good left shoulder isometric strength with manual testing to progress into strength phase. GOAL MET  4. Independent with initial HEP. GOAL MET  Discharge Goals: Time Frame: 12 weeks  1. No more than 1/10 intermittent pain left shoulder with return to normalized household and work activities, and score less than 25% on the DASH. Progressing towards  2. Left shoulder AROM forward elevation greater than 135 degrees, external rotation greater than 60 degrees, and strength to shoulder are grossly WNL's for safe use with normalized activities. GOAL MET  3. Demonstrate good functional shoulder strength and endurance for return to normalized household and work activities. Progressing towards  4. Independent with advanced shoulder HEP for continued self-management. Progressing towards  New Goals  5. Perform 10 minutes of continuous overhead activities with good shoulder mechanics and mild fatigue for work duties. Rehabilitation Potential For Stated Goals: GooD                HISTORY:   History of Present Injury/Illness (Reason for Referral): Injury March 3rd 2016 was lifting something overhead and felt a pop. Had physical therapy and then decided to have surgery 7-19-17.  2-3 months after surgery his shoulder started to get stiff and had manipulation on 10-25-16. He went back to physical therapy and was not getting any better. Then Dr. Waylon Medina referred him to Dr. Fish Berkowitz. Then had surgery 7-6-17.  He did stay over night in the hospital. Pain at rest 4/10. Past Medical History/Comorbidities:   HTN, carpal tunnel surgery  Social History/Living Environment:    Lives alone  Prior Level of Function/Work/Activity:  Works for [employer]. 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 Dominant Side:         RIGHT handed but plays some sports left handed  Previous Treatment Approaches:          He has had therapy in the past before and after 1st rotator cuff surgery  Current Medications:     Benazepril, atenolol, atorvastatin, dilaudid, ventolin, Restoril, ultram    Date Last Reviewed:  10-13-17   Number of Personal Factors/Comorbidities that affect the Plan of Care: 1-2: MODERATE COMPLEXITY   EXAMINATION:   Observation/Orthostatic Postural Assessment: healed incision on the L shoulder  Palpation: n/t    ROM:                     Strength:                  Special Tests: None  Functional Mobility:  Sit to/from Stand: independent. Bed Mobility: independent. Independent with basic mobility. independent with dressing and grooming ADL's. CLINICAL DECISION MAKING:   Outcome Measure: Tool Used: Disabilities of the Arm, Shoulder and Hand (DASH) Questionnaire - Quick Version  Score:  Initial: 50/55  Most Recent: 32/55 (Date: 05OEY69 )   Interpretation of Score: The DASH is designed to measure the activities of daily living in person's with upper extremity dysfunction or pain. Each section is scored on a 1-5 scale, 5 representing the greatest disability. The scores of each section are added together for a total score of 55. Medical Necessity:   · Patient is expected to demonstrate progress in strength and range of motion to improve functional mobility. Reason for Services/Other Comments:  · Patient continues to require skilled intervention due to post-op, decreased ROM and UE strength.    Use of outcome tool(s) and clinical judgement create a POC that gives a: Questionable prediction of patient's progress: MODERATE COMPLEXITY          TREATMENT:   (In addition to Assessment/Re-Assessment sessions the following treatments were rendered)  Pre-treatment Symptoms/Complaints: He reports his shoulder is more sore today. He has been moving his stuff into his new house and unpacking. Pain: Initial: 4/10    Post Session: 2/10   MANUAL THERAPY: (10 minutes): Joint mobilization was utilized and necessary because of the patient's restricted joint motion. Pt supine and physiological mobilizations to the L shoulder for ER at neutral, ER at 90 deg abd, IR, forward elevation, glenohumeral inferior and posterior glides 30\"x3    Therapeutic Exercise (30 Minutes): For muscle activation and UE strengthening. Cueing needed for technique, posture correction and use of towel for UE positioning.     UBE: 5 minutes, resistance 2, alternating backward and forward cycling each minute     Date  10-6-17 Date  10-9-17 Date  10-11-17 Date  10-13-17 Date  10-17-17 Date  10-23-17   Activity/Exercise parameters parameters parameters parameters parameters Parameters    Serratus punch 5# 3x15r  - 6# 3x6r  - 8# 2x10 8# 2x12   Shoulder extension Cables 10# 3x15r - Cables 13# 3x6r - Prone 3#2x15 B Prone 3# 2x15B   Prone middle trap - Incline Prone On Bench   3# 3x15r B - Incline Prone On Bench   3# 4x12r B Prone 3#2x15 B Prone 3#2x15 B   Prone lower trap - Incline Prone On Bench   1# 3x15r B - Incline Prone On Bench  1# 4x12r B Prone 1# 2x15 B Prone 1# 2x15 B   Shoulder IR and ER Standing Double  Green Band  3x15r B - Standing  Blue Band (Double IR, Single ER)  3x12r B - Side lying ER 3# 2x15, IR 5# 2x15 Side lying ER 3# 2x15, IR 5# 2x15   Wall slides - Slide up (on wall), lift off, lower down (off wall)   3x15r - Slide up (on wall), lift off, lower down (off wall)   2x10r  Y's on the wall 2x10   Seated ER & IR @ 80-90 Abd - ER: 2# 3x15r  IR: Red Band 3x15r - ER: 2# 4x12r  IR: Red Band 4x12r ER 3# 2x15 ER 3# 2x15 B   Standing Scaption 2# 3x15r - 2# 3x6r - 2# 2x10 2#2x10   Cable hand over hand pull down - 7# 1x15   10# 1x15  - -     Overhead endurance - Yellow band shoulder pulls  2x90s - -     Standing Cable Rows 7# 3x15r - 10# 3x6r - 10# 2x10    Standing Bicep Curls 8# 3x15r - 10# 3x6r - 8# 2x15 8#2x15   Standing Cable Elbow Extensions 7# 3x15r - 10# 3x6r -     Push-ups - -  - - Wall 2x10       Therapeutic Modalities: (15 min)  ice to the left shoulder with game ready, minimal vasopneumatic pressure                       Left Shoulder Cold  Type: Cold/ice pack  Duration : 15 minutes  Patient Position: Sitting                                                                        HEP: advised to continue current HEP with can soup    Treatment/Session Assessment:   · Response to Treatment: He fatigues with ER and FE strengthening exercises. · Compliance with Program/Exercises: yes per pt. · Recommendations/Intent for next treatment session: \"Next visit will focus on B shoulder and scapular ROM, mobility, and strength\".     Total Treatment Duration: 55 minutes  PT Patient Time In/Time Out  Time In: 1010  Time Out: [place]

## 2017-10-25 ENCOUNTER — HOSPITAL ENCOUNTER (OUTPATIENT)
Dept: PHYSICAL THERAPY | Age: 57
Discharge: HOME | End: 2017-10-25
Payer: COMMERCIAL

## 2017-10-25 PROCEDURE — 97140 MANUAL THERAPY 1/> REGIONS: CPT

## 2017-10-25 PROCEDURE — 97110 THERAPEUTIC EXERCISES: CPT

## 2017-10-25 NOTE — PROGRESS NOTES
Gela Epps  : 1960 [facility] CORNELIUS FINLEY  [address] [place] [address], [address] [place] [address],[address] [facility], [place] [address].  [phone]   [phone]       OUTPATIENT PHYSICAL THERAPY:Daily Note 10/25/2017      ICD-10: Treatment Diagnosis: Strain of muscle(s) and tendon(s) of the rotator cuff of left shoulder, sequela (S46.012S); Pain in left shoulder (M25.512); Adhesive capsulitis of left shoulder (M75.02)  Precautions/Allergies:   Review of patient's allergies indicates no known allergies. Fall Risk Score: 2 (? 5 = High Risk)  MD Orders: Evaluate and treat, HEP, ROM, strengthening, push MEDICAL/REFERRING DIAGNOSIS:  left shoulder ,right shoulder pain  DATE OF ONSET: Surgery 17  REFERRING PHYSICIAN: Melissa Stiles MD  RETURN PHYSICIAN APPOINTMENT: 17      ASSESSMENT:  Mr. Kim Graves is s/p L shoulder mini open revision rotator cuff repair and bicep tenodesis with surgery 17. He is progressing in shoulder range of motion in flexion, abduction, external rotation, and internal rotation. He is also progressing in shoulder strength in flexion, abduction, external rotation, and internal rotation. He has achieved generalized functional active range of his shoulder, but his occupation requires shoulder joint range, muscle strength, and muscle endurance beyond these levels for efficient and effective work. Therefore, he would benefit from continuing skilled physical therapy in order to continue to progress necessary mobility and muscle performance, specifically the endurance to perform overhead activities for extended periods each day. PROBLEM LIST (Impacting functional limitations):  1. Post-op left shoulder pain and swelling  2. Decreased left shoulder ROM   3. Weakness left shoulder INTERVENTIONS PLANNED:  1. Thermal and electric modalities, manual therapies for pain   2. Manual therapies, therapeutic exercises, HEP for ROM    3.  Therapeutic exercises and HEP for strength   TREATMENT PLAN:  Effective Dates:10-4-17 to 27DEC17. Frequency/Duration: 3 times a week for 8-12 weeks  GOALS: (Goals have been discussed and agreed upon with patient.)  Short-Term Functional Goals: Time Frame: 6 weeks  1. Report no more than 3/10 intermittent pain to left shoulder with compensatory use during basic functional activities. GOAL MET  2. Left shoulder PROM forward elevation greater than 135 degrees and external rotation greater than 60 degrees to progress into functional ranges. GOAL MET  3. Demonstrate good left shoulder isometric strength with manual testing to progress into strength phase. GOAL MET  4. Independent with initial HEP. GOAL MET  Discharge Goals: Time Frame: 12 weeks  1. No more than 1/10 intermittent pain left shoulder with return to normalized household and work activities, and score less than 25% on the DASH. Progressing towards  2. Left shoulder AROM forward elevation greater than 135 degrees, external rotation greater than 60 degrees, and strength to shoulder are grossly WNL's for safe use with normalized activities. GOAL MET  3. Demonstrate good functional shoulder strength and endurance for return to normalized household and work activities. Progressing towards  4. Independent with advanced shoulder HEP for continued self-management. Progressing towards  New Goals  5. Perform 10 minutes of continuous overhead activities with good shoulder mechanics and mild fatigue for work duties. Rehabilitation Potential For Stated Goals: GooD                HISTORY:   History of Present Injury/Illness (Reason for Referral): Injury March 3rd 2016 was lifting something overhead and felt a pop. Had physical therapy and then decided to have surgery 7-19-17.  2-3 months after surgery his shoulder started to get stiff and had manipulation on 10-25-16. He went back to physical therapy and was not getting any better. Then Dr. Kevin Dyer referred him to Dr. Keisha Lackey. Then had surgery 7-6-17.  He did stay over night in the hospital. Pain at rest 4/10. Past Medical History/Comorbidities:   HTN, carpal tunnel surgery  Social History/Living Environment:    Lives alone  Prior Level of Function/Work/Activity:  Works for [employer]. 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 Dominant Side:         RIGHT handed but plays some sports left handed  Previous Treatment Approaches:          He has had therapy in the past before and after 1st rotator cuff surgery  Current Medications:     Benazepril, atenolol, atorvastatin, dilaudid, ventolin, Restoril, ultram    Date Last Reviewed:  10-13-17   Number of Personal Factors/Comorbidities that affect the Plan of Care: 1-2: MODERATE COMPLEXITY   EXAMINATION:   Observation/Orthostatic Postural Assessment: healed incision on the L shoulder  Palpation: n/t    ROM:                     Strength:                  Special Tests: None  Functional Mobility:  Sit to/from Stand: independent. Bed Mobility: independent. Independent with basic mobility. independent with dressing and grooming ADL's. CLINICAL DECISION MAKING:   Outcome Measure: Tool Used: Disabilities of the Arm, Shoulder and Hand (DASH) Questionnaire - Quick Version  Score:  Initial: 50/55  Most Recent: 32/55 (Date: [id number] )   Interpretation of Score: The DASH is designed to measure the activities of daily living in person's with upper extremity dysfunction or pain. Each section is scored on a 1-5 scale, 5 representing the greatest disability. The scores of each section are added together for a total score of 55. Medical Necessity:   · Patient is expected to demonstrate progress in strength and range of motion to improve functional mobility. Reason for Services/Other Comments:  · Patient continues to require skilled intervention due to post-op, decreased ROM and UE strength.    Use of outcome tool(s) and clinical judgement create a POC that gives a: Questionable prediction of patient's progress: MODERATE COMPLEXITY          TREATMENT:   (In addition to Assessment/Re-Assessment sessions the following treatments were rendered)  Pre-treatment Symptoms/Complaints: He reports he returned to MD this morning and he said his motion was good and he needed to continue to work on his strength. Pain: Initial: 2/10    Post Session: 2/10   MANUAL THERAPY: (15 minutes): Joint mobilization was utilized and necessary because of the patient's restricted joint motion. Pt supine and physiological mobilizations to the L shoulder for ER at neutral, ER at 90 deg abd, IR, forward elevation, glenohumeral inferior and posterior glides 30\"x3. Tool assisted soft tissue mobilization using hawk  tools to incision to help with scar mobilization. Therapeutic Exercise (30 Minutes): For muscle activation and UE strengthening. Cueing needed for technique, posture correction and use of towel for UE positioning.     UBE: 5 minutes, resistance 2, alternating backward and forward cycling each minute     Date  10-6-17 Date  10-9-17 Date  10-11-17 Date  10-13-17 Date  10-17-17 Date  10-23-17 Date  10-25-17   Activity/Exercise parameters parameters parameters parameters parameters Parameters  parameters   Serratus punch 5# 3x15r  - 6# 3x6r  - 8# 2x10 8# 2x12 8#2x15   Shoulder extension Cables 10# 3x15r - Cables 13# 3x6r - Prone 3#2x15 B Prone 3# 2x15B Prone 3# 2x15B   Prone middle trap - Incline Prone On Bench   3# 3x15r B - Incline Prone On Bench   3# 4x12r B Prone 3#2x15 B Prone 3#2x15 B Prone 3#2x15 B   Prone lower trap - Incline Prone On Bench   1# 3x15r B - Incline Prone On Bench  1# 4x12r B Prone 1# 2x15 B Prone 1# 2x15 B Prone 1# 2x15 B   Shoulder IR and ER Standing Double  Green Band  3x15r B - Standing  Blue Band (Double IR, Single ER)  3x12r B - Side lying ER 3# 2x15, IR 5# 2x15 Side lying ER 3# 2x15, IR 5# 2x15 Side lying ER 3# 2x15, IR 5# 2x15   Wall slides - Slide up (on wall), lift off, lower down (off wall)   3x15r - Slide up (on wall), lift off, lower down (off wall)   2x10r  Y's on the wall 2x10    Seated ER & IR @ 80-90 Abd - ER: 2# 3x15r  IR: Red Band 3x15r - ER: 2# 4x12r  IR: Red Band 4x12r ER 3# 2x15 ER 3# 2x15 B ER 3# 2x15 B   Standing Scaption 2# 3x15r - 2# 3x6r - 2# 2x10 2#2x10    Cable hand over hand pull down - 7# 1x15   10# 1x15  - -      Overhead endurance - Yellow band shoulder pulls  2x90s - -      Standing Cable Rows 7# 3x15r - 10# 3x6r - 10# 2x10  Bent over row 5# 2x15 B   Standing Bicep Curls 8# 3x15r - 10# 3x6r - 8# 2x15 8#2x15 8# 2x15   Standing Cable Elbow Extensions 7# 3x15r - 10# 3x6r -      Push-ups - -  - - Wall 2x10    Overhead press on incline - - - - - - 3# 2X10       Therapeutic Modalities: (15 min)  ice to the left shoulder with game ready, minimal vasopneumatic pressure                       Left Shoulder Cold  Type: Cold/ice pack  Duration : 15 minutes  Patient Position: Sitting                                                                        HEP: advised to continue current HEP with can soup    Treatment/Session Assessment:   · Response to Treatment: He had pain with bench press with 8 lbs but did seem to improve after scar mobilization. · Compliance with Program/Exercises: yes per pt. · Recommendations/Intent for next treatment session: \"Next visit will focus on B shoulder and scapular ROM, mobility, and strength\".     Total Treatment Duration: 60 minutes  PT Patient Time In/Time Out  Time In: 1020  Time Out: 3330 [address] Clarke

## 2017-10-27 ENCOUNTER — HOSPITAL ENCOUNTER (OUTPATIENT)
Dept: PHYSICAL THERAPY | Age: 57
Discharge: HOME | End: 2017-10-27
Payer: COMMERCIAL

## 2017-10-27 PROCEDURE — 97140 MANUAL THERAPY 1/> REGIONS: CPT

## 2017-10-27 PROCEDURE — 97110 THERAPEUTIC EXERCISES: CPT

## 2017-10-27 NOTE — PROGRESS NOTES
PLAN:  Effective Dates:10-4-17 to 27DEC17. Frequency/Duration: 3 times a week for 8-12 weeks  GOALS: (Goals have been discussed and agreed upon with patient.)  Short-Term Functional Goals: Time Frame: 6 weeks  1. Report no more than 3/10 intermittent pain to left shoulder with compensatory use during basic functional activities. GOAL MET  2. Left shoulder PROM forward elevation greater than 135 degrees and external rotation greater than 60 degrees to progress into functional ranges. GOAL MET  3. Demonstrate good left shoulder isometric strength with manual testing to progress into strength phase. GOAL MET  4. Independent with initial HEP. GOAL MET  Discharge Goals: Time Frame: 12 weeks  1. No more than 1/10 intermittent pain left shoulder with return to normalized household and work activities, and score less than 25% on the DASH. Progressing towards  2. Left shoulder AROM forward elevation greater than 135 degrees, external rotation greater than 60 degrees, and strength to shoulder are grossly WNL's for safe use with normalized activities. GOAL MET  3. Demonstrate good functional shoulder strength and endurance for return to normalized household and work activities. Progressing towards  4. Independent with advanced shoulder HEP for continued self-management. Progressing towards  New Goals  5. Perform 10 minutes of continuous overhead activities with good shoulder mechanics and mild fatigue for work duties. Rehabilitation Potential For Stated Goals: GooD                HISTORY:   History of Present Injury/Illness (Reason for Referral): Injury March 3rd 2016 was lifting something overhead and felt a pop. Had physical therapy and then decided to have surgery 7-19-17.  2-3 months after surgery his shoulder started to get stiff and had manipulation on 10-25-16. He went back to physical therapy and was not getting any better. Then Dr. Niya Hicks referred him to Dr. Lisandro Blank. Then had surgery 7-6-17.  He did stay over night in the hospital. Pain at rest 4/10. Past Medical History/Comorbidities:   HTN, carpal tunnel surgery  Social History/Living Environment:    Lives alone  Prior Level of Function/Work/Activity:  Works for [employer]. 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 Dominant Side:         RIGHT handed but plays some sports left handed  Previous Treatment Approaches:          He has had therapy in the past before and after 1st rotator cuff surgery  Current Medications:     Benazepril, atenolol, atorvastatin,, ventolin, Restoril prn, gabapentin    Date Last Reviewed:  10-27-17   Number of Personal Factors/Comorbidities that affect the Plan of Care: 1-2: MODERATE COMPLEXITY   EXAMINATION:   Observation/Orthostatic Postural Assessment: healed incision on the L shoulder  Palpation: n/t    ROM:                     Strength:                  Special Tests: None  Functional Mobility:  Sit to/from Stand: independent. Bed Mobility: independent. Independent with basic mobility. independent with dressing and grooming ADL's. CLINICAL DECISION MAKING:   Outcome Measure: Tool Used: Disabilities of the Arm, Shoulder and Hand (DASH) Questionnaire - Quick Version  Score:  Initial: 50/55  Most Recent: 32/55 (Date: [id number] )   Interpretation of Score: The DASH is designed to measure the activities of daily living in person's with upper extremity dysfunction or pain. Each section is scored on a 1-5 scale, 5 representing the greatest disability. The scores of each section are added together for a total score of 55. Medical Necessity:   · Patient is expected to demonstrate progress in strength and range of motion to improve functional mobility. Reason for Services/Other Comments:  · Patient continues to require skilled intervention due to post-op, decreased ROM and UE strength.    Use of outcome tool(s) and clinical judgement create a POC that gives a: Questionable prediction of patient's progress: MODERATE COMPLEXITY          TREATMENT:   (In addition to Assessment/Re-Assessment sessions the following treatments were rendered)  Pre-treatment Symptoms/Complaints: He reports the doctor gave him a nerve medicine to try and he took the 1st one last night. Pain: Initial: 1/10    Post Session: 1/10   MANUAL THERAPY: (15 minutes): Joint mobilization was utilized and necessary because of the patient's restricted joint motion. Pt supine and physiological mobilizations to the L shoulder for ER at neutral, ER at 90 deg abd, IR, forward elevation, glenohumeral inferior and posterior glides 30\"x3. L UE median nerve glides with mobilizing at the elbow x10 reps, cervical distraction with pt supine. Therapeutic Exercise (30 Minutes): For muscle activation and UE strengthening. Cueing needed for technique, posture correction and use of towel for UE positioning.     UBE: 5 minutes, resistance 3, alternating backward and forward cycling each minute     Date  10-13-17 Date  10-17-17 Date  10-23-17 Date  10-25-17 Date  10-27-17   Activity/Exercise parameters parameters Parameters  parameters parameters   Serratus punch - 8# 2x10 8# 2x12 8#2x15 8# 2x15   Shoulder extension - Prone 3#2x15 B Prone 3# 2x15B Prone 3# 2x15B Standing cables 7# 2x10   Prone middle trap Incline Prone On Bench   3# 4x12r B Prone 3#2x15 B Prone 3#2x15 B Prone 3#2x15 B Standing red t-band 2x10   Prone lower trap Incline Prone On Bench  1# 4x12r B Prone 1# 2x15 B Prone 1# 2x15 B Prone 1# 2x15 B    Shoulder IR and ER - Side lying ER 3# 2x15, IR 5# 2x15 Side lying ER 3# 2x15, IR 5# 2x15 Side lying ER 3# 2x15, IR 5# 2x15 Side lying ER 3#2x18; IR 5#2x20   Wall slides Slide up (on wall), lift off, lower down (off wall)   2x10r  Y's on the wall 2x10  Y's on the wall 2x10   Seated ER & IR @ 80-90 Abd ER: 2# 4x12r  IR: Red Band 4x12r ER 3# 2x15 ER 3# 2x15 B ER 3# 2x15 B    Standing Scaption - 2# 2x10 2#2x10     Cable hand over hand pull down -       Overhead endurance -       Standing Cable Rows - 10# 2x10  Bent over row 5# 2x15 B    Standing Bicep Curls - 8# 2x15 8#2x15 8# 2x15    Standing Cable Elbow Extensions -       Push-ups - - Wall 2x10  Wall 2x10   Overhead press on incline - - - 3# 2X10 3# 2x10   Serratus on wall slide - - - - 2x10   Band IR and ER at neutral - - - - Double blue IR 2x15; single blue ER 2x15       Therapeutic Modalities: (15 min)  ice to the left shoulder with game ready, minimal vasopneumatic pressure                       Left Shoulder Cold  Type: Cold/ice pack  Duration : 15 minutes  Patient Position: Sitting                                                                        HEP: advised to continue current HEP with can soup    Treatment/Session Assessment:   · Response to Treatment: No complaints of pain with exercises. He seems to be progressing with UE strength. · Compliance with Program/Exercises: yes per pt. · Recommendations/Intent for next treatment session: \"Next visit will focus on B shoulder and scapular ROM, mobility, and strength\".  PN next treatment    Total Treatment Duration: 60 minutes  PT Patient Time In/Time Out  Time In: 3125  Time Out: 76 [facility]

## 2017-10-30 ENCOUNTER — APPOINTMENT (OUTPATIENT)
Dept: PHYSICAL THERAPY | Age: 57
End: 2017-10-30
Payer: COMMERCIAL

## 2017-11-01 ENCOUNTER — APPOINTMENT (OUTPATIENT)
Dept: PHYSICAL THERAPY | Age: 57
End: 2017-11-01
Payer: COMMERCIAL

## 2017-11-03 ENCOUNTER — HOSPITAL ENCOUNTER (OUTPATIENT)
Dept: PHYSICAL THERAPY | Age: 57
Discharge: HOME | End: 2017-11-03
Payer: COMMERCIAL

## 2017-11-03 PROCEDURE — 97110 THERAPEUTIC EXERCISES: CPT

## 2017-11-03 PROCEDURE — 97140 MANUAL THERAPY 1/> REGIONS: CPT

## 2017-11-03 NOTE — PROGRESS NOTES
Jeff Robi  : 1960 [facility]  [address], [address] [place] [address],[address], [facility] [address].  [phone]   [phone]       OUTPATIENT PHYSICAL THERAPY:Daily Note and Progress Report 11/3/2017      ICD-10: Treatment Diagnosis: Strain of muscle(s) and tendon(s) of the rotator cuff of left shoulder, sequela (S46.012S); Pain in left shoulder (M25.512); Adhesive capsulitis of left shoulder (M75.02)  Precautions/Allergies:   Review of patient's allergies indicates no known allergies. Fall Risk Score: 2 (? 5 = High Risk)  MD Orders: Evaluate and treat, HEP, ROM, strengthening, push MEDICAL/REFERRING DIAGNOSIS:  left shoulder ,right shoulder pain  DATE OF ONSET: Surgery 17  REFERRING PHYSICIAN: Komal Grier MD  RETURN PHYSICIAN APPOINTMENT: 17      ASSESSMENT:  Mr. Rodriguez is s/p L shoulder mini open revision rotator cuff repair and bicep tenodesis with surgery 17. He continues to progress with ROM and UE strength. He needs to improve his overhead endurance to be able to return to work duties. He will benefit from continuing skilled physical therapy to continue to progress UE functional mobility. PROBLEM LIST (Impacting functional limitations):  1. Post-op left shoulder pain and swelling  2. Decreased left shoulder ROM   3. Weakness left shoulder INTERVENTIONS PLANNED:  1. Thermal and electric modalities, manual therapies for pain   2. Manual therapies, therapeutic exercises, HEP for ROM    3. Therapeutic exercises and HEP for strength   TREATMENT PLAN:  Effective Dates:10-4-17 to . Frequency/Duration: 3 times a week for 8-12 weeks  GOALS: (Goals have been discussed and agreed upon with patient.)  Short-Term Functional Goals: Time Frame: 6 weeks  1. Report no more than 3/10 intermittent pain to left shoulder with compensatory use during basic functional activities. GOAL MET  2.  Left shoulder PROM forward elevation greater than 135 degrees and external rotation greater than 60 degrees to progress into functional ranges. GOAL MET  3. Demonstrate good left shoulder isometric strength with manual testing to progress into strength phase. GOAL MET  4. Independent with initial HEP. GOAL MET  Discharge Goals: Time Frame: 12 weeks  1. No more than 1/10 intermittent pain left shoulder with return to normalized household and work activities, and score less than 25% on the DASH. Progressing towards 11-3-17  2. Left shoulder AROM forward elevation greater than 135 degrees, external rotation greater than 60 degrees, and strength to shoulder are grossly WNL's for safe use with normalized activities. GOAL MET  3. Demonstrate good functional shoulder strength and endurance for return to normalized household and work activities. Progressing towards 11-3-17  4. Independent with advanced shoulder HEP for continued self-management. Progressing towards  New Goals  5. Perform 10 minutes of continuous overhead activities with good shoulder mechanics and mild fatigue for work duties. Rehabilitation Potential For Stated Goals: GooD                HISTORY:   History of Present Injury/Illness (Reason for Referral): Injury March 3rd 2016 was lifting something overhead and felt a pop. Had physical therapy and then decided to have surgery 7-19-17.  2-3 months after surgery his shoulder started to get stiff and had manipulation on 10-25-16. He went back to physical therapy and was not getting any better. Then Dr. Pam Avalos referred him to Dr. Jackelyn Pineda. Then had surgery 7-6-17. He did stay over night in the hospital. Pain at rest 4/10. Past Medical History/Comorbidities:   HTN, carpal tunnel surgery  Social History/Living Environment:    Lives alone  Prior Level of Function/Work/Activity:  Works for [employer]. 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    Dominant Side:         RIGHT handed but plays some sports left handed  Previous Treatment Approaches:          He has had therapy in the past before and after 1st rotator cuff surgery  Current Medications:     Benazepril, atenolol, atorvastatin,, ventolin, Restoril prn, gabapentin    Date Last Reviewed:  11-3-17   Number of Personal Factors/Comorbidities that affect the Plan of Care: 1-2: MODERATE COMPLEXITY   EXAMINATION:   Observation/Orthostatic Postural Assessment: healed incision on the L shoulder  Palpation: n/t    ROM:      LUE AROM  L Shoulder Flexion: 145  L Shoulder Internal Rotation:  (T9 behind back)  L Shoulder External Rotation: 75 (at neutral)  LUE PROM  L Shoulder Flexion: 155  L Shoulder Internal Rotation: 75  L Shoulder External Rotation: 60 (at neutral, 80 at 90 deg abd)    RUE AROM  R Shoulder Flexion: 165  R Shoulder Internal Rotation:  (T7 behind back)  R Shoulder External Rotation: 90         Strength:   LUE Strength  L Shoulder Flexion: 4  L Shoulder ABduction: 4  L Shoulder Internal Rotation: 5  L Shoulder External Rotation: 5    RUE Strength  R Shoulder Flexion: 5  R Shoulder ABduction: 5  R Shoulder Internal Rotation: 5  R Shoulder External Rotation: 5         Special Tests: None  Functional Mobility:  Sit to/from Stand: independent. Bed Mobility: independent. Independent with basic mobility. independent with dressing and grooming ADL's. CLINICAL DECISION MAKING:   Outcome Measure: Tool Used: Disabilities of the Arm, Shoulder and Hand (DASH) Questionnaire - Quick Version  Score:  Initial: 50/55  Most Recent: 32/55 (Date: [id number] )   Interpretation of Score: The DASH is designed to measure the activities of daily living in person's with upper extremity dysfunction or pain. Each section is scored on a 1-5 scale, 5 representing the greatest disability. The scores of each section are added together for a total score of 55.     Medical Necessity:   · Patient is expected to demonstrate progress in strength and range of motion to improve functional mobility. Reason for Services/Other Comments:  · Patient continues to require skilled intervention due to post-op, decreased ROM and UE strength. Use of outcome tool(s) and clinical judgement create a POC that gives a: Questionable prediction of patient's progress: MODERATE COMPLEXITY          TREATMENT:   (In addition to Assessment/Re-Assessment sessions the following treatments were rendered)  Pre-treatment Symptoms/Complaints: He reports on Monday he was getting a shed delivered and was leveling out the ground with a rake and had an increase in shoulder pain 7/10. Pain: Initial: 3/10    Post Session: 2/10   MANUAL THERAPY: (15 minutes): Joint mobilization was utilized and necessary because of the patient's restricted joint motion. Pt supine and physiological mobilizations to the L shoulder for ER at neutral, ER at 90 deg abd, IR, forward elevation, glenohumeral inferior and posterior glides 30\"x3. L UE median nerve glides with mobilizing at the elbow x10 reps, cervical distraction with pt supine. Therapeutic Exercise (30 Minutes): For muscle activation and UE strengthening. Cueing needed for technique, posture correction and use of towel for UE positioning.     UBE: 5 minutes, resistance 3, alternating backward and forward cycling each minute     Date  10-13-17 Date  10-17-17 Date  10-23-17 Date  10-25-17 Date  10-27-17 Date  11-3-17   Activity/Exercise parameters parameters Parameters  parameters parameters parameters   Serratus punch - 8# 2x10 8# 2x12 8#2x15 8# 2x15 8# 2x15   Shoulder extension - Prone 3#2x15 B Prone 3# 2x15B Prone 3# 2x15B Standing cables 7# 2x10 Prone 3#2x15   Prone middle trap Incline Prone On Bench   3# 4x12r B Prone 3#2x15 B Prone 3#2x15 B Prone 3#2x15 B Standing red t-band 2x10 Prone 3#2x15   Prone lower trap Incline Prone On Bench  1# 4x12r B Prone 1# 2x15 B Prone 1# 2x15 B Prone 1# 2x15 B  Prone 1#2x15   Shoulder IR and ER - Side lying ER 3# 2x15, IR 5# 2x15 Side lying ER 3# 2x15, IR 5# 2x15 Side lying ER 3# 2x15, IR 5# 2x15 Side lying ER 3#2x18; IR 5#2x20 Side lying ER 3#2x18; IR 6# 2x15   Wall slides Slide up (on wall), lift off, lower down (off wall)   2x10r  Y's on the wall 2x10  Y's on the wall 2x10    Seated ER & IR @ 80-90 Abd ER: 2# 4x12r  IR: Red Band 4x12r ER 3# 2x15 ER 3# 2x15 B ER 3# 2x15 B  ER 3#2x15   Standing Scaption - 2# 2x10 2#2x10   2#2x10   Cable hand over hand pull down -        Overhead endurance -        Standing Cable Rows - 10# 2x10  Bent over row 5# 2x15 B  Bent over row 5#2x15   Standing Bicep Curls - 8# 2x15 8#2x15 8# 2x15     Standing Cable Elbow Extensions -        Push-ups - - Wall 2x10  Wall 2x10    Overhead press on incline - - - 3# 2X10 3# 2x10    Serratus on wall slide - - - - 2x10    Band IR and ER at neutral - - - - Double blue IR 2x15; single blue ER 2x15 Double blue IR 2x15; single blue ER 2x15       Therapeutic Modalities: (15 min)  ice to the left shoulder with game ready, minimal vasopneumatic pressure                       Left Shoulder Cold  Type: Cold/ice pack  Duration : 15 minutes  Patient Position: Sitting                                                                        HEP: advised to continue current HEP with can soup    Treatment/Session Assessment:   · Response to Treatment: He is progressing with UE strength. · Compliance with Program/Exercises: yes per pt. · Recommendations/Intent for next treatment session: \"Next visit will focus on B shoulder and scapular ROM, mobility, and strength\".  PN next treatment    Total Treatment Duration: 60 minutes  PT Patient Time In/Time Out  Time In: 1020  Time Out: [address]

## 2017-11-06 ENCOUNTER — HOSPITAL ENCOUNTER (OUTPATIENT)
Dept: PHYSICAL THERAPY | Age: 57
Discharge: HOME | End: 2017-11-06
Payer: COMMERCIAL

## 2017-11-06 PROCEDURE — 97140 MANUAL THERAPY 1/> REGIONS: CPT

## 2017-11-06 PROCEDURE — 97110 THERAPEUTIC EXERCISES: CPT

## 2017-11-06 NOTE — PROGRESS NOTES
Jamshid Soto  : 1960 [facility] at [address] [place] [address] Rd  [address] [facility], [address] [place] [address],[address], [place] [address].  [phone]   [phone]       OUTPATIENT PHYSICAL THERAPY:Daily Note and Progress Report 2017      ICD-10: Treatment Diagnosis: Strain of muscle(s) and tendon(s) of the rotator cuff of left shoulder, sequela (S46.012S); Pain in left shoulder (M25.512); Adhesive capsulitis of left shoulder (M75.02)  Precautions/Allergies:   Review of patient's allergies indicates no known allergies. Fall Risk Score: 2 (? 5 = High Risk)  MD Orders: Evaluate and treat, HEP, ROM, strengthening, push MEDICAL/REFERRING DIAGNOSIS:  left shoulder ,right shoulder pain  DATE OF ONSET: Surgery 17  REFERRING PHYSICIAN: Romina Kincaid MD  RETURN PHYSICIAN APPOINTMENT: 17      ASSESSMENT:  Mr. Keita Heading is s/p L shoulder mini open revision rotator cuff repair and bicep tenodesis with surgery 17. He continues to progress with ROM and UE strength. He needs to improve his overhead endurance to be able to return to work duties. He will benefit from continuing skilled physical therapy to continue to progress UE functional mobility. PROBLEM LIST (Impacting functional limitations):  1. Post-op left shoulder pain and swelling  2. Decreased left shoulder ROM   3. Weakness left shoulder INTERVENTIONS PLANNED:  1. Thermal and electric modalities, manual therapies for pain   2. Manual therapies, therapeutic exercises, HEP for ROM    3. Therapeutic exercises and HEP for strength   TREATMENT PLAN:  Effective Dates:10-4-17 to . Frequency/Duration: 3 times a week for 8-12 weeks  GOALS: (Goals have been discussed and agreed upon with patient.)  Short-Term Functional Goals: Time Frame: 6 weeks  1. Report no more than 3/10 intermittent pain to left shoulder with compensatory use during basic functional activities. GOAL MET  2.  Left shoulder PROM forward elevation greater than 135 degrees and external rotation greater than 60 degrees to progress into functional ranges. GOAL MET  3. Demonstrate good left shoulder isometric strength with manual testing to progress into strength phase. GOAL MET  4. Independent with initial HEP. GOAL MET  Discharge Goals: Time Frame: 12 weeks  1. No more than 1/10 intermittent pain left shoulder with return to normalized household and work activities, and score less than 25% on the DASH. Progressing towards 11-3-17  2. Left shoulder AROM forward elevation greater than 135 degrees, external rotation greater than 60 degrees, and strength to shoulder are grossly WNL's for safe use with normalized activities. GOAL MET  3. Demonstrate good functional shoulder strength and endurance for return to normalized household and work activities. Progressing towards 11-3-17  4. Independent with advanced shoulder HEP for continued self-management. Progressing towards  New Goals  5. Perform 10 minutes of continuous overhead activities with good shoulder mechanics and mild fatigue for work duties. Rehabilitation Potential For Stated Goals: GooD                HISTORY:   History of Present Injury/Illness (Reason for Referral): Injury March 3rd 2016 was lifting something overhead and felt a pop. Had physical therapy and then decided to have surgery 7-19-17.  2-3 months after surgery his shoulder started to get stiff and had manipulation on 10-25-16. He went back to physical therapy and was not getting any better. Then Dr. Peña Garay referred him to Dr. Otilia Taylor. Then had surgery 7-6-17. He did stay over night in the hospital. Pain at rest 4/10. Past Medical History/Comorbidities:   HTN, carpal tunnel surgery  Social History/Living Environment:    Lives alone  Prior Level of Function/Work/Activity:  Works for [employer]. 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    Dominant Side:         RIGHT handed but plays some sports left handed  Previous Treatment Approaches:          He has had therapy in the past before and after 1st rotator cuff surgery  Current Medications:     Benazepril, atenolol, atorvastatin,, ventolin, Restoril prn, gabapentin    Date Last Reviewed:  11-6-17   Number of Personal Factors/Comorbidities that affect the Plan of Care: 1-2: MODERATE COMPLEXITY   EXAMINATION:   Observation/Orthostatic Postural Assessment: healed incision on the L shoulder  Palpation: n/t    ROM:                     Strength:                  Special Tests: None  Functional Mobility:  Sit to/from Stand: independent. Bed Mobility: independent. Independent with basic mobility. independent with dressing and grooming ADL's. CLINICAL DECISION MAKING:   Outcome Measure: Tool Used: Disabilities of the Arm, Shoulder and Hand (DASH) Questionnaire - Quick Version  Score:  Initial: 50/55  Most Recent: 32/55 (Date: [id number] )   Interpretation of Score: The DASH is designed to measure the activities of daily living in person's with upper extremity dysfunction or pain. Each section is scored on a 1-5 scale, 5 representing the greatest disability. The scores of each section are added together for a total score of 55. Medical Necessity:   · Patient is expected to demonstrate progress in strength and range of motion to improve functional mobility. Reason for Services/Other Comments:  · Patient continues to require skilled intervention due to post-op, decreased ROM and UE strength. Use of outcome tool(s) and clinical judgement create a POC that gives a: Questionable prediction of patient's progress: MODERATE COMPLEXITY          TREATMENT:   (In addition to Assessment/Re-Assessment sessions the following treatments were rendered)  Pre-treatment Symptoms/Complaints: He reports he was sore on Saturday but Sunday and today the shoulder feel pretty good.    Pain: Initial: 2/10    Post Session: 2/10   MANUAL THERAPY: (15 minutes): Joint mobilization was utilized and necessary because of the patient's restricted joint motion. Pt supine and physiological mobilizations to the L shoulder for ER at neutral, ER at 90 deg abd, IR, forward elevation, glenohumeral inferior and posterior glides 30\"x3. L UE median nerve glides with mobilizing at the elbow x10 reps, cervical distraction with pt supine. Therapeutic Exercise (30 Minutes): For muscle activation and UE strengthening. Cueing needed for technique, posture correction and use of towel for UE positioning.     UBE: 5 minutes, resistance 3, alternating backward and forward cycling each minute     Date  10-23-17 Date  10-25-17 Date  10-27-17 Date  11-3-17 Date  11-6-17   Activity/Exercise Parameters  parameters parameters parameters parameters   Serratus punch 8# 2x12 8#2x15 8# 2x15 8# 2x15 9# 2x15   Shoulder extension Prone 3# 2x15B Prone 3# 2x15B Standing cables 7# 2x10 Prone 3#2x15 Standing cables 7# 2x10   Prone middle trap Prone 3#2x15 B Prone 3#2x15 B Standing red t-band 2x10 Prone 3#2x15    Prone lower trap Prone 1# 2x15 B Prone 1# 2x15 B  Prone 1#2x15    Shoulder IR and ER Side lying ER 3# 2x15, IR 5# 2x15 Side lying ER 3# 2x15, IR 5# 2x15 Side lying ER 3#2x18; IR 5#2x20 Side lying ER 3#2x18; IR 6# 2x15 Side lying ER 3#2x18; IR 6# 2x20   Wall slides Y's on the wall 2x10  Y's on the wall 2x10  Y's on the wall 2x15   Seated ER & IR @ 80-90 Abd ER 3# 2x15 B ER 3# 2x15 B  ER 3#2x15 ER 3# 2x18   Standing Scaption 2#2x10   2#2x10 2# 2x15   Cable hand over hand pull down        Overhead endurance     Red flex bar 30\"x2   Standing Cable Rows  Bent over row 5# 2x15 B  Bent over row 5#2x15 Bent over row 5#2x15   Standing Bicep Curls 8#2x15 8# 2x15   9# 2x10   Push-ups Wall 2x10  Wall 2x10     Overhead press on incline - 3# 2X10 3# 2x10  3# 2x15   Serratus on wall slide - - 2x10     Band IR and ER at neutral - - Double blue IR 2x15; single blue ER 2x15 Double blue IR 2x15; single blue ER 2x15        Therapeutic Modalities: (10 min)  ice to the left shoulder with game ready, minimal vasopneumatic pressure                                                                                                HEP: advised to continue current HEP    Treatment/Session Assessment:   · Response to Treatment: he fatigued with overhead endurance exercises. · Compliance with Program/Exercises: yes per pt. · Recommendations/Intent for next treatment session: \"Next visit will focus on B shoulder and scapular ROM, mobility, and strength\".      Total Treatment Duration: 55 minutes  PT Patient Time In/Time Out  Time In: 1015  Time Out: [address]

## 2017-11-08 ENCOUNTER — HOSPITAL ENCOUNTER (OUTPATIENT)
Dept: PHYSICAL THERAPY | Age: 57
Discharge: HOME | End: 2017-11-08
Payer: COMMERCIAL

## 2017-11-08 PROCEDURE — 97140 MANUAL THERAPY 1/> REGIONS: CPT

## 2017-11-08 PROCEDURE — 97110 THERAPEUTIC EXERCISES: CPT

## 2017-11-08 NOTE — PROGRESS NOTES
Alexsander Greenwood  : 1960 [facility] CORNELIUS FINLEY  [address] [facility], [address] [place] [address],[address], [address] [facility]  [phone]   [phone]       OUTPATIENT PHYSICAL THERAPY:Daily Note 2017      ICD-10: Treatment Diagnosis: Strain of muscle(s) and tendon(s) of the rotator cuff of left shoulder, sequela (S46.012S); Pain in left shoulder (M25.512); Adhesive capsulitis of left shoulder (M75.02)  Precautions/Allergies:   Review of patient's allergies indicates no known allergies. Fall Risk Score: 2 (? 5 = High Risk)  MD Orders: Evaluate and treat, HEP, ROM, strengthening, push MEDICAL/REFERRING DIAGNOSIS:  left shoulder ,right shoulder pain  DATE OF ONSET: Surgery 17  REFERRING PHYSICIAN: Devi Aguilar MD  RETURN PHYSICIAN APPOINTMENT: 17      ASSESSMENT:  Mr. Kailey Ortiz is s/p L shoulder mini open revision rotator cuff repair and bicep tenodesis with surgery 17. He continues to progress with ROM and UE strength. He needs to improve his overhead endurance to be able to return to work duties. He will benefit from continuing skilled physical therapy to continue to progress UE functional mobility. PROBLEM LIST (Impacting functional limitations):  1. Post-op left shoulder pain and swelling  2. Decreased left shoulder ROM   3. Weakness left shoulder INTERVENTIONS PLANNED:  1. Thermal and electric modalities, manual therapies for pain   2. Manual therapies, therapeutic exercises, HEP for ROM    3. Therapeutic exercises and HEP for strength   TREATMENT PLAN:  Effective Dates:10-4-17 to . Frequency/Duration: 3 times a week for 8-12 weeks  GOALS: (Goals have been discussed and agreed upon with patient.)  Short-Term Functional Goals: Time Frame: 6 weeks  1. Report no more than 3/10 intermittent pain to left shoulder with compensatory use during basic functional activities. GOAL MET  2.  Left shoulder PROM forward elevation greater than 135 degrees and external rotation greater than 60 degrees to progress into functional ranges. GOAL MET  3. Demonstrate good left shoulder isometric strength with manual testing to progress into strength phase. GOAL MET  4. Independent with initial HEP. GOAL MET  Discharge Goals: Time Frame: 12 weeks  1. No more than 1/10 intermittent pain left shoulder with return to normalized household and work activities, and score less than 25% on the DASH. Progressing towards 11-3-17  2. Left shoulder AROM forward elevation greater than 135 degrees, external rotation greater than 60 degrees, and strength to shoulder are grossly WNL's for safe use with normalized activities. GOAL MET  3. Demonstrate good functional shoulder strength and endurance for return to normalized household and work activities. Progressing towards 11-3-17  4. Independent with advanced shoulder HEP for continued self-management. Progressing towards  New Goals  5. Perform 10 minutes of continuous overhead activities with good shoulder mechanics and mild fatigue for work duties. Rehabilitation Potential For Stated Goals: GooD                HISTORY:   History of Present Injury/Illness (Reason for Referral): Injury March 3rd 2016 was lifting something overhead and felt a pop. Had physical therapy and then decided to have surgery 7-19-17.  2-3 months after surgery his shoulder started to get stiff and had manipulation on 10-25-16. He went back to physical therapy and was not getting any better. Then Dr. Albin Rincon referred him to Dr. Brianna Rand. Then had surgery 7-6-17. He did stay over night in the hospital. Pain at rest 4/10. Past Medical History/Comorbidities:   HTN, carpal tunnel surgery  Social History/Living Environment:    Lives alone  Prior Level of Function/Work/Activity:  Works for [employer]. 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    Dominant Side:         RIGHT handed but plays some sports left handed  Previous Treatment Approaches:          He has had therapy in the past before and after 1st rotator cuff surgery  Current Medications:     Benazepril, atenolol, atorvastatin,, ventolin, Restoril prn, gabapentin    Date Last Reviewed:  11-6-17   Number of Personal Factors/Comorbidities that affect the Plan of Care: 1-2: MODERATE COMPLEXITY   EXAMINATION:   Observation/Orthostatic Postural Assessment: healed incision on the L shoulder  Palpation: n/t    ROM:                     Strength:                  Special Tests: None  Functional Mobility:  Sit to/from Stand: independent. Bed Mobility: independent. Independent with basic mobility. independent with dressing and grooming ADL's. CLINICAL DECISION MAKING:   Outcome Measure: Tool Used: Disabilities of the Arm, Shoulder and Hand (DASH) Questionnaire - Quick Version  Score:  Initial: 50/55  Most Recent: 32/55 (Date: [id number] )   Interpretation of Score: The DASH is designed to measure the activities of daily living in person's with upper extremity dysfunction or pain. Each section is scored on a 1-5 scale, 5 representing the greatest disability. The scores of each section are added together for a total score of 55. Medical Necessity:   · Patient is expected to demonstrate progress in strength and range of motion to improve functional mobility. Reason for Services/Other Comments:  · Patient continues to require skilled intervention due to post-op, decreased ROM and UE strength. Use of outcome tool(s) and clinical judgement create a POC that gives a: Questionable prediction of patient's progress: MODERATE COMPLEXITY          TREATMENT:   (In addition to Assessment/Re-Assessment sessions the following treatments were rendered)  Pre-treatment Symptoms/Complaints: He reports he feels okay today. He reports the side of his arm is sore today.    Pain: Initial: 2/10    Post Session: 2/10   MANUAL THERAPY: (15 minutes): Joint mobilization was utilized and necessary because of the patient's restricted joint motion. Pt supine and physiological mobilizations to the L shoulder for ER at neutral, ER at 90 deg abd, IR, forward elevation, glenohumeral inferior and posterior glides 30\"x3. L UE median nerve glides with mobilizing at the elbow x10 reps, cervical distraction with pt supine. Therapeutic Exercise (30 Minutes): For muscle activation and UE strengthening. Cueing needed for technique, posture correction and use of towel for UE positioning.     UBE: 5 minutes, resistance 3, alternating backward and forward cycling each minute     Date  10-23-17 Date  10-25-17 Date  10-27-17 Date  11-3-17 Date  11-6-17 Date  11-8-17   Activity/Exercise Parameters  parameters parameters parameters parameters parameter   Serratus punch 8# 2x12 8#2x15 8# 2x15 8# 2x15 9# 2x15 9# 2x15   Shoulder extension Prone 3# 2x15B Prone 3# 2x15B Standing cables 7# 2x10 Prone 3#2x15 Standing cables 7# 2x10 Prone 3# 2x20   Prone middle trap Prone 3#2x15 B Prone 3#2x15 B Standing red t-band 2x10 Prone 3#2x15  Prone 3# 2x20   Prone lower trap Prone 1# 2x15 B Prone 1# 2x15 B  Prone 1#2x15  Prone 1#2x20   Shoulder IR and ER Side lying ER 3# 2x15, IR 5# 2x15 Side lying ER 3# 2x15, IR 5# 2x15 Side lying ER 3#2x18; IR 5#2x20 Side lying ER 3#2x18; IR 6# 2x15 Side lying ER 3#2x18; IR 6# 2x20 -   Wall slides Y's on the wall 2x10  Y's on the wall 2x10  Y's on the wall 2x15    Seated ER & IR @ 80-90 Abd ER 3# 2x15 B ER 3# 2x15 B  ER 3#2x15 ER 3# 2x18    Standing Scaption 2#2x10   2#2x10 2# 2x15    Cable hand over hand pull down      10#  2x5   Overhead endurance     Red flex bar 30\"x2    Standing cable punch forward - - - - - 10# 2x10 B   Standing Cable Rows  Bent over row 5# 2x15 B  Bent over row 5#2x15 Bent over row 5#2x15 10# 2x10 B   Standing Bicep Curls 8#2x15 8# 2x15   9# 2x10    Push-ups Wall 2x10  Wall 2x10   Wall 2x15   Overhead press on incline - 3# 2X10 3# 2x10  3# 2x15 Serratus on wall slide - - 2x10   2x10   Band IR and ER at neutral - - Double blue IR 2x15; single blue ER 2x15 Double blue IR 2x15; single blue ER 2x15  Double blue IR 2x15; single blue ER 2x15   ER at 90 deg abd - - - - - 1#15x  2# 2x10   Lat pull down - - - - - 13# 2x10       Therapeutic Modalities: (15 min)  ice to the left shoulder with game ready, minimal vasopneumatic pressure                       Left Shoulder Cold  Type: Cold/ice pack  Duration : 15 minutes  Patient Position: Sitting                                                                        HEP: advised to continue current HEP    Treatment/Session Assessment:   · Response to Treatment: Improved ability to keep scapula depressed with exercises. · Compliance with Program/Exercises: yes per pt. · Recommendations/Intent for next treatment session: \"Next visit will focus on B shoulder and scapular ROM, mobility, and strength\".      Total Treatment Duration: 60 minutes  PT Patient Time In/Time Out  Time In: 1015  Time Out: 805 [employer]

## 2017-11-10 ENCOUNTER — HOSPITAL ENCOUNTER (OUTPATIENT)
Dept: PHYSICAL THERAPY | Age: 57
Discharge: HOME | End: 2017-11-10
Payer: COMMERCIAL

## 2017-11-10 PROCEDURE — 97140 MANUAL THERAPY 1/> REGIONS: CPT

## 2017-11-10 NOTE — PROGRESS NOTES
Michi Calles  : 1960 [facility] CORNELIUS FINLEY  [address] [facility], [address] [place] [address],[address], [facility] [address]  [phone]   [phone]       OUTPATIENT PHYSICAL THERAPY:Daily Note 11/10/2017      ICD-10: Treatment Diagnosis: Strain of muscle(s) and tendon(s) of the rotator cuff of left shoulder, sequela (S46.012S); Pain in left shoulder (M25.512); Adhesive capsulitis of left shoulder (M75.02)  Precautions/Allergies:   Review of patient's allergies indicates no known allergies. Fall Risk Score: 2 (? 5 = High Risk)  MD Orders: Evaluate and treat, HEP, ROM, strengthening, push MEDICAL/REFERRING DIAGNOSIS:  left shoulder ,right shoulder pain  DATE OF ONSET: Surgery 17  REFERRING PHYSICIAN: Ki Constantino MD  RETURN PHYSICIAN APPOINTMENT: 17      ASSESSMENT:  Mr. Casey Gaming is s/p L shoulder mini open revision rotator cuff repair and bicep tenodesis with surgery 17. He continues to progress with ROM and UE strength. He needs to improve his overhead endurance to be able to return to work duties. He will benefit from continuing skilled physical therapy to continue to progress UE functional mobility. PROBLEM LIST (Impacting functional limitations):  1. Post-op left shoulder pain and swelling  2. Decreased left shoulder ROM   3. Weakness left shoulder INTERVENTIONS PLANNED:  1. Thermal and electric modalities, manual therapies for pain   2. Manual therapies, therapeutic exercises, HEP for ROM    3. Therapeutic exercises and HEP for strength   TREATMENT PLAN:  Effective Dates:10-4-17 to . Frequency/Duration: 3 times a week for 8-12 weeks  GOALS: (Goals have been discussed and agreed upon with patient.)  Short-Term Functional Goals: Time Frame: 6 weeks  1. Report no more than 3/10 intermittent pain to left shoulder with compensatory use during basic functional activities. GOAL MET  2.  Left shoulder PROM forward elevation greater than 135 degrees and external rotation greater than 60 degrees to progress into functional ranges. GOAL MET  3. Demonstrate good left shoulder isometric strength with manual testing to progress into strength phase. GOAL MET  4. Independent with initial HEP. GOAL MET  Discharge Goals: Time Frame: 12 weeks  1. No more than 1/10 intermittent pain left shoulder with return to normalized household and work activities, and score less than 25% on the DASH. Progressing towards 11-3-17  2. Left shoulder AROM forward elevation greater than 135 degrees, external rotation greater than 60 degrees, and strength to shoulder are grossly WNL's for safe use with normalized activities. GOAL MET  3. Demonstrate good functional shoulder strength and endurance for return to normalized household and work activities. Progressing towards 11-3-17  4. Independent with advanced shoulder HEP for continued self-management. Progressing towards  New Goals  5. Perform 10 minutes of continuous overhead activities with good shoulder mechanics and mild fatigue for work duties. Rehabilitation Potential For Stated Goals: GooD                HISTORY:   History of Present Injury/Illness (Reason for Referral): Injury March 3rd 2016 was lifting something overhead and felt a pop. Had physical therapy and then decided to have surgery 7-19-17.  2-3 months after surgery his shoulder started to get stiff and had manipulation on 10-25-16. He went back to physical therapy and was not getting any better. Then Dr. Radha Terrell referred him to Dr. Jacquie Rodrigez. Then had surgery 7-6-17. He did stay over night in the hospital. Pain at rest 4/10. Past Medical History/Comorbidities:   HTN, carpal tunnel surgery  Social History/Living Environment:    Lives alone  Prior Level of Function/Work/Activity:  Works for [employer]. 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    Dominant Side:         RIGHT handed but plays some sports left handed  Previous Treatment Approaches:          He has had therapy in the past before and after 1st rotator cuff surgery  Current Medications:     Benazepril, atenolol, atorvastatin,, ventolin, Restoril prn, gabapentin    Date Last Reviewed:  11-6-17   Number of Personal Factors/Comorbidities that affect the Plan of Care: 1-2: MODERATE COMPLEXITY   EXAMINATION:   Observation/Orthostatic Postural Assessment: healed incision on the L shoulder  Palpation: n/t    ROM:                     Strength:                  Special Tests: None  Functional Mobility:  Sit to/from Stand: independent. Bed Mobility: independent. Independent with basic mobility. independent with dressing and grooming ADL's. CLINICAL DECISION MAKING:   Outcome Measure: Tool Used: Disabilities of the Arm, Shoulder and Hand (DASH) Questionnaire - Quick Version  Score:  Initial: 50/55  Most Recent: 32/55 (Date: [id number] )   Interpretation of Score: The DASH is designed to measure the activities of daily living in person's with upper extremity dysfunction or pain. Each section is scored on a 1-5 scale, 5 representing the greatest disability. The scores of each section are added together for a total score of 55. Medical Necessity:   · Patient is expected to demonstrate progress in strength and range of motion to improve functional mobility. Reason for Services/Other Comments:  · Patient continues to require skilled intervention due to post-op, decreased ROM and UE strength. Use of outcome tool(s) and clinical judgement create a POC that gives a: Questionable prediction of patient's progress: MODERATE COMPLEXITY          TREATMENT:   (In addition to Assessment/Re-Assessment sessions the following treatments were rendered)  Pre-treatment Symptoms/Complaints: He reports he was more sore yesterday.  Pain yesterday 4/10  Pain: Initial: 2/10    Post Session: 2/10   MANUAL THERAPY: (30 minutes): Joint mobilization was utilized and necessary because of the patient's restricted joint motion. Pt supine and physiological mobilizations to the L shoulder for ER at neutral, ER at 90 deg abd, IR, forward elevation, glenohumeral inferior and posterior glides 30\"x3. L UE median nerve glides with mobilizing at the elbow x10 reps, cervical distraction with pt supine. Pt prone and thoracic and cervical spine central PAs grade 3-4. Soft tissue mobilization to scalenes. Therapeutic Exercise ( ):  none   For muscle activation and UE strengthening. Cueing needed for technique, posture correction and use of towel for UE positioning.     UBE: 5 minutes, resistance 3, alternating backward and forward cycling each minute     Date  10-23-17 Date  10-25-17 Date  10-27-17 Date  11-3-17 Date  11-6-17 Date  11-8-17   Activity/Exercise Parameters  parameters parameters parameters parameters parameter   Serratus punch 8# 2x12 8#2x15 8# 2x15 8# 2x15 9# 2x15 9# 2x15   Shoulder extension Prone 3# 2x15B Prone 3# 2x15B Standing cables 7# 2x10 Prone 3#2x15 Standing cables 7# 2x10 Prone 3# 2x20   Prone middle trap Prone 3#2x15 B Prone 3#2x15 B Standing red t-band 2x10 Prone 3#2x15  Prone 3# 2x20   Prone lower trap Prone 1# 2x15 B Prone 1# 2x15 B  Prone 1#2x15  Prone 1#2x20   Shoulder IR and ER Side lying ER 3# 2x15, IR 5# 2x15 Side lying ER 3# 2x15, IR 5# 2x15 Side lying ER 3#2x18; IR 5#2x20 Side lying ER 3#2x18; IR 6# 2x15 Side lying ER 3#2x18; IR 6# 2x20 -   Wall slides Y's on the wall 2x10  Y's on the wall 2x10  Y's on the wall 2x15    Seated ER & IR @ 80-90 Abd ER 3# 2x15 B ER 3# 2x15 B  ER 3#2x15 ER 3# 2x18    Standing Scaption 2#2x10   2#2x10 2# 2x15    Cable hand over hand pull down      10#  2x5   Overhead endurance     Red flex bar 30\"x2    Standing cable punch forward - - - - - 10# 2x10 B   Standing Cable Rows  Bent over row 5# 2x15 B  Bent over row 5#2x15 Bent over row 5#2x15 10# 2x10 B   Standing Bicep Curls 8#2x15 8# 2x15   9# 2x10    Push-ups Wall 2x10  Wall 2x10   Wall 2x15   Overhead press on incline - 3# 2X10 3# 2x10  3# 2x15    Serratus on wall slide - - 2x10   2x10   Band IR and ER at neutral - - Double blue IR 2x15; single blue ER 2x15 Double blue IR 2x15; single blue ER 2x15  Double blue IR 2x15; single blue ER 2x15   ER at 90 deg abd - - - - - 1#15x  2# 2x10   Lat pull down - - - - - 13# 2x10       Therapeutic Modalities: (15 min)  ice to the left shoulder with game ready, minimal vasopneumatic pressure                                                                                                HEP: advised to continue current HEP    Treatment/Session Assessment:   · Response to Treatment: Pt reported an increase in pain yesterday so worked on ROM today and mobility. · Compliance with Program/Exercises: yes per pt. · Recommendations/Intent for next treatment session: \"Next visit will focus on B shoulder and scapular ROM, mobility, and strength\".      Total Treatment Duration: 45 minutes  PT Patient Time In/Time Out  Time In: 1030  Time Out: 805 [employer]

## 2017-11-13 ENCOUNTER — HOSPITAL ENCOUNTER (OUTPATIENT)
Dept: PHYSICAL THERAPY | Age: 57
Discharge: HOME | End: 2017-11-13
Payer: COMMERCIAL

## 2017-11-13 PROCEDURE — 97140 MANUAL THERAPY 1/> REGIONS: CPT

## 2017-11-13 PROCEDURE — 97110 THERAPEUTIC EXERCISES: CPT

## 2017-11-13 NOTE — PROGRESS NOTES
Gela Epps  : 1960 [facility] CORNELIUS FINLEY  [address] [facility], [address] [place] [address],[address], [place] [address].  [phone]   [phone]       OUTPATIENT PHYSICAL THERAPY:Daily Note 2017      ICD-10: Treatment Diagnosis: Strain of muscle(s) and tendon(s) of the rotator cuff of left shoulder, sequela (S46.012S); Pain in left shoulder (M25.512); Adhesive capsulitis of left shoulder (M75.02)  Precautions/Allergies:   Review of patient's allergies indicates no known allergies. Fall Risk Score: 2 (? 5 = High Risk)  MD Orders: Evaluate and treat, HEP, ROM, strengthening, push MEDICAL/REFERRING DIAGNOSIS:  left shoulder ,right shoulder pain  DATE OF ONSET: Surgery 17  REFERRING PHYSICIAN: Enrique Rodriguez MD  RETURN PHYSICIAN APPOINTMENT: 17      ASSESSMENT:  Mr. Kim Graves is s/p L shoulder mini open revision rotator cuff repair and bicep tenodesis with surgery 17. He continues to progress with ROM and UE strength. He needs to improve his overhead endurance to be able to return to work duties. He will benefit from continuing skilled physical therapy to continue to progress UE functional mobility. PROBLEM LIST (Impacting functional limitations):  1. Post-op left shoulder pain and swelling  2. Decreased left shoulder ROM   3. Weakness left shoulder INTERVENTIONS PLANNED:  1. Thermal and electric modalities, manual therapies for pain   2. Manual therapies, therapeutic exercises, HEP for ROM    3. Therapeutic exercises and HEP for strength   TREATMENT PLAN:  Effective Dates:10-4-17 to . Frequency/Duration: 3 times a week for 8-12 weeks  GOALS: (Goals have been discussed and agreed upon with patient.)  Short-Term Functional Goals: Time Frame: 6 weeks  1. Report no more than 3/10 intermittent pain to left shoulder with compensatory use during basic functional activities. GOAL MET  2.  Left shoulder PROM forward elevation greater than 135 degrees and external rotation greater than 60 degrees to progress into functional ranges. GOAL MET  3. Demonstrate good left shoulder isometric strength with manual testing to progress into strength phase. GOAL MET  4. Independent with initial HEP. GOAL MET  Discharge Goals: Time Frame: 12 weeks  1. No more than 1/10 intermittent pain left shoulder with return to normalized household and work activities, and score less than 25% on the DASH. Progressing towards 11-3-17  2. Left shoulder AROM forward elevation greater than 135 degrees, external rotation greater than 60 degrees, and strength to shoulder are grossly WNL's for safe use with normalized activities. GOAL MET  3. Demonstrate good functional shoulder strength and endurance for return to normalized household and work activities. Progressing towards 11-3-17  4. Independent with advanced shoulder HEP for continued self-management. Progressing towards  New Goals  5. Perform 10 minutes of continuous overhead activities with good shoulder mechanics and mild fatigue for work duties. Rehabilitation Potential For Stated Goals: GooD                HISTORY:   History of Present Injury/Illness (Reason for Referral): Injury March 3rd 2016 was lifting something overhead and felt a pop. Had physical therapy and then decided to have surgery 7-19-17.  2-3 months after surgery his shoulder started to get stiff and had manipulation on 10-25-16. He went back to physical therapy and was not getting any better. Then Dr. Alda Gibbons referred him to Dr. Valarie Juarez. Then had surgery 7-6-17. He did stay over night in the hospital. Pain at rest 4/10. Past Medical History/Comorbidities:   HTN, carpal tunnel surgery  Social History/Living Environment:    Lives alone  Prior Level of Function/Work/Activity:  Works for [employer]. 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    Dominant Side:         RIGHT handed but plays some sports left handed  Previous Treatment Approaches:          He has had therapy in the past before and after 1st rotator cuff surgery  Current Medications:     Benazepril, atenolol, atorvastatin,, ventolin, Restoril prn, gabapentin    Date Last Reviewed:  11-13-17   Number of Personal Factors/Comorbidities that affect the Plan of Care: 1-2: MODERATE COMPLEXITY   EXAMINATION:   Observation/Orthostatic Postural Assessment: healed incision on the L shoulder  Palpation: n/t    ROM:                     Strength:                  Special Tests: None  Functional Mobility:  Sit to/from Stand: independent. Bed Mobility: independent. Independent with basic mobility. independent with dressing and grooming ADL's. CLINICAL DECISION MAKING:   Outcome Measure: Tool Used: Disabilities of the Arm, Shoulder and Hand (DASH) Questionnaire - Quick Version  Score:  Initial: 50/55  Most Recent: 32/55 (Date: [id number] )   Interpretation of Score: The DASH is designed to measure the activities of daily living in person's with upper extremity dysfunction or pain. Each section is scored on a 1-5 scale, 5 representing the greatest disability. The scores of each section are added together for a total score of 55. Medical Necessity:   · Patient is expected to demonstrate progress in strength and range of motion to improve functional mobility. Reason for Services/Other Comments:  · Patient continues to require skilled intervention due to post-op, decreased ROM and UE strength. Use of outcome tool(s) and clinical judgement create a POC that gives a: Questionable prediction of patient's progress: MODERATE COMPLEXITY          TREATMENT:   (In addition to Assessment/Re-Assessment sessions the following treatments were rendered)  Pre-treatment Symptoms/Complaints: He reports the cold seems to affect his shoulders.    Pain: Initial: 3/10 L shoulder    Post Session: 2/10   MANUAL THERAPY: (15 minutes): Joint mobilization was utilized and necessary because of the patient's restricted joint motion. Pt supine and physiological mobilizations to the L shoulder for ER at neutral, ER at 90 deg abd, IR, forward elevation, glenohumeral inferior and posterior glides 30\"x3. L UE median nerve glides with mobilizing at the elbow x10 reps, cervical distraction with pt supine. Therapeutic Exercise (30 Minutes):  none   For muscle activation and UE strengthening. Cueing needed for technique, posture correction and use of towel for UE positioning.     UBE: 5 minutes, resistance 3, alternating backward and forward cycling each minute     Date  10-23-17 Date  10-25-17 Date  10-27-17 Date  11-3-17 Date  11-6-17 Date  11-8-17 Date  11-13-17   Activity/Exercise Parameters  parameters parameters parameters parameters parameter parameters   Serratus punch 8# 2x12 8#2x15 8# 2x15 8# 2x15 9# 2x15 9# 2x15 9# 2x15   Shoulder extension Prone 3# 2x15B Prone 3# 2x15B Standing cables 7# 2x10 Prone 3#2x15 Standing cables 7# 2x10 Prone 3# 2x20 Prone 3# 2x20   Prone middle trap Prone 3#2x15 B Prone 3#2x15 B Standing red t-band 2x10 Prone 3#2x15  Prone 3# 2x20 Prone 3#2x20   Prone lower trap Prone 1# 2x15 B Prone 1# 2x15 B  Prone 1#2x15  Prone 1#2x20 Prone 1# 2x20   Shoulder IR and ER Side lying ER 3# 2x15, IR 5# 2x15 Side lying ER 3# 2x15, IR 5# 2x15 Side lying ER 3#2x18; IR 5#2x20 Side lying ER 3#2x18; IR 6# 2x15 Side lying ER 3#2x18; IR 6# 2x20 - Side lying ER 3#2x18; IR 6# 2x20   Wall slides Y's on the wall 2x10  Y's on the wall 2x10  Y's on the wall 2x15     Seated ER & IR @ 80-90 Abd ER 3# 2x15 B ER 3# 2x15 B  ER 3#2x15 ER 3# 2x18  ER 4# 2x10B   Standing Scaption 2#2x10   2#2x10 2# 2x15  2#2x15   Cable hand over hand pull down      10#  2x5    Overhead endurance     Red flex bar 30\"x2     Standing cable punch forward - - - - - 10# 2x10 B 10# 2x10B   Standing Cable Rows  Bent over row 5# 2x15 B  Bent over row 5#2x15 Bent over row 5#2x15 10# 2x10 B 10# 2x10B   Standing Bicep Curls 8#2x15 8# 2x15   9# 2x10  9# 2x12   Push-ups Wall 2x10  Wall 2x10   Wall 2x15    Overhead press on incline - 3# 2X10 3# 2x10  3# 2x15     Serratus on wall slide - - 2x10   2x10    Band IR and ER at neutral - - Double blue IR 2x15; single blue ER 2x15 Double blue IR 2x15; single blue ER 2x15  Double blue IR 2x15; single blue ER 2x15 Double blue IR 2x15; single blue ER 2x15   ER at 90 deg abd prone - - - - - 1#15x  2# 2x10    Lat pull down - - - - - 13# 2x10 13#2x15       Therapeutic Modalities: (15 min)  ice to the left shoulder with game ready, minimal vasopneumatic pressure                                                                                                HEP: advised to continue current HEP    Treatment/Session Assessment:   · Response to Treatment: Good technique with exercises today with minimal cueing. · Compliance with Program/Exercises: yes per pt. · Recommendations/Intent for next treatment session: \"Next visit will focus on B shoulder and scapular ROM, mobility, and strength\".      Total Treatment Duration: 60 minutes  PT Patient Time In/Time Out  Time In: 1010  Time Out: [address]

## 2017-11-15 ENCOUNTER — HOSPITAL ENCOUNTER (OUTPATIENT)
Dept: PHYSICAL THERAPY | Age: 57
Discharge: HOME | End: 2017-11-15
Payer: COMMERCIAL

## 2017-11-15 PROCEDURE — 97110 THERAPEUTIC EXERCISES: CPT

## 2017-11-15 PROCEDURE — 97140 MANUAL THERAPY 1/> REGIONS: CPT

## 2017-11-15 NOTE — PROGRESS NOTES
Millie Brigette  : 1960 [facility] [place]LEY  [address] [facility], [address] [place] [address],[address], [place] [address].  [phone]   [phone]       OUTPATIENT PHYSICAL THERAPY:Daily Note 11/15/2017      ICD-10: Treatment Diagnosis: Strain of muscle(s) and tendon(s) of the rotator cuff of left shoulder, sequela (S46.012S); Pain in left shoulder (M25.512); Adhesive capsulitis of left shoulder (M75.02)  Precautions/Allergies:   Review of patient's allergies indicates no known allergies. Fall Risk Score: 2 (? 5 = High Risk)  MD Orders: Evaluate and treat, HEP, ROM, strengthening, push MEDICAL/REFERRING DIAGNOSIS:  left shoulder ,right shoulder pain  DATE OF ONSET: Surgery 17  REFERRING PHYSICIAN: Sanju Armas MD  RETURN PHYSICIAN APPOINTMENT: 17      ASSESSMENT:  Mr. Holly Conde is s/p L shoulder mini open revision rotator cuff repair and bicep tenodesis with surgery 17. He continues to progress with ROM and UE strength. He needs to improve his overhead endurance to be able to return to work duties. He will benefit from continuing skilled physical therapy to continue to progress UE functional mobility. PROBLEM LIST (Impacting functional limitations):  1. Post-op left shoulder pain and swelling  2. Decreased left shoulder ROM   3. Weakness left shoulder INTERVENTIONS PLANNED:  1. Thermal and electric modalities, manual therapies for pain   2. Manual therapies, therapeutic exercises, HEP for ROM    3. Therapeutic exercises and HEP for strength   TREATMENT PLAN:  Effective Dates:10-4-17 to . Frequency/Duration: 3 times a week for 8-12 weeks  GOALS: (Goals have been discussed and agreed upon with patient.)  Short-Term Functional Goals: Time Frame: 6 weeks  1. Report no more than 3/10 intermittent pain to left shoulder with compensatory use during basic functional activities. GOAL MET  2.  Left shoulder PROM forward elevation greater than 135 degrees and external rotation greater than 60 degrees to progress into functional ranges. GOAL MET  3. Demonstrate good left shoulder isometric strength with manual testing to progress into strength phase. GOAL MET  4. Independent with initial HEP. GOAL MET  Discharge Goals: Time Frame: 12 weeks  1. No more than 1/10 intermittent pain left shoulder with return to normalized household and work activities, and score less than 25% on the DASH. Progressing towards 11-3-17  2. Left shoulder AROM forward elevation greater than 135 degrees, external rotation greater than 60 degrees, and strength to shoulder are grossly WNL's for safe use with normalized activities. GOAL MET  3. Demonstrate good functional shoulder strength and endurance for return to normalized household and work activities. Progressing towards 11-3-17  4. Independent with advanced shoulder HEP for continued self-management. Progressing towards  New Goals  5. Perform 10 minutes of continuous overhead activities with good shoulder mechanics and mild fatigue for work duties. Rehabilitation Potential For Stated Goals: GooD                HISTORY:   History of Present Injury/Illness (Reason for Referral): Injury March 3rd 2016 was lifting something overhead and felt a pop. Had physical therapy and then decided to have surgery 7-19-17.  2-3 months after surgery his shoulder started to get stiff and had manipulation on 10-25-16. He went back to physical therapy and was not getting any better. Then Dr. Tho Jimenez referred him to Dr. Abad Robledo. Then had surgery 7-6-17. He did stay over night in the hospital. Pain at rest 4/10. Past Medical History/Comorbidities:   HTN, carpal tunnel surgery  Social History/Living Environment:    Lives alone  Prior Level of Function/Work/Activity:  Works for [employer]. 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    Dominant Side:         RIGHT handed but plays some sports left handed  Previous Treatment Approaches:          He has had therapy in the past before and after 1st rotator cuff surgery  Current Medications:     Benazepril, atenolol, atorvastatin,, ventolin, Restoril prn, gabapentin    Date Last Reviewed:  11-13-17   Number of Personal Factors/Comorbidities that affect the Plan of Care: 1-2: MODERATE COMPLEXITY   EXAMINATION:   Observation/Orthostatic Postural Assessment: healed incision on the L shoulder  Palpation: n/t    ROM:                     Strength:                  Special Tests: None  Functional Mobility:  Sit to/from Stand: independent. Bed Mobility: independent. Independent with basic mobility. independent with dressing and grooming ADL's. CLINICAL DECISION MAKING:   Outcome Measure: Tool Used: Disabilities of the Arm, Shoulder and Hand (DASH) Questionnaire - Quick Version  Score:  Initial: 50/55  Most Recent: 32/55 (Date: [id number] )   Interpretation of Score: The DASH is designed to measure the activities of daily living in person's with upper extremity dysfunction or pain. Each section is scored on a 1-5 scale, 5 representing the greatest disability. The scores of each section are added together for a total score of 55. Medical Necessity:   · Patient is expected to demonstrate progress in strength and range of motion to improve functional mobility. Reason for Services/Other Comments:  · Patient continues to require skilled intervention due to post-op, decreased ROM and UE strength. Use of outcome tool(s) and clinical judgement create a POC that gives a: Questionable prediction of patient's progress: MODERATE COMPLEXITY          TREATMENT:   (In addition to Assessment/Re-Assessment sessions the following treatments were rendered)  Pre-treatment Symptoms/Complaints: He reports the UBE seems to bother the back of his arm.    Pain: Initial: 3/10 L shoulder    Post Session: 2/10   MANUAL THERAPY: (15 minutes): Joint mobilization was utilized and necessary because of the patient's restricted joint motion. Pt supine and physiological mobilizations to the L shoulder for ER at neutral, ER at 90 deg abd, IR, forward elevation, glenohumeral inferior and posterior glides 30\"x3. L UE median nerve glides with mobilizing at the elbow x10 reps, cervical distraction with pt supine. Therapeutic Exercise (30 Minutes): For muscle activation and UE strengthening. Cueing needed for technique, posture correction and use of towel for UE positioning.     UBE: 5 minutes, resistance 3, alternating backward and forward cycling each minute     Date  11-3-17 Date  11-6-17 Date  11-8-17 Date  11-13-17 Date  11-15-17   Activity/Exercise parameters parameters parameter parameters parameter   Serratus punch 8# 2x15 9# 2x15 9# 2x15 9# 2x15 10# 2x10   Shoulder extension Prone 3#2x15 Standing cables 7# 2x10 Prone 3# 2x20 Prone 3# 2x20 Standing cables 7# 2x15   Prone middle trap Prone 3#2x15  Prone 3# 2x20 Prone 3#2x20 Standing red 2x15   Prone lower trap Prone 1#2x15  Prone 1#2x20 Prone 1# 2x20    Shoulder IR and ER Side lying ER 3#2x18; IR 6# 2x15 Side lying ER 3#2x18; IR 6# 2x20 - Side lying ER 3#2x18; IR 6# 2x20 Side lying ER 3# 2x0; IR 7# 2x15   Wall slides  Y's on the wall 2x15   Y's on wall yellow band 2x10   Seated ER & IR @ 80-90 Abd ER 3#2x15 ER 3# 2x18  ER 4# 2x10B ER 4#2x10 B   Standing Scaption 2#2x10 2# 2x15  2#2x15 3#2x10   Cable hand over hand pull down   10#  2x5     Overhead endurance  Red flex bar 30\"x2      Standing cable punch forward - - 10# 2x10 B 10# 2x10B    Standing Cable Rows Bent over row 5#2x15 Bent over row 5#2x15 10# 2x10 B 10# 2x10B Bent over 6#2x10   Standing Bicep Curls  9# 2x10  9# 2x12 9# 2x12   Push-ups   Wall 2x15     Overhead press on incline  3# 2x15   3# 2x15   Serratus on wall slide   2x10     Band IR and ER at neutral Double blue IR 2x15; single blue ER 2x15  Double blue IR 2x15; single blue ER 2x15 Double blue IR 2x15; single blue ER 2x15 Double blue IR 2x15; single blue ER 2x15   ER at 90 deg abd prone - - 1#15x  2# 2x10     Lat pull down - - 13# 2x10 13#2x15        Therapeutic Modalities: (15 min)  ice to the left shoulder with game ready, minimal vasopneumatic pressure                       Left Shoulder Cold  Type: Cold/ice pack  Duration : 15 minutes  Patient Position: Sitting                                                                        HEP: advised to continue current HEP    Treatment/Session Assessment:   · Response to Treatment: He seems to be progressing with UE strength. He fatigues with ER strengthening. He has good PROM. · Compliance with Program/Exercises: yes per pt. · Recommendations/Intent for next treatment session: \"Next visit will focus on B shoulder and scapular ROM, mobility, and strength\".      Total Treatment Duration: 60 minutes  PT Patient Time In/Time Out  Time In: 1020  Time Out: [address] [facility]

## 2017-11-17 ENCOUNTER — HOSPITAL ENCOUNTER (OUTPATIENT)
Dept: PHYSICAL THERAPY | Age: 57
Discharge: HOME | End: 2017-11-17
Payer: COMMERCIAL

## 2017-11-17 PROCEDURE — 97110 THERAPEUTIC EXERCISES: CPT

## 2017-11-17 PROCEDURE — 97140 MANUAL THERAPY 1/> REGIONS: CPT

## 2017-11-17 NOTE — PROGRESS NOTES
Casper Gordon  : 1960 [facility] at [facility] CORNELIUS FINLEY  [address] [facility], [address] [place] [address],[address], [place] [address].  [phone]   [phone]       OUTPATIENT PHYSICAL THERAPY:Daily Note 2017      ICD-10: Treatment Diagnosis: Strain of muscle(s) and tendon(s) of the rotator cuff of left shoulder, sequela (S46.012S); Pain in left shoulder (M25.512); Adhesive capsulitis of left shoulder (M75.02)  Precautions/Allergies:   Review of patient's allergies indicates no known allergies. Fall Risk Score: 2 (? 5 = High Risk)  MD Orders: Evaluate and treat, HEP, ROM, strengthening, push MEDICAL/REFERRING DIAGNOSIS:  left shoulder ,right shoulder pain  DATE OF ONSET: Surgery 17  REFERRING PHYSICIAN: Deysi Byrd MD  RETURN PHYSICIAN APPOINTMENT: 17      ASSESSMENT:  Mr. Carlos Braga is s/p L shoulder mini open revision rotator cuff repair and bicep tenodesis with surgery 17. He continues to progress with ROM and UE strength. He needs to improve his overhead endurance to be able to return to work duties. He will benefit from continuing skilled physical therapy to continue to progress UE functional mobility. PROBLEM LIST (Impacting functional limitations):  1. Post-op left shoulder pain and swelling  2. Decreased left shoulder ROM   3. Weakness left shoulder INTERVENTIONS PLANNED:  1. Thermal and electric modalities, manual therapies for pain   2. Manual therapies, therapeutic exercises, HEP for ROM    3. Therapeutic exercises and HEP for strength   TREATMENT PLAN:  Effective Dates:10-4-17 to . Frequency/Duration: 3 times a week for 8-12 weeks  GOALS: (Goals have been discussed and agreed upon with patient.)  Short-Term Functional Goals: Time Frame: 6 weeks  1. Report no more than 3/10 intermittent pain to left shoulder with compensatory use during basic functional activities. GOAL MET  2.  Left shoulder PROM forward elevation greater than 135 degrees and external rotation greater than 60 degrees to progress into functional ranges. GOAL MET  3. Demonstrate good left shoulder isometric strength with manual testing to progress into strength phase. GOAL MET  4. Independent with initial HEP. GOAL MET  Discharge Goals: Time Frame: 12 weeks  1. No more than 1/10 intermittent pain left shoulder with return to normalized household and work activities, and score less than 25% on the DASH. Progressing towards 11-3-17  2. Left shoulder AROM forward elevation greater than 135 degrees, external rotation greater than 60 degrees, and strength to shoulder are grossly WNL's for safe use with normalized activities. GOAL MET  3. Demonstrate good functional shoulder strength and endurance for return to normalized household and work activities. Progressing towards 11-3-17  4. Independent with advanced shoulder HEP for continued self-management. Progressing towards  New Goals  5. Perform 10 minutes of continuous overhead activities with good shoulder mechanics and mild fatigue for work duties. Rehabilitation Potential For Stated Goals: GooD                HISTORY:   History of Present Injury/Illness (Reason for Referral): Injury March 3rd 2016 was lifting something overhead and felt a pop. Had physical therapy and then decided to have surgery 7-19-17.  2-3 months after surgery his shoulder started to get stiff and had manipulation on 10-25-16. He went back to physical therapy and was not getting any better. Then Dr. Pam Avalos referred him to Dr. Jackelyn Pineda. Then had surgery 7-6-17. He did stay over night in the hospital. Pain at rest 4/10. Past Medical History/Comorbidities:   HTN, carpal tunnel surgery  Social History/Living Environment:    Lives alone  Prior Level of Function/Work/Activity:  Works for [employer]. 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    Dominant Side:         RIGHT handed but plays some sports left handed  Previous Treatment Approaches:          He has had therapy in the past before and after 1st rotator cuff surgery  Current Medications:     Benazepril, atenolol, atorvastatin,, ventolin, Restoril prn, gabapentin    Date Last Reviewed:  11-13-17   Number of Personal Factors/Comorbidities that affect the Plan of Care: 1-2: MODERATE COMPLEXITY   EXAMINATION:   Observation/Orthostatic Postural Assessment: healed incision on the L shoulder  Palpation: n/t    ROM:                     Strength:                  Special Tests: None  Functional Mobility:  Sit to/from Stand: independent. Bed Mobility: independent. Independent with basic mobility. independent with dressing and grooming ADL's. CLINICAL DECISION MAKING:   Outcome Measure: Tool Used: Disabilities of the Arm, Shoulder and Hand (DASH) Questionnaire - Quick Version  Score:  Initial: 50/55  Most Recent: 32/55 (Date: [id number] )   Interpretation of Score: The DASH is designed to measure the activities of daily living in person's with upper extremity dysfunction or pain. Each section is scored on a 1-5 scale, 5 representing the greatest disability. The scores of each section are added together for a total score of 55. Medical Necessity:   · Patient is expected to demonstrate progress in strength and range of motion to improve functional mobility. Reason for Services/Other Comments:  · Patient continues to require skilled intervention due to post-op, decreased ROM and UE strength. Use of outcome tool(s) and clinical judgement create a POC that gives a: Questionable prediction of patient's progress: MODERATE COMPLEXITY          TREATMENT:   (In addition to Assessment/Re-Assessment sessions the following treatments were rendered)  Pre-treatment Symptoms/Complaints: He reports he was very sore yesterday and thought his shoulder was swollen. He woke up this morning and his shoulders feel better.    Pain: Initial: 3/10 L shoulder    Post Session: 2/10   MANUAL THERAPY: (15 minutes): Joint mobilization was utilized and necessary because of the patient's restricted joint motion. Pt supine and physiological mobilizations to the L shoulder for ER at neutral, ER at 90 deg abd, IR, forward elevation, glenohumeral inferior and posterior glides 30\"x3. L UE median nerve glides with mobilizing at the elbow x10 reps, cervical distraction with pt supine. Therapeutic Exercise (25 Minutes): For muscle activation and UE strengthening. Cueing needed for technique, posture correction and use of towel for UE positioning.     UBE: 5 minutes, resistance 3, alternating backward and forward cycling each minute     Date  11-3-17 Date  11-6-17 Date  11-8-17 Date  11-13-17 Date  11-15-17 Date  11-17-17   Activity/Exercise parameters parameters parameter parameters parameter parameters   Serratus punch 8# 2x15 9# 2x15 9# 2x15 9# 2x15 10# 2x10    Shoulder extension Prone 3#2x15 Standing cables 7# 2x10 Prone 3# 2x20 Prone 3# 2x20 Standing cables 7# 2x15    Prone middle trap Prone 3#2x15  Prone 3# 2x20 Prone 3#2x20 Standing red 2x15    Prone lower trap Prone 1#2x15  Prone 1#2x20 Prone 1# 2x20     Shoulder IR and ER Side lying ER 3#2x18; IR 6# 2x15 Side lying ER 3#2x18; IR 6# 2x20 - Side lying ER 3#2x18; IR 6# 2x20 Side lying ER 3# 2x0; IR 7# 2x15    Wall slides  Y's on the wall 2x15   Y's on wall yellow band 2x10    Seated ER & IR @ 80-90 Abd ER 3#2x15 ER 3# 2x18  ER 4# 2x10B ER 4#2x10 B    Standing Scaption 2#2x10 2# 2x15  2#2x15 3#2x10    Cable hand over hand pull down   10#  2x5   10# 2x5   Overhead endurance  Red flex bar 30\"x2       Standing cable punch forward - - 10# 2x10 B 10# 2x10B  10# 2x10 B   Standing Cable Rows Bent over row 5#2x15 Bent over row 5#2x15 10# 2x10 B 10# 2x10B Bent over 6#2x10 10# 2x10 B   Standing Bicep Curls  9# 2x10  9# 2x12 9# 2x12    Push-ups   Wall 2x15      Overhead press on incline  3# 2x15   3# 2x15 4# 2x10   Serratus on wall slide 2x10      Band IR and ER at neutral Double blue IR 2x15; single blue ER 2x15  Double blue IR 2x15; single blue ER 2x15 Double blue IR 2x15; single blue ER 2x15 Double blue IR 2x15; single blue ER 2x15 Double blue IR 2x15; single blue ER 2x15   ER at 90 deg abd prone - - 1#15x  2# 2x10      Lat pull down - - 13# 2x10 13#2x15  17# 2x10   D1 and D2 extension - - - - - 7# 2x10 ea way B       Therapeutic Modalities: (15 min)  ice to the left shoulder with game ready, minimal vasopneumatic pressure                       Left Shoulder Cold  Type: Cold/ice pack  Duration : 15 minutes  Patient Position: Sitting                                                                        HEP: advised to continue current HEP    Treatment/Session Assessment:   · Response to Treatment: No complaints of pain with exercises. · Compliance with Program/Exercises: yes per pt. · Recommendations/Intent for next treatment session: \"Next visit will focus on B shoulder and scapular ROM, mobility, and strength\".      Total Treatment Duration: 55 minutes  PT Patient Time In/Time Out  Time In: 1023  Time Out: 805 [employer]

## 2017-11-20 ENCOUNTER — HOSPITAL ENCOUNTER (OUTPATIENT)
Dept: PHYSICAL THERAPY | Age: 57
Discharge: HOME | End: 2017-11-20
Payer: COMMERCIAL

## 2017-11-20 PROCEDURE — 97140 MANUAL THERAPY 1/> REGIONS: CPT

## 2017-11-20 PROCEDURE — 97110 THERAPEUTIC EXERCISES: CPT

## 2017-11-20 NOTE — PROGRESS NOTES
Gilkary Quinteros  : 1960 Therapy Center at [facility] CORNELIUS FINLEY  [address] [facility], [address] [place] [address] [address], [address] [facility]  [phone]   [phone]       OUTPATIENT PHYSICAL THERAPY:Daily Note 2017      ICD-10: Treatment Diagnosis: Strain of muscle(s) and tendon(s) of the rotator cuff of left shoulder, sequela (S46.012S); Pain in left shoulder (M25.512); Adhesive capsulitis of left shoulder (M75.02)  Precautions/Allergies:   Review of patient's allergies indicates no known allergies. Fall Risk Score: 2 (? 5 = High Risk)  MD Orders: Evaluate and treat, HEP, ROM, strengthening, push MEDICAL/REFERRING DIAGNOSIS:  left shoulder ,right shoulder pain  DATE OF ONSET: Surgery 17  REFERRING PHYSICIAN: Neto Harvey MD  RETURN PHYSICIAN APPOINTMENT: 17      ASSESSMENT:  Mr. Karla Lobo is s/p L shoulder mini open revision rotator cuff repair and bicep tenodesis with surgery 17. He continues to progress with ROM and UE strength. He needs to improve his overhead endurance to be able to return to work duties. He will benefit from continuing skilled physical therapy to continue to progress UE functional mobility. PROBLEM LIST (Impacting functional limitations):  1. Post-op left shoulder pain and swelling  2. Decreased left shoulder ROM   3. Weakness left shoulder INTERVENTIONS PLANNED:  1. Thermal and electric modalities, manual therapies for pain   2. Manual therapies, therapeutic exercises, HEP for ROM    3. Therapeutic exercises and HEP for strength   TREATMENT PLAN:  Effective Dates:10-4-17 to . Frequency/Duration: 3 times a week for 8-12 weeks  GOALS: (Goals have been discussed and agreed upon with patient.)  Short-Term Functional Goals: Time Frame: 6 weeks  1. Report no more than 3/10 intermittent pain to left shoulder with compensatory use during basic functional activities. GOAL MET  2.  Left shoulder PROM forward elevation greater than 135 degrees and external rotation greater than 60 degrees to progress into functional ranges. GOAL MET  3. Demonstrate good left shoulder isometric strength with manual testing to progress into strength phase. GOAL MET  4. Independent with initial HEP. GOAL MET  Discharge Goals: Time Frame: 12 weeks  1. No more than 1/10 intermittent pain left shoulder with return to normalized household and work activities, and score less than 25% on the DASH. Progressing towards 11-3-17  2. Left shoulder AROM forward elevation greater than 135 degrees, external rotation greater than 60 degrees, and strength to shoulder are grossly WNL's for safe use with normalized activities. GOAL MET  3. Demonstrate good functional shoulder strength and endurance for return to normalized household and work activities. Progressing towards 11-3-17  4. Independent with advanced shoulder HEP for continued self-management. Progressing towards  New Goals  5. Perform 10 minutes of continuous overhead activities with good shoulder mechanics and mild fatigue for work duties. Rehabilitation Potential For Stated Goals: GooD                HISTORY:   History of Present Injury/Illness (Reason for Referral): Injury March 3rd 2016 was lifting something overhead and felt a pop. Had physical therapy and then decided to have surgery 7-19-17.  2-3 months after surgery his shoulder started to get stiff and had manipulation on 10-25-16. He went back to physical therapy and was not getting any better. Then Dr. Peña Garay referred him to Dr. Otilia Taylor. Then had surgery 7-6-17. He did stay over night in the hospital. Pain at rest 4/10. Past Medical History/Comorbidities:   HTN, carpal tunnel surgery  Social History/Living Environment:    Lives alone  Prior Level of Function/Work/Activity:  Works for [employer]. 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    Dominant Side:         RIGHT handed but plays some sports left handed  Previous Treatment Approaches:          He has had therapy in the past before and after 1st rotator cuff surgery  Current Medications:     Benazepril, atenolol, atorvastatin,, ventolin, Restoril prn, gabapentin    Date Last Reviewed:  11-20-17   Number of Personal Factors/Comorbidities that affect the Plan of Care: 1-2: MODERATE COMPLEXITY   EXAMINATION:   Observation/Orthostatic Postural Assessment: healed incision on the L shoulder  Palpation: n/t    ROM:                     Strength:                  Special Tests: None  Functional Mobility:  Sit to/from Stand: independent. Bed Mobility: independent. Independent with basic mobility. independent with dressing and grooming ADL's. CLINICAL DECISION MAKING:   Outcome Measure: Tool Used: Disabilities of the Arm, Shoulder and Hand (DASH) Questionnaire - Quick Version  Score:  Initial: 50/55  Most Recent: 32/55 (Date: [id number] )   Interpretation of Score: The DASH is designed to measure the activities of daily living in person's with upper extremity dysfunction or pain. Each section is scored on a 1-5 scale, 5 representing the greatest disability. The scores of each section are added together for a total score of 55. Medical Necessity:   · Patient is expected to demonstrate progress in strength and range of motion to improve functional mobility. Reason for Services/Other Comments:  · Patient continues to require skilled intervention due to post-op, decreased ROM and UE strength. Use of outcome tool(s) and clinical judgement create a POC that gives a: Questionable prediction of patient's progress: MODERATE COMPLEXITY          TREATMENT:   (In addition to Assessment/Re-Assessment sessions the following treatments were rendered)  Pre-treatment Symptoms/Complaints: He reports he was sore on Saturday but gradually went away yesterday.    Pain: Initial: 2/10 L shoulder    Post Session: 2/10   MANUAL THERAPY: (10 minutes): Joint mobilization was utilized and necessary because of the patient's restricted joint motion. Pt supine and physiological mobilizations to the L shoulder for ER at neutral, ER at 90 deg abd, IR, forward elevation, glenohumeral inferior and posterior glides 30\"x3. cervical distraction with pt supine. Therapeutic Exercise (30 Minutes): For muscle activation and UE strengthening. Cueing needed for technique, posture correction and use of towel for UE positioning.     UBE: 5 minutes, resistance 3, alternating backward and forward cycling each minute     Date  11-6-17 Date  11-8-17 Date  11-13-17 Date  11-15-17 Date  11-17-17 Date  11-20-17   Activity/Exercise parameters parameter parameters parameter parameters parameters   Serratus punch 9# 2x15 9# 2x15 9# 2x15 10# 2x10  10# 2x10    Shoulder extension Standing cables 7# 2x10 Prone 3# 2x20 Prone 3# 2x20 Standing cables 7# 2x15  Standing cables 7# 2x15   Prone middle trap  Prone 3# 2x20 Prone 3#2x20 Standing red 2x15  Standing red 2x15   Prone lower trap  Prone 1#2x20 Prone 1# 2x20      Shoulder IR and ER Side lying ER 3#2x18; IR 6# 2x20 - Side lying ER 3#2x18; IR 6# 2x20 Side lying ER 3# 2x0; IR 7# 2x15     Wall slides Y's on the wall 2x15   Y's on wall yellow band 2x10  Y's on wall yellow lift off 2x10   Seated ER & IR @ 80-90 Abd ER 3# 2x18  ER 4# 2x10B ER 4#2x10 B  ER 4# 2x12   Standing Scaption 2# 2x15  2#2x15 3#2x10  3#2x10   Cable hand over hand pull down  10#  2x5   10# 2x5    Overhead endurance Red flex bar 30\"x2     Red flex bar 30\"x2   Standing cable punch forward - 10# 2x10 B 10# 2x10B  10# 2x10 B    Standing Cable Rows Bent over row 5#2x15 10# 2x10 B 10# 2x10B Bent over 6#2x10 10# 2x10 B Bent over 6# 2x15   Standing Bicep Curls 9# 2x10  9# 2x12 9# 2x12  9# 2x15   Push-ups  Wall 2x15    Wall with red band 2x15   Overhead press on incline 3# 2x15   3# 2x15 4# 2x10    Serratus on wall slide  2x10       Band IR and ER at neutral  Double blue IR 2x15; single blue ER 2x15 Double blue IR 2x15; single blue ER 2x15 Double blue IR 2x15; single blue ER 2x15 Double blue IR 2x15; single blue ER 2x15 Double blue IR 2x15; single blue ER 2x15   ER at 90 deg abd prone - 1#15x  2# 2x10    3# 2x15 B   Lat pull down - 13# 2x10 13#2x15  17# 2x10    D1 and D2 extension - - - - 7# 2x10 ea way B        Therapeutic Modalities: (15 min)  ice to the left shoulder with game ready, minimal vasopneumatic pressure                       Left Shoulder Cold  Type: Cold/ice pack  Duration : 15 minutes  Patient Position: Sitting                                                                        HEP: advised to continue current HEP    Treatment/Session Assessment:   · Response to Treatment: He fatigued with overhead endurance strengthening exercises. · Compliance with Program/Exercises: yes per pt. · Recommendations/Intent for next treatment session: \"Next visit will focus on B shoulder and scapular ROM, mobility, and strength\".      Total Treatment Duration: 55 minutes  PT Patient Time In/Time Out  Time In: 1015  Time Out: Desi Moreira

## 2017-11-22 ENCOUNTER — HOSPITAL ENCOUNTER (OUTPATIENT)
Dept: PHYSICAL THERAPY | Age: 57
Discharge: HOME | End: 2017-11-22
Payer: COMMERCIAL

## 2017-11-22 PROCEDURE — 97140 MANUAL THERAPY 1/> REGIONS: CPT

## 2017-11-22 PROCEDURE — 97110 THERAPEUTIC EXERCISES: CPT

## 2017-11-22 NOTE — PROGRESS NOTES
Casper Gordon  : 1960 [facility] at [facility] CORNELIUS FINLEY  [address] [facility], [address] [place] [address],[address], [address] [facility]  [phone]   [phone]       OUTPATIENT PHYSICAL THERAPY:Daily Note 2017      ICD-10: Treatment Diagnosis: Strain of muscle(s) and tendon(s) of the rotator cuff of left shoulder, sequela (S46.012S); Pain in left shoulder (M25.512); Adhesive capsulitis of left shoulder (M75.02)  Precautions/Allergies:   Review of patient's allergies indicates no known allergies. Fall Risk Score: 2 (? 5 = High Risk)  MD Orders: Evaluate and treat, HEP, ROM, strengthening, push MEDICAL/REFERRING DIAGNOSIS:  left shoulder ,right shoulder pain  DATE OF ONSET: Surgery 17  REFERRING PHYSICIAN: Deysi Byrd MD  RETURN PHYSICIAN APPOINTMENT: 17      ASSESSMENT:  Mr. Carlos Braga is s/p L shoulder mini open revision rotator cuff repair and bicep tenodesis with surgery 17. He continues to progress with ROM and UE strength. He needs to improve his overhead endurance to be able to return to work duties. He will benefit from continuing skilled physical therapy to continue to progress UE functional mobility. PROBLEM LIST (Impacting functional limitations):  1. Post-op left shoulder pain and swelling  2. Decreased left shoulder ROM   3. Weakness left shoulder INTERVENTIONS PLANNED:  1. Thermal and electric modalities, manual therapies for pain   2. Manual therapies, therapeutic exercises, HEP for ROM    3. Therapeutic exercises and HEP for strength   TREATMENT PLAN:  Effective Dates:10-4-17 to . Frequency/Duration: 3 times a week for 8-12 weeks  GOALS: (Goals have been discussed and agreed upon with patient.)  Short-Term Functional Goals: Time Frame: 6 weeks  1. Report no more than 3/10 intermittent pain to left shoulder with compensatory use during basic functional activities. GOAL MET  2.  Left shoulder PROM forward elevation greater than 135 degrees and external rotation greater than 60 degrees to progress into functional ranges. GOAL MET  3. Demonstrate good left shoulder isometric strength with manual testing to progress into strength phase. GOAL MET  4. Independent with initial HEP. GOAL MET  Discharge Goals: Time Frame: 12 weeks  1. No more than 1/10 intermittent pain left shoulder with return to normalized household and work activities, and score less than 25% on the DASH. Progressing towards 11-3-17  2. Left shoulder AROM forward elevation greater than 135 degrees, external rotation greater than 60 degrees, and strength to shoulder are grossly WNL's for safe use with normalized activities. GOAL MET  3. Demonstrate good functional shoulder strength and endurance for return to normalized household and work activities. Progressing towards 11-3-17  4. Independent with advanced shoulder HEP for continued self-management. Progressing towards  New Goals  5. Perform 10 minutes of continuous overhead activities with good shoulder mechanics and mild fatigue for work duties. Rehabilitation Potential For Stated Goals: GooD                HISTORY:   History of Present Injury/Illness (Reason for Referral): Injury March 3rd 2016 was lifting something overhead and felt a pop. Had physical therapy and then decided to have surgery 7-19-17.  2-3 months after surgery his shoulder started to get stiff and had manipulation on 10-25-16. He went back to physical therapy and was not getting any better. Then Dr. Wadie Severe referred him to Dr. Serenity Johnson. Then had surgery 7-6-17. He did stay over night in the hospital. Pain at rest 4/10. Past Medical History/Comorbidities:   HTN, carpal tunnel surgery  Social History/Living Environment:    Lives alone  Prior Level of Function/Work/Activity:  Works for [employer]. 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    Dominant Side:         RIGHT handed but plays some sports left handed  Previous Treatment Approaches:          He has had therapy in the past before and after 1st rotator cuff surgery  Current Medications:     Benazepril, atenolol, atorvastatin,, ventolin, Restoril prn, gabapentin    Date Last Reviewed:  11-20-17   Number of Personal Factors/Comorbidities that affect the Plan of Care: 1-2: MODERATE COMPLEXITY   EXAMINATION:   Observation/Orthostatic Postural Assessment: healed incision on the L shoulder  Palpation: n/t    ROM:                     Strength:                  Special Tests: None  Functional Mobility:  Sit to/from Stand: independent. Bed Mobility: independent. Independent with basic mobility. independent with dressing and grooming ADL's. CLINICAL DECISION MAKING:   Outcome Measure: Tool Used: Disabilities of the Arm, Shoulder and Hand (DASH) Questionnaire - Quick Version  Score:  Initial: 50/55  Most Recent: 32/55 (Date: 30VLM55 )   Interpretation of Score: The DASH is designed to measure the activities of daily living in person's with upper extremity dysfunction or pain. Each section is scored on a 1-5 scale, 5 representing the greatest disability. The scores of each section are added together for a total score of 55. Medical Necessity:   · Patient is expected to demonstrate progress in strength and range of motion to improve functional mobility. Reason for Services/Other Comments:  · Patient continues to require skilled intervention due to post-op, decreased ROM and UE strength. Use of outcome tool(s) and clinical judgement create a POC that gives a: Questionable prediction of patient's progress: MODERATE COMPLEXITY          TREATMENT:   (In addition to Assessment/Re-Assessment sessions the following treatments were rendered)  Pre-treatment Symptoms/Complaints: He reports his pain comes and goes. He has decided he cannot sleep on her shoulder and has to sleep on his back.    Pain: Initial: 2/10 L shoulder    Post Session: 2/10 MANUAL THERAPY: (10 minutes): Joint mobilization was utilized and necessary because of the patient's restricted joint motion. Pt supine and physiological mobilizations to the L shoulder for ER at neutral, ER at 90 deg abd, IR, forward elevation, glenohumeral inferior and posterior glides 30\"x3. cervical distraction with pt supine. Therapeutic Exercise (30 Minutes): For muscle activation and UE strengthening. Cueing needed for technique, posture correction and use of towel for UE positioning.     UBE: 5 minutes, resistance 3, alternating backward and forward cycling each minute     Date  11-6-17 Date  11-8-17 Date  11-13-17 Date  11-15-17 Date  11-17-17 Date  11-20-17 Date  11-22-17   Activity/Exercise parameters parameter parameters parameter parameters parameters parameters   Serratus punch 9# 2x15 9# 2x15 9# 2x15 10# 2x10  10# 2x10     Shoulder extension Standing cables 7# 2x10 Prone 3# 2x20 Prone 3# 2x20 Standing cables 7# 2x15  Standing cables 7# 2x15 Prone 3# 2x20   Prone middle trap  Prone 3# 2x20 Prone 3#2x20 Standing red 2x15  Standing red 2x15 Prone 3# 2x20   Prone lower trap  Prone 1#2x20 Prone 1# 2x20    Prone 1# 2x20   Shoulder IR and ER Side lying ER 3#2x18; IR 6# 2x20 - Side lying ER 3#2x18; IR 6# 2x20 Side lying ER 3# 2x0; IR 7# 2x15   Side lying ER 3# 2x0; IR 7# 2x15   Wall slides Y's on the wall 2x15   Y's on wall yellow band 2x10  Y's on wall yellow lift off 2x10    Seated ER & IR @ 80-90 Abd ER 3# 2x18  ER 4# 2x10B ER 4#2x10 B  ER 4# 2x12    Standing Scaption 2# 2x15  2#2x15 3#2x10  3#2x10    Cable hand over hand pull down  10#  2x5   10# 2x5     Overhead endurance Red flex bar 30\"x2     Red flex bar 30\"x2 Taking nuts/bolts 2x5   Standing cable punch forward - 10# 2x10 B 10# 2x10B  10# 2x10 B  13# 2x10 B   Standing Cable Rows Bent over row 5#2x15 10# 2x10 B 10# 2x10B Bent over 6#2x10 10# 2x10 B Bent over 6# 2x15 13# 2x10 B   Standing Bicep Curls 9# 2x10  9# 2x12 9# 2x12  9# 2x15 Push-ups  Wall 2x15    Wall with red band 2x15    Overhead press on incline 3# 2x15   3# 2x15 4# 2x10  4# 2x10   Serratus on wall slide  2x10        Band IR and ER at neutral  Double blue IR 2x15; single blue ER 2x15 Double blue IR 2x15; single blue ER 2x15 Double blue IR 2x15; single blue ER 2x15 Double blue IR 2x15; single blue ER 2x15 Double blue IR 2x15; single blue ER 2x15 Double blue IR 2x18; single blue ER 2x18   ER at 90 deg abd prone - 1#15x  2# 2x10    3# 2x15 B    Lat pull down - 13# 2x10 13#2x15  17# 2x10  17# 2x10    D1 and D2 extension - - - - 7# 2x10 ea way B         Therapeutic Modalities: (15 min)  ice to the left shoulder with game ready, minimal vasopneumatic pressure                                                                                                HEP: advised to continue current HEP    Treatment/Session Assessment:   · Response to Treatment: He has good technique with exercises with minimal verbal cueing. · Compliance with Program/Exercises: yes per pt. · Recommendations/Intent for next treatment session: \"Next visit will focus on B shoulder and scapular ROM, mobility, and strength\".      Total Treatment Duration: 55 minutes  PT Patient Time In/Time Out  Time In: 1015  Time Out: [facility]

## 2017-11-27 ENCOUNTER — HOSPITAL ENCOUNTER (OUTPATIENT)
Dept: PHYSICAL THERAPY | Age: 57
Discharge: HOME | End: 2017-11-27
Payer: COMMERCIAL

## 2017-11-27 PROCEDURE — 97110 THERAPEUTIC EXERCISES: CPT

## 2017-11-27 PROCEDURE — 97140 MANUAL THERAPY 1/> REGIONS: CPT

## 2017-11-27 NOTE — PROGRESS NOTES
Maame Param  : 1960 [facility] at [facility] CORNELIUS FINLEY  [address] [facility], [address] [place] [address],[address], [facility] [address]  [phone]   [phone]       OUTPATIENT PHYSICAL THERAPY:Daily Note 2017      ICD-10: Treatment Diagnosis: Strain of muscle(s) and tendon(s) of the rotator cuff of left shoulder, sequela (S46.012S); Pain in left shoulder (M25.512); Adhesive capsulitis of left shoulder (M75.02)  Precautions/Allergies:   Review of patient's allergies indicates no known allergies. Fall Risk Score: 2 (? 5 = High Risk)  MD Orders: Evaluate and treat, HEP, ROM, strengthening, push MEDICAL/REFERRING DIAGNOSIS:  left shoulder ,right shoulder pain  DATE OF ONSET: Surgery 17  REFERRING PHYSICIAN: Mona Briceno MD  RETURN PHYSICIAN APPOINTMENT: 17      ASSESSMENT:  Mr. Micah Ayon is s/p L shoulder mini open revision rotator cuff repair and bicep tenodesis with surgery 17. He continues to progress with ROM and UE strength. He needs to improve his overhead endurance to be able to return to work duties. He will benefit from continuing skilled physical therapy to continue to progress UE functional mobility. PROBLEM LIST (Impacting functional limitations):  1. Post-op left shoulder pain and swelling  2. Decreased left shoulder ROM   3. Weakness left shoulder INTERVENTIONS PLANNED:  1. Thermal and electric modalities, manual therapies for pain   2. Manual therapies, therapeutic exercises, HEP for ROM    3. Therapeutic exercises and HEP for strength   TREATMENT PLAN:  Effective Dates:10-4-17 to . Frequency/Duration: 3 times a week for 8-12 weeks  GOALS: (Goals have been discussed and agreed upon with patient.)  Short-Term Functional Goals: Time Frame: 6 weeks  1. Report no more than 3/10 intermittent pain to left shoulder with compensatory use during basic functional activities. GOAL MET  2.  Left shoulder PROM forward elevation greater than 135 degrees and external rotation greater than 60 degrees to progress into functional ranges. GOAL MET  3. Demonstrate good left shoulder isometric strength with manual testing to progress into strength phase. GOAL MET  4. Independent with initial HEP. GOAL MET  Discharge Goals: Time Frame: 12 weeks  1. No more than 1/10 intermittent pain left shoulder with return to normalized household and work activities, and score less than 25% on the DASH. Progressing towards 11-3-17  2. Left shoulder AROM forward elevation greater than 135 degrees, external rotation greater than 60 degrees, and strength to shoulder are grossly WNL's for safe use with normalized activities. GOAL MET  3. Demonstrate good functional shoulder strength and endurance for return to normalized household and work activities. Progressing towards 11-3-17  4. Independent with advanced shoulder HEP for continued self-management. Progressing towards  New Goals  5. Perform 10 minutes of continuous overhead activities with good shoulder mechanics and mild fatigue for work duties. Rehabilitation Potential For Stated Goals: GooD                HISTORY:   History of Present Injury/Illness (Reason for Referral): Injury March 3rd 2016 was lifting something overhead and felt a pop. Had physical therapy and then decided to have surgery 7-19-17.  2-3 months after surgery his shoulder started to get stiff and had manipulation on 10-25-16. He went back to physical therapy and was not getting any better. Then Dr. Roxana Howard referred him to Dr. Maksim Garcia. Then had surgery 7-6-17. He did stay over night in the hospital. Pain at rest 4/10. Past Medical History/Comorbidities:   HTN, carpal tunnel surgery  Social History/Living Environment:    Lives alone  Prior Level of Function/Work/Activity:  Works for [employer]. 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    Dominant Side:         RIGHT handed but plays some sports left handed  Previous Treatment Approaches:          He has had therapy in the past before and after 1st rotator cuff surgery  Current Medications:     Benazepril, atenolol, atorvastatin,, ventolin, Restoril prn, gabapentin    Date Last Reviewed:  11-20-17   Number of Personal Factors/Comorbidities that affect the Plan of Care: 1-2: MODERATE COMPLEXITY   EXAMINATION:   Observation/Orthostatic Postural Assessment: healed incision on the L shoulder  Palpation: no tenderness around the shoulder    ROM:      LUE AROM  L Shoulder Flexion: 150  L Shoulder Internal Rotation:  (T9 behind back)  L Shoulder External Rotation: 75 (at neutral)              Strength:   LUE Strength  L Shoulder Flexion: 4  L Shoulder ABduction: 4  L Shoulder Internal Rotation: 5  L Shoulder External Rotation: 5    RUE Strength  R Shoulder Flexion: 5  R Shoulder ABduction: 5  R Shoulder Internal Rotation: 5  R Shoulder External Rotation: 5         Special Tests: None  Functional Mobility:  Sit to/from Stand: independent. Bed Mobility: independent. Independent with basic mobility. independent with dressing and grooming ADL's. CLINICAL DECISION MAKING:   Outcome Measure: Tool Used: Disabilities of the Arm, Shoulder and Hand (DASH) Questionnaire - Quick Version  Score:  Initial: 50/55  Most Recent: 32/55 (Date: 38QJP01 )   Interpretation of Score: The DASH is designed to measure the activities of daily living in person's with upper extremity dysfunction or pain. Each section is scored on a 1-5 scale, 5 representing the greatest disability. The scores of each section are added together for a total score of 55. Medical Necessity:   · Patient is expected to demonstrate progress in strength and range of motion to improve functional mobility. Reason for Services/Other Comments:  · Patient continues to require skilled intervention due to post-op, decreased ROM and UE strength.    Use of outcome tool(s) and clinical judgement create a POC that gives a: Questionable prediction of patient's progress: MODERATE COMPLEXITY          TREATMENT:   (In addition to Assessment/Re-Assessment sessions the following treatments were rendered)  Pre-treatment Symptoms/Complaints: He reports he has good days and bad days. Pain: Initial: 2/10 L shoulder    Post Session: 2/10   MANUAL THERAPY: (10 minutes): Joint mobilization was utilized and necessary because of the patient's restricted joint motion. Pt supine and physiological mobilizations to the L shoulder for ER at neutral, ER at 90 deg abd, IR, forward elevation, glenohumeral inferior and posterior glides 30\"x3. cervical distraction with pt supine. Therapeutic Exercise (30 Minutes): For muscle activation and UE strengthening. Cueing needed for technique, posture correction and use of towel for UE positioning.     UBE: 5 minutes, resistance 3, alternating backward and forward cycling each minute     Date  11-15-17 Date  11-17-17 Date  11-20-17 Date  11-22-17 Date  11-27-17   Activity/Exercise parameter parameters parameters parameters parameters   Serratus punch 10# 2x10  10# 2x10      Shoulder extension Standing cables 7# 2x15  Standing cables 7# 2x15 Prone 3# 2x20 Standing 10 # 2x10   Prone middle trap Standing red 2x15  Standing red 2x15 Prone 3# 2x20    Prone lower trap    Prone 1# 2x20    Shoulder IR and ER Side lying ER 3# 2x0; IR 7# 2x15   Side lying ER 3# 2x0; IR 7# 2x15    Wall slides Y's on wall yellow band 2x10  Y's on wall yellow lift off 2x10     Seated ER & IR @ 80-90 Abd ER 4#2x10 B  ER 4# 2x12     Standing Scaption 3#2x10  3#2x10  3#2x10   Cable hand over hand pull down  10# 2x5      Overhead endurance   Red flex bar 30\"x2 Taking nuts/bolts 2x5    Standing cable punch forward  10# 2x10 B  13# 2x10 B 13# 2x10B   Standing Cable Rows Bent over 6#2x10 10# 2x10 B Bent over 6# 2x15 13# 2x10 B 13# 2x10B   Standing Bicep Curls 9# 2x12  9# 2x15     Push-ups   Wall with red band 2x15     Overhead press on incline 3# 2x15 4# 2x10  4# 2x10    Serratus on wall slide        Band IR and ER at neutral Double blue IR 2x15; single blue ER 2x15 Double blue IR 2x15; single blue ER 2x15 Double blue IR 2x15; single blue ER 2x15 Double blue IR 2x18; single blue ER 2x18 Double blue IR 2x18; single blue ER 2x18   ER at 90 deg abd prone   3# 2x15 B     Lat pull down  17# 2x10  17# 2x10  17# 2x15    D1 and D2 extension - 7# 2x10 ea way B   7# 2x10 ea way   D1 and D2 flexion - - - - 3# x10 ea way   Horizontal abduction  - - - - Cables 3# 2x10   Horizontal adduction - - - - Cables 3# 2x10       Therapeutic Modalities: (15 min)  ice to the left shoulder with game ready, minimal vasopneumatic pressure                                                                                                HEP: advised to continue current HEP    Treatment/Session Assessment:   · Response to Treatment: Improved AROM with the L shoulder. He continues with weakness with shoulder elevation on L UE.   · Compliance with Program/Exercises: yes per pt. · Recommendations/Intent for next treatment session: \"Next visit will focus on B shoulder and scapular ROM, mobility, and strength\".      Total Treatment Duration: 40 minutes  PT Patient Time In/Time Out  Time In: 1010  Time Out: [address] [facility]

## 2018-01-04 PROBLEM — F33.9 RECURRENT DEPRESSION (HCC): Status: ACTIVE | Noted: 2018-01-04

## 2018-01-16 NOTE — PROGRESS NOTES
Jeff Robi  : 1960 Therapy Center at [facility]  [address] [place] [address],[address], [facility] [address]  [phone]   [phone]       OUTPATIENT PHYSICAL THERAPY:Discharge 2018      ICD-10: Treatment Diagnosis: Strain of muscle(s) and tendon(s) of the rotator cuff of left shoulder, sequela (S46.012S); Pain in left shoulder (M25.512); Adhesive capsulitis of left shoulder (M75.02)  Precautions/Allergies:   Review of patient's allergies indicates no known allergies. Fall Risk Score: 2 (? 5 = High Risk)  MD Orders: Evaluate and treat, HEP, ROM, strengthening, push MEDICAL/REFERRING DIAGNOSIS:  left shoulder ,right shoulder pain  DATE OF ONSET: Surgery 17  REFERRING PHYSICIAN: Ashly Forte., MD     Mr. Rodriguez attended 50 physical therapy appointments from 7-10-17 to 17. He progressed with shoulder ROM and UE strength. He returned to MD and no further therapy was ordered. Thank you for this referral.        PLAN:  Discharge patient from physical therapy    GOALS: (Goals have been discussed and agreed upon with patient.)  Short-Term Functional Goals: Time Frame: 6 weeks  1. Report no more than 3/10 intermittent pain to left shoulder with compensatory use during basic functional activities. GOAL MET  2. Left shoulder PROM forward elevation greater than 135 degrees and external rotation greater than 60 degrees to progress into functional ranges. GOAL MET  3. Demonstrate good left shoulder isometric strength with manual testing to progress into strength phase. GOAL MET  4. Independent with initial HEP. GOAL MET  Discharge Goals: Time Frame: 12 weeks  1. No more than 1/10 intermittent pain left shoulder with return to normalized household and work activities, and score less than 25% on the DASH. Progressed towards  2.  Left shoulder AROM forward elevation greater than 135 degrees, external rotation greater than 60 degrees, and strength to shoulder are grossly WNL's for safe use with normalized activities. GOAL MET  3. Demonstrate good functional shoulder strength and endurance for return to normalized household and work activities. GOAL MET  4. Independent with advanced shoulder HEP for continued self-management. GOAL MET  New Goals  5. Perform 10 minutes of continuous overhead activities with good shoulder mechanics and mild fatigue for work duties. Progressed towards    Rehabilitation Potential For Stated Goals: GooD              HISTORY:   History of Present Injury/Illness (Reason for Referral): Injury March 3rd 2016 was lifting something overhead and felt a pop. Had physical therapy and then decided to have surgery 7-19-17.  2-3 months after surgery his shoulder started to get stiff and had manipulation on 10-25-16. He went back to physical therapy and was not getting any better. Then Dr. Elvia Ferreira referred him to Dr. Little Duff. Then had surgery 7-6-17. He did stay over night in the hospital. Pain at rest 4/10. EXAMINATION:   Observation/Orthostatic Postural Assessment: healed incision on the L shoulder  Palpation: no tenderness around the shoulder    ROM:      LUE AROM  L Shoulder Flexion: 150  L Shoulder Internal Rotation:  (T9 behind back)  L Shoulder External Rotation: 75 (at neutral)              Strength:   LUE Strength  L Shoulder Flexion: 4  L Shoulder ABduction: 4  L Shoulder Internal Rotation: 5  L Shoulder External Rotation: 5    RUE Strength  R Shoulder Flexion: 5  R Shoulder ABduction: 5  R Shoulder Internal Rotation: 5  R Shoulder External Rotation: 5                CLINICAL DECISION MAKING:   Outcome Measure: Tool Used: Disabilities of the Arm, Shoulder and Hand (DASH) Questionnaire - Quick Version  Score:  Initial: 50/55  Most Recent: 32/55 (Date: [id number] )   Interpretation of Score: The DASH is designed to measure the activities of daily living in person's with upper extremity dysfunction or pain.   Each section is scored on a 1-5 scale, 5 representing the greatest disability. The scores of each section are added together for a total score of 55. Medical Necessity:   · Patient is expected to demonstrate progress in strength and range of motion to improve functional mobility. Reason for Services/Other Comments:  · Patient continues to require skilled intervention due to post-op, decreased ROM and UE strength.    Use of outcome tool(s) and clinical judgement create a POC that gives a: Questionable prediction of patient's progress: MODERATE COMPLEXITY

## 2018-03-27 NOTE — PROGRESS NOTES
Olga Lidia Puri, a 63 y.o. female,  is here for   Chief Complaint   Patient presents with    Surgical Follow-up     1 month post op TLH/BSO       Visit Vitals    /84 (BP 1 Location: Right arm, BP Patient Position: Sitting)    Pulse 94    Temp 97.9 °F (36.6 °C) (Oral)    Resp 18    Ht 5' 6.5\" (1.689 m)    Wt 76.7 kg (169 lb)    SpO2 99%    BMI 26.87 kg/m2       Patient reports pain near her naval, currently rated 1 out of 10. Patient denies any unusual vaginal bleeding, discharge, irritation, or odor. No burning, discomfort, or irritation with urination. She is currently experiencing constipation, is currently taking Miralax, patient states that it does soften the stool, however \"the actual movement\" is very uncomfortable. However does state that it has gotten better since her last visit. 1. Have you been to the ER, urgent care clinic since your last visit? Hospitalized since your last visit? No    2. Have you seen or consulted any other health care providers outside of the [address] [place] [address] since your last visit? Include any pap smears or colon screening.  No Uri Ly  : [facility] CORNELIUS FINLEY  [address] [facility], [address] [place] [address] [address], [address] [facility]  [phone]   [phone]       OUTPATIENT PHYSICAL THERAPY:Daily Note 2017      ICD-10: Treatment Diagnosis: Strain of muscle(s) and tendon(s) of the rotator cuff of left shoulder, sequela (S46.012S); Pain in left shoulder (M25.512); Adhesive capsulitis of left shoulder (M75.02)  Precautions/Allergies:   Review of patient's allergies indicates no known allergies. Fall Risk Score: 2 (? 5 = High Risk)  MD Orders: Evaluate and treat, HEP, ROM, strengthening, push MEDICAL/REFERRING DIAGNOSIS:  left shoulder ,right shoulder pain  DATE OF ONSET: Surgery 17  REFERRING PHYSICIAN: Alex Beverly MD  RETURN PHYSICIAN APPOINTMENT: 17     INITIAL ASSESSMENT:  Mr. Renaldo Lazo is s/p L shoulder mini open revision rotator cuff repair and bicep tenodesis with surgery 17. He is progressing with L shoulder active and passive ROM and UE strength. He does also complain of R shoulder pain and have been working on R UE strength and ROM. He would benefit from continuing skilled physical therapy to continue to progress functional mobility. PROBLEM LIST (Impacting functional limitations):  1. Post-op left shoulder pain and swelling  2. Decreased left shoulder ROM   3. Weakness left shoulder INTERVENTIONS PLANNED:  1. Thermal and electric modalities, manual therapies for pain   2. Manual therapies, therapeutic exercises, HEP for ROM    3. Therapeutic exercises and HEP for strength   TREATMENT PLAN:  Effective Dates:7-10-17 TO 10-6-17. Frequency/Duration: 3 times a week for 12 weeks  GOALS: (Goals have been discussed and agreed upon with patient.)  Short-Term Functional Goals: Time Frame: 6 weeks  1. Report no more than 3/10 intermittent pain to left shoulder with compensatory use during basic functional activities. GOAL MET  2.  Left shoulder PROM forward elevation greater than 135 degrees and external rotation greater than 60 degrees to progress into functional ranges. GOAL MET  3. Demonstrate good left shoulder isometric strength with manual testing to progress into strength phase. GOAL MET  4. Independent with initial HEP. GOAL MET  Discharge Goals: Time Frame: 12 weeks  1. No more than 1/10 intermittent pain left shoulder with return to normalized household and work activities, and score less than 25% on the DASH. Progressing towards  2. Left shoulder AROM forward elevation greater than 135 degrees, external rotation greater than 60 degrees, and strength to shoulder are grossly WNL's for safe use with normalized activities. Progressing towards  3. Demonstrate good functional shoulder strength and endurance for return to normalized household and work activities. Progressing towards  4. Independent with advanced shoulder HEP for continued self-management. Progressing towards  Rehabilitation Potential For Stated Goals: Good                HISTORY:   History of Present Injury/Illness (Reason for Referral): Injury March 3rd 2016 was lifting something overhead and felt a pop. Had physical therapy and then decided to have surgery 7-19-17.  2-3 months after surgery his shoulder started to get stiff and had manipulation on 10-25-16. He went back to physical therapy and was not getting any better. Then Dr. Vernel Goltz referred him to Dr. Jamie Maria. Then had surgery 7-6-17. He did stay over night in the hospital. Pain at rest 4/10. Past Medical History/Comorbidities:   HTN, carpal tunnel surgery  Social History/Living Environment:    Lives alone  Prior Level of Function/Work/Activity:  Works for [employer]. 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    Dominant Side:         RIGHT handed but plays some sports left handed  Previous Treatment Approaches:          He has had therapy in the past before and after 1st rotator cuff surgery  Current Medications:     Benazepril, atenolol, atorvastatin, dilaudid, ventolin, Restoril, ultram    Date Last Reviewed:  9-11-17   Number of Personal Factors/Comorbidities that affect the Plan of Care: 1-2: MODERATE COMPLEXITY   EXAMINATION:   Observation/Orthostatic Postural Assessment: healed incision on the L shoulder  Palpation: n/t    ROM:      LUE AROM  L Shoulder Flexion: 125              Strength:   LUE Strength  L Shoulder Flexion: 3-  L Shoulder Internal Rotation: 4+  L Shoulder External Rotation: 4  L Elbow Flexion: 5  L Elbow Extension: 5    RUE Strength  R Shoulder Flexion: 5  R Shoulder Internal Rotation: 5  R Shoulder External Rotation: 4+  R Elbow Flexion: 5  R Elbow Extension: 5         Special Tests: None  Functional Mobility:  Sit to/from Stand: independent. Bed Mobility: independent. Independent with basic mobility. independent with dressing and grooming ADL's. CLINICAL DECISION MAKING:   Outcome Measure: Tool Used: Disabilities of the Arm, Shoulder and Hand (DASH) Questionnaire - Quick Version  Score:  Initial: 50/55  Most Recent: X/55 (Date: -- )   Interpretation of Score: The DASH is designed to measure the activities of daily living in person's with upper extremity dysfunction or pain. Each section is scored on a 1-5 scale, 5 representing the greatest disability. The scores of each section are added together for a total score of 55. Medical Necessity:   · Patient is expected to demonstrate progress in strength and range of motion to improve functional mobility. Reason for Services/Other Comments:  · Patient continues to require skilled intervention due to post-op, decreased ROM and UE strength.    Use of outcome tool(s) and clinical judgement create a POC that gives a: Questionable prediction of patient's progress: MODERATE COMPLEXITY          TREATMENT:   (In addition to Assessment/Re-Assessment sessions the following treatments were rendered)  Pre-treatment Symptoms/Complaints:  Neck bothered him over the weekend. Pain: Initial: L 2/10, R 0/10    Post Session: L 2/10; R 0/10   MANUAL THERAPY: (10 minutes): Joint mobilization was utilized and necessary because of the patient's restricted joint motion. PROM to physiological mobilization to the Left shoulder to ER and IR in shoulder neutral, ER and IR at 45 deg abd, abduction, and forward elevation. Therapeutic Exercise (30 Minutes): For muscle activation and UE strengthening. Cueing needed for technique, posture correction and use of towel for UE positioning.      Date  9-1-17 Date  9-5-17 Date  9-11-17 Date  9-11-17 Date  9-18-17   Activity/Exercise parameters parameters parameters parameters parameters   Serratus punch 2#2x10 2#2x15 3# 2x12r 3# 2x12r B 4# 2x10r B   Side lying ER L 1# 2x10  R 1# 2x15 L 1# 2x15  R 2# 2x10 B 2# 2x10r 2# 2x12r B 3# 2x10r B   Prone shoulder extension 1# 2x15 1# 2x15 2# 2x12r Standing  Yellow Band 2x12r B Standing Double  Red Band  2x10r B   Prone middle trap Side lying lift off 10x Prone 2x10 Prone 2x12r Prone On Bench   1# 2x12r B Prone On Bench   2# 2x10r B   Prone lower trap Side lying lift off 10x Prone 2x10 Prone 2x12r Prone On Bench   0# 2x12r B Prone On Bench   0# 2x10r B   Band IR and ER Yellow 2x10L red 2x15 R L red 2x10 ea Red 2x12r B Double Red 2x12r B Standing Double  Red Band  2x10r B   Side lying IR R 2# 2x15  L 1#2x15 R 3# 2x15  L 2#2x10 B 2# 2x12r    2# 2x12r B 3# 2x10r B   Wall slides - 2x10 2x10r B + Lift off 2x12r B + Lift off 1# 2x10r B + Lift off with R   Seated ER with arm supported at 80 deg abduction - - - - 3# 2x10r B   Shoulder Elevation on Incline Bench - - - - Elbow Flx on ascent, Elbow Ext on descent  2x10r B         Therapeutic Modalities: (20 min)  ice to the left shoulder with game ready x 20 min                                                                                                HEP: advised to continue current HEP with can soup    Treatment/Session Assessment:  · Response to Treatment:  He is developing motor control of his Scapular Muscles (specificallly Middle and Lower Traps) to stabilize the shoulder girdle during motion (especially elevation). Improved L shoulder active flexion ROM today. · Compliance with Program/Exercises: yes per pt. · Recommendations/Intent for next treatment session: \"Next visit will focus on B shoulder ROM, mobility, and strength\".     Total Treatment Duration: 60 minutes  PT Patient Time In/Time Out  Time In: 5965  Time Out: Benedict Ambrosio, RADHA Su, PT

## 2018-05-07 ENCOUNTER — HOSPITAL ENCOUNTER (OUTPATIENT)
Dept: RESPIRATORY THERAPY | Age: 58
Discharge: HOME | End: 2018-05-07
Attending: FAMILY MEDICINE
Payer: COMMERCIAL

## 2018-05-07 DIAGNOSIS — Z87.891 FORMER MODERATE CIGARETTE SMOKER (10-19 PER DAY): ICD-10-CM

## 2018-05-07 PROCEDURE — 94729 DIFFUSING CAPACITY: CPT

## 2018-05-07 PROCEDURE — 94726 PLETHYSMOGRAPHY LUNG VOLUMES: CPT

## 2018-05-07 PROCEDURE — 94010 BREATHING CAPACITY TEST: CPT

## 2018-12-11 ENCOUNTER — HOSPITAL ENCOUNTER (OUTPATIENT)
Dept: GENERAL RADIOLOGY | Age: 58
Discharge: HOME | End: 2018-12-11
Payer: COMMERCIAL

## 2018-12-11 DIAGNOSIS — M79.642 LEFT HAND PAIN: ICD-10-CM

## 2018-12-11 PROCEDURE — 73130 X-RAY EXAM OF HAND: CPT

## 2020-01-07 ENCOUNTER — HOSPITAL ENCOUNTER (INPATIENT)
Age: 60
LOS: 1 days | Discharge: HOME | DRG: 247 | End: 2020-01-09
Attending: EMERGENCY MEDICINE | Admitting: INTERNAL MEDICINE
Payer: COMMERCIAL

## 2020-01-07 ENCOUNTER — APPOINTMENT (OUTPATIENT)
Dept: GENERAL RADIOLOGY | Age: 60
DRG: 247 | End: 2020-01-07
Attending: EMERGENCY MEDICINE
Payer: COMMERCIAL

## 2020-01-07 DIAGNOSIS — R07.9 CHEST PAIN, UNSPECIFIED TYPE: ICD-10-CM

## 2020-01-07 DIAGNOSIS — I48.91 ATRIAL FIBRILLATION, UNSPECIFIED TYPE (HCC): Primary | ICD-10-CM

## 2020-01-07 PROBLEM — I48.0 PAF (PAROXYSMAL ATRIAL FIBRILLATION) (HCC): Status: ACTIVE | Noted: 2020-01-07

## 2020-01-07 LAB
ALBUMIN SERPL-MCNC: 4 G/DL (ref 3.2–4.6)
ALBUMIN/GLOB SERPL: 1 {RATIO} (ref 1.2–3.5)
ALP SERPL-CCNC: 118 U/L (ref 50–136)
ALT SERPL-CCNC: 29 U/L (ref 12–65)
ANION GAP SERPL CALC-SCNC: 7 MMOL/L (ref 7–16)
AST SERPL-CCNC: 24 U/L (ref 15–37)
BASOPHILS # BLD: 0.1 K/UL (ref 0–0.2)
BASOPHILS NFR BLD: 1 % (ref 0–2)
BILIRUB SERPL-MCNC: 0.3 MG/DL (ref 0.2–1.1)
BUN SERPL-MCNC: 14 MG/DL (ref 8–23)
CALCIUM SERPL-MCNC: 9 MG/DL (ref 8.3–10.4)
CHLORIDE SERPL-SCNC: 105 MMOL/L (ref 98–107)
CO2 SERPL-SCNC: 26 MMOL/L (ref 21–32)
CREAT SERPL-MCNC: 0.96 MG/DL (ref 0.8–1.5)
DIFFERENTIAL METHOD BLD: ABNORMAL
EOSINOPHIL # BLD: 0.3 K/UL (ref 0–0.8)
EOSINOPHIL NFR BLD: 5 % (ref 0.5–7.8)
ERYTHROCYTE [DISTWIDTH] IN BLOOD BY AUTOMATED COUNT: 13.6 % (ref 11.9–14.6)
GLOBULIN SER CALC-MCNC: 4.2 G/DL (ref 2.3–3.5)
GLUCOSE SERPL-MCNC: 172 MG/DL (ref 65–100)
HCT VFR BLD AUTO: 53.9 % (ref 41.1–50.3)
HGB BLD-MCNC: 18.4 G/DL (ref 13.6–17.2)
IMM GRANULOCYTES # BLD AUTO: 0 K/UL (ref 0–0.5)
IMM GRANULOCYTES NFR BLD AUTO: 0 % (ref 0–5)
LYMPHOCYTES # BLD: 1.5 K/UL (ref 0.5–4.6)
LYMPHOCYTES NFR BLD: 26 % (ref 13–44)
MAGNESIUM SERPL-MCNC: 2 MG/DL (ref 1.8–2.4)
MCH RBC QN AUTO: 31 PG (ref 26.1–32.9)
MCHC RBC AUTO-ENTMCNC: 34.1 G/DL (ref 31.4–35)
MCV RBC AUTO: 90.7 FL (ref 79.6–97.8)
MONOCYTES # BLD: 0.4 K/UL (ref 0.1–1.3)
MONOCYTES NFR BLD: 6 % (ref 4–12)
NEUTS SEG # BLD: 3.5 K/UL (ref 1.7–8.2)
NEUTS SEG NFR BLD: 61 % (ref 43–78)
NRBC # BLD: 0 K/UL (ref 0–0.2)
PLATELET # BLD AUTO: 229 K/UL (ref 150–450)
PMV BLD AUTO: 9.7 FL (ref 9.4–12.3)
POTASSIUM SERPL-SCNC: 3.2 MMOL/L (ref 3.5–5.1)
PROT SERPL-MCNC: 8.2 G/DL (ref 6.3–8.2)
RBC # BLD AUTO: 5.94 M/UL (ref 4.23–5.6)
SODIUM SERPL-SCNC: 138 MMOL/L (ref 136–145)
TROPONIN I SERPL-MCNC: 0.07 NG/ML (ref 0.02–0.05)
WBC # BLD AUTO: 5.7 K/UL (ref 4.3–11.1)

## 2020-01-07 PROCEDURE — 83735 ASSAY OF MAGNESIUM: CPT

## 2020-01-07 PROCEDURE — 85025 COMPLETE CBC W/AUTO DIFF WBC: CPT

## 2020-01-07 PROCEDURE — 71046 X-RAY EXAM CHEST 2 VIEWS: CPT

## 2020-01-07 PROCEDURE — 84484 ASSAY OF TROPONIN QUANT: CPT

## 2020-01-07 PROCEDURE — 96366 THER/PROPH/DIAG IV INF ADDON: CPT | Performed by: EMERGENCY MEDICINE

## 2020-01-07 PROCEDURE — 96365 THER/PROPH/DIAG IV INF INIT: CPT | Performed by: EMERGENCY MEDICINE

## 2020-01-07 PROCEDURE — 99285 EMERGENCY DEPT VISIT HI MDM: CPT | Performed by: EMERGENCY MEDICINE

## 2020-01-07 PROCEDURE — 80053 COMPREHEN METABOLIC PANEL: CPT

## 2020-01-07 PROCEDURE — 93005 ELECTROCARDIOGRAM TRACING: CPT | Performed by: EMERGENCY MEDICINE

## 2020-01-08 LAB
ANION GAP SERPL CALC-SCNC: 7 MMOL/L (ref 7–16)
APTT PPP: 106.4 SEC (ref 24.7–39.8)
ATRIAL RATE: 202 BPM
ATRIAL RATE: 57 BPM
ATRIAL RATE: 57 BPM
BUN SERPL-MCNC: 15 MG/DL (ref 8–23)
CALCIUM SERPL-MCNC: 8.4 MG/DL (ref 8.3–10.4)
CALCULATED P AXIS, ECG09: 57 DEGREES
CALCULATED P AXIS, ECG09: 65 DEGREES
CALCULATED R AXIS, ECG10: 14 DEGREES
CALCULATED R AXIS, ECG10: 49 DEGREES
CALCULATED R AXIS, ECG10: 56 DEGREES
CALCULATED T AXIS, ECG11: -146 DEGREES
CALCULATED T AXIS, ECG11: -156 DEGREES
CALCULATED T AXIS, ECG11: 177 DEGREES
CHLORIDE SERPL-SCNC: 111 MMOL/L (ref 98–107)
CHOLEST SERPL-MCNC: 153 MG/DL
CO2 SERPL-SCNC: 24 MMOL/L (ref 21–32)
CREAT SERPL-MCNC: 0.78 MG/DL (ref 0.8–1.5)
DIAGNOSIS, 93000: NORMAL
ERYTHROCYTE [DISTWIDTH] IN BLOOD BY AUTOMATED COUNT: 13.8 % (ref 11.9–14.6)
GLUCOSE SERPL-MCNC: 96 MG/DL (ref 65–100)
HCT VFR BLD AUTO: 46.8 % (ref 41.1–50.3)
HDLC SERPL-MCNC: 39 MG/DL (ref 40–60)
HDLC SERPL: 3.9 {RATIO}
HGB BLD-MCNC: 15.8 G/DL (ref 13.6–17.2)
LDLC SERPL CALC-MCNC: 73.2 MG/DL
LIPID PROFILE,FLP: ABNORMAL
MCH RBC QN AUTO: 31.1 PG (ref 26.1–32.9)
MCHC RBC AUTO-ENTMCNC: 33.8 G/DL (ref 31.4–35)
MCV RBC AUTO: 92.1 FL (ref 79.6–97.8)
NRBC # BLD: 0 K/UL (ref 0–0.2)
P-R INTERVAL, ECG05: 134 MS
P-R INTERVAL, ECG05: 138 MS
PLATELET # BLD AUTO: 216 K/UL (ref 150–450)
PMV BLD AUTO: 9.9 FL (ref 9.4–12.3)
POTASSIUM SERPL-SCNC: 4 MMOL/L (ref 3.5–5.1)
Q-T INTERVAL, ECG07: 324 MS
Q-T INTERVAL, ECG07: 452 MS
Q-T INTERVAL, ECG07: 468 MS
QRS DURATION, ECG06: 74 MS
QRS DURATION, ECG06: 82 MS
QRS DURATION, ECG06: 84 MS
QTC CALCULATION (BEZET), ECG08: 407 MS
QTC CALCULATION (BEZET), ECG08: 439 MS
QTC CALCULATION (BEZET), ECG08: 455 MS
RBC # BLD AUTO: 5.08 M/UL (ref 4.23–5.6)
SODIUM SERPL-SCNC: 142 MMOL/L (ref 136–145)
TRIGL SERPL-MCNC: 204 MG/DL (ref 35–150)
TROPONIN I SERPL-MCNC: 1.09 NG/ML (ref 0.02–0.05)
TROPONIN I SERPL-MCNC: 1.13 NG/ML (ref 0.02–0.05)
VENTRICULAR RATE, ECG03: 57 BPM
VENTRICULAR RATE, ECG03: 57 BPM
VENTRICULAR RATE, ECG03: 95 BPM
VLDLC SERPL CALC-MCNC: 40.8 MG/DL (ref 6–23)
WBC # BLD AUTO: 5.7 K/UL (ref 4.3–11.1)

## 2020-01-08 PROCEDURE — 93005 ELECTROCARDIOGRAM TRACING: CPT | Performed by: INTERNAL MEDICINE

## 2020-01-08 PROCEDURE — B2111ZZ FLUOROSCOPY OF MULTIPLE CORONARY ARTERIES USING LOW OSMOLAR CONTRAST: ICD-10-PCS | Performed by: INTERNAL MEDICINE

## 2020-01-08 PROCEDURE — 92928 PRQ TCAT PLMT NTRAC ST 1 LES: CPT

## 2020-01-08 PROCEDURE — 85730 THROMBOPLASTIN TIME PARTIAL: CPT

## 2020-01-08 PROCEDURE — 80048 BASIC METABOLIC PNL TOTAL CA: CPT

## 2020-01-08 PROCEDURE — 74011000258 HC RX REV CODE- 258: Performed by: INTERNAL MEDICINE

## 2020-01-08 PROCEDURE — 4A023N7 MEASUREMENT OF CARDIAC SAMPLING AND PRESSURE, LEFT HEART, PERCUTANEOUS APPROACH: ICD-10-PCS | Performed by: INTERNAL MEDICINE

## 2020-01-08 PROCEDURE — 99152 MOD SED SAME PHYS/QHP 5/>YRS: CPT

## 2020-01-08 PROCEDURE — 96361 HYDRATE IV INFUSION ADD-ON: CPT | Performed by: EMERGENCY MEDICINE

## 2020-01-08 PROCEDURE — 74011250637 HC RX REV CODE- 250/637: Performed by: INTERNAL MEDICINE

## 2020-01-08 PROCEDURE — 65660000000 HC RM CCU STEPDOWN

## 2020-01-08 PROCEDURE — 74011000250 HC RX REV CODE- 250: Performed by: INTERNAL MEDICINE

## 2020-01-08 PROCEDURE — 027034Z DILATION OF CORONARY ARTERY, ONE ARTERY WITH DRUG-ELUTING INTRALUMINAL DEVICE, PERCUTANEOUS APPROACH: ICD-10-PCS | Performed by: INTERNAL MEDICINE

## 2020-01-08 PROCEDURE — 93458 L HRT ARTERY/VENTRICLE ANGIO: CPT

## 2020-01-08 PROCEDURE — C1894 INTRO/SHEATH, NON-LASER: HCPCS

## 2020-01-08 PROCEDURE — C8929 TTE W OR WO FOL WCON,DOPPLER: HCPCS

## 2020-01-08 PROCEDURE — 77030015766

## 2020-01-08 PROCEDURE — C1725 CATH, TRANSLUMIN NON-LASER: HCPCS

## 2020-01-08 PROCEDURE — C1769 GUIDE WIRE: HCPCS

## 2020-01-08 PROCEDURE — 99153 MOD SED SAME PHYS/QHP EA: CPT

## 2020-01-08 PROCEDURE — 77030019569 HC BND COMPR RAD TERU -B

## 2020-01-08 PROCEDURE — 74011250636 HC RX REV CODE- 250/636: Performed by: NURSE PRACTITIONER

## 2020-01-08 PROCEDURE — 74011250637 HC RX REV CODE- 250/637: Performed by: NURSE PRACTITIONER

## 2020-01-08 PROCEDURE — 85027 COMPLETE CBC AUTOMATED: CPT

## 2020-01-08 PROCEDURE — 80061 LIPID PANEL: CPT

## 2020-01-08 PROCEDURE — 74011250636 HC RX REV CODE- 250/636: Performed by: INTERNAL MEDICINE

## 2020-01-08 PROCEDURE — C1887 CATHETER, GUIDING: HCPCS

## 2020-01-08 PROCEDURE — 74011636320 HC RX REV CODE- 636/320: Performed by: INTERNAL MEDICINE

## 2020-01-08 PROCEDURE — 77030016699 HC CATH ANGI DX INFN1 CARD -A

## 2020-01-08 PROCEDURE — 99218 HC RM OBSERVATION: CPT

## 2020-01-08 PROCEDURE — C1874 STENT, COATED/COV W/DEL SYS: HCPCS

## 2020-01-08 PROCEDURE — B2151ZZ FLUOROSCOPY OF LEFT HEART USING LOW OSMOLAR CONTRAST: ICD-10-PCS | Performed by: INTERNAL MEDICINE

## 2020-01-08 PROCEDURE — 84484 ASSAY OF TROPONIN QUANT: CPT

## 2020-01-08 RX ORDER — SODIUM CHLORIDE 9 MG/ML
75 INJECTION, SOLUTION INTRAVENOUS CONTINUOUS
Status: DISCONTINUED | OUTPATIENT
Start: 2020-01-08 | End: 2020-01-09 | Stop reason: HOSPADM

## 2020-01-08 RX ORDER — MONTELUKAST SODIUM 10 MG/1
10 TABLET ORAL DAILY
Status: DISCONTINUED | OUTPATIENT
Start: 2020-01-08 | End: 2020-01-09 | Stop reason: HOSPADM

## 2020-01-08 RX ORDER — ACETAMINOPHEN 325 MG/1
650 TABLET ORAL
Status: DISCONTINUED | OUTPATIENT
Start: 2020-01-08 | End: 2020-01-08 | Stop reason: SDUPTHER

## 2020-01-08 RX ORDER — HYDROCODONE BITARTRATE AND ACETAMINOPHEN 5; 325 MG/1; MG/1
1 TABLET ORAL
Status: DISCONTINUED | OUTPATIENT
Start: 2020-01-08 | End: 2020-01-09 | Stop reason: HOSPADM

## 2020-01-08 RX ORDER — FENTANYL CITRATE 50 UG/ML
25-50 INJECTION, SOLUTION INTRAMUSCULAR; INTRAVENOUS
Status: DISCONTINUED | OUTPATIENT
Start: 2020-01-08 | End: 2020-01-09 | Stop reason: HOSPADM

## 2020-01-08 RX ORDER — CLOPIDOGREL BISULFATE 75 MG/1
600 TABLET ORAL ONCE
Status: COMPLETED | OUTPATIENT
Start: 2020-01-08 | End: 2020-01-08

## 2020-01-08 RX ORDER — HEPARIN SODIUM 5000 [USP'U]/100ML
12-25 INJECTION, SOLUTION INTRAVENOUS
Status: DISCONTINUED | OUTPATIENT
Start: 2020-01-08 | End: 2020-01-08

## 2020-01-08 RX ORDER — NITROGLYCERIN 0.4 MG/1
0.4 TABLET SUBLINGUAL
Status: DISCONTINUED | OUTPATIENT
Start: 2020-01-08 | End: 2020-01-09 | Stop reason: HOSPADM

## 2020-01-08 RX ORDER — SODIUM CHLORIDE 0.9 % (FLUSH) 0.9 %
5-40 SYRINGE (ML) INJECTION AS NEEDED
Status: DISCONTINUED | OUTPATIENT
Start: 2020-01-08 | End: 2020-01-09 | Stop reason: HOSPADM

## 2020-01-08 RX ORDER — CLOPIDOGREL BISULFATE 75 MG/1
75 TABLET ORAL DAILY
Status: DISCONTINUED | OUTPATIENT
Start: 2020-01-09 | End: 2020-01-09 | Stop reason: HOSPADM

## 2020-01-08 RX ORDER — NITROGLYCERIN 0.4 MG/1
0.4 TABLET SUBLINGUAL
Status: DISCONTINUED | OUTPATIENT
Start: 2020-01-08 | End: 2020-01-08 | Stop reason: SDUPTHER

## 2020-01-08 RX ORDER — SODIUM CHLORIDE 0.9 % (FLUSH) 0.9 %
5-40 SYRINGE (ML) INJECTION EVERY 8 HOURS
Status: DISCONTINUED | OUTPATIENT
Start: 2020-01-08 | End: 2020-01-09 | Stop reason: HOSPADM

## 2020-01-08 RX ORDER — LIDOCAINE HYDROCHLORIDE 10 MG/ML
2-20 INJECTION, SOLUTION EPIDURAL; INFILTRATION; INTRACAUDAL; PERINEURAL ONCE
Status: COMPLETED | OUTPATIENT
Start: 2020-01-08 | End: 2020-01-08

## 2020-01-08 RX ORDER — ONDANSETRON 2 MG/ML
4 INJECTION INTRAMUSCULAR; INTRAVENOUS
Status: DISCONTINUED | OUTPATIENT
Start: 2020-01-08 | End: 2020-01-09 | Stop reason: HOSPADM

## 2020-01-08 RX ORDER — ASPIRIN 81 MG/1
81 TABLET ORAL DAILY
Status: DISCONTINUED | OUTPATIENT
Start: 2020-01-08 | End: 2020-01-08 | Stop reason: SDUPTHER

## 2020-01-08 RX ORDER — TRAZODONE HYDROCHLORIDE 50 MG/1
100 TABLET ORAL
Status: DISCONTINUED | OUTPATIENT
Start: 2020-01-08 | End: 2020-01-09 | Stop reason: HOSPADM

## 2020-01-08 RX ORDER — ACETAMINOPHEN 325 MG/1
650 TABLET ORAL
Status: DISCONTINUED | OUTPATIENT
Start: 2020-01-08 | End: 2020-01-09 | Stop reason: HOSPADM

## 2020-01-08 RX ORDER — ATORVASTATIN CALCIUM 10 MG/1
10 TABLET, FILM COATED ORAL
Status: DISCONTINUED | OUTPATIENT
Start: 2020-01-08 | End: 2020-01-09 | Stop reason: HOSPADM

## 2020-01-08 RX ORDER — MORPHINE SULFATE 2 MG/ML
2 INJECTION, SOLUTION INTRAMUSCULAR; INTRAVENOUS
Status: DISCONTINUED | OUTPATIENT
Start: 2020-01-08 | End: 2020-01-09 | Stop reason: HOSPADM

## 2020-01-08 RX ORDER — MIDAZOLAM HYDROCHLORIDE 1 MG/ML
.5-2 INJECTION, SOLUTION INTRAMUSCULAR; INTRAVENOUS
Status: DISCONTINUED | OUTPATIENT
Start: 2020-01-08 | End: 2020-01-08 | Stop reason: HOSPADM

## 2020-01-08 RX ORDER — HEPARIN SODIUM 200 [USP'U]/100ML
3 INJECTION, SOLUTION INTRAVENOUS CONTINUOUS
Status: DISCONTINUED | OUTPATIENT
Start: 2020-01-08 | End: 2020-01-08 | Stop reason: HOSPADM

## 2020-01-08 RX ORDER — MAG HYDROX/ALUMINUM HYD/SIMETH 200-200-20
30 SUSPENSION, ORAL (FINAL DOSE FORM) ORAL AS NEEDED
Status: DISCONTINUED | OUTPATIENT
Start: 2020-01-08 | End: 2020-01-08 | Stop reason: HOSPADM

## 2020-01-08 RX ORDER — LORAZEPAM 1 MG/1
1 TABLET ORAL
Status: DISCONTINUED | OUTPATIENT
Start: 2020-01-08 | End: 2020-01-09 | Stop reason: HOSPADM

## 2020-01-08 RX ORDER — HEPARIN SODIUM 5000 [USP'U]/ML
4000 INJECTION, SOLUTION INTRAVENOUS; SUBCUTANEOUS
Status: COMPLETED | OUTPATIENT
Start: 2020-01-08 | End: 2020-01-08

## 2020-01-08 RX ORDER — GUAIFENESIN 100 MG/5ML
81 LIQUID (ML) ORAL DAILY
Status: DISCONTINUED | OUTPATIENT
Start: 2020-01-09 | End: 2020-01-08

## 2020-01-08 RX ADMIN — ALUMINUM HYDROXIDE, MAGNESIUM HYDROXIDE, AND SIMETHICONE 30 ML: 200; 200; 20 SUSPENSION ORAL at 15:24

## 2020-01-08 RX ADMIN — HEPARIN SODIUM 2 ML: 10000 INJECTION, SOLUTION INTRAVENOUS; SUBCUTANEOUS at 14:25

## 2020-01-08 RX ADMIN — MIDAZOLAM 1 MG: 1 INJECTION INTRAMUSCULAR; INTRAVENOUS at 14:49

## 2020-01-08 RX ADMIN — ACETAMINOPHEN 650 MG: 325 TABLET, FILM COATED ORAL at 17:21

## 2020-01-08 RX ADMIN — ASPIRIN 324 MG: 81 TABLET, COATED ORAL at 12:36

## 2020-01-08 RX ADMIN — PERFLUTREN 1 ML: 6.52 INJECTION, SUSPENSION INTRAVENOUS at 09:00

## 2020-01-08 RX ADMIN — FENTANYL CITRATE 25 MCG: 50 INJECTION, SOLUTION INTRAMUSCULAR; INTRAVENOUS at 14:49

## 2020-01-08 RX ADMIN — FENTANYL CITRATE 25 MCG: 50 INJECTION, SOLUTION INTRAMUSCULAR; INTRAVENOUS at 14:23

## 2020-01-08 RX ADMIN — HYDROCODONE BITARTRATE AND ACETAMINOPHEN 1 TABLET: 5; 325 TABLET ORAL at 18:34

## 2020-01-08 RX ADMIN — HEPARIN SODIUM 4000 UNITS: 5000 INJECTION INTRAVENOUS; SUBCUTANEOUS at 05:18

## 2020-01-08 RX ADMIN — APIXABAN 5 MG: 5 TABLET, FILM COATED ORAL at 21:07

## 2020-01-08 RX ADMIN — MIDAZOLAM 2 MG: 1 INJECTION INTRAMUSCULAR; INTRAVENOUS at 14:23

## 2020-01-08 RX ADMIN — SODIUM CHLORIDE 75 ML/HR: 900 INJECTION, SOLUTION INTRAVENOUS at 01:13

## 2020-01-08 RX ADMIN — FENTANYL CITRATE 25 MCG: 50 INJECTION, SOLUTION INTRAMUSCULAR; INTRAVENOUS at 14:38

## 2020-01-08 RX ADMIN — HEPARIN SODIUM 3 ML/HR: 5000 INJECTION, SOLUTION INTRAVENOUS; SUBCUTANEOUS at 14:25

## 2020-01-08 RX ADMIN — NITROGLYCERIN 0.1 MG: 200 INJECTION, SOLUTION INTRAVENOUS at 15:18

## 2020-01-08 RX ADMIN — DRONEDARONE 400 MG: 400 TABLET, FILM COATED ORAL at 21:06

## 2020-01-08 RX ADMIN — BIVALIRUDIN 1.75 MG/KG/HR: 250 INJECTION, POWDER, LYOPHILIZED, FOR SOLUTION INTRAVENOUS at 15:00

## 2020-01-08 RX ADMIN — LIDOCAINE HYDROCHLORIDE 3 ML: 10 INJECTION, SOLUTION EPIDURAL; INFILTRATION; INTRACAUDAL; PERINEURAL at 14:25

## 2020-01-08 RX ADMIN — Medication 10 ML: at 21:30

## 2020-01-08 RX ADMIN — IOPAMIDOL 250 ML: 755 INJECTION, SOLUTION INTRAVENOUS at 15:25

## 2020-01-08 RX ADMIN — TRAZODONE HYDROCHLORIDE 100 MG: 50 TABLET ORAL at 01:12

## 2020-01-08 RX ADMIN — CLOPIDOGREL BISULFATE 600 MG: 75 TABLET ORAL at 15:24

## 2020-01-08 RX ADMIN — TRAZODONE HYDROCHLORIDE 100 MG: 50 TABLET ORAL at 21:06

## 2020-01-08 RX ADMIN — HEPARIN SODIUM 12 UNITS/KG/HR: 5000 INJECTION, SOLUTION INTRAVENOUS at 05:18

## 2020-01-08 NOTE — PROGRESS NOTES
Bedside and verbal report given to [facility] by Rebeca Mike. Report included the following information SBAR, Kardex, Intake/Output and MAR. Oncoming RN assumed care of the patient.

## 2020-01-08 NOTE — PROGRESS NOTES
TRANSFER - OUT REPORT:    Verbal report given to Danay Castillo RN(name) on Cheli Cervantes  being transferred to CPRU(unit) for routine progression of care       Report consisted of patients Situation, Background, Assessment and   Recommendations(SBAR). Information from the following report(s) SBAR was reviewed with the receiving nurse.     [facility] w/ Dr. Latisha Steele  RCA stented x 1  R radial  TR band 12 mls  3 mg versed  75 mcg fentanyl  600 mg plavix  mylanta  angiomax bolus and gtt - off at [phone]

## 2020-01-08 NOTE — PROGRESS NOTES
Initial visit made to patient and a prayer was provided for the patient and his family.         EMERALD Martinez

## 2020-01-08 NOTE — PROGRESS NOTES
TRANSFER - OUT REPORT:    Verbal report given to Katja Parnell RN on Hayden Gonzalo  being transferred to 3rd floor for routine progression of care       Report consisted of patients Situation, Background, Assessment and Recommendations(SBAR). Information from the following report(s) SBAR, Kardex and Procedure Summary was reviewed with the receiving nurse. Opportunity for questions and clarification was provided. Right radial TR band C/D/I without bleeding or hematoma. Pt instructed on importance of limited movement of RUE.

## 2020-01-08 NOTE — ED PROVIDER NOTES
HPI:  24-year-old male history of high blood pressure, high cholesterol,'s nonobstructive CAD is here with chest pain started roughly 10:00. Described as pressure in the mid chest.  Lasted 5 minutes then went away but quickly returned. He took some sublingual nitro that was prescribed by cardiology from a few years ago. Resolution of chest pain. When medic got there apparently heart rate was in the 180. Appear consistent with A. fib. No known history of A. fib. They gave 20 of Cardizem. At the time of his arrival to the emergency department his heart rate was in the low 100s. Currently pain-free. Denies any cough, fever, leg swelling, hemoptysis, recent travel. Not on blood thinner. No known history of atrial fibrillation    ROS  Constitutional: No fever, no chills  Skin: no rash  Eye: No vision changes  ENMT:   Respiratory: No shortness of breath, no cough  Cardiovascular: + chest pain, + palpitations  Gastrointestinal: No vomiting, no nausea, no diarrhea, no abdominal pain  : No dysuria  MSK: No back pain, no muscle pain, no joint pain  Neuro: No headache, no change in mental status, no numbness, no tingling, no weakness  Psych:   Endocrine:   All other review of systems positive per history of present illness and the above otherwise negative or noncontributory. Visit Vitals  /65   Pulse 86   Temp 98.4 °F (36.9 °C)   Resp 16   Ht 5' 7\" (1.702 m)   Wt 79.8 kg (176 lb)   SpO2 96%   BMI 27.57 kg/m²     Past Medical History:   Diagnosis Date    Abnormal EKG     1. Cardiac MRI (5/14/15):  EF 68%. The left ventricular myocardium appear symmetric at the base. There appears to be slightly thicker left ventricular myocardium at the mid and apical lateral wall compared to other segments. No wall motion advised. EF 68%. Calculated left ventricular mass is within normal limits.     Arrhythmia     occasionally has heart palpitations     Chronic obstructive pulmonary disease (HCC)     ventolin prn  Contact dermatitis and other eczema, due to unspecified cause     Depression     Former smoker     Hypercholesteremia     controlled by lipitor    Hyperlipidemia     Hypertension     daily meds    Inguinal hernia     Left ventricular hypertrophy     ECHO 2016    Unstable angina ([address] 07/11/2014    cath in 2014=mild nonobstructive CAD     Past Surgical History:   Procedure Laterality Date    CARDIAC SURG PROCEDURE UNLIST      CCL, clean/no interventions    HX CARPAL TUNNEL RELEASE Left     HX COLONOSCOPY  2009    HX HEART CATHETERIZATION  2014    no stents    HX HERNIA REPAIR Right 10/24/13    inguinal    HX KNEE REPLACEMENT Right 03/19/2019    HX ORTHOPAEDIC Left 10/2016    shoulder manipulation     HX SHOULDER ARTHROSCOPY Left 07/2016    second surgery 07/17     Prior to Admission Medications   Prescriptions Last Dose Informant Patient Reported? Taking? albuterol (VENTOLIN HFA) 90 mcg/actuation inhaler   No No   Sig: Take 2 Puffs by inhalation as needed for Wheezing. ammonium lactate (AMLACTIN) 12 % topical cream   No No   Sig: Apply  to affected area two (2) times a day. rub in to affected area well   aspirin delayed-release 81 mg tablet   Yes No   Sig: Take 81 mg by mouth daily. Take / use AM day of surgery  per anesthesia protocols. atorvastatin (LIPITOR) 10 mg tablet   No No   Sig: Take 1 Tab by mouth every morning. Indications: high cholesterol   benazepril (LOTENSIN) 20 mg tablet   No No   Sig: Take 1 Tab by mouth every morning. Indications: hypertension   levocetirizine (XYZAL) 5 mg tablet   Yes No   Sig: Take  by mouth.   metoprolol succinate (TOPROL XL) 100 mg tablet   No No   Sig: Take 1 Tab by mouth daily. montelukast (SINGULAIR) 10 mg tablet   No No   Sig: Take 1 Tab by mouth daily. multivitamin (ONE A DAY) tablet   Yes No   Sig: Take 1 Tab by mouth daily.    naphazoline-pheniramine (OPCON-A) ophthalmic solution   No No   Sig: Administer 2 Drops to both eyes three (3) times daily as needed (redness). omega-3 fatty acids (FISH OIL) cap   Yes No   Sig: Take 1 Cap by mouth daily. tadalafil (CIALIS) 20 mg tablet   No No   Sig: Take 1 Tab by mouth as needed (ED). traZODone (DESYREL) 100 mg tablet   No No   Sig: Take 1 Tab by mouth nightly. valACYclovir (VALTREX) 1 gram tablet   No No   Sig: Tab 1 bid today and tab 1 tomorrow am      Facility-Administered Medications: None         Adult Exam   General: alert, no acute distress  Head: normocephalic, atraumatic  ENT: moist mucous membranes  Neck: supple, non-tender; full range of motion  Cardiovascular: regular rate and rhythm, normal peripheral perfusion, no edema, equal pulses  Respiratory:  normal respirations; no wheezing, rales or rhonchi  Gastrointestinal: soft, non-tender; no rebound or guarding, no peritoneal signs, no distension  Back: non-tender, full range of motion  Musculoskeletal: normal range of motion, normal strength, no gross deformities  Neurological: alert and oriented x 4, no gross focal deficits; normal speech  Psychiatric: cooperative; appropriate mood and affect    MDM:  EKG from arrival atrial fibrillation rate of 95. Normal axis. ST elevation noted aVR. ST depression noted laterally. No prior history of A. fib. Currently pain-free. Story is concerning. This may be new onset A. fib. Troponins abnormal.  Does not have any chest pain at this time. No pleuritic pain. Low suspicion for pulmonary embolism. Chest x-ray unremarkable. Lungs are clear. Do not suspect pneumonia, pneumothorax. Concern for acute ACS causing chest pain with new onset A. fib. Spoke with cardiologist.  Will admit to the hospital for further cardiac evaluation       Xr Chest Pa Lat    Result Date: 1/7/2020  EXAM: Chest x-ray. INDICATION: Dyspnea. COMPARISON: July 11, 2014. TECHNIQUE: Frontal and lateral x-rays of the chest were obtained.  FINDINGS: The lungs are clear but hyperexpanded, consistent with the reported history of underlying COPD. The cardiac size, mediastinal contour and pulmonary vasculature are within normal limits. No pneumothorax or pleural effusion is seen. IMPRESSION: 1. No acute process. 2. COPD. Recent Results (from the past 24 hour(s))   CBC WITH AUTOMATED DIFF    Collection Time: 01/07/20 10:48 PM   Result Value Ref Range    WBC 5.7 4.3 - 11.1 K/uL    RBC 5.94 (H) 4.23 - 5.6 M/uL    HGB 18.4 (H) 13.6 - 17.2 g/dL    HCT 53.9 (H) 41.1 - 50.3 %    MCV 90.7 79.6 - 97.8 FL    MCH 31.0 26.1 - 32.9 PG    MCHC 34.1 31.4 - 35.0 g/dL    RDW 13.6 11.9 - 14.6 %    PLATELET 906 329 - 373 K/uL    MPV 9.7 9.4 - 12.3 FL    ABSOLUTE NRBC 0.00 0.0 - 0.2 K/uL    DF AUTOMATED      NEUTROPHILS 61 43 - 78 %    LYMPHOCYTES 26 13 - 44 %    MONOCYTES 6 4.0 - 12.0 %    EOSINOPHILS 5 0.5 - 7.8 %    BASOPHILS 1 0.0 - 2.0 %    IMMATURE GRANULOCYTES 0 0.0 - 5.0 %    ABS. NEUTROPHILS 3.5 1.7 - 8.2 K/UL    ABS. LYMPHOCYTES 1.5 0.5 - 4.6 K/UL    ABS. MONOCYTES 0.4 0.1 - 1.3 K/UL    ABS. EOSINOPHILS 0.3 0.0 - 0.8 K/UL    ABS. BASOPHILS 0.1 0.0 - 0.2 K/UL    ABS. IMM. GRANS. 0.0 0.0 - 0.5 K/UL   METABOLIC PANEL, COMPREHENSIVE    Collection Time: 01/07/20 10:48 PM   Result Value Ref Range    Sodium 138 136 - 145 mmol/L    Potassium 3.2 (L) 3.5 - 5.1 mmol/L    Chloride 105 98 - 107 mmol/L    CO2 26 21 - 32 mmol/L    Anion gap 7 7 - 16 mmol/L    Glucose 172 (H) 65 - 100 mg/dL    BUN 14 8 - 23 MG/DL    Creatinine 0.96 0.8 - 1.5 MG/DL    GFR est AA >60 >60 ml/min/1.73m2    GFR est non-AA >60 >60 ml/min/1.73m2    Calcium 9.0 8.3 - 10.4 MG/DL    Bilirubin, total 0.3 0.2 - 1.1 MG/DL    ALT (SGPT) 29 12 - 65 U/L    AST (SGOT) 24 15 - 37 U/L    Alk.  phosphatase 118 50 - 136 U/L    Protein, total 8.2 6.3 - 8.2 g/dL    Albumin 4.0 3.2 - 4.6 g/dL    Globulin 4.2 (H) 2.3 - 3.5 g/dL    A-G Ratio 1.0 (L) 1.2 - 3.5     TROPONIN I    Collection Time: 01/07/20 10:48 PM   Result Value Ref Range    Troponin-I, Qt. 0.07 (H) 0.02 - 0.05 NG/ML   MAGNESIUM Collection Time: 01/07/20 10:48 PM   Result Value Ref Range    Magnesium 2.0 1.8 - 2.4 mg/dL         Dragon voice recognition software was used to create this note. Although the note has been reviewed and corrected where necessary, additional errors may have been overlooked and remain in the text.

## 2020-01-08 NOTE — PROGRESS NOTES
TRANSFER - IN REPORT:    Verbal report received from ALISHA Ritter on Jl Ros being received from [address] [place] [address] for routine progression of care      Report consisted of patients Situation, Background, Assessment and Recommendations(SBAR). Information from the following report(s) Procedure Summary was reviewed with the receiving nurse. Opportunity for questions and clarification was provided. Assessment completed upon patients arrival to unit and care assumed.

## 2020-01-08 NOTE — ED NOTES
PTT collected and sent to lab. Patient assisted to void in urinal.  Patient has voided 200 ml at this time. Resting in stretcher with cardiac monitoring and cycling vitals in place. Call light in reach. Son at bedside. Will continue to monitor.

## 2020-01-08 NOTE — H&P
[address] Cardiology History & Physical      Date of  Admission: 1/7/2020 10:35 PM     Primary Care Physician: Dr. Lily Preciado  Primary Cardiologist: Dr. Jesika Davies (last seen in 2018)  Admitting Physician: Dr. Yvon Elmore    CC: chest pain, palpitations     HPI:  Darren Smith is a 60 y.o. male with past medical history of abnormal EKG secondary to HCM, past palpitations, COPD, tobacco abuse, depression and mild non-obstructive CAD by [facility] in 2014 who presented to the ER via EMS with complaints of chest pain and palpitations. Patient reports that his symptoms started acutely around 2100 last night while getting read for bed. He reports that he felt his heart racing along with chest pressure stating at the same time. Reports that it was worse when lying flat. Upon EMS arrival, patient was found to be in AFIB with RVR with rates as high as 170s. He was given 20mg IV Cardizem with no change in HR per chart. Upon arrival to the ER, his HR was in the 90s. EKG shows possible rate controlled AFIB. Initial troponin is negative. He is currently in SR and symptom free. Denies nausea, vomiting, diaphoresis or shortness of breath. Reports that he recently started smoking again after 6 years. Denies ever taking blood thinners in the past or being told that he has AFIB. Past Medical History:   Diagnosis Date    Abnormal EKG     1. Cardiac MRI (5/14/15):  EF 68%. The left ventricular myocardium appear symmetric at the base. There appears to be slightly thicker left ventricular myocardium at the mid and apical lateral wall compared to other segments. No wall motion advised. EF 68%. Calculated left ventricular mass is within normal limits.     Arrhythmia     occasionally has heart palpitations     Chronic obstructive pulmonary disease (HCC)     ventolin prn     Contact dermatitis and other eczema, due to unspecified cause     Depression     Former smoker     Hypercholesteremia     controlled by lipitor    Hyperlipidemia     Hypertension     daily meds    Inguinal hernia     Left ventricular hypertrophy     ECHO 2016    Unstable angina ([address] 2014    cath in 2014=mild nonobstructive CAD      Past Surgical History:   Procedure Laterality Date    CARDIAC SURG PROCEDURE UNLIST      CCL, clean/no interventions    HX CARPAL TUNNEL RELEASE Left     HX COLONOSCOPY  2009    HX HEART CATHETERIZATION  2014    no stents    HX HERNIA REPAIR Right 10/24/13    inguinal    HX KNEE REPLACEMENT Right 2019    HX ORTHOPAEDIC Left 10/2016    shoulder manipulation     HX SHOULDER ARTHROSCOPY Left 2016    second surgery        No Known Allergies   Social History     Socioeconomic History    Marital status:      Spouse name: Not on file    Number of children: Not on file    Years of education: Not on file    Highest education level: Not on file   Occupational History    Not on file   Social Needs    Financial resource strain: Not on file    Food insecurity:     Worry: Not on file     Inability: Not on file    Transportation needs:     Medical: Not on file     Non-medical: Not on file   Tobacco Use    Smoking status: Former Smoker     Packs/day: 1.00     Years: 35.00     Pack years: 35.00     Types: Cigarettes     Last attempt to quit: 2013     Years since quittin.4    Smokeless tobacco: Never Used   Substance and Sexual Activity    Alcohol use:  Yes     Alcohol/week: 20.0 standard drinks     Types: 24 Cans of beer per week    Drug use: No    Sexual activity: Not Currently   Lifestyle    Physical activity:     Days per week: Not on file     Minutes per session: Not on file    Stress: Not on file   Relationships    Social connections:     Talks on phone: Not on file     Gets together: Not on file     Attends Holiness service: Not on file     Active member of club or organization: Not on file     Attends meetings of clubs or organizations: Not on file     Relationship status: Not on file    Intimate partner violence:     Fear of current or ex partner: Not on file     Emotionally abused: Not on file     Physically abused: Not on file     Forced sexual activity: Not on file   Other Topics Concern    Not on file   Social History Narrative    Not on file     Family History   Problem Relation Age of Onset    Heart Disease Sister     Stroke Sister     Cancer Brother         lymphoma    Lung Disease Mother     Hypertension Sister     Hypertension Sister     Lung Disease Sister     Heart Disease Brother         No current facility-administered medications for this encounter. Current Outpatient Medications   Medication Sig    atorvastatin (LIPITOR) 10 mg tablet Take 1 Tab by mouth every morning. Indications: high cholesterol    tadalafil (CIALIS) 20 mg tablet Take 1 Tab by mouth as needed (ED).  traZODone (DESYREL) 100 mg tablet Take 1 Tab by mouth nightly.  montelukast (SINGULAIR) 10 mg tablet Take 1 Tab by mouth daily.  ammonium lactate (AMLACTIN) 12 % topical cream Apply  to affected area two (2) times a day. rub in to affected area well    levocetirizine (XYZAL) 5 mg tablet Take  by mouth.  naphazoline-pheniramine (OPCON-A) ophthalmic solution Administer 2 Drops to both eyes three (3) times daily as needed (redness).  valACYclovir (VALTREX) 1 gram tablet Tab 1 bid today and tab 1 tomorrow am    metoprolol succinate (TOPROL XL) 100 mg tablet Take 1 Tab by mouth daily.  benazepril (LOTENSIN) 20 mg tablet Take 1 Tab by mouth every morning. Indications: hypertension    albuterol (VENTOLIN HFA) 90 mcg/actuation inhaler Take 2 Puffs by inhalation as needed for Wheezing.  aspirin delayed-release 81 mg tablet Take 81 mg by mouth daily. Take / use AM day of surgery  per anesthesia protocols.  multivitamin (ONE A DAY) tablet Take 1 Tab by mouth daily.  omega-3 fatty acids (FISH OIL) cap Take 1 Cap by mouth daily.        Review of Systems    Review of Systems Constitutional: Negative. HENT: Negative. Respiratory: Negative. Cardiovascular: Positive for chest pain and palpitations. Gastrointestinal: Negative. Genitourinary: Negative. Musculoskeletal: Negative. Skin: Negative. Neurological: Negative. Endo/Heme/Allergies: Negative. Psychiatric/Behavioral: Negative. Subjective:     Visit Vitals  /65   Pulse 86   Temp 98.4 °F (36.9 °C)   Resp 16   Ht 5' 7\" (1.702 m)   Wt 79.8 kg (176 lb)   SpO2 96%   BMI 27.57 kg/m²     Physical Exam  HENT:      Head: Normocephalic. Nose: Nose normal.   Eyes:      Pupils: Pupils are equal, round, and reactive to light. Neck:      Musculoskeletal: Normal range of motion. Cardiovascular:      Rate and Rhythm: Normal rate and regular rhythm. Pulmonary:      Effort: Pulmonary effort is normal.      Breath sounds: Normal breath sounds. Abdominal:      General: Bowel sounds are normal.   Musculoskeletal: Normal range of motion. Skin:     General: Skin is warm and dry. Neurological:      Mental Status: He is alert and oriented to person, place, and time. Psychiatric:         Mood and Affect: Mood normal.         Cardiographics  Telemetry: normal sinus rhythm  ECG: atrial fibrillation, rate controlled.  EMS with AFIB rates in the 160-170s  Echocardiogram: ordered/pending    Labs:   Recent Results (from the past 24 hour(s))   CBC WITH AUTOMATED DIFF    Collection Time: 01/07/20 10:48 PM   Result Value Ref Range    WBC 5.7 4.3 - 11.1 K/uL    RBC 5.94 (H) 4.23 - 5.6 M/uL    HGB 18.4 (H) 13.6 - 17.2 g/dL    HCT 53.9 (H) 41.1 - 50.3 %    MCV 90.7 79.6 - 97.8 FL    MCH 31.0 26.1 - 32.9 PG    MCHC 34.1 31.4 - 35.0 g/dL    RDW 13.6 11.9 - 14.6 %    PLATELET 838 518 - 861 K/uL    MPV 9.7 9.4 - 12.3 FL    ABSOLUTE NRBC 0.00 0.0 - 0.2 K/uL    DF AUTOMATED      NEUTROPHILS 61 43 - 78 %    LYMPHOCYTES 26 13 - 44 %    MONOCYTES 6 4.0 - 12.0 %    EOSINOPHILS 5 0.5 - 7.8 %    BASOPHILS 1 0.0 - 2.0 % IMMATURE GRANULOCYTES 0 0.0 - 5.0 %    ABS. NEUTROPHILS 3.5 1.7 - 8.2 K/UL    ABS. LYMPHOCYTES 1.5 0.5 - 4.6 K/UL    ABS. MONOCYTES 0.4 0.1 - 1.3 K/UL    ABS. EOSINOPHILS 0.3 0.0 - 0.8 K/UL    ABS. BASOPHILS 0.1 0.0 - 0.2 K/UL    ABS. IMM. GRANS. 0.0 0.0 - 0.5 K/UL   METABOLIC PANEL, COMPREHENSIVE    Collection Time: 01/07/20 10:48 PM   Result Value Ref Range    Sodium 138 136 - 145 mmol/L    Potassium 3.2 (L) 3.5 - 5.1 mmol/L    Chloride 105 98 - 107 mmol/L    CO2 26 21 - 32 mmol/L    Anion gap 7 7 - 16 mmol/L    Glucose 172 (H) 65 - 100 mg/dL    BUN 14 8 - 23 MG/DL    Creatinine 0.96 0.8 - 1.5 MG/DL    GFR est AA >60 >60 ml/min/1.73m2    GFR est non-AA >60 >60 ml/min/1.73m2    Calcium 9.0 8.3 - 10.4 MG/DL    Bilirubin, total 0.3 0.2 - 1.1 MG/DL    ALT (SGPT) 29 12 - 65 U/L    AST (SGOT) 24 15 - 37 U/L    Alk. phosphatase 118 50 - 136 U/L    Protein, total 8.2 6.3 - 8.2 g/dL    Albumin 4.0 3.2 - 4.6 g/dL    Globulin 4.2 (H) 2.3 - 3.5 g/dL    A-G Ratio 1.0 (L) 1.2 - 3.5     TROPONIN I    Collection Time: 01/07/20 10:48 PM   Result Value Ref Range    Troponin-I, Qt. 0.07 (H) 0.02 - 0.05 NG/ML   MAGNESIUM    Collection Time: 01/07/20 10:48 PM   Result Value Ref Range    Magnesium 2.0 1.8 - 2.4 mg/dL       Atrial Fibrillation CHADSVASC2 Score Stroke Risk:   60 y.o. <65        + 0    male Male     [de-identified]   CHF HX: No    + 0   HTN HX: Yes    +1   Stroke/TIA/Thromboembolism No    +0   Vascular Disease HX: Yes    +1   Diabetes Mellitus No    + 0   CHADSVASC 2 Score 2      Annual Stroke Risk 2.2%- moderate-high        Patient has been seen and examined by Dr. Abdi Quinteros and he agrees with the following assessment and plan:     Assessment/Plan:        Chest pain -- resolved, initial cardiac enzyme is negative. Admit to telemetry for observation. Follow serial cardiac enzymes. Continue ASA and statin. Holding BB and ACEi with lower BPs on admission. Check echocardiogram and lipid panel in the AM. NPO with IV hydration now.  Will discuss further testing with attending. HTN (hypertension) -- some labile BP while in the ER. Will hold BP medications on admission and reassess in the AM. Monitor BP closely. Titrate medications as needed. Hyperlipidemia -- continue statin. Check lipid panel in the AM      Coronary atherosclerosis of native coronary vessel -- see above      Overview: [facility] 7/2014: mild CAD up to 20% ostial LM and 30% ostial RCA, normal EF            Abnormal EKG -- secondary to HCM. Repeat echocardiogram during admission. Overview: 1.  1.  Left ventriculogram (7/11/14):  EF 65-70%. 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 2. Cardiac MRI (5/14/15):  EF 68%. The left ventricular myocardium appear       symmetric at the base. There appears to be slightly thicker left       ventricular myocardium at the mid and apical lateral wall compared to       other segments. No wall motion advised. EF 68%. Calculated left       ventricular mass is within normal limits. 3. Echo (12/15/16):  EF 54%. Normal diastolic function. No significant       valve abnormalities. PAF (paroxysmal atrial fibrillation) -- AFIB by EMS EKG, now is SR. See CHADVASc score above. Will likely need outpatient cardiac monitoring to establish AFIB risk. Will discuss anticoagulation with attending. If he has more AFIB tonight, will start IV heparin drip.        Camacho Ramos NP  1/8/2020 12:03 AM

## 2020-01-08 NOTE — PROCEDURES
Brief Cardiac Procedure Note    Patient: Cheli Cervantes MRN: [id number]  SSN: [id number]    YOB: 1960  Age: 60 y.o. Sex: male      Date of Procedure: 1/8/2020     Pre-procedure Diagnosis: NSTEMI    Post-procedure Diagnosis: Coronary Artery Disease    Reason for Procedure: ACS < or = 24 Hours    Procedure: Left Heart Catheterization with Percutaneous Coronary Intervention    Brief Description of Procedure: lhc via right radial, tr, pci rca    Performed By: Yelitza Nguyen MD     Assistants: none    Anesthesia: Moderate Sedation    Estimated Blood Loss: Less than 10 mL      Specimens: None    Implants: None    Findings: osiel rca- nl lvef    Complications: None    Recommendations: Continue medical therapy.     Signed By: Yelitza Nguyen MD     January 8, 2020

## 2020-01-08 NOTE — ED NOTES
Critical troponin of 1.09 received from nicolle Hernandez cardiology NP notified.  Orders for heparin 4000 units bolus and initiate heparin infusion received

## 2020-01-08 NOTE — PROGRESS NOTES
Patient received into room 8 from ED. Transported via stretcher by Yevgeniy Blood RN. Patient is alert and oriented x 3. Denies pain at present. Patient prepped for LHC/POSS. Consent signed and on chart.  mg PO given at 1236. Heparin gtt @ 12u/kg/hr from ED. Patient reports last dose of Cialis was 1/4/2020 around 1900.

## 2020-01-08 NOTE — ED TRIAGE NOTES
Reports chest pain radiating across entire chest onset approx 2100 while attempting to get ready for bed. Reports associated shortness of breath then localization of chest pain to left side. Describes as \"pressure\" and intermittent since onset. Worsening of pain with lying flat. Upon EMS arrival pt afib rvr on monitor in 170s. cardizem 20mg given by ems without change of heart rate. Attempted nitroglycerin x1 and inhaler prior to ems arrival without relief. bgl 210 with ems, no hx DM. HX AFIB, denies blood thinners. On metoprolol. Reports head pain on arrival. Followed by dr Ezekiel Ibanez. +smoker. +etoh, prescribed sildenafil however denies tonight.

## 2020-01-08 NOTE — PROGRESS NOTES
TRANSFER - IN REPORT:    Verbal report received from Ninfa Trevino RN on Casimiro Au being received from [address] [place] [address] for routine post - op      Report consisted of patients Situation, Background, Assessment and Recommendations(SBAR). Information from the following report(s) Procedure Summary was reviewed with the receiving nurse. Opportunity for questions and clarification was provided. Assessment completed upon patients arrival to unit and care assumed. R radial site present with no bleeding or hematoma noted. TR band intact.

## 2020-01-09 VITALS
TEMPERATURE: 97.4 F | DIASTOLIC BLOOD PRESSURE: 66 MMHG | SYSTOLIC BLOOD PRESSURE: 105 MMHG | OXYGEN SATURATION: 95 % | WEIGHT: 175.2 LBS | BODY MASS INDEX: 28.16 KG/M2 | RESPIRATION RATE: 18 BRPM | HEART RATE: 69 BPM | HEIGHT: 66 IN

## 2020-01-09 LAB
ANION GAP SERPL CALC-SCNC: 5 MMOL/L (ref 7–16)
ATRIAL RATE: 64 BPM
BASOPHILS # BLD: 0 K/UL (ref 0–0.2)
BASOPHILS NFR BLD: 0 % (ref 0–2)
BUN SERPL-MCNC: 13 MG/DL (ref 8–23)
CALCIUM SERPL-MCNC: 8.8 MG/DL (ref 8.3–10.4)
CALCULATED P AXIS, ECG09: 56 DEGREES
CALCULATED R AXIS, ECG10: 26 DEGREES
CALCULATED T AXIS, ECG11: 169 DEGREES
CHLORIDE SERPL-SCNC: 109 MMOL/L (ref 98–107)
CHOLEST SERPL-MCNC: 122 MG/DL
CO2 SERPL-SCNC: 27 MMOL/L (ref 21–32)
CREAT SERPL-MCNC: 0.97 MG/DL (ref 0.8–1.5)
DIAGNOSIS, 93000: NORMAL
DIFFERENTIAL METHOD BLD: ABNORMAL
EOSINOPHIL # BLD: 0.4 K/UL (ref 0–0.8)
EOSINOPHIL NFR BLD: 8 % (ref 0.5–7.8)
ERYTHROCYTE [DISTWIDTH] IN BLOOD BY AUTOMATED COUNT: 14.2 % (ref 11.9–14.6)
GLUCOSE SERPL-MCNC: 99 MG/DL (ref 65–100)
HCT VFR BLD AUTO: 44 % (ref 41.1–50.3)
HDLC SERPL-MCNC: 36 MG/DL (ref 40–60)
HDLC SERPL: 3.4 {RATIO}
HGB BLD-MCNC: 14.2 G/DL (ref 13.6–17.2)
IMM GRANULOCYTES # BLD AUTO: 0 K/UL (ref 0–0.5)
IMM GRANULOCYTES NFR BLD AUTO: 0 % (ref 0–5)
LDLC SERPL CALC-MCNC: 56.4 MG/DL
LIPID PROFILE,FLP: ABNORMAL
LYMPHOCYTES # BLD: 1.8 K/UL (ref 0.5–4.6)
LYMPHOCYTES NFR BLD: 36 % (ref 13–44)
MCH RBC QN AUTO: 30.5 PG (ref 26.1–32.9)
MCHC RBC AUTO-ENTMCNC: 32.3 G/DL (ref 31.4–35)
MCV RBC AUTO: 94.4 FL (ref 79.6–97.8)
MONOCYTES # BLD: 0.4 K/UL (ref 0.1–1.3)
MONOCYTES NFR BLD: 9 % (ref 4–12)
NEUTS SEG # BLD: 2.4 K/UL (ref 1.7–8.2)
NEUTS SEG NFR BLD: 48 % (ref 43–78)
NRBC # BLD: 0 K/UL (ref 0–0.2)
P-R INTERVAL, ECG05: 140 MS
PLATELET # BLD AUTO: 182 K/UL (ref 150–450)
PMV BLD AUTO: 10.3 FL (ref 9.4–12.3)
POTASSIUM SERPL-SCNC: 4.4 MMOL/L (ref 3.5–5.1)
Q-T INTERVAL, ECG07: 444 MS
QRS DURATION, ECG06: 82 MS
QTC CALCULATION (BEZET), ECG08: 458 MS
RBC # BLD AUTO: 4.66 M/UL (ref 4.23–5.6)
SODIUM SERPL-SCNC: 141 MMOL/L (ref 136–145)
TRIGL SERPL-MCNC: 148 MG/DL (ref 35–150)
VENTRICULAR RATE, ECG03: 64 BPM
VLDLC SERPL CALC-MCNC: 29.6 MG/DL (ref 6–23)
WBC # BLD AUTO: 5 K/UL (ref 4.3–11.1)

## 2020-01-09 PROCEDURE — 74011250637 HC RX REV CODE- 250/637: Performed by: NURSE PRACTITIONER

## 2020-01-09 PROCEDURE — 74011250637 HC RX REV CODE- 250/637: Performed by: INTERNAL MEDICINE

## 2020-01-09 PROCEDURE — 80048 BASIC METABOLIC PNL TOTAL CA: CPT

## 2020-01-09 PROCEDURE — 85025 COMPLETE CBC W/AUTO DIFF WBC: CPT

## 2020-01-09 PROCEDURE — 80061 LIPID PANEL: CPT

## 2020-01-09 PROCEDURE — 36415 COLL VENOUS BLD VENIPUNCTURE: CPT

## 2020-01-09 PROCEDURE — 93005 ELECTROCARDIOGRAM TRACING: CPT | Performed by: INTERNAL MEDICINE

## 2020-01-09 RX ORDER — CLOPIDOGREL BISULFATE 75 MG/1
75 TABLET ORAL DAILY
Qty: 90 TAB | Refills: 3 | Status: SHIPPED | OUTPATIENT
Start: 2020-01-10 | End: 2020-01-09 | Stop reason: SDUPTHER

## 2020-01-09 RX ORDER — NITROGLYCERIN 0.4 MG/1
0.4 TABLET SUBLINGUAL
Qty: 1 BOTTLE | Refills: 11 | Status: SHIPPED | OUTPATIENT
Start: 2020-01-09 | End: 2020-08-26 | Stop reason: SDUPTHER

## 2020-01-09 RX ORDER — CLOPIDOGREL BISULFATE 75 MG/1
75 TABLET ORAL DAILY
Qty: 90 TAB | Refills: 3 | Status: SHIPPED | OUTPATIENT
Start: 2020-01-10 | End: 2021-01-28

## 2020-01-09 RX ORDER — NITROGLYCERIN 0.4 MG/1
0.4 TABLET SUBLINGUAL
Qty: 1 BOTTLE | Refills: 11 | Status: SHIPPED | OUTPATIENT
Start: 2020-01-09 | End: 2020-01-09 | Stop reason: SDUPTHER

## 2020-01-09 RX ADMIN — APIXABAN 5 MG: 5 TABLET, FILM COATED ORAL at 08:09

## 2020-01-09 RX ADMIN — MONTELUKAST 10 MG: 10 TABLET, FILM COATED ORAL at 08:09

## 2020-01-09 RX ADMIN — CLOPIDOGREL BISULFATE 75 MG: 75 TABLET, FILM COATED ORAL at 08:09

## 2020-01-09 RX ADMIN — Medication 10 ML: at 06:20

## 2020-01-09 RX ADMIN — Medication 10 ML: at 06:21

## 2020-01-09 RX ADMIN — DRONEDARONE 400 MG: 400 TABLET, FILM COATED ORAL at 08:09

## 2020-01-09 RX ADMIN — ATORVASTATIN CALCIUM 10 MG: 10 TABLET, FILM COATED ORAL at 08:09

## 2020-01-09 NOTE — ROUTINE PROCESS
Bedside and Verbal report given to [facility] RN by self.  Report included SBAR, Kardex, ED summary, procedure summary, recent results and cardiac rhythm SR.

## 2020-01-09 NOTE — PROGRESS NOTES
Care Management Interventions  Transition of Care Consult (CM Consult): Discharge Planning  Discharge Durable Medical Equipment: No  Physical Therapy Consult: No  Occupational Therapy Consult: No  Speech Therapy Consult: No  Discharge Location  Discharge Placement: Home    Chart screened by  for discharge planning. No needs identified at this time. Please consult  if any new issues arise.

## 2020-01-09 NOTE — DISCHARGE SUMMARY
[address] 121 Cardiology Discharge Summary     Patient ID:  Missael Dai  [id number]  27 y.o.  1960    Admit date: 1/7/2020    Discharge date:  1/9/20    Admitting Physician: Scarlett Ken MD     Discharge Physician: GHISLAINE Young/Dr. Andrews Been    Admission Diagnoses: Chest pain [R07.9]  Chest pain [R07.9]    Discharge Diagnoses:    Diagnosis    Chest pain    PAF (paroxysmal atrial fibrillation) ([address]    Recurrent depression ([facility] [address]    Hypomagnesemia    Depression    Adhesive capsulitis of left shoulder    Strain of muscle(s) and tendon(s) of the rotator cuff of left shoulder, sequela    Strain of muscle, fascia and tendon of long head of biceps, left arm, sequela    SLAP lesion of left shoulder    Degenerative joint disease of left acromioclavicular joint    Coronary atherosclerosis of native coronary vessel    Abnormal EKG    History of tobacco abuse    ESPINOZA (dyspnea on exertion)    HTN (hypertension)    Hyperlipidemia    Inguinal hernia unilateral, non-recurrent     Transitional care follow up with [address] [place] [address] [address] Cardiology Dr Maurice Gabriel 17 at 8:00[facility] office     Cardiology Procedures this admission:  Left heart catheterization with PCI, Echo  Consults: None    Hospital Course: Patient presented to the emergency department of [facility] with complaints of palpitations and CP and was found to be in a fib w RVR. Patient was evaluated and subsequently admitted for further cardiac evaluation and treatment. Cardiac enzymes were positive at .07 up to 1. 13.  BP low and BB held, he converted to NSR, was started on heparin. [facility] was planned for 1-8-20. Patient underwent cardiac catheterization by Dr. Kelvin Lo and was found to have a 95% stenosis of the RCA that was stented with a 3.0 x 30 [place] drug-eluting stent  with 0% residual stenosis. EF 70%. Patient tolerated the procedure well and returned to the telemetry floor for recovery.   An echocardiogram was performed with report as follows: -  Left ventricle: Systolic function was normal. Ejection fraction was  estimated in the range of 55 % to 60 %. There were no regional wall motion  abnormalities. There was moderate asymmetric hypertrophy of the LV apex.     -  Inferior vena cava, hepatic veins: The respirophasic change in diameter   was  less than 50%.    -  Tricuspid valve: There was mild regurgitation. Multaq was added and pt remained in NSR. The morning of 1/9/20 patient was up feeling well without any complaints of chest pain or shortness of breath. Patient's right radial cath site was clean, dry and intact without hematoma or bruit. Patient's labs were WNL. Patient was seen and examined by Dr. Jennifer Wylie and determined stable and ready for discharge. Patient was instructed on the importance of medication compliance including taking plavix everyday without missing a dose. After receiving drug eluting stents, the patient will need to be on dual anti-platelet therapy for at least 1 year. NO ASA due to increased risk of bleeding on ELIQUIS. Indication for treatment and risk of Eliquis and plavix therapy was discussed with the patient and he/she understands to seek medical care immediately if any signs of bleeding. For maximized medical therapy of CAD, patient will continue use of statin BUT NO ACE/ARB or BB due to hypotension. He is to check BP at home and IF SBP greater than 140 can restart these meds. The patient will have close transitional care follow up with [facility] Cardiology Dr Leo Monzon at 8:00[facility] [place] office and has been referred to cardiac rehab. Discussed with the patient importance of diet and exercise to prevent further cardiovascular disease. DISPOSITION: The patient is being discharged home in stable condition on a low saturated fat, low cholesterol and low salt diet. The patient is instructed to advance activities as tolerated to the limit of fatigue or shortness of breath.  The patient is instructed to avoid all heavy lifting for 5 days. The patient is instructed to watch the cath site for bleeding/oozing; if seen, the patient is instructed to apply firm pressure with a clean cloth and call [facility] Cardiology at [phone]. The patient is instructed to watch for signs of infection which include: increasing area of redness, fever/hot to touch or purulent drainage at the catheterization site. The patient is instructed not to soak in a bathtub for 7-10 days, but is cleared to shower. The patient is instructed to call the office or return to the ER for immediate evaluation for any shortness of breath or chest pain not relieved by NTG. Discharge Exam:   Visit Vitals  /68 (BP 1 Location: Left arm, BP Patient Position: At rest)   Pulse 72   Temp 98.4 °F (36.9 °C)   Resp 18   Ht 5' 6\" (1.676 m)   Wt 79.5 kg (175 lb 3.2 oz)   SpO2 94%   BMI 28.28 kg/m²      Patient Vitals for the past 12 hrs:   Temp Pulse Resp BP SpO2   01/09/20 0507 98.4 °F (36.9 °C) 72 18 104/68 94 %   01/09/20 0106 98.1 °F (36.7 °C) 67 20 101/66 94 %   01/08/20 2048 97.9 °F (36.6 °C) 69 20 112/68 96 %        Patient has been seen by Dr. Jose Alfredo Coombs: see his progress note for exam details. Recent Results (from the past 24 hour(s))   EKG, 12 LEAD, SUBSEQUENT    Collection Time: 01/08/20  9:45 AM   Result Value Ref Range    Ventricular Rate 57 BPM    Atrial Rate 57 BPM    P-R Interval 134 ms    QRS Duration 82 ms    Q-T Interval 452 ms    QTC Calculation (Bezet) 439 ms    Calculated P Axis 65 degrees    Calculated R Axis 56 degrees    Calculated T Axis 177 degrees    Diagnosis       !! AGE AND GENDER SPECIFIC ECG ANALYSIS !! Sinus bradycardia  ST & T wave abnormality, consider inferior ischemia  ST & T wave abnormality, consider anterolateral ischemia  Abnormal ECG  When compared with ECG of 07-JAN-2020 22:40,  Sinus rhythm has replaced Atrial fibrillation  Vent.  rate has decreased BY  38 BPM  ST no longer depressed in Inferior leads  T wave inversion more evident in Anterior leads  Confirmed by Micheline Perez ([id number]) on 1/8/2020 10:22:32 AM     TROPONIN I    Collection Time: 01/08/20  9:49 AM   Result Value Ref Range    Troponin-I, Qt. 1.13 (HH) 0.02 - 0.05 NG/ML   PTT    Collection Time: 01/08/20 11:44 AM   Result Value Ref Range    aPTT 106.4 (H) 24.7 - 39.8 SEC   EKG, 12 LEAD, INITIAL    Collection Time: 01/08/20  3:44 PM   Result Value Ref Range    Ventricular Rate 57 BPM    Atrial Rate 57 BPM    P-R Interval 138 ms    QRS Duration 74 ms    Q-T Interval 468 ms    QTC Calculation (Bezet) 455 ms    Calculated P Axis 57 degrees    Calculated R Axis 14 degrees    Calculated T Axis -156 degrees    Diagnosis       Sinus bradycardia  ST & T wave abnormality, consider inferior ischemia  ST & T wave abnormality, consider anterolateral ischemia  Abnormal ECG  When compared with ECG of 08-JAN-2020 09:45,  No significant change was found  Confirmed by Micheline Perez ([id number]) on 1/8/2020 8:70:05 PM     METABOLIC PANEL, BASIC    Collection Time: 01/09/20  4:11 AM   Result Value Ref Range    Sodium 141 136 - 145 mmol/L    Potassium 4.4 3.5 - 5.1 mmol/L    Chloride 109 (H) 98 - 107 mmol/L    CO2 27 21 - 32 mmol/L    Anion gap 5 (L) 7 - 16 mmol/L    Glucose 99 65 - 100 mg/dL    BUN 13 8 - 23 MG/DL    Creatinine 0.97 0.8 - 1.5 MG/DL    GFR est AA >60 >60 ml/min/1.73m2    GFR est non-AA >60 >60 ml/min/1.73m2    Calcium 8.8 8.3 - 10.4 MG/DL   LIPID PANEL    Collection Time: 01/09/20  4:11 AM   Result Value Ref Range    LIPID PROFILE          Cholesterol, total 122 <200 MG/DL    Triglyceride 148 35 - 150 MG/DL    HDL Cholesterol 36 (L) 40 - 60 MG/DL    LDL, calculated 56.4 <100 MG/DL    VLDL, calculated 29.6 (H) 6.0 - 23.0 MG/DL    CHOL/HDL Ratio 3.4     CBC WITH AUTOMATED DIFF    Collection Time: 01/09/20  4:11 AM   Result Value Ref Range    WBC 5.0 4.3 - 11.1 K/uL    RBC 4.66 4.23 - 5.6 M/uL    HGB 14.2 13.6 - 17.2 g/dL    HCT 44.0 41.1 - 50.3 %    MCV 94.4 79.6 - 97.8 FL    MCH 30.5 26.1 - 32.9 PG    MCHC 32.3 31.4 - 35.0 g/dL    RDW 14.2 11.9 - 14.6 %    PLATELET 228 011 - 308 K/uL    MPV 10.3 9.4 - 12.3 FL    ABSOLUTE NRBC 0.00 0.0 - 0.2 K/uL    DF AUTOMATED      NEUTROPHILS 48 43 - 78 %    LYMPHOCYTES 36 13 - 44 %    MONOCYTES 9 4.0 - 12.0 %    EOSINOPHILS 8 (H) 0.5 - 7.8 %    BASOPHILS 0 0.0 - 2.0 %    IMMATURE GRANULOCYTES 0 0.0 - 5.0 %    ABS. NEUTROPHILS 2.4 1.7 - 8.2 K/UL    ABS. LYMPHOCYTES 1.8 0.5 - 4.6 K/UL    ABS. MONOCYTES 0.4 0.1 - 1.3 K/UL    ABS. EOSINOPHILS 0.4 0.0 - 0.8 K/UL    ABS. BASOPHILS 0.0 0.0 - 0.2 K/UL    ABS. IMM. GRANS. 0.0 0.0 - 0.5 K/UL   EKG, 12 LEAD, INITIAL    Collection Time: 01/09/20  6:56 AM   Result Value Ref Range    Ventricular Rate 64 BPM    Atrial Rate 64 BPM    P-R Interval 140 ms    QRS Duration 82 ms    Q-T Interval 444 ms    QTC Calculation (Bezet) 458 ms    Calculated P Axis 56 degrees    Calculated R Axis 26 degrees    Calculated T Axis 169 degrees    Diagnosis       Normal sinus rhythm  ST & Marked T wave abnormality, consider anterolateral ischemia  Abnormal ECG  When compared with ECG of 08-JAN-2020 15:44,  T wave inversion less evident in Inferior leads  Confirmed by Kae Rees MD (), Brad Flores ([id number]) on 1/9/2020 7:33:51 AM           Patient Instructions:     Current Discharge Medication List      START taking these medications    Details   apixaban (ELIQUIS) 5 mg tablet Take 1 Tab by mouth two (2) times a day. Qty: 60 Tab, Refills: 11      clopidogrel (PLAVIX) 75 mg tab Take 1 Tab by mouth daily. Qty: 90 Tab, Refills: 3      dronedarone (MULTAQ) tab tablet Take 1 Tab by mouth two (2) times daily (with meals). Qty: 180 Tab, Refills: 3      nitroglycerin (NITROSTAT) 0.4 mg SL tablet 1 Tab by SubLINGual route every five (5) minutes as needed for Chest Pain. Up to 3 doses.   Qty: 1 Bottle, Refills: 11         CONTINUE these medications which have NOT CHANGED    Details   atorvastatin (LIPITOR) 10 mg tablet Take 1 Tab by mouth every morning. Indications: high cholesterol  Qty: 90 Tab, Refills: 3      tadalafil (CIALIS) 20 mg tablet Take 1 Tab by mouth as needed (ED). Qty: 30 Tab, Refills: 5    Associated Diagnoses: Erectile dysfunction, unspecified erectile dysfunction type      traZODone (DESYREL) 100 mg tablet Take 1 Tab by mouth nightly. Qty: 90 Tab, Refills: 1    Associated Diagnoses: Primary insomnia      levocetirizine (XYZAL) 5 mg tablet Take  by mouth. naphazoline-pheniramine (OPCON-A) ophthalmic solution Administer 2 Drops to both eyes three (3) times daily as needed (redness). Qty: 15 mL, Refills: 5    Associated Diagnoses: Other conjunctivitis of both eyes      albuterol (VENTOLIN HFA) 90 mcg/actuation inhaler Take 2 Puffs by inhalation as needed for Wheezing. Qty: 1 Inhaler, Refills: 3      multivitamin (ONE A DAY) tablet Take 1 Tab by mouth daily. omega-3 fatty acids (FISH OIL) cap Take 1 Cap by mouth daily. montelukast (SINGULAIR) 10 mg tablet Take 1 Tab by mouth daily. Qty: 90 Tab, Refills: 1    Associated Diagnoses: Environmental and seasonal allergies      ammonium lactate (AMLACTIN) 12 % topical cream Apply  to affected area two (2) times a day.  rub in to affected area well  Qty: 280 g, Refills: 0    Associated Diagnoses: Corn of foot      valACYclovir (VALTREX) 1 gram tablet Tab 1 bid today and tab 1 tomorrow am  Qty: 3 Tab, Refills: 1    Associated Diagnoses: Oral herpes simplex infection         STOP taking these medications       metoprolol succinate (TOPROL XL) 100 mg tablet Comments:   Reason for Stopping:         benazepril (LOTENSIN) 20 mg tablet Comments:   Reason for Stopping:         aspirin delayed-release 81 mg tablet Comments:   Reason for Stopping:               Signed:  Emma Lezama PA-C  1/9/2020  8:08 AM

## 2020-01-09 NOTE — ROUTINE PROCESS
Bedside and Verbal report given to self by Ailin Garcia RN. Report included SBAR, Kardex, ED Summary, Procedure Summary, Intake and Output and Cardiac Rhythm SR. R-radial site assessed and site noted to be C/D/I.

## 2020-01-09 NOTE — PROCEDURES
[address] [facility]  CARDIAC CATH    Name:  Johnson Callas  MR#:  [id number]  :  1960  ACCOUNT #:  [de-identified]  DATE OF SERVICE:  2020    PROCEDURES PERFORMED:  Left heart catheterization via the right radial artery with a 5-Ch[place] Tiger catheter and a 5-Chadian JR-5 catheter. Angioplasty and stenting of the ostial RCA was performed with a 6-[place] multipurpose, hockey stick, Angiomax and a  50 wire. TR band was placed for good hemostasis. The patient was loaded with Plavix and Eliquis was started given the patient's recent AFib. PREOPERATIVE DIAGNOSES:  Chest pain, non-ST-elevation myocardial infarction. POSTOPERATIVE DIAGNOSIS:  Coronary artery disease. SURGEON:  Natalee Pugh MD    ASSISTANT:  None. ESTIMATED BLOOD LOSS:  5 mL. SPECIMENS REMOVED:  None. COMPLICATIONS:  None. IMPLANTS:  A 3.0 x 30 [place] drug-eluting stent postdilated to 3.3. ANESTHESIA:  Conscious sedation was provided by Micheline Luther RN beginning at 14:23, concluding at 15:24. Total of 3 mg Versed and 100 mcg of fentanyl were given. Vital signs and saturation were stable throughout. FINDINGS:  Left main coronary has 20% ostial narrowing, bifurcates into LAD and circumflex system. The LAD has mild irregularities up to 20% in the proximal segment. There is focal 20% mid vessel irregularity. The distal vessel is free of significant disease. The circumflex has a bifurcating mid vessel obtuse marginal branch, small distal obtuse marginal branch. No significant atherosclerosis seen in the circumflex system. The right coronary is hard to cannulate given that there is a 95% ostial narrowing. Left ventriculogram reveals apical hypertrophic cardiomyopathy with ejection fraction of 70%. Left ventricular end-diastolic pressure is 19 mmHg with an opening aortic pressure of 110/63 and no gradient across the aortic valve.     After Angiomax was given, a  50 wire was placed in the distal RCA. The lesion was predilated with a 2.75 x 15 Trek balloon stented with a 3.0 x 30 [place] drug-eluting stent and postdilated with a 3.25 x 15 [place] Trek with inflations to 20 atmospheres, 0% residual and JACLYN 3 flow. CONCLUSIONS:  1. Successful angioplasty and stenting of the ostial RCA. 2.  Preserved LV systolic function. 3.  Apical hypertrophic cardiomyopathy. 4.  TR band for hemostasis. RECOMMENDATIONS:  The patient will be treated with Plavix and Eliquis will be started tonight. We would add Multaq given the very fast heart rate and extreme symptoms the patient has with his hypertrophic cardiomyopathy with the AFib with RVR.       Mary Lawrence MD      [id number]  D:  01/08/2020 15:32  T:  01/09/2020 0:57  JOB #:  [id number]

## 2020-01-09 NOTE — ROUTINE PROCESS
Bedside and Verbal shift change report given to self (oncoming nurse) by Beti Manuel (offgoing nurse). Report included the following information SBAR, Kardex and Recent Results.

## 2020-01-09 NOTE — PROGRESS NOTES
Problem: Falls - Risk of  Goal: *Absence of Falls  Description  Document Jaydon Rivertj Fall Risk and appropriate interventions in the flowsheet.   Outcome: Progressing Towards Goal  Note: Fall Risk Interventions:            Medication Interventions: Patient to call before getting OOB, Teach patient to arise slowly                   Problem: Cath Lab Procedures: Post-Cath Day of Procedure (Initiate SCIP Measures for Post-Op Care)  Goal: Activity/Safety  Outcome: Progressing Towards Goal  Goal: Consults, if ordered  Outcome: Progressing Towards Goal  Goal: Diagnostic Test/Procedures  Outcome: Progressing Towards Goal  Goal: Nutrition/Diet  Outcome: Progressing Towards Goal  Goal: Discharge Planning  Outcome: Progressing Towards Goal  Goal: Medications  Outcome: Progressing Towards Goal  Goal: Respiratory  Outcome: Progressing Towards Goal  Goal: Treatments/Interventions/Procedures  Outcome: Progressing Towards Goal  Goal: Psychosocial  Outcome: Progressing Towards Goal  Goal: *Procedure site is without bleeding and signs of infection six hours post sheath removal  Outcome: Progressing Towards Goal  Goal: *Hemodynamically stable  Outcome: Progressing Towards Goal  Goal: *Optimal pain control at patient's stated goal  Outcome: Progressing Towards Goal

## 2020-01-09 NOTE — PROGRESS NOTES
Radial compression band removed at 2115 after slowly reducing air from unknown cc to zero as per hospital protocol. No bleeding or hematoma noted. 2 x 2 gauze with tegaderm placed over puncture site. The affected extremity is warm and dry to the touch. Frequent vital signs printed and placed on bedside chart. Patient instructed to call if any bleeding noted on gauze. Patient verbalized understanding the nursing instructions.

## 2020-01-09 NOTE — DISCHARGE INSTRUCTIONS
Patient Education   Patient Education   Patient Education        Atrial Fibrillation: Care Instructions  Your Care Instructions    Atrial fibrillation is an irregular and often fast heartbeat. Treating this condition is important for several reasons. It can cause blood clots, which can travel from your heart to your brain and cause a stroke. If you have a fast heartbeat, you may feel lightheaded, dizzy, and weak. An irregular heartbeat can also increase your risk for heart failure. Atrial fibrillation is often the result of another heart condition, such as high blood pressure or coronary artery disease. Making changes to improve your heart condition will help you stay healthy and active. Follow-up care is a key part of your treatment and safety. Be sure to make and go to all appointments, and call your doctor if you are having problems. It's also a good idea to know your test results and keep a list of the medicines you take. How can you care for yourself at home? Medicines    · Take your medicines exactly as prescribed. Call your doctor if you think you are having a problem with your medicine. You will get more details on the specific medicines your doctor prescribes.     · If your doctor has given you a blood thinner to prevent a stroke, be sure you get instructions about how to take your medicine safely. Blood thinners can cause serious bleeding problems.     · Do not take any vitamins, over-the-counter drugs, or herbal products without talking to your doctor first.    Lifestyle changes    · Do not smoke. Smoking can increase your chance of a stroke and heart attack. If you need help quitting, talk to your doctor about stop-smoking programs and medicines. These can increase your chances of quitting for good.     · Eat a heart-healthy diet.     · Stay at a healthy weight. Lose weight if you need to.     · Limit alcohol to 2 drinks a day for men and 1 drink a day for women.  Too much alcohol can cause health problems.     · Avoid colds and flu. Get a pneumococcal vaccine shot. If you have had one before, ask your doctor whether you need another dose. Get a flu shot every year. If you must be around people with colds or flu, wash your hands often. Activity    · If your doctor recommends it, get more exercise. Walking is a good choice. Bit by bit, increase the amount you walk every day. Try for at least 30 minutes on most days of the week. You also may want to swim, bike, or do other activities. Your doctor may suggest that you join a cardiac rehabilitation program so that you can have help increasing your physical activity safely.     · Start light exercise if your doctor says it is okay. Even a small amount will help you get stronger, have more energy, and manage stress. Walking is an easy way to get exercise. Start out by walking a little more than you did in the hospital. Gradually increase the amount you walk.     · When you exercise, watch for signs that your heart is working too hard. You are pushing too hard if you cannot talk while you are exercising. If you become short of breath or dizzy or have chest pain, sit down and rest immediately.     · Check your pulse regularly. Place two fingers on the artery at the palm side of your wrist, in line with your thumb. If your heartbeat seems uneven or fast, talk to your doctor. When should you call for help? Call 911 anytime you think you may need emergency care. For example, call if:    · You have symptoms of a heart attack. These may include:  ? Chest pain or pressure, or a strange feeling in the chest.  ? Sweating. ? Shortness of breath. ? Nausea or vomiting. ? Pain, pressure, or a strange feeling in the back, neck, jaw, or upper belly or in one or both shoulders or arms. ? Lightheadedness or sudden weakness. ? A fast or irregular heartbeat. After you call 911, the  may tell you to chew 1 adult-strength or 2 to 4 low-dose aspirin.  Wait for an ambulance. Do not try to drive yourself.     · You have symptoms of a stroke. These may include:  ? Sudden numbness, tingling, weakness, or loss of movement in your face, arm, or leg, especially on only one side of your body. ? Sudden vision changes. ? Sudden trouble speaking. ? Sudden confusion or trouble understanding simple statements. ? Sudden problems with walking or balance. ? A sudden, severe headache that is different from past headaches.     · You passed out (lost consciousness).    Call your doctor now or seek immediate medical care if:    · You have new or increased shortness of breath.     · You feel dizzy or lightheaded, or you feel like you may faint.     · Your heart rate becomes irregular.     · You can feel your heart flutter in your chest or skip heartbeats. Tell your doctor if these symptoms are new or worse.    Watch closely for changes in your health, and be sure to contact your doctor if you have any problems. Where can you learn more? Go to http://carmen-svetlana.info/. Enter U020 in the search box to learn more about \"Atrial Fibrillation: Care Instructions. \"  Current as of: April 9, 2019  Content Version: 12.2  © 4198-2354 [employer]. Care instructions adapted under license by [employer] (which disclaims liability or warranty for this information). If you have questions about a medical condition or this instruction, always ask your healthcare professional. [place] [address] any warranty or liability for your use of this information. Learning About Percutaneous Coronary Intervention  What is percutaneous coronary intervention? Percutaneous coronary intervention (PCI) is the name for procedures to open a blocked coronary artery. The two most common are coronary angioplasty and coronary stent placement. A PCI is a way to open a blocked coronary artery before, during, or after a heart attack.  It gets blood flowing to your heart. And it can help prevent heart problems by widening an artery that has been narrowed by fatty buildup (plaque). This also may be called balloon angioplasty. Before a PCI, a doctor does a test to find blocked arteries. The test is called cardiac catheterization. The doctor puts a tiny tube called a catheter into an artery in your groin or arm. The doctor moves the catheter through the artery to your heart. Then he or she puts a dye into the catheter. This makes your heart's arteries show up on a screen so the doctor can see any blockages. The test also can measure the pressure inside your heart's chambers. If you have a blocked artery, the doctor may do an angioplasty or a coronary stent procedure. In an angioplasty, the doctor uses a catheter with a tiny balloon at the tip. He or she puts it into the blocked area and inflates it. The balloon presses the plaque against the walls of the artery. This makes more room for blood to flow. In most cases, the doctor then puts a stent in the artery. A stent is a small, expandable tube that presses against the walls of the artery. The stent is left in the artery to keep the artery open. This helps blood flow. It also may keep small pieces of plaque from breaking off and causing a heart attack. How is PCI done? PCI is done in a cardiac catheterization laboratory (\"cath lab\"). It is done by a heart specialist called a cardiologist. The whole procedure may take 1½ to 3 hours. You lie on a table under a large X-ray machine. You will get medicine through an IV in one of your veins. It helps you relax and not feel pain. You will be awake during the procedure. But you may not be able to remember much about it. The doctor will inject some medicine into your arm or groin to numb the skin. You will feel a small needle stick. It's like having a blood test. You may feel some pressure when the doctor puts in the catheter. But you will not feel pain.   The doctor will look at [employer] on a monitor (like a TV set) to move the catheter to your heart. You may feel warm or flushed for a short time when the doctor injects dye into your artery. The doctor then will inflate a tiny balloon at the end of the catheter. The balloon is inflated for a brief time. Then it is deflated and removed. The doctor also may use the catheter to put a stent in the artery. What can you expect after PCI? The catheter will be removed. A nurse or doctor may press on a bandage on the opening. Then a bandage or a compression device may be placed on your groin or wrist at the catheter insertion site. This prevents bleeding. After the test, you will be taken to a room where the catheter site and your heart rate, blood pressure, and temperature will be checked several times. If the catheter was put in your groin, you will need to lie still and keep your leg straight for several hours. If the catheter was put in your arm, you may be able to sit up and get out of bed right away. But you will need to keep your arm still for at least one hour. You may stay 1 night in the hospital. When you go home, you will get instructions from your doctor to help you recover well and prevent problems. Make sure to drink plenty of fluids (unless your doctor tells you not to) for several hours after the test. This will help flush the dye out of your body. Your doctor will prescribe blood-thinning medicines. You will likely take aspirin plus another blood thinner. It is very important that you take these medicines exactly as directed. They help keep the coronary artery open and reduce your risk of a heart attack. If you have this procedure, you will still need to make lifestyle changes like eating healthy, being active, and not smoking. This will give you the best chance for a longer, healthier life. Follow-up care is a key part of your treatment and safety.  Be sure to make and go to all appointments, and call your doctor if you are having problems. It's also a good idea to know your test results and keep a list of the medicines you take. Where can you learn more? Go to http://carmen-svetlana.info/. Enter J777 in the search box to learn more about \"Learning About Percutaneous Coronary Intervention. \"  Current as of: April 9, 2019  Content Version: 12.2  © 3244-0960 [employer]. Care instructions adapted under license by [employer] (which disclaims liability or warranty for this information). If you have questions about a medical condition or this instruction, always ask your healthcare professional. [place] [address] any warranty or liability for your use of this information. Heart Attack: Care Instructions  Your Care Instructions    A heart attack (myocardial infarction, or MI) occurs when one or more of the coronary arteries, which supply the heart with oxygen-rich blood, is blocked. A blockage usually occurs when plaque inside the artery breaks open and a blood clot forms in the artery. After a heart attack, you may be worried about your future. Over the next several weeks, your heart will start to heal. Though it can be hard to break old habits, you can prevent another heart attack by making some lifestyle changes and by taking medicines. You may use this information for ideas about what to do at home to speed your recovery. Follow-up care is a key part of your treatment and safety. Be sure to make and go to all appointments, and call your doctor if you are having problems. It's also a good idea to know your test results and keep a list of the medicines you take. How can you care for yourself at home? Activity    · Until your doctor says it is okay, do not do strenuous exercise. And do not lift, pull, or push anything heavy.  Ask your doctor what types of activities are safe for you.     · If your doctor has not set you up with a cardiac rehabilitation (rehab) program, talk to him or her about whether that is right for you. Cardiac rehab includes supervised exercise. It also includes help with diet and lifestyle changes and emotional support. It may reduce your risk of future heart problems.     · Increase your activities slowly. Take short rest breaks when you get tired.     · If your doctor recommends it, get more exercise. Walking is a good choice. Bit by bit, increase the amount you walk every day. Try for at least 30 minutes on most days of the week. You also may want to swim, bike, or do other activities. Talk with your doctor before you start an exercise program to make sure it is safe for you.     · Ask your doctor when you can drive, go back to work, and do other daily activities again.     · You can have sex as soon as you feel ready for it. Often this means when you can easily walk around or climb stairs. Talk with your doctor if you have any concerns. If you are taking nitroglycerin, do not take erection-enhancing medicine such as sildenafil (Viagra), tadalafil (Cialis), or vardenafil (Levitra) .    Lifestyle changes    · Do not smoke. Smoking increases your risk of another heart attack. If you need help quitting, talk to your doctor about stop-smoking programs and medicines. These can increase your chances of quitting for good.     · Eat a heart-healthy diet that is low in saturated fat and salt, and is full of fruits, vegetables and whole-grains. Eat at least two servings of fish each week. You may get more details about how to eat healthy. But these tips can help you get started.     · Stay at a healthy weight, or lose weight if you need to. Medicines    · Be safe with medicines. Take your medicines exactly as prescribed. Call your doctor if you think you are having a problem with your medicine. You will get more details on the specific medicines your doctor prescribes. Do not stop taking your medicine unless your doctor tells you to.  Not taking your medicine might raise your risk of having another heart attack.     · You may need several medicines to help lower your risk of another heart attack. These include:  ? Blood pressure medicines such as angiotensin-converting enzyme (ACE) inhibitors, ARBs (angiotensin II receptor blockers), and beta-blockers. ? Cholesterol medicine called statins. ? Aspirin and other blood thinners. These prevent blood clots that can cause a heart attack.     · If your doctor has given you nitroglycerin, keep it with you at all times. If you have angina symptoms, such as chest pain or pressure, sit down and rest. Take the first dose of nitroglycerin as directed. If symptoms get worse or are not getting better within 5 minutes, call 911 right away. Stay on the phone. The emergency  will tell you what to do.     · Do not take any over-the-counter medicines, vitamins, or herbal products without talking to your doctor first.    Staying healthy    · Manage other health conditions such as high blood pressure and diabetes.     · Avoid colds and flu. Get a pneumococcal vaccine shot. If you have had one before, ask your doctor whether you need another dose. Get the flu vaccine every year. If you must be around people with colds or flu, wash your hands often.     · Be sure to tell your doctor about any angina symptoms you have had, even if they went away. Pay attention to your angina symptoms. Know what is typical for you and learn how to control it. Know when to call for help.     · Talk to your family, friends, or a counselor about your feelings. It is normal to feel frightened, angry, hopeless, helpless, and even guilty. Talking openly about bad feelings can help you cope. If you have symptoms of depression, talk to your doctor. When should you call for help? Call 911 anytime you think you may need emergency care. For example, call if:    · You have symptoms of a heart attack. These may include:  ?  Chest pain or pressure, or a strange feeling in the chest.  ? Sweating. ? Shortness of breath. ? Nausea or vomiting. ? Pain, pressure, or a strange feeling in the back, neck, jaw, or upper belly or in one or both shoulders or arms. ? Lightheadedness or sudden weakness. ? A fast or irregular heartbeat. After you call 911, the  may tell you to chew 1 adult-strength or 2 to 4 low-dose aspirin. Wait for an ambulance. Do not try to drive yourself.     · You have angina symptoms (such as chest pain or pressure) that do not go away with rest or are not getting better within 5 minutes after you take a dose of nitroglycerin.     · You passed out (lost consciousness).     · You feel like you are having another heart attack.    Call your doctor now or seek immediate medical care if:    · You are having angina symptoms, such as chest pain or pressure, more often than usual, or the symptoms are different or worse than usual.     · You have new or increased shortness of breath.     · You are dizzy or lightheaded, or you feel like you may faint.    Watch closely for changes in your health, and be sure to contact your doctor if you have any problems. Where can you learn more? Go to http://carmen-svetlana.info/. Enter [id number] in the search box to learn more about \"Heart Attack: Care Instructions. \"  Current as of: April 9, 2019  Content Version: 12.2  © 8931-4358 [employer]. Care instructions adapted under license by [employer] (which disclaims liability or warranty for this information). If you have questions about a medical condition or this instruction, always ask your healthcare professional. [place] [address] any warranty or liability for your use of this information. Patient Education      Dronedarone (Multaq) - (By mouth)   Why this medicine is used:   Treats heart rhythm problems.   Contact a nurse or doctor right away if you have:  · Fast, slow, pounding, or uneven heartbeat; lightheadedness, dizziness  · Rapid weight gain, swelling in your hands, ankles, or feet  · Yellow skin or eyes  · Dark urine or pale stools, change in how much or how often you urinate  · Vomiting, stomach pain, loss of appetite     Common side effects:  · Diarrhea, nausea, stomach pain  · Weakness  © 2017 [facility] Information is for End User's use only and may not be sold, redistributed or otherwise used for commercial purposes. Patient Education      Nitroglycerin, Rapid Release (NitroMist, Nitrolingual, Nitroquick, Nitrostat) - (By mouth)   Why this medicine is used:   Treats or prevents angina (chest pain). Contact a nurse or doctor right away if you have:  · Throbbing, severe, or ongoing headache, confusion, vision problems  · Trouble breathing, cold sweat, blue skin, lips, or nails  · Severe or ongoing dizziness, lightheadedness, fainting  · Low fever     Common side effects:  · Dizziness, lightheadedness  · Headache  · Feeling of warmth, redness of the face, neck, arms, and upper chest  © 2017 [facility] Information is for End User's use only and may not be sold, redistributed or otherwise used for commercial purposes. Patient Education      Apixaban (Eliquis) - (By mouth)   Why this medicine is used:   Treats and prevents blood clots. Contact a nurse or doctor right away if you have:  · Sudden or severe headache  · Back pain, numbness, tingling, weakness in your legs or feet  · Loss of bladder or bowel control  · Bloody vomit or vomit that looks like coffee grounds; bloody or black, tarry stools  · Bleeding that does not stop or bruises that do not heal     Common side effects:  · Minor bleeding or bruising  © 2017 [employer] [facility] Information is for End User's use only and may not be sold, redistributed or otherwise used for commercial purposes.      Patient Education      Clopidogrel (Plavix) - (By mouth)   Why this medicine is used: Helps prevent stroke, heart attack, and other heart problems. Contact a nurse or doctor right away if you have:  · Sudden or severe headache  · Bloody vomit or vomit that looks like coffee grounds; bloody or black, tarry stools  · Bleeding that does not stop or bruises that do not heal  · Dark urine or pale stools, nausea, loss of appetite, stomach pain,  · Yellow skin or eyes     Common side effects:  · Minor bleeding or bruising  © 2017 [facility] Information is for End User's use only and may not be sold, redistributed or otherwise used for commercial purposes.

## 2020-01-09 NOTE — PROGRESS NOTES
Discharge instructions reviewed with Patient. Prescriptions given for new meds and med info sheets provided for all new medications. Opportunity for questions provided. Patient voiced understanding of all discharge instructions.

## 2020-01-09 NOTE — PROGRESS NOTES
Cardiac Rehab: Spoke with patient regarding referral to cardiac rehab. Patient meets admission criteria based on PCI (01/08/2000. Written information about Cardiac Rehab given and reviewed with patient. Discussed lifestyle modifications to promote cardiac wellness. Patient indicated that he does not want to participate in the cardiac rehab program at this time due to cost. Pt encouraged to tour our program when in for his follow up appt. His Cardiologist is Dr. Katie Rush.       Thank you,  TOO Hand, RN  Cardiopulmonary Rehabilitation Nurse Liaison  [facility]

## 2020-08-26 ENCOUNTER — HOSPITAL ENCOUNTER (OUTPATIENT)
Dept: LAB | Age: 60
Discharge: HOME | End: 2020-08-26
Payer: COMMERCIAL

## 2020-08-26 DIAGNOSIS — E78.2 MIXED HYPERLIPIDEMIA: ICD-10-CM

## 2020-08-26 DIAGNOSIS — I25.10 ATHEROSCLEROSIS OF NATIVE CORONARY ARTERY OF NATIVE HEART WITHOUT ANGINA PECTORIS: ICD-10-CM

## 2020-08-26 DIAGNOSIS — R94.31 ABNORMAL EKG: ICD-10-CM

## 2020-08-26 DIAGNOSIS — I48.0 PAF (PAROXYSMAL ATRIAL FIBRILLATION) (HCC): ICD-10-CM

## 2020-08-26 LAB
ALBUMIN SERPL-MCNC: 3.6 G/DL (ref 3.2–4.6)
ALBUMIN/GLOB SERPL: 0.9 {RATIO} (ref 1.2–3.5)
ALP SERPL-CCNC: 102 U/L (ref 50–136)
ALT SERPL-CCNC: 20 U/L (ref 12–65)
ANION GAP SERPL CALC-SCNC: 5 MMOL/L (ref 7–16)
AST SERPL-CCNC: 17 U/L (ref 15–37)
BASOPHILS # BLD: 0 K/UL (ref 0–0.2)
BASOPHILS NFR BLD: 1 % (ref 0–2)
BILIRUB SERPL-MCNC: 0.5 MG/DL (ref 0.2–1.1)
BUN SERPL-MCNC: 22 MG/DL (ref 8–23)
CALCIUM SERPL-MCNC: 9.2 MG/DL (ref 8.3–10.4)
CHLORIDE SERPL-SCNC: 104 MMOL/L (ref 98–107)
CHOLEST SERPL-MCNC: 110 MG/DL
CO2 SERPL-SCNC: 28 MMOL/L (ref 21–32)
CREAT SERPL-MCNC: 1.31 MG/DL (ref 0.8–1.5)
DIFFERENTIAL METHOD BLD: NORMAL
EOSINOPHIL # BLD: 0.4 K/UL (ref 0–0.8)
EOSINOPHIL NFR BLD: 6 % (ref 0.5–7.8)
ERYTHROCYTE [DISTWIDTH] IN BLOOD BY AUTOMATED COUNT: 14.6 % (ref 11.9–14.6)
GLOBULIN SER CALC-MCNC: 3.9 G/DL (ref 2.3–3.5)
GLUCOSE SERPL-MCNC: 85 MG/DL (ref 65–100)
HCT VFR BLD AUTO: 48.9 % (ref 41.1–50.3)
HDLC SERPL-MCNC: 37 MG/DL (ref 40–60)
HDLC SERPL: 3 {RATIO}
HGB BLD-MCNC: 16.4 G/DL (ref 13.6–17.2)
IMM GRANULOCYTES # BLD AUTO: 0 K/UL (ref 0–0.5)
IMM GRANULOCYTES NFR BLD AUTO: 0 % (ref 0–5)
LDLC SERPL CALC-MCNC: 45.4 MG/DL
LIPID PROFILE,FLP: ABNORMAL
LYMPHOCYTES # BLD: 2.2 K/UL (ref 0.5–4.6)
LYMPHOCYTES NFR BLD: 30 % (ref 13–44)
MCH RBC QN AUTO: 29.9 PG (ref 26.1–32.9)
MCHC RBC AUTO-ENTMCNC: 33.5 G/DL (ref 31.4–35)
MCV RBC AUTO: 89.2 FL (ref 79.6–97.8)
MONOCYTES # BLD: 0.6 K/UL (ref 0.1–1.3)
MONOCYTES NFR BLD: 8 % (ref 4–12)
NEUTS SEG # BLD: 4.2 K/UL (ref 1.7–8.2)
NEUTS SEG NFR BLD: 56 % (ref 43–78)
NRBC # BLD: 0 K/UL (ref 0–0.2)
PLATELET # BLD AUTO: 285 K/UL (ref 150–450)
PMV BLD AUTO: 9.7 FL (ref 9.4–12.3)
POTASSIUM SERPL-SCNC: 4.4 MMOL/L (ref 3.5–5.1)
PROT SERPL-MCNC: 7.5 G/DL (ref 6.3–8.2)
RBC # BLD AUTO: 5.48 M/UL (ref 4.23–5.6)
SODIUM SERPL-SCNC: 137 MMOL/L (ref 136–145)
TRIGL SERPL-MCNC: 138 MG/DL (ref 35–150)
VLDLC SERPL CALC-MCNC: 27.6 MG/DL (ref 6–23)
WBC # BLD AUTO: 7.4 K/UL (ref 4.3–11.1)

## 2020-08-26 PROCEDURE — 85025 COMPLETE CBC W/AUTO DIFF WBC: CPT

## 2020-08-26 PROCEDURE — 36415 COLL VENOUS BLD VENIPUNCTURE: CPT

## 2020-08-26 PROCEDURE — 80053 COMPREHEN METABOLIC PANEL: CPT

## 2020-08-26 PROCEDURE — 80061 LIPID PANEL: CPT

## 2020-10-12 PROBLEM — I65.23 BILATERAL CAROTID ARTERY STENOSIS: Status: ACTIVE | Noted: 2020-10-12

## 2020-10-20 ENCOUNTER — HOSPITAL ENCOUNTER (OUTPATIENT)
Dept: ULTRASOUND IMAGING | Age: 60
Discharge: HOME | End: 2020-10-20
Attending: INTERNAL MEDICINE

## 2020-10-20 DIAGNOSIS — E04.1 THYROID NODULE: ICD-10-CM

## 2021-05-25 ENCOUNTER — HOSPITAL ENCOUNTER (EMERGENCY)
Age: 61
Discharge: HOME | End: 2021-05-25
Attending: EMERGENCY MEDICINE
Payer: COMMERCIAL

## 2021-05-25 VITALS
OXYGEN SATURATION: 96 % | BODY MASS INDEX: 25.11 KG/M2 | RESPIRATION RATE: 16 BRPM | HEIGHT: 67 IN | WEIGHT: 160 LBS | TEMPERATURE: 98 F | SYSTOLIC BLOOD PRESSURE: 146 MMHG | DIASTOLIC BLOOD PRESSURE: 80 MMHG | HEART RATE: 82 BPM

## 2021-05-25 DIAGNOSIS — R33.9 URINARY RETENTION: Primary | ICD-10-CM

## 2021-05-25 LAB
ALBUMIN SERPL-MCNC: 4.3 G/DL (ref 3.2–4.6)
ALBUMIN/GLOB SERPL: 1 {RATIO} (ref 1.2–3.5)
ALP SERPL-CCNC: 115 U/L (ref 50–136)
ALT SERPL-CCNC: 20 U/L (ref 12–65)
ANION GAP SERPL CALC-SCNC: 7 MMOL/L (ref 7–16)
AST SERPL-CCNC: 16 U/L (ref 15–37)
BASOPHILS # BLD: 0.1 K/UL (ref 0–0.2)
BASOPHILS NFR BLD: 0 % (ref 0–2)
BILIRUB SERPL-MCNC: 0.6 MG/DL (ref 0.2–1.1)
BUN SERPL-MCNC: 8 MG/DL (ref 8–23)
CALCIUM SERPL-MCNC: 9.5 MG/DL (ref 8.3–10.4)
CHLORIDE SERPL-SCNC: 100 MMOL/L (ref 98–107)
CO2 SERPL-SCNC: 25 MMOL/L (ref 21–32)
CREAT SERPL-MCNC: 0.75 MG/DL (ref 0.8–1.5)
DIFFERENTIAL METHOD BLD: ABNORMAL
EOSINOPHIL # BLD: 0.1 K/UL (ref 0–0.8)
EOSINOPHIL NFR BLD: 1 % (ref 0.5–7.8)
ERYTHROCYTE [DISTWIDTH] IN BLOOD BY AUTOMATED COUNT: 13.3 % (ref 11.9–14.6)
GLOBULIN SER CALC-MCNC: 4.3 G/DL (ref 2.3–3.5)
GLUCOSE SERPL-MCNC: 92 MG/DL (ref 65–100)
HCT VFR BLD AUTO: 51.9 % (ref 41.1–50.3)
HGB BLD-MCNC: 17.2 G/DL (ref 13.6–17.2)
IMM GRANULOCYTES # BLD AUTO: 0.1 K/UL (ref 0–0.5)
IMM GRANULOCYTES NFR BLD AUTO: 0 % (ref 0–5)
LACTATE SERPL-SCNC: 1.1 MMOL/L (ref 0.4–2)
LIPASE SERPL-CCNC: 85 U/L (ref 73–393)
LYMPHOCYTES # BLD: 1.9 K/UL (ref 0.5–4.6)
LYMPHOCYTES NFR BLD: 16 % (ref 13–44)
MCH RBC QN AUTO: 29.4 PG (ref 26.1–32.9)
MCHC RBC AUTO-ENTMCNC: 33.1 G/DL (ref 31.4–35)
MCV RBC AUTO: 88.6 FL (ref 79.6–97.8)
MONOCYTES # BLD: 0.7 K/UL (ref 0.1–1.3)
MONOCYTES NFR BLD: 6 % (ref 4–12)
NEUTS SEG # BLD: 9.6 K/UL (ref 1.7–8.2)
NEUTS SEG NFR BLD: 77 % (ref 43–78)
NRBC # BLD: 0 K/UL (ref 0–0.2)
PLATELET # BLD AUTO: 251 K/UL (ref 150–450)
PMV BLD AUTO: 10.2 FL (ref 9.4–12.3)
POTASSIUM SERPL-SCNC: 3.8 MMOL/L (ref 3.5–5.1)
PROT SERPL-MCNC: 8.6 G/DL (ref 6.3–8.2)
RBC # BLD AUTO: 5.86 M/UL (ref 4.23–5.6)
SODIUM SERPL-SCNC: 132 MMOL/L (ref 136–145)
WBC # BLD AUTO: 12.4 K/UL (ref 4.3–11.1)

## 2021-05-25 PROCEDURE — 51702 INSERT TEMP BLADDER CATH: CPT

## 2021-05-25 PROCEDURE — 74011250636 HC RX REV CODE- 250/636: Performed by: EMERGENCY MEDICINE

## 2021-05-25 PROCEDURE — 80053 COMPREHEN METABOLIC PANEL: CPT

## 2021-05-25 PROCEDURE — 85025 COMPLETE CBC W/AUTO DIFF WBC: CPT

## 2021-05-25 PROCEDURE — 96375 TX/PRO/DX INJ NEW DRUG ADDON: CPT

## 2021-05-25 PROCEDURE — 96374 THER/PROPH/DIAG INJ IV PUSH: CPT

## 2021-05-25 PROCEDURE — 83690 ASSAY OF LIPASE: CPT

## 2021-05-25 PROCEDURE — 99284 EMERGENCY DEPT VISIT MOD MDM: CPT

## 2021-05-25 PROCEDURE — 83605 ASSAY OF LACTIC ACID: CPT

## 2021-05-25 RX ORDER — SODIUM CHLORIDE 0.9 % (FLUSH) 0.9 %
5-10 SYRINGE (ML) INJECTION EVERY 8 HOURS
Status: DISCONTINUED | OUTPATIENT
Start: 2021-05-25 | End: 2021-05-25 | Stop reason: HOSPADM

## 2021-05-25 RX ORDER — ONDANSETRON 2 MG/ML
4 INJECTION INTRAMUSCULAR; INTRAVENOUS ONCE
Status: COMPLETED | OUTPATIENT
Start: 2021-05-25 | End: 2021-05-25

## 2021-05-25 RX ORDER — TAMSULOSIN HYDROCHLORIDE 0.4 MG/1
0.4 CAPSULE ORAL DAILY
Qty: 15 CAPSULE | Refills: 0 | Status: SHIPPED | OUTPATIENT
Start: 2021-05-25 | End: 2021-06-07 | Stop reason: SDUPTHER

## 2021-05-25 RX ORDER — HYDROMORPHONE HYDROCHLORIDE 1 MG/ML
0.5 INJECTION, SOLUTION INTRAMUSCULAR; INTRAVENOUS; SUBCUTANEOUS ONCE
Status: COMPLETED | OUTPATIENT
Start: 2021-05-25 | End: 2021-05-25

## 2021-05-25 RX ORDER — SODIUM CHLORIDE 0.9 % (FLUSH) 0.9 %
5-10 SYRINGE (ML) INJECTION AS NEEDED
Status: DISCONTINUED | OUTPATIENT
Start: 2021-05-25 | End: 2021-05-25 | Stop reason: HOSPADM

## 2021-05-25 RX ADMIN — HYDROMORPHONE HYDROCHLORIDE 0.5 MG: 1 INJECTION, SOLUTION INTRAMUSCULAR; INTRAVENOUS; SUBCUTANEOUS at 15:17

## 2021-05-25 RX ADMIN — SODIUM CHLORIDE 1000 ML: 900 INJECTION, SOLUTION INTRAVENOUS at 15:17

## 2021-05-25 RX ADMIN — ONDANSETRON 4 MG: 2 INJECTION INTRAMUSCULAR; INTRAVENOUS at 15:17

## 2021-05-25 NOTE — ED NOTES
I have reviewed discharge instructions with the patient. The patient verbalized understanding. Patient left ED via Discharge Method: ambulatory to Home with son. Opportunity for questions and clarification provided. Patient given 1 scripts. To continue your aftercare when you leave the hospital, you may receive an automated call from our care team to check in on how you are doing. This is a free service and part of our promise to provide the best care and service to meet your aftercare needs.  If you have questions, or wish to unsubscribe from this service please call [phone]. Thank you for Choosing our [facility] [employer] [facility].

## 2021-05-25 NOTE — ED TRIAGE NOTES
Pt reports he had a colonoscopy yesterday. Pt reports severe lower abd pain. Pt reports he is unable to urinate and only had one BM since colonoscopy.

## 2021-05-25 NOTE — ED PROVIDER NOTES
41-year-old male presenting for abdominal pain and urinary retention. Symptoms have been more pronounced over the past 24 hours since he had a colonoscopy performed but tells me he has been having increased difficulty urinating for the past couple of weeks. No known history of urinary problems. Since he had his colonoscopy yesterday he has had increasing lower abdominal pain distention and a sensation that he needs to urinate but cannot go. Denies fevers or vomiting. The history is provided by the patient. Abdominal Pain   This is a new problem. The current episode started yesterday. The problem occurs constantly. The problem has been gradually worsening. The pain is associated with previous surgery. The pain is located in the periumbilical region and suprapubic region. The quality of the pain is aching and cramping. The pain is at a severity of 10/10. The pain is severe. Associated symptoms comments: Enuresis  . Nothing worsens the pain. The pain is relieved by nothing. Past workup includes colonoscopy. Past workup includes no CT scan, no ultrasound, no surgery, no esophagogastroduodenoscopy, no UGI, no barium enema. The patient's surgical history non-contributory. Past Medical History:   Diagnosis Date    Abnormal EKG     1. Cardiac MRI (5/14/15):  EF 68%. The left ventricular myocardium appear symmetric at the base. There appears to be slightly thicker left ventricular myocardium at the mid and apical lateral wall compared to other segments. No wall motion advised. EF 68%. Calculated left ventricular mass is within normal limits.     Arrhythmia     occasionally has heart palpitations     Chronic obstructive pulmonary disease (HCC)     ventolin prn     Contact dermatitis and other eczema, due to unspecified cause     Depression     Former smoker     Hypercholesteremia     controlled by lipitor    Hyperlipidemia     Hypertension     daily meds    Inguinal hernia     Left ventricular hypertrophy     ECHO 2016    Unstable angina ([facility] [address] 07/11/2014    cath in 2014=mild nonobstructive CAD       Past Surgical History:   Procedure Laterality Date    HX CARPAL TUNNEL RELEASE Left     HX COLONOSCOPY  2009    HX HEART CATHETERIZATION  2014    no stents    HX HERNIA REPAIR Right 10/24/13    inguinal    HX KNEE REPLACEMENT Right 03/19/2019    HX ORTHOPAEDIC Left 10/2016    shoulder manipulation     HX SHOULDER ARTHROSCOPY Left 07/2016    second surgery 07/17    [place] CARDIAC SURG PROCEDURE UNLIST      CCL, clean/no interventions         Family History:   Problem Relation Age of Onset    Heart Disease Sister     Stroke Sister    Ezio Sit Cancer Brother         lymphoma    Lung Disease Mother     Hypertension Sister     Hypertension Sister     Lung Disease Sister     Heart Disease Brother        Social History     Socioeconomic History    Marital status:      Spouse name: Not on file    Number of children: Not on file    Years of education: Not on file    Highest education level: Not on file   Occupational History    Not on file   Tobacco Use    Smoking status: Current Every Day Smoker     Packs/day: 1.00     Years: 35.00     Pack years: 35.00     Types: Cigarettes    Smokeless tobacco: Never Used   Substance and Sexual Activity    Alcohol use: Yes     Alcohol/week: 20.0 standard drinks     Types: 24 Cans of beer per week    Drug use: No    Sexual activity: Not Currently   Other Topics Concern    Not on file   Social History Narrative    Not on file     Social Determinants of Health     Financial Resource Strain:     Difficulty of Paying Living Expenses:    Food Insecurity:     Worried About Running Out of Food in the Last Year:     [address] in the Last Year:    Transportation Needs:     Lack of Transportation (Medical):      Lack of Transportation (Non-Medical):    Physical Activity:     Days of Exercise per Week:     Minutes of Exercise per Session:    Stress:     Feeling of Stress :    Social Connections:     Frequency of Communication with Friends and Family:     Frequency of Social Gatherings with Friends and Family:     Attends Voodoo Services:     Active Member of Clubs or Organizations:     Attends Club or Organization Meetings:     Marital Status:    Intimate Partner Violence:     Fear of Current or Ex-Partner:     Emotionally Abused:     Physically Abused:     Sexually Abused: ALLERGIES: Patient has no known allergies. Review of Systems   Gastrointestinal: Positive for abdominal pain. Genitourinary: Positive for difficulty urinating. All other systems reviewed and are negative. Vitals:    05/25/21 1321 05/25/21 1440   BP: (!) 186/103    Pulse: 91    Resp: 16    Temp: 98 °F (36.7 °C)    SpO2: 97% 97%   Weight: 72.6 kg (160 lb)    Height: 5' 7\" (1.702 m)             Physical Exam  Vitals and nursing note reviewed. Constitutional:       Appearance: He is well-developed. HENT:      Head: Normocephalic and atraumatic. Eyes:      Conjunctiva/sclera: Conjunctivae normal.      Pupils: Pupils are equal, round, and reactive to light. Cardiovascular:      Rate and Rhythm: Normal rate and regular rhythm. Heart sounds: Normal heart sounds. Pulmonary:      Effort: Pulmonary effort is normal.      Breath sounds: Normal breath sounds. Abdominal:      General: Bowel sounds are normal. There is distension. Palpations: Abdomen is soft. Tenderness: There is abdominal tenderness in the periumbilical area and suprapubic area. Hernia: No hernia is present. There is no hernia in the umbilical area. Musculoskeletal:         General: No deformity. Normal range of motion. Cervical back: Normal range of motion and neck supple. Skin:     General: Skin is warm and dry. Neurological:      Mental Status: He is alert and oriented to person, place, and time. Cranial Nerves: No cranial nerve deficit.    Psychiatric: Behavior: Behavior normal.          MDM  Number of Diagnoses or Management Options  Urinary retention  Diagnosis management comments: 78-year-old male presenting for worsening abdominal pain distention and difficulty urinating. There was an initial concern that this could be abdominal pain secondary to colonic perforation given that he just had a colonoscopy yesterday but physical exam is more consistent with urinary retention. We BladderScan the patient at bedside it was greater than 1 L. Blood work is already been sent but we will place a Encinas and see if his abdominal pain resolves. If he continues to have abdominal pain we may need to still scan the patient but for now this order has been canceled.        Amount and/or Complexity of Data Reviewed  Clinical lab tests: ordered and reviewed (Results for orders placed or performed during the hospital encounter of 05/25/21  -CBC WITH AUTOMATED DIFF       Result                      Value             Ref Range           WBC                         12.4 (H)          4.3 - 11.1 K*       RBC                         5.86 (H)          4.23 - 5.6 M*       HGB                         17.2              13.6 - 17.2 *       HCT                         51.9 (H)          41.1 - 50.3 %       MCV                         88.6              79.6 - 97.8 *       MCH                         29.4              26.1 - 32.9 *       MCHC                        33.1              31.4 - 35.0 *       RDW                         13.3              11.9 - 14.6 %       PLATELET                    251               150 - 450 K/*       MPV                         10.2              9.4 - 12.3 FL       ABSOLUTE NRBC               0.00              0.0 - 0.2 K/*       DF                          AUTOMATED                             NEUTROPHILS                 77                43 - 78 %           LYMPHOCYTES                 16                13 - 44 %           MONOCYTES                   6 4.0 - 12.0 %        EOSINOPHILS                 1                 0.5 - 7.8 %         BASOPHILS                   0                 0.0 - 2.0 %         IMMATURE GRANULOCYTES       0                 0.0 - 5.0 %         ABS. NEUTROPHILS            9.6 (H)           1.7 - 8.2 K/*       ABS. LYMPHOCYTES            1.9               0.5 - 4.6 K/*       ABS. MONOCYTES              0.7               0.1 - 1.3 K/*       ABS. EOSINOPHILS            0.1               0.0 - 0.8 K/*       ABS. BASOPHILS              0.1               0.0 - 0.2 K/*       ABS. IMM. GRANS.            0.1               0.0 - 0.5 K/*  -METABOLIC PANEL, COMPREHENSIVE       Result                      Value             Ref Range           Sodium                      132 (L)           136 - 145 mm*       Potassium                   3.8               3.5 - 5.1 mm*       Chloride                    100               98 - 107 mmo*       CO2                         25                21 - 32 mmol*       Anion gap                   7                 7 - 16 mmol/L       Glucose                     92                65 - 100 mg/*       BUN                         8                 8 - 23 MG/DL        Creatinine                  0.75 (L)          0.8 - 1.5 MG*       GFR est AA                  >60               >60 ml/min/1*       GFR est non-AA              >60               >60 ml/min/1*       Calcium                     9.5               8.3 - 10.4 M*       Bilirubin, total            0.6               0.2 - 1.1 MG*       ALT (SGPT)                  20                12 - 65 U/L         AST (SGOT)                  16                15 - 37 U/L         Alk.  phosphatase            115               50 - 136 U/L        Protein, total              8.6 (H)           6.3 - 8.2 g/*       Albumin                     4.3               3.2 - 4.6 g/*       Globulin                    4.3 (H)           2.3 - 3.5 g/*       A-G Ratio                   1.0 (L) 1.2 - 3.5      -LIPASE       Result                      Value             Ref Range           Lipase                      85                73 - 393 U/L   -LACTIC ACID       Result                      Value             Ref Range           Lactic acid                 1.1               0.4 - 2.0 MM*  )    Risk of Complications, Morbidity, and/or Mortality  Presenting problems: high  Diagnostic procedures: high  Management options: moderate  General comments: Once Encinas was placed the patient drained over 1500 cc of urine. He feels completely better. Remainder of his work-up is unremarkable and his blood pressure is improved. Discharging with leg bag and Flomax. Follow-up with urology    I personally reviewed the patient's vital signs, laboratory tests, and/or radiological findings. I discussed these findings with the patient and their significance. I answered all questions and gave the patient clear return precautions.   The patient was discharged from the emergency department in stable condition        Patient Progress  Patient progress: improved    ED Course as of May 25 1558   Tue May 25, 2021   1556 Patient's abdominal pain has completely resolved so we are discharging him on Flomax with instructions to follow-up with urology    [JS]      ED Course User Index  [JS] Georgina Cabrera MD       Procedures

## 2021-07-16 ENCOUNTER — HOSPITAL ENCOUNTER (OUTPATIENT)
Dept: ULTRASOUND IMAGING | Age: 61
Discharge: HOME | End: 2021-07-16
Attending: NURSE PRACTITIONER

## 2021-07-16 DIAGNOSIS — R39.14 FEELING OF INCOMPLETE BLADDER EMPTYING: ICD-10-CM

## 2021-07-16 DIAGNOSIS — N30.00 ACUTE CYSTITIS WITHOUT HEMATURIA: ICD-10-CM

## 2021-07-16 DIAGNOSIS — N39.0 RECURRENT UTI: ICD-10-CM

## 2021-10-14 PROBLEM — K64.9 HEMORRHOIDS: Status: ACTIVE | Noted: 2021-10-14

## 2021-10-14 PROBLEM — Z86.010 HISTORY OF COLON POLYPS: Status: ACTIVE | Noted: 2021-03-17

## 2021-10-21 ENCOUNTER — HOSPITAL ENCOUNTER (OUTPATIENT)
Dept: ULTRASOUND IMAGING | Age: 61
Discharge: HOME | End: 2021-10-21
Attending: PHYSICIAN ASSISTANT

## 2021-10-21 DIAGNOSIS — R59.0 INGUINAL LYMPHADENOPATHY: ICD-10-CM

## 2021-10-21 DIAGNOSIS — N50.811 PAIN IN BOTH TESTICLES: ICD-10-CM

## 2021-10-21 DIAGNOSIS — N50.812 PAIN IN BOTH TESTICLES: ICD-10-CM

## 2021-11-02 NOTE — PROGRESS NOTES
Ultrasound noted some mild lymph nodes swollen in right inguinal area and some small bilateral hydroceles which are benign.   Since there is not a cause for the lymph nodes I recommend following up with urology and will put in referral

## 2022-01-12 ENCOUNTER — APPOINTMENT (OUTPATIENT)
Dept: CT IMAGING | Age: 62
DRG: 698 | End: 2022-01-12
Attending: EMERGENCY MEDICINE
Payer: COMMERCIAL

## 2022-01-12 ENCOUNTER — HOSPITAL ENCOUNTER (INPATIENT)
Age: 62
LOS: 7 days | Discharge: HOME HEALTH | DRG: 698 | End: 2022-01-19
Attending: EMERGENCY MEDICINE | Admitting: FAMILY MEDICINE
Payer: COMMERCIAL

## 2022-01-12 ENCOUNTER — APPOINTMENT (OUTPATIENT)
Dept: GENERAL RADIOLOGY | Age: 62
DRG: 698 | End: 2022-01-12
Attending: STUDENT IN AN ORGANIZED HEALTH CARE EDUCATION/TRAINING PROGRAM
Payer: COMMERCIAL

## 2022-01-12 DIAGNOSIS — E61.1 IRON DEFICIENCY: ICD-10-CM

## 2022-01-12 DIAGNOSIS — N12 PYELONEPHRITIS: ICD-10-CM

## 2022-01-12 DIAGNOSIS — A41.9 SEPSIS, DUE TO UNSPECIFIED ORGANISM, UNSPECIFIED WHETHER ACUTE ORGAN DYSFUNCTION PRESENT (HCC): ICD-10-CM

## 2022-01-12 DIAGNOSIS — N17.9 AKI (ACUTE KIDNEY INJURY) (HCC): ICD-10-CM

## 2022-01-12 DIAGNOSIS — C67.9 MALIGNANT NEOPLASM OF URINARY BLADDER, UNSPECIFIED SITE (HCC): Primary | ICD-10-CM

## 2022-01-12 DIAGNOSIS — T83.512A INFECTION ASSOCIATED WITH NEPHROSTOMY CATHETER, INITIAL ENCOUNTER (HCC): ICD-10-CM

## 2022-01-12 PROBLEM — E87.1 HYPONATREMIA: Status: ACTIVE | Noted: 2022-01-12

## 2022-01-12 PROBLEM — R65.20 SEVERE SEPSIS (HCC): Status: ACTIVE | Noted: 2022-01-12

## 2022-01-12 PROBLEM — N39.0 UTI (URINARY TRACT INFECTION): Status: ACTIVE | Noted: 2022-01-12

## 2022-01-12 LAB
ALBUMIN SERPL-MCNC: 2.5 G/DL (ref 3.2–4.6)
ALBUMIN/GLOB SERPL: 0.5 {RATIO} (ref 1.2–3.5)
ALP SERPL-CCNC: 150 U/L (ref 50–136)
ALT SERPL-CCNC: 15 U/L (ref 12–65)
AMORPH CRY URNS QL MICRO: ABNORMAL
ANION GAP SERPL CALC-SCNC: 10 MMOL/L (ref 7–16)
ANION GAP SERPL CALC-SCNC: 7 MMOL/L (ref 7–16)
APPEARANCE UR: ABNORMAL
APPEARANCE UR: ABNORMAL
AST SERPL-CCNC: 14 U/L (ref 15–37)
ATRIAL RATE: 105 BPM
BACTERIA URNS QL MICRO: ABNORMAL /HPF
BACTERIA URNS QL MICRO: ABNORMAL /HPF
BASOPHILS # BLD: 0.2 K/UL (ref 0–0.2)
BASOPHILS NFR BLD: 1 % (ref 0–2)
BILIRUB SERPL-MCNC: 0.5 MG/DL (ref 0.2–1.1)
BILIRUB UR QL: NEGATIVE
BILIRUB UR QL: NEGATIVE
BUN SERPL-MCNC: 27 MG/DL (ref 8–23)
BUN SERPL-MCNC: 28 MG/DL (ref 8–23)
CALCIUM SERPL-MCNC: 8.2 MG/DL (ref 8.3–10.4)
CALCIUM SERPL-MCNC: 9.2 MG/DL (ref 8.3–10.4)
CALCULATED P AXIS, ECG09: 47 DEGREES
CALCULATED R AXIS, ECG10: 13 DEGREES
CALCULATED T AXIS, ECG11: 175 DEGREES
CASTS URNS QL MICRO: ABNORMAL /LPF
CASTS URNS QL MICRO: ABNORMAL /LPF
CHLORIDE SERPL-SCNC: 92 MMOL/L (ref 98–107)
CHLORIDE SERPL-SCNC: 97 MMOL/L (ref 98–107)
CO2 SERPL-SCNC: 22 MMOL/L (ref 21–32)
CO2 SERPL-SCNC: 24 MMOL/L (ref 21–32)
COLOR UR: ABNORMAL
COLOR UR: ABNORMAL
CREAT SERPL-MCNC: 1.51 MG/DL (ref 0.8–1.5)
CREAT SERPL-MCNC: 1.66 MG/DL (ref 0.8–1.5)
DIAGNOSIS, 93000: NORMAL
DIFFERENTIAL METHOD BLD: ABNORMAL
EOSINOPHIL # BLD: 0 K/UL (ref 0–0.8)
EOSINOPHIL NFR BLD: 0 % (ref 0.5–7.8)
EPI CELLS #/AREA URNS HPF: ABNORMAL /HPF
EPI CELLS #/AREA URNS HPF: ABNORMAL /HPF
ERYTHROCYTE [DISTWIDTH] IN BLOOD BY AUTOMATED COUNT: 13.8 % (ref 11.9–14.6)
GLOBULIN SER CALC-MCNC: 4.9 G/DL (ref 2.3–3.5)
GLUCOSE SERPL-MCNC: 101 MG/DL (ref 65–100)
GLUCOSE SERPL-MCNC: 90 MG/DL (ref 65–100)
GLUCOSE UR STRIP.AUTO-MCNC: NEGATIVE MG/DL
GLUCOSE UR STRIP.AUTO-MCNC: NEGATIVE MG/DL
HCT VFR BLD AUTO: 35.8 % (ref 41.1–50.3)
HGB BLD-MCNC: 11.5 G/DL (ref 13.6–17.2)
HGB UR QL STRIP: ABNORMAL
HGB UR QL STRIP: ABNORMAL
IMM GRANULOCYTES # BLD AUTO: 0.5 K/UL (ref 0–0.5)
IMM GRANULOCYTES NFR BLD AUTO: 3 % (ref 0–5)
KETONES UR QL STRIP.AUTO: ABNORMAL MG/DL
KETONES UR QL STRIP.AUTO: NEGATIVE MG/DL
LACTATE SERPL-SCNC: 1.7 MMOL/L (ref 0.4–2)
LACTATE SERPL-SCNC: 2.1 MMOL/L (ref 0.4–2)
LACTATE SERPL-SCNC: 3.3 MMOL/L (ref 0.4–2)
LEUKOCYTE ESTERASE UR QL STRIP.AUTO: ABNORMAL
LEUKOCYTE ESTERASE UR QL STRIP.AUTO: ABNORMAL
LYMPHOCYTES # BLD: 0.6 K/UL (ref 0.5–4.6)
LYMPHOCYTES NFR BLD: 4 % (ref 13–44)
MCH RBC QN AUTO: 25.5 PG (ref 26.1–32.9)
MCHC RBC AUTO-ENTMCNC: 32.1 G/DL (ref 31.4–35)
MCV RBC AUTO: 79.4 FL (ref 79.6–97.8)
MONOCYTES # BLD: 0.6 K/UL (ref 0.1–1.3)
MONOCYTES NFR BLD: 4 % (ref 4–12)
NEUTS SEG # BLD: 13.3 K/UL (ref 1.7–8.2)
NEUTS SEG NFR BLD: 88 % (ref 43–78)
NITRITE UR QL STRIP.AUTO: NEGATIVE
NITRITE UR QL STRIP.AUTO: NEGATIVE
NRBC # BLD: 0 K/UL (ref 0–0.2)
OTHER OBSERVATIONS,UCOM: ABNORMAL
OTHER OBSERVATIONS,UCOM: ABNORMAL
P-R INTERVAL, ECG05: 130 MS
PH UR STRIP: 7.5 [PH] (ref 5–9)
PH UR STRIP: 8 [PH] (ref 5–9)
PLATELET # BLD AUTO: 92 K/UL (ref 150–450)
PLATELET COMMENTS,PCOM: ABNORMAL
PMV BLD AUTO: 10 FL (ref 9.4–12.3)
POTASSIUM SERPL-SCNC: 4.6 MMOL/L (ref 3.5–5.1)
POTASSIUM SERPL-SCNC: 4.6 MMOL/L (ref 3.5–5.1)
PROCALCITONIN SERPL-MCNC: 2.21 NG/ML (ref 0–0.49)
PROT SERPL-MCNC: 7.4 G/DL (ref 6.3–8.2)
PROT UR STRIP-MCNC: 100 MG/DL
PROT UR STRIP-MCNC: 300 MG/DL
Q-T INTERVAL, ECG07: 296 MS
QRS DURATION, ECG06: 80 MS
QTC CALCULATION (BEZET), ECG08: 391 MS
RBC # BLD AUTO: 4.51 M/UL (ref 4.23–5.6)
RBC #/AREA URNS HPF: ABNORMAL /HPF
RBC #/AREA URNS HPF: ABNORMAL /HPF
RBC MORPH BLD: ABNORMAL
SODIUM SERPL-SCNC: 126 MMOL/L (ref 136–145)
SODIUM SERPL-SCNC: 126 MMOL/L (ref 136–145)
SP GR UR REFRACTOMETRY: 1.02 (ref 1–1.02)
SP GR UR REFRACTOMETRY: 1.02 (ref 1–1.02)
UROBILINOGEN UR QL STRIP.AUTO: 1 EU/DL (ref 0.2–1)
UROBILINOGEN UR QL STRIP.AUTO: 1 EU/DL (ref 0.2–1)
VENTRICULAR RATE, ECG03: 105 BPM
WBC # BLD AUTO: 15.2 K/UL (ref 4.3–11.1)
WBC MORPH BLD: ABNORMAL
WBC URNS QL MICRO: >100 /HPF
WBC URNS QL MICRO: ABNORMAL /HPF

## 2022-01-12 PROCEDURE — 93005 ELECTROCARDIOGRAM TRACING: CPT | Performed by: EMERGENCY MEDICINE

## 2022-01-12 PROCEDURE — 99285 EMERGENCY DEPT VISIT HI MDM: CPT

## 2022-01-12 PROCEDURE — 87077 CULTURE AEROBIC IDENTIFY: CPT

## 2022-01-12 PROCEDURE — 87088 URINE BACTERIA CULTURE: CPT

## 2022-01-12 PROCEDURE — 84145 PROCALCITONIN (PCT): CPT

## 2022-01-12 PROCEDURE — 81001 URINALYSIS AUTO W/SCOPE: CPT

## 2022-01-12 PROCEDURE — 87040 BLOOD CULTURE FOR BACTERIA: CPT

## 2022-01-12 PROCEDURE — 83605 ASSAY OF LACTIC ACID: CPT

## 2022-01-12 PROCEDURE — 93005 ELECTROCARDIOGRAM TRACING: CPT | Performed by: STUDENT IN AN ORGANIZED HEALTH CARE EDUCATION/TRAINING PROGRAM

## 2022-01-12 PROCEDURE — 87150 DNA/RNA AMPLIFIED PROBE: CPT

## 2022-01-12 PROCEDURE — 65660000000 HC RM CCU STEPDOWN

## 2022-01-12 PROCEDURE — 74011000258 HC RX REV CODE- 258: Performed by: FAMILY MEDICINE

## 2022-01-12 PROCEDURE — 96375 TX/PRO/DX INJ NEW DRUG ADDON: CPT

## 2022-01-12 PROCEDURE — 74176 CT ABD & PELVIS W/O CONTRAST: CPT

## 2022-01-12 PROCEDURE — 74011000258 HC RX REV CODE- 258: Performed by: EMERGENCY MEDICINE

## 2022-01-12 PROCEDURE — 94762 N-INVAS EAR/PLS OXIMTRY CONT: CPT

## 2022-01-12 PROCEDURE — 74011250636 HC RX REV CODE- 250/636: Performed by: EMERGENCY MEDICINE

## 2022-01-12 PROCEDURE — 87086 URINE CULTURE/COLONY COUNT: CPT

## 2022-01-12 PROCEDURE — 87205 SMEAR GRAM STAIN: CPT

## 2022-01-12 PROCEDURE — 87186 SC STD MICRODIL/AGAR DIL: CPT

## 2022-01-12 PROCEDURE — 96366 THER/PROPH/DIAG IV INF ADDON: CPT

## 2022-01-12 PROCEDURE — 74011250637 HC RX REV CODE- 250/637: Performed by: FAMILY MEDICINE

## 2022-01-12 PROCEDURE — 74011250636 HC RX REV CODE- 250/636: Performed by: FAMILY MEDICINE

## 2022-01-12 PROCEDURE — 85025 COMPLETE CBC W/AUTO DIFF WBC: CPT

## 2022-01-12 PROCEDURE — 96365 THER/PROPH/DIAG IV INF INIT: CPT

## 2022-01-12 PROCEDURE — 74011250637 HC RX REV CODE- 250/637: Performed by: EMERGENCY MEDICINE

## 2022-01-12 PROCEDURE — 71045 X-RAY EXAM CHEST 1 VIEW: CPT

## 2022-01-12 PROCEDURE — 80053 COMPREHEN METABOLIC PANEL: CPT

## 2022-01-12 RX ORDER — ACETAMINOPHEN 500 MG
1000 TABLET ORAL
Status: COMPLETED | OUTPATIENT
Start: 2022-01-12 | End: 2022-01-12

## 2022-01-12 RX ORDER — HYOSCYAMINE SULFATE 0.12 MG/5ML
0.12 LIQUID ORAL
Status: DISCONTINUED | OUTPATIENT
Start: 2022-01-12 | End: 2022-01-19 | Stop reason: HOSPADM

## 2022-01-12 RX ORDER — POLYETHYLENE GLYCOL 3350 17 G/17G
17 POWDER, FOR SOLUTION ORAL DAILY PRN
Status: DISCONTINUED | OUTPATIENT
Start: 2022-01-12 | End: 2022-01-19 | Stop reason: HOSPADM

## 2022-01-12 RX ORDER — ONDANSETRON 2 MG/ML
4 INJECTION INTRAMUSCULAR; INTRAVENOUS
Status: DISCONTINUED | OUTPATIENT
Start: 2022-01-12 | End: 2022-01-19 | Stop reason: HOSPADM

## 2022-01-12 RX ORDER — SODIUM CHLORIDE 0.9 % (FLUSH) 0.9 %
5-10 SYRINGE (ML) INJECTION AS NEEDED
Status: DISCONTINUED | OUTPATIENT
Start: 2022-01-12 | End: 2022-01-19 | Stop reason: HOSPADM

## 2022-01-12 RX ORDER — SODIUM CHLORIDE 0.9 % (FLUSH) 0.9 %
5-10 SYRINGE (ML) INJECTION AS NEEDED
Status: DISCONTINUED | OUTPATIENT
Start: 2022-01-12 | End: 2022-01-12 | Stop reason: SDUPTHER

## 2022-01-12 RX ORDER — SODIUM CHLORIDE 9 MG/ML
100 INJECTION, SOLUTION INTRAVENOUS CONTINUOUS
Status: DISCONTINUED | OUTPATIENT
Start: 2022-01-12 | End: 2022-01-13

## 2022-01-12 RX ORDER — SODIUM CHLORIDE 0.9 % (FLUSH) 0.9 %
5-40 SYRINGE (ML) INJECTION AS NEEDED
Status: DISCONTINUED | OUTPATIENT
Start: 2022-01-12 | End: 2022-01-19 | Stop reason: HOSPADM

## 2022-01-12 RX ORDER — OXYCODONE HYDROCHLORIDE 5 MG/1
10 TABLET ORAL
Status: DISCONTINUED | OUTPATIENT
Start: 2022-01-12 | End: 2022-01-13

## 2022-01-12 RX ORDER — VANCOMYCIN/0.9 % SOD CHLORIDE 1.5G/250ML
1500 PLASTIC BAG, INJECTION (ML) INTRAVENOUS ONCE
Status: COMPLETED | OUTPATIENT
Start: 2022-01-12 | End: 2022-01-12

## 2022-01-12 RX ORDER — SODIUM CHLORIDE 0.9 % (FLUSH) 0.9 %
5-10 SYRINGE (ML) INJECTION EVERY 8 HOURS
Status: DISCONTINUED | OUTPATIENT
Start: 2022-01-12 | End: 2022-01-12 | Stop reason: SDUPTHER

## 2022-01-12 RX ORDER — ACETAMINOPHEN 325 MG/1
650 TABLET ORAL
Status: DISCONTINUED | OUTPATIENT
Start: 2022-01-12 | End: 2022-01-19 | Stop reason: HOSPADM

## 2022-01-12 RX ORDER — ONDANSETRON 2 MG/ML
4 INJECTION INTRAMUSCULAR; INTRAVENOUS
Status: COMPLETED | OUTPATIENT
Start: 2022-01-12 | End: 2022-01-12

## 2022-01-12 RX ORDER — SODIUM CHLORIDE 9 MG/ML
1000 INJECTION, SOLUTION INTRAVENOUS ONCE
Status: COMPLETED | OUTPATIENT
Start: 2022-01-12 | End: 2022-01-13

## 2022-01-12 RX ORDER — ALBUTEROL SULFATE 0.83 MG/ML
2.5 SOLUTION RESPIRATORY (INHALATION)
Status: DISCONTINUED | OUTPATIENT
Start: 2022-01-12 | End: 2022-01-19 | Stop reason: HOSPADM

## 2022-01-12 RX ORDER — NALOXONE HYDROCHLORIDE 0.4 MG/ML
0.4 INJECTION, SOLUTION INTRAMUSCULAR; INTRAVENOUS; SUBCUTANEOUS
Status: DISCONTINUED | OUTPATIENT
Start: 2022-01-12 | End: 2022-01-19 | Stop reason: HOSPADM

## 2022-01-12 RX ORDER — SODIUM CHLORIDE 0.9 % (FLUSH) 0.9 %
5-40 SYRINGE (ML) INJECTION EVERY 8 HOURS
Status: DISCONTINUED | OUTPATIENT
Start: 2022-01-12 | End: 2022-01-19 | Stop reason: HOSPADM

## 2022-01-12 RX ORDER — IBUPROFEN 200 MG
1 TABLET ORAL EVERY 24 HOURS
Status: DISCONTINUED | OUTPATIENT
Start: 2022-01-12 | End: 2022-01-19 | Stop reason: HOSPADM

## 2022-01-12 RX ORDER — MORPHINE SULFATE 4 MG/ML
4 INJECTION INTRAVENOUS
Status: COMPLETED | OUTPATIENT
Start: 2022-01-12 | End: 2022-01-12

## 2022-01-12 RX ORDER — OXYBUTYNIN CHLORIDE 5 MG/1
5 TABLET ORAL
Status: DISCONTINUED | OUTPATIENT
Start: 2022-01-12 | End: 2022-01-19 | Stop reason: HOSPADM

## 2022-01-12 RX ORDER — ACETAMINOPHEN 650 MG/1
650 SUPPOSITORY RECTAL
Status: DISCONTINUED | OUTPATIENT
Start: 2022-01-12 | End: 2022-01-19 | Stop reason: HOSPADM

## 2022-01-12 RX ORDER — ONDANSETRON 4 MG/1
4 TABLET, ORALLY DISINTEGRATING ORAL
Status: DISCONTINUED | OUTPATIENT
Start: 2022-01-12 | End: 2022-01-19 | Stop reason: HOSPADM

## 2022-01-12 RX ORDER — ATORVASTATIN CALCIUM 40 MG/1
40 TABLET, FILM COATED ORAL
Status: DISCONTINUED | OUTPATIENT
Start: 2022-01-12 | End: 2022-01-19 | Stop reason: HOSPADM

## 2022-01-12 RX ORDER — MONTELUKAST SODIUM 10 MG/1
10 TABLET ORAL
Status: DISCONTINUED | OUTPATIENT
Start: 2022-01-12 | End: 2022-01-19 | Stop reason: HOSPADM

## 2022-01-12 RX ADMIN — SODIUM CHLORIDE 1000 ML: 900 INJECTION, SOLUTION INTRAVENOUS at 15:39

## 2022-01-12 RX ADMIN — VANCOMYCIN HYDROCHLORIDE 1500 MG: 500 INJECTION, POWDER, LYOPHILIZED, FOR SOLUTION INTRAVENOUS at 21:42

## 2022-01-12 RX ADMIN — MORPHINE SULFATE 4 MG: 4 INJECTION INTRAVENOUS at 15:42

## 2022-01-12 RX ADMIN — ACETAMINOPHEN 1000 MG: 500 TABLET ORAL at 11:26

## 2022-01-12 RX ADMIN — CEFTRIAXONE 1 G: 1 INJECTION, POWDER, FOR SOLUTION INTRAMUSCULAR; INTRAVENOUS at 16:38

## 2022-01-12 RX ADMIN — SODIUM CHLORIDE 75 ML/HR: 900 INJECTION, SOLUTION INTRAVENOUS at 23:53

## 2022-01-12 RX ADMIN — SODIUM CHLORIDE 1000 ML: 900 INJECTION, SOLUTION INTRAVENOUS at 21:06

## 2022-01-12 RX ADMIN — SODIUM CHLORIDE 1000 ML: 900 INJECTION, SOLUTION INTRAVENOUS at 16:40

## 2022-01-12 RX ADMIN — PIPERACILLIN SODIUM AND TAZOBACTAM SODIUM 3.38 G: 3; .375 INJECTION, POWDER, LYOPHILIZED, FOR SOLUTION INTRAVENOUS at 21:09

## 2022-01-12 RX ADMIN — ATORVASTATIN CALCIUM 40 MG: 40 TABLET, FILM COATED ORAL at 23:56

## 2022-01-12 RX ADMIN — OXYCODONE 10 MG: 5 TABLET ORAL at 21:11

## 2022-01-12 RX ADMIN — MONTELUKAST 10 MG: 10 TABLET, FILM COATED ORAL at 23:00

## 2022-01-12 RX ADMIN — ONDANSETRON 4 MG: 2 INJECTION INTRAMUSCULAR; INTRAVENOUS at 15:42

## 2022-01-12 NOTE — ED TRIAGE NOTES
Patient arrives via [facility] from home. Masked. Hx kidney cancer. Reports bilateral nephrostomy tubes. Worsening flank pain over the past 2 weeks. Reports foul odor to urine in nephrostomy tubes. Last chemo was 1/4. Denies fever at home. Reports chills.

## 2022-01-12 NOTE — ED PROVIDER NOTES
Patient with history of bladder cancer. Currently on chemo. Last round was 1 week ago. Has bilateral nephrostomy tubes in place since November. 2 weeks ago started having increased right flank pain. Shortly thereafter pain developed all the way across the lower back. Has continued and getting worse so came in. Has foul odor to urine. Has low-grade fever here with tachycardia. The history is provided by the patient. No  was used. Flank Pain   This is a new problem. The current episode started more than 1 week ago. The problem has been gradually worsening. The problem occurs constantly. Patient reports not work related injury. The pain is associated with no known injury. The pain is present in the lower back, left side and right side. The quality of the pain is described as aching. The pain does not radiate. The pain is moderate. Pertinent negatives include no chest pain, no fever, no numbness, no headaches, no abdominal pain, no abdominal swelling, no bowel incontinence, no perianal numbness, no bladder incontinence, no dysuria, no leg pain, no paresthesias and no weakness. He has tried nothing for the symptoms. Risk factors include a history of cancer. Past Medical History:   Diagnosis Date    Abnormal EKG     1. Cardiac MRI (5/14/15):  EF 68%. The left ventricular myocardium appear symmetric at the base. There appears to be slightly thicker left ventricular myocardium at the mid and apical lateral wall compared to other segments. No wall motion advised. EF 68%. Calculated left ventricular mass is within normal limits.     Arrhythmia     occasionally has heart palpitations     Chronic obstructive pulmonary disease (HCC)     ventolin prn     Depression     Former smoker     Hypercholesteremia     controlled by lipitor    Hyperlipidemia     Hypertension     daily meds    Inguinal hernia     Left ventricular hypertrophy     ECHO 2016    Prostate troubles     Unstable angina ([facility] [address] 07/11/2014    cath in 2014=mild nonobstructive CAD       Past Surgical History:   Procedure Laterality Date    HX CARPAL TUNNEL RELEASE Left     HX COLONOSCOPY  2009    HX HEART CATHETERIZATION  2014    no stents    HX HERNIA REPAIR Right 10/24/13    inguinal    HX ORTHOPAEDIC Left 10/2016    shoulder manipulation     HX SHOULDER ARTHROSCOPY Left 07/2016    second surgery 07/17    HX TURP  10/04/2021    CT CARDIAC SURG PROCEDURE UNLIST      CCL, clean/no interventions         Family History:   Problem Relation Age of Onset    Heart Disease Sister     Stroke Sister     Cancer Brother         lymphoma    Lung Disease Mother     Hypertension Sister     Hypertension Sister     Lung Disease Sister     Heart Disease Sister     Heart Disease Brother     Stroke Brother     Heart Disease Brother        Social History     Socioeconomic History    Marital status:      Spouse name: Not on file    Number of children: Not on file    Years of education: Not on file    Highest education level: Not on file   Occupational History    Not on file   Tobacco Use    Smoking status: Current Every Day Smoker     Packs/day: 1.00     Years: 35.00     Pack years: 35.00     Types: Cigarettes    Smokeless tobacco: Never Used    Tobacco comment: nicotine supplement? Vaping Use    Vaping Use: Never used   Substance and Sexual Activity    Alcohol use: Yes     Alcohol/week: 20.0 standard drinks     Types: 24 Cans of beer per week    Drug use: No    Sexual activity: Not Currently   Other Topics Concern    Not on file   Social History Narrative    Not on file     Social Determinants of Health     Financial Resource Strain:     Difficulty of Paying Living Expenses: Not on file   Food Insecurity:     Worried About Running Out of Food in the Last Year: Not on file    Teresa of Food in the Last Year: Not on file   Transportation Needs:     Lack of Transportation (Medical):  Not on file  Lack of Transportation (Non-Medical): Not on file   Physical Activity:     Days of Exercise per Week: Not on file    Minutes of Exercise per Session: Not on file   Stress:     Feeling of Stress : Not on file   Social Connections:     Frequency of Communication with Friends and Family: Not on file    Frequency of Social Gatherings with Friends and Family: Not on file    Attends Druze Services: Not on file    Active Member of [address] [place] [address] or Organizations: Not on file    Attends Club or Organization Meetings: Not on file    Marital Status: Not on file   Intimate Partner Violence:     Fear of Current or Ex-Partner: Not on file    Emotionally Abused: Not on file    Physically Abused: Not on file    Sexually Abused: Not on file   Housing Stability:     Unable to Pay for Housing in the Last Year: Not on file    Number of [place] in the Last Year: Not on file    Unstable Housing in the Last Year: Not on file         ALLERGIES: Patient has no known allergies. Review of Systems   Constitutional: Negative for chills and fever. HENT: Negative for rhinorrhea and sore throat. Eyes: Negative for pain and redness. Respiratory: Negative for chest tightness, shortness of breath and wheezing. Cardiovascular: Negative for chest pain and leg swelling. Gastrointestinal: Negative for abdominal pain, bowel incontinence, diarrhea, nausea and vomiting. Genitourinary: Positive for flank pain and hematuria. Negative for bladder incontinence and dysuria. Musculoskeletal: Positive for back pain. Negative for gait problem, neck pain and neck stiffness. Skin: Negative for color change and rash. Neurological: Negative for weakness, numbness, headaches and paresthesias.        Vitals:    01/12/22 1118   BP: 111/80   Pulse: (!) 111   Resp: 20   Temp: 100.3 °F (37.9 °C)   SpO2: 95%   Weight: 61.2 kg (135 lb)   Height: 5' 6\" (1.676 m)            Physical Exam  Constitutional:       Appearance: Normal appearance. He is well-developed. HENT:      Head: Normocephalic and atraumatic. Cardiovascular:      Rate and Rhythm: Regular rhythm. Tachycardia present. Pulmonary:      Effort: Pulmonary effort is normal. No respiratory distress. Breath sounds: Normal breath sounds. Abdominal:      General: Bowel sounds are normal.      Palpations: Abdomen is soft. Tenderness: There is no abdominal tenderness. Musculoskeletal:         General: Tenderness (Bilateral nephrostomy tubes present with mild tenderness around both. No surrounding drainage. No swelling. Sutures in place.) present. No swelling. Normal range of motion. Cervical back: Normal range of motion and neck supple. Skin:     General: Skin is warm and dry. Neurological:      General: No focal deficit present. Mental Status: He is alert and oriented to person, place, and time. MDM  Number of Diagnoses or Management Options  Diagnosis management comments: Patient with bladder cancer and bilateral nephrostomy tubes. Current treatment is all at [facility]. Has sepsis with bilateral pyelonephritis. Antibiotics started and request admit       Amount and/or Complexity of Data Reviewed  Clinical lab tests: ordered and reviewed  Tests in the radiology section of CPT®: ordered and reviewed  Tests in the medicine section of CPT®: ordered and reviewed    Patient Progress  Patient progress: stable         Procedures        EKG: nonspecific ST and T waves changes, sinus tachycardia. Rate 105.                   XR CHEST PORT (Final result)  Result time 01/12/22 11:41:53  Final result by Madeleine Olivia MD (01/12/22 11:41:53)                Impression:      1. Interval developing multifocal pulmonary nodules consistent with diffuse   metastatic disease.      CPT code(s) 60232                   Narrative:    Exam: XR CHEST PORT on 1/12/2022 11:40 AM     Clinical History: The Male patient is 57years old  presenting for meets SIRS criteria. Comparison:  Chest x-ray 1/7/2020     Findings:  Frontal view of the chest was obtained. Multiple bilateral pulmonary nodules are demonstrated bridging size up to 1 cm.    No pleural effusions are seen.  The cardiomediastinal silhouette is within   normal limits.  There are no acute osseous abnormalities.                     Results Include:    Recent Results (from the past 24 hour(s))   EKG, 12 LEAD, INITIAL    Collection Time: 01/12/22 11:22 AM   Result Value Ref Range    Ventricular Rate 105 BPM    Atrial Rate 105 BPM    P-R Interval 130 ms    QRS Duration 80 ms    Q-T Interval 296 ms    QTC Calculation (Bezet) 391 ms    Calculated P Axis 47 degrees    Calculated R Axis 13 degrees    Calculated T Axis 175 degrees    Diagnosis       !!! Poor data quality, interpretation may be adversely affected  Sinus tachycardia  Possible Left atrial enlargement  Left ventricular hypertrophy  Inferior infarct (cited on or before 12-JAN-2022)  ST & T wave abnormality, consider lateral ischemia  Abnormal ECG  When compared with ECG of 12-JAN-2022 11:22,  ST no longer depressed in Inferior leads  T wave inversion less evident in Inferior leads  Nonspecific T wave abnormality, worse in Anterior leads  Confirmed by Gilda Schrader MD (), MEILIANO VARELA ([id number]) on 1/12/2022 2:54:49 PM     CULTURE, BLOOD    Collection Time: 01/12/22 11:26 AM    Specimen: Blood   Result Value Ref Range    Special Requests: RIGHT  Antecubital        Culture result: PENDING    LACTIC ACID    Collection Time: 01/12/22 11:26 AM   Result Value Ref Range    Lactic acid 3.3 (H) 0.4 - 2.0 MMOL/L   CBC WITH AUTOMATED DIFF    Collection Time: 01/12/22 11:26 AM   Result Value Ref Range    WBC 15.2 (H) 4.3 - 11.1 K/uL    RBC 4.51 4.23 - 5.6 M/uL    HGB 11.5 (L) 13.6 - 17.2 g/dL    HCT 35.8 (L) 41.1 - 50.3 %    MCV 79.4 (L) 79.6 - 97.8 FL    MCH 25.5 (L) 26.1 - 32.9 PG    MCHC 32.1 31.4 - 35.0 g/dL    RDW 13.8 11.9 - 14.6 %    PLATELET 92 (L) 780 - 450 K/uL MPV 10.0 9.4 - 12.3 FL    ABSOLUTE NRBC 0.00 0.0 - 0.2 K/uL    NEUTROPHILS 88 (H) 43 - 78 %    LYMPHOCYTES 4 (L) 13 - 44 %    MONOCYTES 4 4.0 - 12.0 %    EOSINOPHILS 0 (L) 0.5 - 7.8 %    BASOPHILS 1 0.0 - 2.0 %    IMMATURE GRANULOCYTES 3 0.0 - 5.0 %    ABS. NEUTROPHILS 13.3 (H) 1.7 - 8.2 K/UL    ABS. LYMPHOCYTES 0.6 0.5 - 4.6 K/UL    ABS. MONOCYTES 0.6 0.1 - 1.3 K/UL    ABS. EOSINOPHILS 0.0 0.0 - 0.8 K/UL    ABS. BASOPHILS 0.2 0.0 - 0.2 K/UL    ABS. IMM. GRANS. 0.5 0.0 - 0.5 K/UL    RBC COMMENTS OCCASIONAL  MICROCYTOSIS        WBC COMMENTS Result Confirmed By Smear      PLATELET COMMENTS DECREASED      DF AUTOMATED     METABOLIC PANEL, COMPREHENSIVE    Collection Time: 01/12/22 11:26 AM   Result Value Ref Range    Sodium 126 (L) 136 - 145 mmol/L    Potassium 4.6 3.5 - 5.1 mmol/L    Chloride 92 (L) 98 - 107 mmol/L    CO2 24 21 - 32 mmol/L    Anion gap 10 7 - 16 mmol/L    Glucose 101 (H) 65 - 100 mg/dL    BUN 28 (H) 8 - 23 MG/DL    Creatinine 1.66 (H) 0.8 - 1.5 MG/DL    GFR est AA 54 (L) >60 ml/min/1.73m2    GFR est non-AA 45 (L) >60 ml/min/1.73m2    Calcium 9.2 8.3 - 10.4 MG/DL    Bilirubin, total 0.5 0.2 - 1.1 MG/DL    ALT (SGPT) 15 12 - 65 U/L    AST (SGOT) 14 (L) 15 - 37 U/L    Alk.  phosphatase 150 (H) 50 - 136 U/L    Protein, total 7.4 6.3 - 8.2 g/dL    Albumin 2.5 (L) 3.2 - 4.6 g/dL    Globulin 4.9 (H) 2.3 - 3.5 g/dL    A-G Ratio 0.5 (L) 1.2 - 3.5     PROCALCITONIN    Collection Time: 01/12/22 11:26 AM   Result Value Ref Range    Procalcitonin 2.21 (H) 0.00 - 0.49 ng/mL   URINALYSIS W/ RFLX MICROSCOPIC    Collection Time: 01/12/22  3:49 PM   Result Value Ref Range    Color MATEO      Appearance TURBID      Specific gravity 1.018 1.001 - 1.023      pH (UA) 7.5 5.0 - 9.0      Protein 100 (A) NEG mg/dL    Glucose Negative mg/dL    Ketone Negative NEG mg/dL    Bilirubin Negative NEG      Blood LARGE (A) NEG      Urobilinogen 1.0 0.2 - 1.0 EU/dL    Nitrites Negative NEG      Leukocyte Esterase SMALL (A) NEG WBC 10-20 0 /hpf    RBC 5-10 0 /hpf    Epithelial cells 0-3 0 /hpf    Bacteria 4+ (H) 0 /hpf    Casts HYALINE 0 /lpf    Amorphous Crystals 1+ (H) 0    Other observations RESULTS VERIFIED MANUALLY     URINALYSIS W/ RFLX MICROSCOPIC    Collection Time: 01/12/22  3:50 PM   Result Value Ref Range    Color RED      Appearance TURBID      Specific gravity 1.019 1.001 - 1.023      pH (UA) 8.0 5.0 - 9.0      Protein 300 (A) NEG mg/dL    Glucose Negative mg/dL    Ketone TRACE (A) NEG mg/dL    Bilirubin Negative NEG      Blood LARGE (A) NEG      Urobilinogen 1.0 0.2 - 1.0 EU/dL    Nitrites Negative NEG      Leukocyte Esterase LARGE (A) NEG      WBC >100 0 /hpf    RBC 5-10 0 /hpf    Epithelial cells 0-3 0 /hpf    Bacteria 4+ (H) 0 /hpf    Casts HYALINE 0 /lpf    Other observations RESULTS VERIFIED MANUALLY

## 2022-01-13 LAB
ABO + RH BLD: NORMAL
ACC. NO. FROM MICRO ORDER, ACCP: NORMAL
ALBUMIN SERPL-MCNC: 1.8 G/DL (ref 3.2–4.6)
ALBUMIN/GLOB SERPL: 0.5 {RATIO} (ref 1.2–3.5)
ALP SERPL-CCNC: 90 U/L (ref 50–136)
ALT SERPL-CCNC: 16 U/L (ref 12–65)
ANION GAP SERPL CALC-SCNC: 10 MMOL/L (ref 7–16)
AST SERPL-CCNC: 22 U/L (ref 15–37)
BACTERIA SPEC CULT: NORMAL
BASOPHILS # BLD: 0 K/UL (ref 0–0.2)
BASOPHILS # BLD: 0.1 K/UL (ref 0–0.2)
BASOPHILS NFR BLD: 0 % (ref 0–2)
BASOPHILS NFR BLD: 1 % (ref 0–2)
BILIRUB SERPL-MCNC: 0.6 MG/DL (ref 0.2–1.1)
BLOOD CULTURE PCR TESTING: NORMAL
BLOOD GROUP ANTIBODIES SERPL: NORMAL
BUN SERPL-MCNC: 21 MG/DL (ref 8–23)
BUN SERPL-MCNC: 23 MG/DL (ref 8–23)
BUN SERPL-MCNC: 32 MG/DL (ref 8–23)
CALCIUM SERPL-MCNC: 7.1 MG/DL (ref 8.3–10.4)
CALCIUM SERPL-MCNC: 7.5 MG/DL (ref 8.3–10.4)
CALCIUM SERPL-MCNC: 7.6 MG/DL (ref 8.3–10.4)
CHLORIDE SERPL-SCNC: 97 MMOL/L (ref 98–107)
CHLORIDE SERPL-SCNC: 98 MMOL/L (ref 98–107)
CHLORIDE SERPL-SCNC: 99 MMOL/L (ref 98–107)
CK SERPL-CCNC: 157 U/L (ref 21–215)
CO2 SERPL-SCNC: 18 MMOL/L (ref 21–32)
CO2 SERPL-SCNC: 19 MMOL/L (ref 21–32)
CO2 SERPL-SCNC: 19 MMOL/L (ref 21–32)
CREAT SERPL-MCNC: 1.37 MG/DL (ref 0.8–1.5)
CREAT SERPL-MCNC: 1.38 MG/DL (ref 0.8–1.5)
CREAT SERPL-MCNC: 1.45 MG/DL (ref 0.8–1.5)
CREAT UR-MCNC: 73.6 MG/DL
DIFFERENTIAL METHOD BLD: ABNORMAL
DIFFERENTIAL METHOD BLD: ABNORMAL
EOSINOPHIL # BLD: 0 K/UL (ref 0–0.8)
EOSINOPHIL # BLD: 0 K/UL (ref 0–0.8)
EOSINOPHIL NFR BLD: 0 % (ref 0.5–7.8)
EOSINOPHIL NFR BLD: 0 % (ref 0.5–7.8)
ERYTHROCYTE [DISTWIDTH] IN BLOOD BY AUTOMATED COUNT: 14 % (ref 11.9–14.6)
ERYTHROCYTE [DISTWIDTH] IN BLOOD BY AUTOMATED COUNT: 14 % (ref 11.9–14.6)
FERRITIN SERPL-MCNC: 2069 NG/ML (ref 8–388)
FOLATE SERPL-MCNC: 11.1 NG/ML (ref 3.1–17.5)
GLOBULIN SER CALC-MCNC: 3.8 G/DL (ref 2.3–3.5)
GLUCOSE SERPL-MCNC: 103 MG/DL (ref 65–100)
GLUCOSE SERPL-MCNC: 75 MG/DL (ref 65–100)
GLUCOSE SERPL-MCNC: 86 MG/DL (ref 65–100)
HCT VFR BLD AUTO: 25.9 % (ref 41.1–50.3)
HCT VFR BLD AUTO: 26.4 % (ref 41.1–50.3)
HEMOCCULT STL QL: NEGATIVE
HGB BLD-MCNC: 8.4 G/DL (ref 13.6–17.2)
HGB BLD-MCNC: 8.5 G/DL (ref 13.6–17.2)
HGB RETIC QN AUTO: 30 PG (ref 29–35)
IMM GRANULOCYTES # BLD AUTO: 0.5 K/UL (ref 0–0.5)
IMM GRANULOCYTES # BLD AUTO: 0.6 K/UL (ref 0–0.5)
IMM GRANULOCYTES NFR BLD AUTO: 6 % (ref 0–5)
IMM GRANULOCYTES NFR BLD AUTO: 8 % (ref 0–5)
IMM RETICS NFR: 21.2 % (ref 2.3–13.4)
IRON SATN MFR SERPL: 6 %
IRON SERPL-MCNC: 10 UG/DL (ref 35–150)
LDH SERPL L TO P-CCNC: 249 U/L (ref 110–210)
LYMPHOCYTES # BLD: 0.2 K/UL (ref 0.5–4.6)
LYMPHOCYTES # BLD: 0.3 K/UL (ref 0.5–4.6)
LYMPHOCYTES NFR BLD: 3 % (ref 13–44)
LYMPHOCYTES NFR BLD: 4 % (ref 13–44)
MCH RBC QN AUTO: 25.4 PG (ref 26.1–32.9)
MCH RBC QN AUTO: 26 PG (ref 26.1–32.9)
MCHC RBC AUTO-ENTMCNC: 32.2 G/DL (ref 31.4–35)
MCHC RBC AUTO-ENTMCNC: 32.4 G/DL (ref 31.4–35)
MCV RBC AUTO: 79 FL (ref 79.6–97.8)
MCV RBC AUTO: 80.2 FL (ref 79.6–97.8)
MONOCYTES # BLD: 0.2 K/UL (ref 0.1–1.3)
MONOCYTES # BLD: 0.2 K/UL (ref 0.1–1.3)
MONOCYTES NFR BLD: 2 % (ref 4–12)
MONOCYTES NFR BLD: 2 % (ref 4–12)
NEUTS SEG # BLD: 6.7 K/UL (ref 1.7–8.2)
NEUTS SEG # BLD: 6.7 K/UL (ref 1.7–8.2)
NEUTS SEG NFR BLD: 87 % (ref 43–78)
NEUTS SEG NFR BLD: 87 % (ref 43–78)
NRBC # BLD: 0 K/UL (ref 0–0.2)
NRBC # BLD: 0 K/UL (ref 0–0.2)
OSMOLALITY SERPL: 254 MOSM/KG H2O (ref 275–295)
OSMOLALITY UR: 500 MOSM/KG H2O (ref 50–1400)
PLATELET # BLD AUTO: 69 K/UL (ref 150–450)
PLATELET # BLD AUTO: 73 K/UL (ref 150–450)
PLATELET COMMENTS,PCOM: ABNORMAL
PLATELET COMMENTS,PCOM: ABNORMAL
PMV BLD AUTO: 10.7 FL (ref 9.4–12.3)
PMV BLD AUTO: 11.1 FL (ref 9.4–12.3)
POTASSIUM SERPL-SCNC: 3.5 MMOL/L (ref 3.5–5.1)
POTASSIUM SERPL-SCNC: 3.9 MMOL/L (ref 3.5–5.1)
POTASSIUM SERPL-SCNC: 3.9 MMOL/L (ref 3.5–5.1)
POTASSIUM UR-SCNC: 58 MMOL/L
PROT SERPL-MCNC: 5.6 G/DL (ref 6.3–8.2)
PROT UR-MCNC: 172 MG/DL
PROT/CREAT UR-RTO: 2.3
RBC # BLD AUTO: 3.23 M/UL (ref 4.23–5.6)
RBC # BLD AUTO: 3.34 M/UL (ref 4.23–5.6)
RBC MORPH BLD: ABNORMAL
RBC MORPH BLD: ABNORMAL
RETICS # AUTO: 0.03 M/UL (ref 0.03–0.1)
RETICS/RBC NFR AUTO: 0.9 % (ref 0.3–2)
SERVICE CMNT-IMP: NORMAL
SODIUM SERPL-SCNC: 126 MMOL/L (ref 136–145)
SODIUM SERPL-SCNC: 126 MMOL/L (ref 138–145)
SODIUM SERPL-SCNC: 128 MMOL/L (ref 136–145)
SODIUM UR-SCNC: 81 MMOL/L
SPECIMEN EXP DATE BLD: NORMAL
TIBC SERPL-MCNC: 165 UG/DL (ref 250–450)
VIT B12 SERPL-MCNC: 2652 PG/ML (ref 193–986)
WBC # BLD AUTO: 7.7 K/UL (ref 4.3–11.1)
WBC # BLD AUTO: 7.8 K/UL (ref 4.3–11.1)
WBC MORPH BLD: ABNORMAL
WBC MORPH BLD: ABNORMAL

## 2022-01-13 PROCEDURE — 97161 PT EVAL LOW COMPLEX 20 MIN: CPT

## 2022-01-13 PROCEDURE — 80053 COMPREHEN METABOLIC PANEL: CPT

## 2022-01-13 PROCEDURE — 97116 GAIT TRAINING THERAPY: CPT

## 2022-01-13 PROCEDURE — 85025 COMPLETE CBC W/AUTO DIFF WBC: CPT

## 2022-01-13 PROCEDURE — 74011000258 HC RX REV CODE- 258: Performed by: FAMILY MEDICINE

## 2022-01-13 PROCEDURE — 65270000029 HC RM PRIVATE

## 2022-01-13 PROCEDURE — 84156 ASSAY OF PROTEIN URINE: CPT

## 2022-01-13 PROCEDURE — 83540 ASSAY OF IRON: CPT

## 2022-01-13 PROCEDURE — 82728 ASSAY OF FERRITIN: CPT

## 2022-01-13 PROCEDURE — 80048 BASIC METABOLIC PNL TOTAL CA: CPT

## 2022-01-13 PROCEDURE — 83615 LACTATE (LD) (LDH) ENZYME: CPT

## 2022-01-13 PROCEDURE — 74011000250 HC RX REV CODE- 250: Performed by: FAMILY MEDICINE

## 2022-01-13 PROCEDURE — 74011250637 HC RX REV CODE- 250/637: Performed by: FAMILY MEDICINE

## 2022-01-13 PROCEDURE — 74011250637 HC RX REV CODE- 250/637: Performed by: EMERGENCY MEDICINE

## 2022-01-13 PROCEDURE — 82272 OCCULT BLD FECES 1-3 TESTS: CPT

## 2022-01-13 PROCEDURE — 87641 MR-STAPH DNA AMP PROBE: CPT

## 2022-01-13 PROCEDURE — 86900 BLOOD TYPING SEROLOGIC ABO: CPT

## 2022-01-13 PROCEDURE — 82550 ASSAY OF CK (CPK): CPT

## 2022-01-13 PROCEDURE — 65660000000 HC RM CCU STEPDOWN

## 2022-01-13 PROCEDURE — 36415 COLL VENOUS BLD VENIPUNCTURE: CPT

## 2022-01-13 PROCEDURE — 83010 ASSAY OF HAPTOGLOBIN QUANT: CPT

## 2022-01-13 PROCEDURE — 83930 ASSAY OF BLOOD OSMOLALITY: CPT

## 2022-01-13 PROCEDURE — 74011250636 HC RX REV CODE- 250/636: Performed by: STUDENT IN AN ORGANIZED HEALTH CARE EDUCATION/TRAINING PROGRAM

## 2022-01-13 PROCEDURE — 83935 ASSAY OF URINE OSMOLALITY: CPT

## 2022-01-13 PROCEDURE — 84300 ASSAY OF URINE SODIUM: CPT

## 2022-01-13 PROCEDURE — 97166 OT EVAL MOD COMPLEX 45 MIN: CPT

## 2022-01-13 PROCEDURE — 74011000258 HC RX REV CODE- 258: Performed by: STUDENT IN AN ORGANIZED HEALTH CARE EDUCATION/TRAINING PROGRAM

## 2022-01-13 PROCEDURE — 74011250637 HC RX REV CODE- 250/637: Performed by: STUDENT IN AN ORGANIZED HEALTH CARE EDUCATION/TRAINING PROGRAM

## 2022-01-13 PROCEDURE — 97535 SELF CARE MNGMENT TRAINING: CPT

## 2022-01-13 PROCEDURE — 74011250637 HC RX REV CODE- 250/637: Performed by: NURSE PRACTITIONER

## 2022-01-13 PROCEDURE — 82607 VITAMIN B-12: CPT

## 2022-01-13 PROCEDURE — 74011250636 HC RX REV CODE- 250/636: Performed by: FAMILY MEDICINE

## 2022-01-13 PROCEDURE — 85046 RETICYTE/HGB CONCENTRATE: CPT

## 2022-01-13 PROCEDURE — 82746 ASSAY OF FOLIC ACID SERUM: CPT

## 2022-01-13 PROCEDURE — 84133 ASSAY OF URINE POTASSIUM: CPT

## 2022-01-13 PROCEDURE — 86580 TB INTRADERMAL TEST: CPT | Performed by: FAMILY MEDICINE

## 2022-01-13 RX ORDER — TRAZODONE HYDROCHLORIDE 50 MG/1
100 TABLET ORAL
Status: DISCONTINUED | OUTPATIENT
Start: 2022-01-13 | End: 2022-01-19 | Stop reason: HOSPADM

## 2022-01-13 RX ORDER — CETIRIZINE HCL 10 MG
5 TABLET ORAL DAILY
Status: DISCONTINUED | OUTPATIENT
Start: 2022-01-13 | End: 2022-01-19 | Stop reason: HOSPADM

## 2022-01-13 RX ORDER — OXYCODONE HYDROCHLORIDE 5 MG/1
10 TABLET ORAL
Status: DISCONTINUED | OUTPATIENT
Start: 2022-01-13 | End: 2022-01-19 | Stop reason: HOSPADM

## 2022-01-13 RX ADMIN — MONTELUKAST 10 MG: 10 TABLET, FILM COATED ORAL at 21:57

## 2022-01-13 RX ADMIN — DRONEDARONE 400 MG: 400 TABLET, FILM COATED ORAL at 10:12

## 2022-01-13 RX ADMIN — ACETAMINOPHEN 650 MG: 325 TABLET ORAL at 22:24

## 2022-01-13 RX ADMIN — CETIRIZINE HYDROCHLORIDE 5 MG: 10 TABLET, FILM COATED ORAL at 12:59

## 2022-01-13 RX ADMIN — SODIUM CHLORIDE 500 ML: 900 INJECTION, SOLUTION INTRAVENOUS at 15:05

## 2022-01-13 RX ADMIN — DRONEDARONE 400 MG: 400 TABLET, FILM COATED ORAL at 18:46

## 2022-01-13 RX ADMIN — OXYCODONE 10 MG: 5 TABLET ORAL at 10:06

## 2022-01-13 RX ADMIN — ATORVASTATIN CALCIUM 40 MG: 40 TABLET, FILM COATED ORAL at 21:58

## 2022-01-13 RX ADMIN — SODIUM CHLORIDE, PRESERVATIVE FREE 10 ML: 5 INJECTION INTRAVENOUS at 21:58

## 2022-01-13 RX ADMIN — SODIUM CHLORIDE, PRESERVATIVE FREE 5 ML: 5 INJECTION INTRAVENOUS at 18:46

## 2022-01-13 RX ADMIN — POLYETHYLENE GLYCOL 3350 17 G: 17 POWDER, FOR SOLUTION ORAL at 10:06

## 2022-01-13 RX ADMIN — OXYCODONE 10 MG: 5 TABLET ORAL at 01:53

## 2022-01-13 RX ADMIN — TRAZODONE HYDROCHLORIDE 100 MG: 50 TABLET ORAL at 21:57

## 2022-01-13 RX ADMIN — SODIUM CHLORIDE, PRESERVATIVE FREE 5 ML: 5 INJECTION INTRAVENOUS at 05:10

## 2022-01-13 RX ADMIN — Medication 1 PACKET: at 11:59

## 2022-01-13 RX ADMIN — TUBERCULIN PURIFIED PROTEIN DERIVATIVE 5 UNITS: 5 INJECTION, SOLUTION INTRADERMAL at 10:06

## 2022-01-13 RX ADMIN — MEROPENEM 500 MG: 500 INJECTION, POWDER, FOR SOLUTION INTRAVENOUS at 18:46

## 2022-01-13 RX ADMIN — OXYCODONE 10 MG: 5 TABLET ORAL at 21:58

## 2022-01-13 RX ADMIN — OXYCODONE 10 MG: 5 TABLET ORAL at 16:23

## 2022-01-13 RX ADMIN — VANCOMYCIN HYDROCHLORIDE 1000 MG: 1 INJECTION, POWDER, LYOPHILIZED, FOR SOLUTION INTRAVENOUS at 21:57

## 2022-01-13 RX ADMIN — ACETAMINOPHEN 650 MG: 325 TABLET ORAL at 10:06

## 2022-01-13 RX ADMIN — MEROPENEM 500 MG: 500 INJECTION, POWDER, FOR SOLUTION INTRAVENOUS at 12:59

## 2022-01-13 RX ADMIN — PIPERACILLIN SODIUM AND TAZOBACTAM SODIUM 3.38 G: 3; .375 INJECTION, POWDER, LYOPHILIZED, FOR SOLUTION INTRAVENOUS at 05:07

## 2022-01-13 RX ADMIN — SODIUM CHLORIDE 500 ML: 900 INJECTION, SOLUTION INTRAVENOUS at 11:58

## 2022-01-13 RX ADMIN — OXYCODONE 10 MG: 5 TABLET ORAL at 06:14

## 2022-01-13 NOTE — H&P
Hospitalist Admission History and Physical     NAME:  Martín Morris   Age:  57 y.o.  :   1960   MRN:   [id number]  PCP: Meka Ly MD  Consulting MD:  Treatment Team: Attending Provider: Marcianne Mohs, DO; Primary Nurse: Barney Hinson RN    Chief Complaint   Patient presents with    Flank Pain         HPI:   Patient is a 57 y.o. male who presented to the ED for cc lower back pain along with odor to urine. Hx of depression, HTN, HLD, CAD s/p PCI, paroxysmal a fib not on anticoagulation, metastatic urothelial/ bladder cancer on chemo last tx one week ago at [address] with mets to lung, bilateral nephrosotomy tubes since November, and recurrent UTIs growing stenotrophomonas maltophilia, klebsiella pneumoniae, and klebsiella ozaenae in the past. Wife at bedside who states she has noticed some erythema to right nephrostomy site. Both state having good urine output from nephrostomy tubes. Vitals -     Labs- WBC 15.2. Platelets 92. UA consistent with UTI. Na 126, Creatine 1.6 from baseline 1, procal 2.2, lactic acid 3.3    CT abdomen pelvis without contrast pending. Past Medical History:   Diagnosis Date    Abnormal EKG     1. Cardiac MRI (5/14/15):  EF 68%. The left ventricular myocardium appear symmetric at the base. There appears to be slightly thicker left ventricular myocardium at the mid and apical lateral wall compared to other segments. No wall motion advised. EF 68%. Calculated left ventricular mass is within normal limits.     Arrhythmia     occasionally has heart palpitations     Chronic obstructive pulmonary disease (HCC)     ventolin prn     Depression     Former smoker     Hypercholesteremia     controlled by lipitor    Hyperlipidemia     Hypertension     daily meds    Inguinal hernia     Left ventricular hypertrophy     ECHO 2016    Prostate troubles     Unstable angina ([facility] [address] 2014    cath in 2014=mild nonobstructive CAD        Past Surgical History:   Procedure Laterality Date    HX CARPAL TUNNEL RELEASE Left     HX COLONOSCOPY  2009    HX HEART CATHETERIZATION  2014    no stents    HX HERNIA REPAIR Right 10/24/13    inguinal    HX ORTHOPAEDIC Left 10/2016    shoulder manipulation     HX SHOULDER ARTHROSCOPY Left 07/2016    second surgery 07/17    HX TURP  10/04/2021    ME CARDIAC SURG PROCEDURE UNLIST      CCL, clean/no interventions        Family History   Problem Relation Age of Onset    Heart Disease Sister     Stroke Sister     Cancer Brother         lymphoma    Lung Disease Mother     Hypertension Sister     Hypertension Sister     Lung Disease Sister     Heart Disease Sister     Heart Disease Brother     Stroke Brother     Heart Disease Brother        Social History     Social History Narrative    Not on file        Social History     Tobacco Use    Smoking status: Current Every Day Smoker     Packs/day: 1.00     Years: 35.00     Pack years: 35.00     Types: Cigarettes    Smokeless tobacco: Never Used    Tobacco comment: nicotine supplement? Substance Use Topics    Alcohol use: Yes     Alcohol/week: 20.0 standard drinks     Types: 24 Cans of beer per week        Social History     Substance and Sexual Activity   Drug Use No         No Known Allergies    Prior to Admission medications    Medication Sig Start Date End Date Taking? Authorizing Provider   sertraline (Zoloft) 25 mg tablet Take 25 mg by mouth daily. Provider, Historical   dronedarone (Multaq) tab tablet Take 1 Tablet by mouth two (2) times daily (with meals). 11/12/21   Ciara Rucker MD   tadalafiL (Cialis) 20 mg tablet Take 1 Tablet by mouth daily as needed for Erectile Dysfunction. 10/14/21   Meli Robledo PA-C   montelukast (SINGULAIR) 10 mg tablet Take 1 Tablet by mouth daily. 9/3/21   Meli Robledo PA-C   metoprolol succinate (TOPROL-XL) 50 mg XL tablet Take 1 Tablet by mouth daily.  9/3/21   eMli Robledo PA-C   traZODone (DESYREL) 100 mg tablet Take 1 Tablet by mouth nightly. 8/31/21   Destinee Hernandez PA-C   hyoscyamine SL (LEVSIN/SL) 0.125 mg SL tablet 1 Tablet by SubLINGual route every four (4) hours as needed for Other (bladder pain). 8/27/21   Iza Randhawa NP   atorvastatin (LIPITOR) 40 mg tablet TAKE 1 TABLET BY MOUTH EVERY MORNING FOR HIGH CHOLESTEROL 4/16/21   Mickey Rucker MD   benazepriL (LOTENSIN) 10 mg tablet TAKE 1 TABLET BY MOUTH DAILY 4/2/21   Mickey Rucker MD   hydrocortisone (ANUSOL-HC) 2.5 % rectal cream Insert  into rectum four (4) times daily. 9/22/20   Dulce Dorsey DO   nitroglycerin (NITROSTAT) 0.4 mg SL tablet 1 Tab by SubLINGual route every five (5) minutes as needed for Chest Pain. Up to 3 doses. 8/26/20   Mickey Rucker MD   apixaban (ELIQUIS) 5 mg tablet Take 1 Tab by mouth two (2) times a day. 1/9/20   GHISLAINE Harmon   levocetirizine (XYZAL) 5 mg tablet Take  by mouth. Provider, Historical   albuterol (VENTOLIN HFA) 90 mcg/actuation inhaler Take 2 Puffs by inhalation as needed for Wheezing. 1/4/18   Mickey Rucker MD   multivitamin (ONE A DAY) tablet Take 1 Tab by mouth daily.     Provider, Historical           Review of Systems    Constitutional: fatigued  Eyes:  no change in visual acuity, no photophobia  Ears, nose, mouth, throat, and face: no  Odynphagia, dysphagia, no thrush or exudate, negative for chronic sinus congestion, recurrent headaches  Respiratory: negative for SOB, hemoptysis or cough  Cardiovascular: negative for CP, palpitations, or PND  Gastrointestinal: negative for abdominal pain, no hematemesis, hematochezia or BRBPR  Genitourinary: no urgency, frequency, or dysuria, no nocturia  Integument/breast: negative for skin rash or skin lesions  Hematologic/lymphatic: negative for known bleeding disorder  Musculoskeletal:lower back pain  Neurological: weakness, confusion  Behavioral/Psych: negative for depression or chronic anxiety,   Endocrine: negative for polydyspia, polyuria or intolerance to heat or cold  Allergic/Immunologic: negative for chronic allergic rhinitis, or known connective tissue disorder      Objective:     Patient Vitals for the past 24 hrs:   Temp Pulse Resp BP SpO2   01/12/22 1937     94 %   01/12/22 1920 99 °F (37.2 °C) (!) 106 20 126/75 94 %   01/12/22 1558     98 %   01/12/22 1545    117/72 95 %   01/12/22 1542    115/71    01/12/22 1500    92/61 96 %   01/12/22 1445    92/68 98 %   01/12/22 1430    92/62 98 %   01/12/22 1428     98 %   01/12/22 1427    92/67    01/12/22 1118 100.3 °F (37.9 °C) (!) 111 20 111/80 95 %        01/12 1901 - 01/13 0700  In: 50 [I.V.:50]  Out: -   No intake/output data recorded.     Data Review:   Recent Results (from the past 24 hour(s))   EKG, 12 LEAD, INITIAL    Collection Time: 01/12/22 11:22 AM   Result Value Ref Range    Ventricular Rate 105 BPM    Atrial Rate 105 BPM    P-R Interval 130 ms    QRS Duration 80 ms    Q-T Interval 296 ms    QTC Calculation (Bezet) 391 ms    Calculated P Axis 47 degrees    Calculated R Axis 13 degrees    Calculated T Axis 175 degrees    Diagnosis       !!! Poor data quality, interpretation may be adversely affected  Sinus tachycardia  Possible Left atrial enlargement  Left ventricular hypertrophy  Inferior infarct (cited on or before 12-JAN-2022)  ST & T wave abnormality, consider lateral ischemia  Abnormal ECG  When compared with ECG of 12-JAN-2022 11:22,  ST no longer depressed in Inferior leads  T wave inversion less evident in Inferior leads  Nonspecific T wave abnormality, worse in Anterior leads  Confirmed by Mary Jane Natarajan MD ()EMILIANO ([id number]) on 1/12/2022 2:54:49 PM     CULTURE, BLOOD    Collection Time: 01/12/22 11:26 AM    Specimen: Blood   Result Value Ref Range    Special Requests: RIGHT  Antecubital        Culture result: PENDING    LACTIC ACID    Collection Time: 01/12/22 11:26 AM   Result Value Ref Range    Lactic acid 3.3 (H) 0.4 - 2.0 MMOL/L   CBC WITH AUTOMATED DIFF    Collection Time: 01/12/22 11:26 AM   Result Value Ref Range    WBC 15.2 (H) 4.3 - 11.1 K/uL    RBC 4.51 4.23 - 5.6 M/uL    HGB 11.5 (L) 13.6 - 17.2 g/dL    HCT 35.8 (L) 41.1 - 50.3 %    MCV 79.4 (L) 79.6 - 97.8 FL    MCH 25.5 (L) 26.1 - 32.9 PG    MCHC 32.1 31.4 - 35.0 g/dL    RDW 13.8 11.9 - 14.6 %    PLATELET 92 (L) 641 - 450 K/uL    MPV 10.0 9.4 - 12.3 FL    ABSOLUTE NRBC 0.00 0.0 - 0.2 K/uL    NEUTROPHILS 88 (H) 43 - 78 %    LYMPHOCYTES 4 (L) 13 - 44 %    MONOCYTES 4 4.0 - 12.0 %    EOSINOPHILS 0 (L) 0.5 - 7.8 %    BASOPHILS 1 0.0 - 2.0 %    IMMATURE GRANULOCYTES 3 0.0 - 5.0 %    ABS. NEUTROPHILS 13.3 (H) 1.7 - 8.2 K/UL    ABS. LYMPHOCYTES 0.6 0.5 - 4.6 K/UL    ABS. MONOCYTES 0.6 0.1 - 1.3 K/UL    ABS. EOSINOPHILS 0.0 0.0 - 0.8 K/UL    ABS. BASOPHILS 0.2 0.0 - 0.2 K/UL    ABS. IMM. GRANS. 0.5 0.0 - 0.5 K/UL    RBC COMMENTS OCCASIONAL  MICROCYTOSIS        WBC COMMENTS Result Confirmed By Smear      PLATELET COMMENTS DECREASED      DF AUTOMATED     METABOLIC PANEL, COMPREHENSIVE    Collection Time: 01/12/22 11:26 AM   Result Value Ref Range    Sodium 126 (L) 136 - 145 mmol/L    Potassium 4.6 3.5 - 5.1 mmol/L    Chloride 92 (L) 98 - 107 mmol/L    CO2 24 21 - 32 mmol/L    Anion gap 10 7 - 16 mmol/L    Glucose 101 (H) 65 - 100 mg/dL    BUN 28 (H) 8 - 23 MG/DL    Creatinine 1.66 (H) 0.8 - 1.5 MG/DL    GFR est AA 54 (L) >60 ml/min/1.73m2    GFR est non-AA 45 (L) >60 ml/min/1.73m2    Calcium 9.2 8.3 - 10.4 MG/DL    Bilirubin, total 0.5 0.2 - 1.1 MG/DL    ALT (SGPT) 15 12 - 65 U/L    AST (SGOT) 14 (L) 15 - 37 U/L    Alk.  phosphatase 150 (H) 50 - 136 U/L    Protein, total 7.4 6.3 - 8.2 g/dL    Albumin 2.5 (L) 3.2 - 4.6 g/dL    Globulin 4.9 (H) 2.3 - 3.5 g/dL    A-G Ratio 0.5 (L) 1.2 - 3.5     PROCALCITONIN    Collection Time: 01/12/22 11:26 AM   Result Value Ref Range    Procalcitonin 2.21 (H) 0.00 - 0.49 ng/mL   URINALYSIS W/ RFLX MICROSCOPIC    Collection Time: 01/12/22  3:49 PM Result Value Ref Range    Color MATEO      Appearance TURBID      Specific gravity 1.018 1.001 - 1.023      pH (UA) 7.5 5.0 - 9.0      Protein 100 (A) NEG mg/dL    Glucose Negative mg/dL    Ketone Negative NEG mg/dL    Bilirubin Negative NEG      Blood LARGE (A) NEG      Urobilinogen 1.0 0.2 - 1.0 EU/dL    Nitrites Negative NEG      Leukocyte Esterase SMALL (A) NEG      WBC 10-20 0 /hpf    RBC 5-10 0 /hpf    Epithelial cells 0-3 0 /hpf    Bacteria 4+ (H) 0 /hpf    Casts HYALINE 0 /lpf    Amorphous Crystals 1+ (H) 0    Other observations RESULTS VERIFIED MANUALLY     URINALYSIS W/ RFLX MICROSCOPIC    Collection Time: 01/12/22  3:50 PM   Result Value Ref Range    Color RED      Appearance TURBID      Specific gravity 1.019 1.001 - 1.023      pH (UA) 8.0 5.0 - 9.0      Protein 300 (A) NEG mg/dL    Glucose Negative mg/dL    Ketone TRACE (A) NEG mg/dL    Bilirubin Negative NEG      Blood LARGE (A) NEG      Urobilinogen 1.0 0.2 - 1.0 EU/dL    Nitrites Negative NEG      Leukocyte Esterase LARGE (A) NEG      WBC >100 0 /hpf    RBC 5-10 0 /hpf    Epithelial cells 0-3 0 /hpf    Bacteria 4+ (H) 0 /hpf    Casts HYALINE 0 /lpf    Other observations RESULTS VERIFIED MANUALLY         Physical Exam:     General:  Alert, cooperative, obvious discomfort. Mild increased work of breathing. Mild confusion   Eyes:  Conjunctivae/corneas clear. PERRL   Ears:  Normal TMs and external ear canals both ears. Nose: Nares normal   Mouth/Throat: Lips, mucosa, and tongue normal.    Neck:  no JVD. Back:   Mild erythema surrounding right nephrostomy tube. No drainage noted. Lungs:   Clear to auscultation bilaterally. Heart:  Sinus tachycardia   Abdomen:   Soft, non-tender. Bowel sounds normal. No masses,  No organomegaly. Extremities: Extremities normal, atraumatic, no cyanosis or edema. Muscle wasting   Pulses: 2+ and symmetric all extremities.    Skin: Skin color, texture, turgor normal. No rashes or lesions   Lymph nodes: Cervical, supraclavicular, and axillary nodes normal.   Neurologic: Limited ability to move. Assessment and Plan     Principal Problem:    [facility] (1/12/2022)    Active Problems:    HTN (hypertension) (10/14/2013)      Recurrent depression ([facility] [address] (1/4/2018)      PAF (paroxysmal atrial fibrillation) ([facility] (1/7/2020)      Overview: 1. Admitted 1/20 with afib and chest pain. Started on Multaq and Eliquis. Hyponatremia (1/12/2022)      UTI (urinary tract infection) (1/12/2022)      EDWARDO (acute kidney injury) ([facility] (1/12/2022)    Severe sepsis (met by HR, WBC, and EDWARDO) secondary to UTI - Urine culture pending. Start Vancomycin and Zosyn. Mild erythema around right nephrostomy tube but good urine output from tube so holding off on nephrology consult and IR consult. CT pending. Bladder and urothelial cancer - Follows [facility]. PRN oxycodone, but reducing dose since with some confusion and EDWARDO    EDWARDO - tx sepsis. Give IV fluids. Hyponatremia - IV fluids, trend.  Do not overcorrect more than 8 units in 24 hours to avoid demyelination    Holding tamsulosin, trazodone due to confusion, benazepril due to EDWARDO, and finesteride,     DVT prophylaxis - SCDs due to low platelets  Signed By: Renaldo Plummer,    January 12, 2022

## 2022-01-13 NOTE — PROGRESS NOTES
Care Management Interventions  PCP Verified by CM: Yes  Mode of Transport at Discharge: Self  Transition of Care Consult (CM Consult): Discharge Planning  Discharge Durable Medical Equipment: No  Physical Therapy Consult: Yes  Occupational Therapy Consult: Yes  Speech Therapy Consult: No  Support Systems: Spouse/Significant Other  Confirm Follow Up Transport: Family  The Plan for Transition of Care is Related to the Following Treatment Goals : Home  The Patient and/or Patient Representative was Provided with a Choice of Provider and Agrees with the Discharge Plan?: Yes  Name of the Patient Representative Who was Provided with a Choice of Provider and Agrees with the Discharge Plan: Patient  Freedom of Choice List was Provided with Basic Dialogue that Supports the Patient's Individualized Plan of Care/Goals, Treatment Preferences and Shares the Quality Data Associated with the Providers?: Yes  Discharge Location  Discharge Placement: Home with family assistance      Pt awaiting bed assignment. CM met with pt to discuss CM needs & DCP. Pt is A&Ox4. Pt is indep at home with all ADLS. Pt lives with gf. Pt has no DME needs. Pt has no difficulty with obtaining medications or transport. Patient relies on GF for transport. Patient has 3 adult children that live locally and assist as needed. Denies hx of rehab/HH. DCP home  CM to continue to monitor.

## 2022-01-13 NOTE — PROGRESS NOTES
[facility] Pharmacokinetic Monitoring Service - Vancomycin     Mani Brooks is a 57 y.o. male starting on vancomycin therapy for sepsis secondary to UTI. Pharmacy consulted by Dr Bradley Boateng for monitoring and adjustment. Target Concentration: Goal AUC/CARMELO 400-600 mg*hr/L    Additional Antimicrobials: zosyn    Pertinent Laboratory Values: Wt Readings from Last 1 Encounters:   01/12/22 61.2 kg (135 lb)     Temp Readings from Last 1 Encounters:   01/12/22 99 °F (37.2 °C)     No components found for: PROCAL  Recent Labs     01/12/22  1931 01/12/22  1126   BUN  --  28*   CREA  --  1.66*   WBC  --  15.2*   PCT  --  2.21*   LAC 2.1* 3.3*     Estimated Creatinine Clearance: 39.9 mL/min (A) (based on SCr of 1.66 mg/dL (H)). No results found for: Dominga Cheng    MRSA Nasal Swab: N/A. Non-respiratory infection. .    Plan:  Dosing recommendations based on Bayesian software  Start vancomycin 1500 mg x1, then 1000 mg daily  Anticipated AUC of 517 and trough concentration of 16 at steady state  Renal labs as indicated   Vancomycin concentration Levels will be ordered as clinically indicated.    Pharmacy will continue to monitor patient and adjust therapy as indicated    Thank you for the consult,  Diann Jackson, PharmD, BCPS  Clinical Pharmacist

## 2022-01-13 NOTE — CONSULTS
Infectious Disease Consult    Today's Date: 1/13/2022   Admit Date: 1/12/2022    Impression:   · GPC Sepsis: nidus probably either urine/kidneys or nephrostomy tube exit site; would continue Vancomycin pending ID  · UTI: patient did have significant pyuria from one nephrostomy tube (one with 10-20 WBC; other with >100); in addition right perinephric stranding seen on CT suggestive of pyelonephritis  · Metastatic Bladder Ca    Plan:   ·  Would continue Vancomycin pending ID GPC  · Given other prior urine isolates (stenotrophomonas, Klebsiella) have been FQ sensitive could switch Meropenem to FQ; okay to continue pending culture results  · Duration of tx TBD  · May want to consider earlier exchange of nephrostomy tubes    Anti-infectives:   · Vancomycin (01/12-)  · Piperacillin/tazobactam (01/12-01/13)  · Ceftriaxone (01/12)  · Meropenem (01/13-)    Subjective:   Date of Consultation:  January 13, 2022  Referring Physician: Dr. Teresa Perez    Patient is a 57 y.o. male with a hx of metastatic bladder carcinoma followed at [address], bilateral nephrostomy tubes, recurrent UTIs admitted to [facility] on 01/12 with lower back pain, malodorous urine and some redness around right nephrostomy tube site. Patient with Tmax 100.3 on admission, WBC 15.2, procal 2.2; CT with right perinephric stranding, and U/A with >100 WBC. Cultures obtained (BC, and ?left nephrostomy tube) and patient initially given dose of ceftriaxone in ED. Admitted and placed on Vanc/Zosyn. Initial BC growing GPC in anaerobic bottle; zosyn switched to meropenem this am.  Afebrile, WBC down to 7; Cr down from 1.66 on admission to 1.37 this am.   Overall patient feels better; with resolution of back pain; no fever or chills. States he's always noted less urine output on right then left nephrostomy tubes since they were placed at [address] in 11/2021. He states they are scheduled to be exchanged in February.   He has no port or indwelling catheter; his chemo has been given through peripheral access. He makes very little urine through his urethra. He denied N/V/diarrhea. Patient Active Problem List   Diagnosis Code    HTN (hypertension) I10    Hyperlipidemia E78.5    Inguinal hernia unilateral, non-recurrent K40.90    History of tobacco abuse Z87.891    ESPINOZA (dyspnea on exertion) R06.00    Coronary atherosclerosis of native coronary vessel I25.10    Abnormal EKG R94.31    Adhesive capsulitis of left shoulder M75.02    Strain of muscle(s) and tendon(s) of the rotator cuff of left shoulder, sequela S46.012S    Strain of muscle, fascia and tendon of long head of biceps, left arm, sequela S46.112S    SLAP lesion of left shoulder S43.432A    Degenerative joint disease of left acromioclavicular joint M19.012    Depression F32. A    Hypomagnesemia E83.42    Recurrent depression (HCC) F33.9    Chest pain R07.9    PAF (paroxysmal atrial fibrillation) ([facility] I48.0    Bilateral carotid artery stenosis I65.23    History of colon polyps Z86.010    Hemorrhoids K64.9    Hyponatremia E87.1    UTI (urinary tract infection) N39.0    Severe sepsis ([facility]) A41.9, R65.20    EDWARDO (acute kidney injury) ([facility] N17.9     Past Medical History:   Diagnosis Date    Abnormal EKG     1. Cardiac MRI (5/14/15):  EF 68%. The left ventricular myocardium appear symmetric at the base. There appears to be slightly thicker left ventricular myocardium at the mid and apical lateral wall compared to other segments. No wall motion advised. EF 68%. Calculated left ventricular mass is within normal limits.     Arrhythmia     occasionally has heart palpitations     Chronic obstructive pulmonary disease (HCC)     ventolin prn     Depression     Former smoker     Hypercholesteremia     controlled by lipitor    Hyperlipidemia     Hypertension     daily meds    Inguinal hernia     Left ventricular hypertrophy     ECHO 2016    Prostate troubles     Unstable angina ([facility] 07/11/2014 cath in 2014=mild nonobstructive CAD      Family History   Problem Relation Age of Onset    Heart Disease Sister     Stroke Sister    [place] Cancer Brother         lymphoma    Lung Disease Mother     Hypertension Sister     Hypertension Sister     Lung Disease Sister     Heart Disease Sister     Heart Disease Brother     Stroke Brother     Heart Disease Brother       Social History     Tobacco Use    Smoking status: Current Every Day Smoker     Packs/day: 1.00     Years: 35.00     Pack years: 35.00     Types: Cigarettes    Smokeless tobacco: Never Used    Tobacco comment: nicotine supplement? Substance Use Topics    Alcohol use: Yes     Alcohol/week: 20.0 standard drinks     Types: 24 Cans of beer per week     Past Surgical History:   Procedure Laterality Date    HX CARPAL TUNNEL RELEASE Left     HX COLONOSCOPY  2009    HX HEART CATHETERIZATION  2014    no stents    HX HERNIA REPAIR Right 10/24/13    inguinal    HX ORTHOPAEDIC Left 10/2016    shoulder manipulation     HX SHOULDER ARTHROSCOPY Left 07/2016    second surgery 07/17    HX TURP  10/04/2021    [place] CARDIAC SURG PROCEDURE UNLIST      CCL, clean/no interventions      Prior to Admission medications    Medication Sig Start Date End Date Taking? Authorizing Provider   sertraline (Zoloft) 25 mg tablet Take 25 mg by mouth daily. Provider, Historical   dronedarone (Multaq) tab tablet Take 1 Tablet by mouth two (2) times daily (with meals). 11/12/21   Wolfgang Rucker MD   tadalafiL (Cialis) 20 mg tablet Take 1 Tablet by mouth daily as needed for Erectile Dysfunction. 10/14/21   Albino Perez PA-C   montelukast (SINGULAIR) 10 mg tablet Take 1 Tablet by mouth daily. 9/3/21   Albino Perez PA-C   metoprolol succinate (TOPROL-XL) 50 mg XL tablet Take 1 Tablet by mouth daily. 9/3/21   Albino Perez PA-C   traZODone (DESYREL) 100 mg tablet Take 1 Tablet by mouth nightly.  8/31/21   Albino Perez PA-C   hyoscyamine SL (LEVSIN/SL) 0.125 mg SL tablet 1 Tablet by SubLINGual route every four (4) hours as needed for Other (bladder pain). 21   Reynold Du NP   atorvastatin (LIPITOR) 40 mg tablet TAKE 1 TABLET BY MOUTH EVERY MORNING FOR HIGH CHOLESTEROL 21   Catarino Rucker MD   benazepriL (LOTENSIN) 10 mg tablet TAKE 1 TABLET BY MOUTH DAILY 21   Catarino Rucker MD   hydrocortisone (ANUSOL-HC) 2.5 % rectal cream Insert  into rectum four (4) times daily. 20   Gaurav Leslie DO   nitroglycerin (NITROSTAT) 0.4 mg SL tablet 1 Tab by SubLINGual route every five (5) minutes as needed for Chest Pain. Up to 3 doses. 20   Catarino Rucker MD   apixaban (ELIQUIS) 5 mg tablet Take 1 Tab by mouth two (2) times a day. 20   GHISLAINE Cortez   levocetirizine (XYZAL) 5 mg tablet Take  by mouth. Provider, Historical   albuterol (VENTOLIN HFA) 90 mcg/actuation inhaler Take 2 Puffs by inhalation as needed for Wheezing. 18   Catarino Rucker MD   multivitamin (ONE A DAY) tablet Take 1 Tab by mouth daily. Provider, Historical       No Known Allergies     Review of Systems:  A comprehensive review of systems was negative except for that written in the History of Present Illness. Objective:     Visit Vitals  BP (!) 87/57   Pulse (!) 104   Temp 98.8 °F (37.1 °C)   Resp 20   Ht 5' 6\" (1.676 m)   Wt 61.2 kg (135 lb)   SpO2 95%   BMI 21.79 kg/m²     Temp (24hrs), Av °F (37.2 °C), Min:98.3 °F (36.8 °C), Max:100.2 °F (37.9 °C)       Lines:  Peripheral IV:       Physical Exam:    General:  Alert, cooperative, well noursished, well developed, appears stated age   Eyes:  Sclera anicteric. Pupils equally round and reactive to light. Mouth/Throat: Mucous membranes normal, oral pharynx clear   Neck: Supple   Lungs:   Clear to auscultation bilaterally, good effort   CV:  Regular rate and rhythm,no murmur, click, rub or gallop   Abdomen:   Soft, non-tender.  bowel sounds normal. non-distended; tenderness right nephrostomy tube exit site, no drainage; left site nontender   Extremities: No cyanosis or edema   Skin: Skin color, texture, turgor normal. no acute rash or lesions   Lymph nodes: Cervical and supraclavicular normal   Musculoskeletal: No swelling or deformity   Lines/Devices:  Intact, no erythema, drainage or tenderness   Psych: Alert and oriented, normal mood affect given the setting       Data Review:     CBC:  Recent Labs     01/13/22  0941 01/13/22  0322 01/12/22  1126 01/12/22  1126   WBC 7.7 7.8  --  15.2*   GRANS 87* 87*   < > 88*   MONOS 2* 2*   < > 4   EOS 0* 0*   < > 0*   ANEU 6.7 6.7   < > 13.3*   ABL 0.2* 0.3*   < > 0.6   HGB 8.4* 8.5*  --  11.5*   HCT 25.9* 26.4*  --  35.8*   PLT 73* 69*  --  92*    < > = values in this interval not displayed. BMP:  Recent Labs     01/13/22  0939 01/13/22  0322 01/12/22  2104   CREA 1.37 1.45 1.51*   BUN 21 23 27*   * 128* 126*   K 3.5 3.9 4.6   CL 98 99 97*   CO2 18* 19* 22   AGAP 10 10 7   * 86 90       LFTS:  Recent Labs     01/13/22  0322 01/12/22  1126   TBILI 0.6 0.5   ALT 16 15   AP 90 150*   TP 5.6* 7.4   ALB 1.8* 2.5*       Microbiology:     All Micro Results     Procedure Component Value Units Date/Time    BLOOD CULTURE [[id number]] Collected: 01/12/22 1643    Order Status: Completed Specimen: Blood Updated: 01/13/22 1214     Special Requests: --        NO SPECIAL REQUESTS  LEFT  Antecubital       Culture result: NO GROWTH AFTER 18 HOURS       MSSA/MRSA SC BY PCR, NASAL SWAB [[id number]] Collected: 01/13/22 0942    Order Status: Completed Specimen: Nasal swab Updated: 01/13/22 1140     Special Requests: NO SPECIAL REQUESTS        Culture result:       SA target not detected. A MRSA NEGATIVE, SA NEGATIVE test result does not preclude MRSA or SA nasal colonization.           BLOOD CULTURE [[id number]] Collected: 01/12/22 1126    Order Status: Completed Specimen: Blood Updated: 01/13/22 0942     Special Requests: -- RIGHT  Antecubital       GRAM STAIN GRAM POSITIVE COCCI         ANAEROBIC BOTTLE POSITIVE               RESULTS VERIFIED, PHONED TO AND READ BACK BY Katy Worley RN @ 0257 ON 1/13/22 BY M           Culture result:       CULTURE IN [address] UPDATES TO FOLLOW                  Refer to Blood Culture ID Panel Accession  [id number]      BLOOD CULTURE ID PANEL [[id number]] Collected: 01/12/22 1126    Order Status: Completed Specimen: Blood Updated: 01/13/22 0910     Acc. no. from Micro Order [id number]     Blood Culture PCR Testing       MULTIPLEX PCR NEGATIVE:  A negative FilmArray BCID result does not exclude the possibility of bloodstream infection. CULTURE, URINE [[id number]] Collected: 01/12/22 1550    Order Status: Completed Specimen: Urine Updated: 01/13/22 0637     Special Requests: NO SPECIAL REQUESTS        Culture result:       NO GROWTH AFTER SHORT PERIOD OF INCUBATION. FURTHER RESULTS TO FOLLOW AFTER OVERNIGHT INCUBATION. Imaging:   CT Abd/pelvis (01/12/22): IMPRESSION  1. Asymmetric right perinephric stranding concerning for right pyelonephritis  in the correct clinical setting. Bilateral nephrostomy tubes in place. No  hydronephrosis. No urinary tract calculi.     2.  Bladder is decompressed with circumferential wall thickening and to bladder  diverticula the larger appearing posteriorly measuring up to 4.5 cm. Follow-up  as clinically warranted.     3.   No bowel obstruction, diverticulitis or appendicitis.     4.  Multiple bibasilar pulmonary metastatic nodules.       Signed By: Alfonzo Montenegro MD     January 13, 2022

## 2022-01-13 NOTE — ED NOTES
Patient has been provided meal tray at this time. Son and sister at bedside. Patient denies having any other needs.

## 2022-01-13 NOTE — PROGRESS NOTES
ACUTE PHYSICAL THERAPY GOALS:  (Developed with and agreed upon by patient and/or caregiver.)    1. Patient will perform bed mobility with INDEPENDENCE within 7 days. 2. Patient will transfer bed to chair with MODIFIED INDEPENDENCE within 7 days. 3. Patient will demonstrate FAIR+ DYNAMIC STANDING balance within 7 day(s). 4. Patient will ambulate 150ft+ using least restrictive assistive device and MODIFIED INDEPENDENCE within 7 days. 5. Patient will tolerate 25+ minutes of therapeutic activity/exercise and/or neuromuscular re-education while maintaining stable vitals to improve functional strength and activity tolerance within 7 days. PHYSICAL THERAPY ASSESSMENT: Initial Assessment, Daily Note and PM PT Treatment Day # 1      Jessica Chakraborty is a 57 y.o. male   PRIMARY DIAGNOSIS: Severe sepsis ([facility] [address]  Severe sepsis ([facility] [address] [A41.9, R65.20]  UTI (urinary tract infection) [N39.0]  Hyponatremia [E87.1]  EDWARDO (acute kidney injury) ([facility] [address] [N17.9]       Reason for Referral:    ICD-10: Treatment Diagnosis: Generalized Muscle Weakness (M62.81)  Difficulty in walking, Not elsewhere classified (R26.2)  INPATIENT: Payor: BLUE CROSS / Plan: [place] CR[place] [facility] / Product Type: PPO /     ASSESSMENT:     REHAB RECOMMENDATIONS:   Recommendation to date pending progress:  [address] [place] [address]  Equipment:    Rolling Walker     PRIOR LEVEL OF FUNCTION:  (Prior to Hospitalization) INITIAL/CURRENT LEVEL OF FUNCTION:  (Most Recently Demonstrated)   Bed Mobility:   Independent  Sit to Stand:   Independent  Transfers:   Independent  Gait/Mobility:   Independent Bed Mobility:   Minimal Assistance  Sit to Stand:   Minimal Assistance  Transfers:   Minimal Assistance  Gait/Mobility:   Minimal Assistance     ASSESSMENT:  Mr. Thang Reid is a pleasant 57year old male admitted with sepsis, EDWARDO, and functional weakness. Patient is seen this PM for initial PT evaluation.  At baseline, patient is an independent, community-level ambulator without utilizing an assistive device. Endorses 1 week decline in functional strength and mobility. Today, patient presents with decreased functional strength, activity tolerance, and balance/gait status. Required minimal assistance x1-2 for ambulation x5ft and transfers. Mildly mobility improved with RW use. SOB appreciated with mobility this PM. Patient is currently a fall risk. See detailed assessment below. Recommend inpatient rehabilitation and RW at discharge. Will follow with stated plan of care during acute stay. SUBJECTIVE:   Mr. Bry Butler states, \"Thank you. \"    SOCIAL HISTORY/LIVING ENVIRONMENT: At baseline, patient is an independent, community-level   ambulator without utilizing an assistive device. Endorses 1 week decline in functional strength and mobility. Support Systems: Spouse/Significant Other  OBJECTIVE:     PAIN: VITAL SIGNS: LINES/DRAINS:   Pre Treatment: Pain Screen  Pain Scale 1: Numeric (0 - 10)  Pain Intensity 1: 5  Pain Onset 1: PTA  Pain Location 1: Flank  Pain Description 1: Sore  Pain Intervention(s) 1: Ambulation/Increased Activity; Emotional support;Position;Repositioned; Rest  Post Treatment: 5/10   IV and B nephrectomy tubes; ICU monitors   O2 Device: None (Room air)     GROSS EVALUATION:   Within Functional Limits Abnormal/ Functional Abnormal/ Non-Functional (see comments) Not Tested Comments:   AROM [x] [] [] [] B LE   PROM [] [] [] [x]    Strength [] [x] [] [] Generalized B LE and trunk; non focal   Balance [] [x] [] [] Fair+ dynamically in sitting; Fair- dynamically in standing   Posture [] [x] [] [] Mild trunk flexion; otherwise WFL   Sensation [] [] [] [x]    Coordination [] [] [] [x]    Tone [] [] [] [x]    Edema [] [] [] [x]    Activity Tolerance [] [x] [] [] Secondary to functional weakness, flank pain, and SOB with activity    [] [] [] []      COGNITION/  PERCEPTION: Intact Impaired   (see comments) Comments:   Orientation [x] [] Oriented x3   Vision [] [] Not formally assessed   Hearing [x] [] [facility]   Command Following [x] []    Safety Awareness [] [x] Cues for activity pacing     [] []      MOBILITY: I Mod I S SBA CGA Min Mod Max Total  NT x2 Comments:   Bed Mobility    Rolling [] [] [] [] [] [] [] [] [] [x] []    Supine to Sit [] [] [] [] [x] [] [] [] [] [] []    Scooting [] [] [] [] [x] [] [] [] [] [] []    Sit to Supine [] [] [] [] [] [x] [] [] [] [] [] For B LEs   Transfers    Sit to Stand [] [] [] [] [] [x] [] [] [] [] []    Bed to Chair [] [] [] [] [] [x] [] [] [] [] []    Stand to Sit [] [] [] [] [] [x] [] [] [] [] []    I=Independent, Mod I=Modified Independent, S=Supervision, SBA=Standby Assistance, CGA=Contact Guard Assistance,   Min=Minimal Assistance, Mod=Moderate Assistance, Max=Maximal Assistance, Total=Total Assistance, NT=Not Tested  GAIT: I Mod I S SBA CGA Min Mod Max Total  NT x2 Comments:   Level of Assistance [] [] [] [] [] [x] [] [] [] [] [] 5ft without RW; 5ft with RW   Distance 2x5ft    DME Rolling Walker    Gait Quality Functional B LE weakness, trunk flexion, impaired dynamic balance    Weightbearing Status N/A     I=Independent, Mod I=Modified Independent, S=Supervision, SBA=Standby Assistance, CGA=Contact Guard Assistance,   Min=Minimal Assistance, Mod=Moderate Assistance, Max=Maximal Assistance, Total=Total Assistance, NT=Not Tested    [facility] [address] [id number] AM-PAC 6 Clicks   Basic Mobility Inpatient Short Form       How much difficulty does the patient currently have. .. Unable A Lot A Little None   1. Turning over in bed (including adjusting bedclothes, sheets and blankets)? [] 1   [] 2   [] 3   [x] 4   2. Sitting down on and standing up from a chair with arms ( e.g., wheelchair, bedside commode, etc.)   [] 1   [] 2   [x] 3   [] 4   3. Moving from lying on back to sitting on the side of the bed? [] 1   [] 2   [x] 3   [] 4   How much help from another person does the patient currently need. ..  Total A Lot A Little None   4. Moving to and from a bed to a chair (including a wheelchair)? [] 1   [x] 2   [] 3   [] 4   5. Need to walk in hospital room? [] 1   [x] 2   [] 3   [] 4   6. Climbing 3-5 steps with a railing? [] 1   [x] 2   [] 3   [] 4   © , Trustees of [address] [place] [address], under license to [employer]. All rights reserved     Score:  Initial: 16 Most Recent: X (Date: -- )    Interpretation of Tool:  Represents activities that are increasingly more difficult (i.e. Bed mobility, Transfers, Gait). PLAN:   FREQUENCY/DURATION: PT Plan of Care: 3 times/week for duration of hospital stay or until stated goals are met, whichever comes first.    PROBLEM LIST:   (Skilled intervention is medically necessary to address:)  1. Decreased ADL/Functional Activities  2. Decreased Activity Tolerance  3. Decreased AROM/PROM  4. Decreased Balance  5. Decreased Gait Ability  6. Decreased Strength  7. Decreased Transfer Abilities   INTERVENTIONS PLANNED:   (Benefits and precautions of physical therapy have been discussed with the patient.)  1. Therapeutic Activity  2. Therapeutic Exercise/HEP  3. Neuromuscular Re-education  4. Gait Training  5. Manual Therapy  6. Education     TREATMENT:     EVALUATION: Low Complexity : (Untimed Charge)  At this time, patient is appropriate for Co-treatment with occupational therapy due to patient's decreased overall endurance/tolerance levels, as well as need for high level skilled assistance to complete functional transfers/mobility and functional tasks. [facility] co-treatment of PT and OT to address goals of both disciplines. TREATMENT:   ($$ Gait Trainin-22 mins    )  Gait Training (10 Minutes): Gait training for 2x5 feet utilizing [employer]. Patient required Manual, Tactile, Verbal and Visual cueing to improve Activity Pacing, Assistive Device Utilization, Dynamic Standing Balance and Gait Mechanics.      TREATMENT GRID:  N/A    AFTER TREATMENT POSITION/PRECAUTIONS:  Bed, Needs within reach, RN notified and Visitors at bedside    INTERDISCIPLINARY COLLABORATION:  RN/PCT, PT/PTA and OT/DUARTE    TOTAL TREATMENT DURATION:  PT Patient Time In/Time Out  Time In: 1351  Time Out: 705 [facility]

## 2022-01-13 NOTE — ACP (ADVANCE CARE PLANNING)
Advance Care Planning   Healthcare Decision Maker:       Primary Decision Maker: Chantel Parrish - [phone]    Supplemental (Other) Decision Maker: Keny Angelita - Girlfriend - [phone]    Click here to complete [address] including selection of the Healthcare Decision Maker Relationship (ie \"Primary\")  Today we documented Decision Maker(s) consistent with Legal Next of Kin hierarchy.

## 2022-01-13 NOTE — ED NOTES
Bilateral nephrostomy sites dresing are more than half off. Redressed with dry gauze and window paper tape. each with intact sutures Double O-Z Flap Text: The defect edges were debeveled with a #15 scalpel blade.  Given the location of the defect, shape of the defect and the proximity to free margins a Double O-Z flap was deemed most appropriate.  Using a sterile surgical marker, an appropriate transposition flap was drawn incorporating the defect and placing the expected incisions within the relaxed skin tension lines where possible. The area thus outlined was incised deep to adipose tissue with a #15 scalpel blade.  The skin margins were undermined to an appropriate distance in all directions utilizing iris scissors.

## 2022-01-13 NOTE — PROGRESS NOTES
ACUTE OT GOALS:  (Developed with and agreed upon by patient and/or caregiver.)  1. Patient will complete lower body bathing and dressing with setup and adaptive equipment as needed. 2. Patient will complete toileting with supervision. 3. Patient will tolerate 20 minutes of OT treatment with as needed rest breaks to increase activity tolerance for ADLs. 4. Patient will complete functional transfers with supervision and adaptive equipment as needed. 5. Patient will complete grooming with modified independence. Timeframe: 7 visits       OCCUPATIONAL THERAPY ASSESSMENT: Initial Assessment and Daily Note OT Treatment Day # 1    Thuan Pina is a 57 y.o. male   PRIMARY DIAGNOSIS: Severe sepsis ([facility] [address]  Severe sepsis ([facility] [address] [A41.9, R65.20]  UTI (urinary tract infection) [N39.0]  Hyponatremia [E87.1]  EDWARDO (acute kidney injury) ([facility] [address] [N17.9]       Reason for Referral:  Generalized weakness due to severe sepsis  ICD-10: Treatment Diagnosis: Generalized Muscle Weakness (M62.81)  Dizziness and Giddiness (R42)  INPATIENT: Payor: BLUE CROSS / Plan: [place]UE CR[place] [facility] / Product Type: PPO /   ASSESSMENT:     REHAB RECOMMENDATIONS:   Recommendation to date pending progress:  [address] [place] [address]  Equipment:    To Be Determined     PRIOR LEVEL OF FUNCTION:  (Prior to Hospitalization)  INITIAL/CURRENT LEVEL OF FUNCTION:  (Based on today's evaluation)   Bathing:   Minimal Assistance  Dressing:   Independent  Feeding/Grooming:   Independent  Toileting:   Minimal Assistance  Functional Mobility:   Independent Bathing:   Moderate Assistance  Dressing:   Maximal Assistance  Feeding/Grooming:   Supervision  Toileting:   Minimal Assistance  Functional Mobility:   Minimal Assistance x 2     ASSESSMENT:  Mr. Nell Hill is a 58 y/o M with relevant PMH of bladder cancer with lung mets admitted for severe sepsis.  Baseline, lives with gf Yadira who assists with bathing & managing nephrostomy tubes. Today, presents with 5/10 flank pain & dizziness with movement. Currently requiring minimal assistance x2 for functional transfers due to weakness and decreased balance. Participated in toileting & grooming ADLs. He is functioning below his baseline and would benefit from continued OT. Will follow. SUBJECTIVE:   Mr. Bry Butler states, \"Do you know what GTS means? .\"    SOCIAL HISTORY/LIVING ENVIRONMENT: Lives with girlfriend Mian Crew with ADLs except Yadira helps with bathing and managing nephrosotmy tubes. Yadira does IADLs such as cooking, cleaning, and driving as well. No medical equipment. No recent falls.    Support Systems: Spouse/Significant Other    OBJECTIVE:     PAIN: VITAL SIGNS: LINES/DRAINS:   Pre Treatment: Pain Screen  Pain Scale 1: Numeric (0 - 10)  Pain Intensity 1: 5  Pain Location 1: Flank  Post Treatment: same  Vital Signs  Pulse (Heart Rate): 100  BP: 101/68  MAP (Calculated): 79  O2 Sat (%): 98 % Continuous Pulse Oximetry, IV and B nephrosotmy   O2 Device: None (Room air)     GROSS EVALUATION:  BUE Within Functional Limits Abnormal/ Functional Abnormal/ Non-Functional (see comments) Not Tested Comments:   AROM [x] [] [] []    PROM [] [] [] [x]    Strength [] [x] [] []    Balance [] [] [x] []    Posture [] [x] [] []    Sensation [] [] [] [x]    Coordination [] [] [] [x]    Tone [] [] [] [x]    Edema [] [] [] [x]    Activity Tolerance [] [] [x] [] Poor endurance, fatigues easily     [] [] [] []      COGNITION/  PERCEPTION: Intact Impaired   (see comments) Comments:   Orientation [x] []    Vision [x] []    Hearing [x] []    Judgment/ Insight [x] []    Attention [x] []    Memory [x] []    Command Following [x] []    Emotional Regulation [x] []     [] []      ACTIVITIES OF DAILY LIVING: I Mod I S SBA CGA Min Mod Max Total NT Comments   BASIC ADLs:              Bathing/ Showering [] [] [] [] [] [] [] [] [] []    Toileting [] [] [x] [] [] [] [] [] [] []    Dressing [] [] [] [] [] [] [] [] [] []    Feeding [] [] [] [] [] [] [] [] [] []    Grooming [] [] [x] [] [] [] [] [] [] [] From seated position    Personal Device Care [] [] [] [] [] [] [] [] [] []    Functional Mobility [] [] [] [] [] [] [x] [] [] []    I=Independent, Mod I=Modified Independent, S=Supervision, SBA=Standby Assistance, CGA=Contact Guard Assistance,   Min=Minimal Assistance, Mod=Moderate Assistance, Max=Maximal Assistance, Total=Total Assistance, NT=Not Tested    MOBILITY: I Mod I S SBA CGA Min Mod Max Total  NT x2 Comments:   Supine to sit [] [] [] [] [] [x] [] [] [] [] []    Sit to supine [] [] [] [] [] [] [x] [] [] [] []    Sit to stand [] [] [] [] [] [x] [] [] [] [] [x]    Bed to chair [] [] [] [] [] [x] [] [] [] [] [x]    I=Independent, Mod I=Modified Independent, S=Supervision, SBA=Standby Assistance, CGA=Contact Guard Assistance,   Min=Minimal Assistance, Mod=Moderate Assistance, Max=Maximal Assistance, Total=Total Assistance, NT=Not Tested    [facility] [address] [facility] [place] [address]   Daily Activity Inpatient Short Form        How much help from another person does the patient currently need. .. Total A Lot A Little None   1. Putting on and taking off regular lower body clothing? [] 1   [x] 2   [] 3   [] 4   2. Bathing (including washing, rinsing, drying)? [] 1   [x] 2   [] 3   [] 4   3. Toileting, which includes using toilet, bedpan or urinal?   [] 1   [x] 2   [] 3   [] 4   4. Putting on and taking off regular upper body clothing? [] 1   [] 2   [x] 3   [] 4   5. Taking care of personal grooming such as brushing teeth? [] 1   [] 2   [x] 3   [] 4   6. Eating meals? [] 1   [] 2   [x] 3   [] 4   © 2007, Trustees of [address] [place] [address] [address] [id number], under license to [employer]. All rights reserved     Score:  Initial: 15 Most Recent: X (Date: -- )   Interpretation of Tool:  Represents activities that are increasingly more difficult (i.e. Bed mobility, Transfers, Gait).     PLAN:   FREQUENCY/DURATION: OT Plan of Care: 3 times/week for duration of hospital stay or until stated goals are met, whichever comes first.    PROBLEM LIST:   (Skilled intervention is medically necessary to address:)  1. Decreased ADL/Functional Activities  2. Decreased Activity Tolerance  3. Decreased AROM/PROM  4. Decreased Balance  5. Decreased Gait Ability  6. Decreased Strength  7. Decreased Transfer Abilities  8. Increased Pain   INTERVENTIONS PLANNED:   (Benefits and precautions of occupational therapy have been discussed with the patient.)  1. Self Care Training  2. Therapeutic Activity  3. Therapeutic Exercise/HEP  4. Neuromuscular Re-education  5. Education     TREATMENT:     EVALUATION: Moderate Complexity : (Untimed Charge)    TREATMENT:   ($$ Self Care/Home Management: 8-22 mins    )  Co-Treatment PT/OT necessary due to patient's decreased overall endurance/tolerance levels, as well as need for high level skilled assistance to complete functional transfers/mobility and functional tasks  Self Care (8 Minutes): Self care including Toileting and Grooming to increase independence and increased activity tolerance.     TREATMENT GRID:  N/A    AFTER TREATMENT POSITION/PRECAUTIONS:  Bed, Needs within reach and RN notified    INTERDISCIPLINARY COLLABORATION:  RN/PCT, PT/PTA and OT/DUARTE    TOTAL TREATMENT DURATION:  OT Patient Time In/Time Out  Time In: 9909  Time Out: 400 Opbeat Mp OTR/JOSE

## 2022-01-13 NOTE — ED NOTES
Patient requesting pain medication. Perfect serve message sent to Dr. Shruti Zelaya. Awaiting response.

## 2022-01-13 NOTE — ED NOTES
TRANSFER - OUT REPORT:    Verbal report given to Cristina Gilbert (name) on Courtney Rosales  being transferred to [phone] (unit) for routine progression of care       Report consisted of patients Situation, Background, Assessment and   Recommendations(SBAR). Information from the following report(s) SBAR, ED Summary, [facility] and Recent Results was reviewed with the receiving nurse. Lines:   Peripheral IV 01/12/22 Left Antecubital (Active)   Site Assessment Clean, dry, & intact 01/12/22 1944   Phlebitis Assessment 0 01/12/22 1944   Infiltration Assessment 0 01/12/22 1944   Dressing Status Clean, dry, & intact 01/12/22 1944   Dressing Type Transparent 01/12/22 1944       Peripheral IV 01/13/22 Right Wrist (Active)   Site Assessment Clean, dry, & intact 01/13/22 0343   Phlebitis Assessment 0 01/13/22 0343   Infiltration Assessment 0 01/13/22 0343   Dressing Status Clean, dry, & intact 01/13/22 0343   Dressing Type Transparent 01/13/22 0343   Hub Color/Line Status Pink 01/13/22 0343        Opportunity for questions and clarification was provided.       Patient transported with:   [employer]

## 2022-01-13 NOTE — CONSULTS
Nephrology consult        Admission Date:  1/12/2022    Admission Diagnosis  Severe sepsis ([facility] [address] [A41.9, R65.20]  UTI (urinary tract infection) [N39.0]  Hyponatremia [E87.1]  EDWARDO (acute kidney injury) ([facility] [address] [N17.9]    We are asked by Dr. Lou Hernandez    History of Present Illness: Mr. America Blanton is a 57 y,o male with PMH significant for HTN, HLD, CAD s/p PCI, P Atrial Fib, metastatic bladder cancer with mets to the lungs on chemo last treatment reported Jan 4th at [address] (next chemo scheduled next Tuesday), bilateral nephrostomy tubes since November and recurrent UTIs admitted with complaints of bilateral nephrotomy tube flank pain R>L. CT revealed asymmetric right perinephric stranding concerning for right pyelonephritis in the correct clinical setting. Bilateral nephrostomy tubes in place. No hydronephrosis. No urinary tract calculi. Multiple bibasilar pulmonary metastatic nodules. WBC 15.2, urine and blood Cx NGTD, evidence of UTI on UA started on vancomycin and Rocephin in ED. We are consulted for EDWARDO, hyponatremia. From a renal standpoint his labs on admission Cr 1.66 improving down to 1.45, SNa 126->128, hgb trending down. Home meds significant for zoloft reportedly started 2 months ago, ACEi. Pt seen and examined in ED spouse at the bedside pt reports flank pain better overall R>L still, good uop via bilat nephrostomy tubes has had some dribbling hematuria. Dry mouth, thirst and has been drinking more H2O, + weakness, hand tremors, exertional SOB. Denies CP, N/V/D, chills, syncope or edema. Past Medical History:   Diagnosis Date    Abnormal EKG     1. Cardiac MRI (5/14/15):  EF 68%. The left ventricular myocardium appear symmetric at the base. There appears to be slightly thicker left ventricular myocardium at the mid and apical lateral wall compared to other segments. No wall motion advised. EF 68%. Calculated left ventricular mass is within normal limits.     Arrhythmia     occasionally has heart palpitations     Chronic obstructive pulmonary disease (HCC)     ventolin prn     Depression     Former smoker     Hypercholesteremia     controlled by lipitor    Hyperlipidemia     Hypertension     daily meds    Inguinal hernia     Left ventricular hypertrophy     ECHO 2016    Prostate troubles     Unstable angina ([address] 07/11/2014    cath in 2014=mild nonobstructive CAD      Past Surgical History:   Procedure Laterality Date    HX CARPAL TUNNEL RELEASE Left     HX COLONOSCOPY  2009    HX HEART CATHETERIZATION  2014    no stents    HX HERNIA REPAIR Right 10/24/13    inguinal    HX ORTHOPAEDIC Left 10/2016    shoulder manipulation     HX SHOULDER ARTHROSCOPY Left 07/2016    second surgery 07/17    HX TURP  10/04/2021    [place] CARDIAC SURG PROCEDURE UNLIST      CCL, clean/no interventions      Current Facility-Administered Medications   Medication Dose Route Frequency    cefTRIAXone (ROCEPHIN) 1 g in 0.9% sodium chloride (MBP/ADV) 50 mL MBP  1 g IntraVENous Q24H    sodium chloride (NS) flush 5-10 mL  5-10 mL IntraVENous PRN    sodium chloride (NS) flush 5-40 mL  5-40 mL IntraVENous Q8H    sodium chloride (NS) flush 5-40 mL  5-40 mL IntraVENous PRN    acetaminophen (TYLENOL) tablet 650 mg  650 mg Oral Q6H PRN    Or    acetaminophen (TYLENOL) suppository 650 mg  650 mg Rectal Q6H PRN    polyethylene glycol (MIRALAX) packet 17 g  17 g Oral DAILY PRN    ondansetron (ZOFRAN ODT) tablet 4 mg  4 mg Oral Q8H PRN    Or    ondansetron (ZOFRAN) injection 4 mg  4 mg IntraVENous Q6H PRN    montelukast (SINGULAIR) tablet 10 mg  10 mg Oral QHS    hyoscyamine (LEVSIN) elixir 0.125 mg  0.125 mg Oral Q4H PRN    atorvastatin (LIPITOR) tablet 40 mg  40 mg Oral QHS    albuterol (PROVENTIL VENTOLIN) nebulizer solution 2.5 mg  2.5 mg Nebulization Q6H PRN    oxybutynin (DITROPAN) tablet 5 mg  5 mg Oral Q8H PRN    nicotine (NICODERM CQ) 14 mg/24 hr patch 1 Patch  1 Patch TransDERmal Q24H    dronedarone (Jinny [place]) tablet tab 400 mg  400 mg Oral BID WITH MEALS    tuberculin injection 5 Units  5 Units IntraDERMal ONCE    oxyCODONE IR (ROXICODONE) tablet 10 mg  10 mg Oral Q4H PRN    naloxone (NARCAN) injection 0.4 mg  0.4 mg IntraVENous EVERY 2 MINUTES AS NEEDED    vancomycin (VANCOCIN) 1,000 mg in 0.9% sodium chloride 250 mL (VIAL-MATE)  1,000 mg IntraVENous Q24H    0.9% sodium chloride infusion  75 mL/hr IntraVENous CONTINUOUS     Current Outpatient Medications   Medication Sig    sertraline (Zoloft) 25 mg tablet Take 25 mg by mouth daily.  dronedarone (Multaq) tab tablet Take 1 Tablet by mouth two (2) times daily (with meals).  tadalafiL (Cialis) 20 mg tablet Take 1 Tablet by mouth daily as needed for Erectile Dysfunction.  montelukast (SINGULAIR) 10 mg tablet Take 1 Tablet by mouth daily.  metoprolol succinate (TOPROL-XL) 50 mg XL tablet Take 1 Tablet by mouth daily.  traZODone (DESYREL) 100 mg tablet Take 1 Tablet by mouth nightly.  hyoscyamine SL (LEVSIN/SL) 0.125 mg SL tablet 1 Tablet by SubLINGual route every four (4) hours as needed for Other (bladder pain).  atorvastatin (LIPITOR) 40 mg tablet TAKE 1 TABLET BY MOUTH EVERY MORNING FOR HIGH CHOLESTEROL    benazepriL (LOTENSIN) 10 mg tablet TAKE 1 TABLET BY MOUTH DAILY    hydrocortisone (ANUSOL-HC) 2.5 % rectal cream Insert  into rectum four (4) times daily.  nitroglycerin (NITROSTAT) 0.4 mg SL tablet 1 Tab by SubLINGual route every five (5) minutes as needed for Chest Pain. Up to 3 doses.  apixaban (ELIQUIS) 5 mg tablet Take 1 Tab by mouth two (2) times a day.  levocetirizine (XYZAL) 5 mg tablet Take  by mouth.  albuterol (VENTOLIN HFA) 90 mcg/actuation inhaler Take 2 Puffs by inhalation as needed for Wheezing.  multivitamin (ONE A DAY) tablet Take 1 Tab by mouth daily.      No Known Allergies   Social History     Tobacco Use    Smoking status: Current Every Day Smoker     Packs/day: 1.00     Years: 35.00     Pack years: 35.00 Types: Cigarettes    Smokeless tobacco: Never Used    Tobacco comment: nicotine supplement? Substance Use Topics    Alcohol use:  Yes     Alcohol/week: 20.0 standard drinks     Types: 24 Cans of beer per week      Family History   Problem Relation Age of Onset    Heart Disease Sister     Stroke Sister     Cancer Brother         lymphoma    Lung Disease Mother     Hypertension Sister     Hypertension Sister     Lung Disease Sister     Heart Disease Sister     Heart Disease Brother     Stroke Brother     Heart Disease Brother         Review of Systems  Gen - + fever, no chills, appetite okay  HEENT - no sore throat, no decreased vision, no hearing loss  CV - no chest pain, no palpitation, no orthopnea  Lung - + exertional shortness of breath, no cough, no hemoptysis  Abd - no tenderness, no nausea/vomiting, no bloody stool  Ext - no edema, no clubbing, no cyanosis  Musculoskeletal - no joint pain, + Bilateral flank discomfort R>L  Neurologic - no headaches, no dizziness, no seizures, + weakness and hand tremors  Psychiatric - no anxiety, no depression  Skin - no rashes, no pupura  Genitourinary - no decreased urine output, + hematuria, bilateral nephrostomy tubes draining clear yellow urine    Objective:     Vitals:    01/13/22 0607 01/13/22 0622 01/13/22 0637 01/13/22 0700   BP: 112/76 124/68 111/68 115/65   Pulse:       Resp:       Temp:       SpO2: 99% 97% 95% 98%   Weight:       Height:           Intake/Output Summary (Last 24 hours) at 1/13/2022 0849  Last data filed at 1/13/2022 2822  Gross per 24 hour   Intake 550 ml   Output 700 ml   Net -150 ml       Physical Exam  GEN :in no distress, alert and oriented  HEENT: anicteric sclerae, Mucous membranes are dry  Neck - supple without JVD  CV - regular rate, S1, S2, no Rub   Lung - clear bilaterally, lungs expand symmetrically  Abd - soft, nontender, bowel sounds present, no hepatosplenomegaly  Ext - no clubbing, no cyanosis, no edema  Neurologic - bilat hand tremors  Genitourinary - bladder nonpalpable, bilateral Nephrostomy tubes clean, dry intact, draining clear yellow urine  Skin - no rashes, no purpura  Psychiatric: Normal mood and affect. Data Review:   Recent Labs     01/13/22  0322 01/12/22  1126   WBC 7.8 15.2*   HGB 8.5* 11.5*   HCT 26.4* 35.8*   PLT 69* 92*     Recent Labs     01/13/22  0322 01/12/22  2104 01/12/22  1126   * 126* 126*   K 3.9 4.6 4.6   CL 99 97* 92*   CO2 19* 22 24   BUN 23 27* 28*   CREA 1.45 1.51* 1.66*   GLU 86 90 101*   CA 7.6* 8.2* 9.2     No results for input(s): PH, PCO2, PO2, PCO2 in the last 72 hours. Problem List:     Patient Active Problem List    Diagnosis Date Noted    Hyponatremia 01/12/2022    UTI (urinary tract infection) 01/12/2022    Severe sepsis ([facility] [address] 01/12/2022    EDWARDO (acute kidney injury) ([facility] [address] 01/12/2022    Hemorrhoids 10/14/2021    History of colon polyps 03/17/2021    Bilateral carotid artery stenosis 10/12/2020    Chest pain 01/07/2020    PAF (paroxysmal atrial fibrillation) ([facility] [address] 01/07/2020    Recurrent depression ([facility] [address] 01/04/2018    Hypomagnesemia 07/07/2017    Depression 07/06/2017    Adhesive capsulitis of left shoulder 06/26/2017    Strain of muscle(s) and tendon(s) of the rotator cuff of left shoulder, sequela 06/26/2017    Strain of muscle, fascia and tendon of long head of biceps, left arm, sequela 06/26/2017    SLAP lesion of left shoulder 06/26/2017    Degenerative joint disease of left acromioclavicular joint 06/26/2017    Coronary atherosclerosis of native coronary vessel 11/15/2016    Abnormal EKG     History of tobacco abuse 07/11/2014    ESPINOZA (dyspnea on exertion) 07/11/2014    HTN (hypertension) 10/14/2013    Hyperlipidemia 10/14/2013    Inguinal hernia unilateral, non-recurrent 10/14/2013   1.  EDWARDO likely pre renal azotemia episodes of hypotension since admission, s/p IVF bolus Cr trending down, no hydronephrosis on CT  Non oliguric via bilateral nephrostomy tubes  Trend renal function     2. Hyponatremia- ? SIADH, recently started on sertraline, lung mets. Urine lytes pending  -Free water restrict 1.5 L per day, will add URE-NA  -trend SNa,     3. Leukocytosis/UTI on UA  Cultures NGTD, on vancomycin and rocephin    4. Anemia- hgb trending down, reports dribbling hematuria  Occult stool pending   Home Eliquis on hold     5.  Metastatic bladder cancer with mets to the lungs on chemo last treatment reported Jan 4th at [facility] [address] (next chemo scheduled next Tuesday)      Lise Hope, [address] [facility] Nephrology

## 2022-01-13 NOTE — ED NOTES
Patient to and from restroom to attempt to have bowel movement. Passing gas but no stool. Requesting stool softener.

## 2022-01-13 NOTE — ADT AUTH CERT NOTES
INPATIENT ADMISSION NOTIFICATION     ADMISSION DATE 2022    STILL IN [place]     UR CONTACT -   Kelsey R[address] 18.   UR [phone]  [facility]    [facility]! [address] [facility]     FACILITY NPI :[id number]  FACILITY TAX ID : [id number]     [facility] SPECIALTY HOSPTIAL [facility] EMERGENCY DEPT  ONE  [address] [facility] [place] [address]  [phone]            Patient Name :Kuldip Solis   : 1960 (57 yrs)  MRN : [id number]     Patient Mailing Address [address] [place] [address] [41] , [id number]                                                        .         Insurance Plan Payor: Rashida Roque  Subscriber ID : [id number]          Current Patient Class : INPATIENT  Admit Date : 2022     REQUESTED LEVEL OF CARE: INPATIENT [101]                                                           Diagnosis : Severe sepsis (HCC);UTI (urinary tract infection); Hyponatremia;EDWARDO (acute kidney injury) ([facility]                          ICD10 Code : Severe sepsis ([facility] [A41.9, R65.20]  UTI (urinary tract infection) [N39.0]  Hyponatremia [E87.1]  EDWARDO (acute kidney injury) [facility]) [N17.9]          Admitting and Attending Info:  Admitting Provider : Bettye Jennings DO     NPI: [id number]  Admitting Provider Phone. [phone] Provider Address: SAME AS FACILITY . Patient Demographics    Patient Name   Peace Garcia Legal Sex   Male    1960 Address   [place] [id number] Phone   [phone] [facility])   [phone] (Mobile) *Formerly [facility] Account    Name Acct ID Class Status Primary Coverage   Peace Garcia [id number] INPATIENT Open BLUE [place]OSS - [place] OF [address] [place] [address] Rd            Guarantor Account (for Hospital Account [de-identified])    Name Relation to Pt Service Area Active?  Acct Type   Peace Garcia Self BONSC Yes Personal/Family   Address Phone     [address] [phone](Y)              Coverage Information (for Hospital Account [de-identified])    F/O Payor/Plan Subscriber  Subscriber Sex Precert #   DEVI CALLEJAS/SC Debora CALLEJAS [facility] 58 M    Subscriber Subscriber #   William Valencia [id number]   Grp # Group Name   [id number]    Address Phone   [address] [facility], [address] [facility]    Policy Number Status Effective Date Benefits Phone   [id number] -  -   Auth/Cert               Diagnosis     Codes Comments   Malignant neoplasm of urinary bladder, unspecified site [facility])  ICD-10-CM: C67.9   ICD-9-CM: 188. 9             Admission Information    Arrival Date/Time: 2022 1107 Admit Date/Time: 2022 1358 IP Adm.  Date/Time: 2022   Admission Type: Emergency Point of Origin: Non-health Care Facility/self Admit Category:    Means of Arrival: Ambulance Primary Service: Medicine Secondary Service: N/A   Transfer Source:  Service Area: [facility] Unit: [facility] EMERGENCY DEPT   Admit Provider: Hossein Oliva DO Attending Provider: Latoya Rodney MD Referring Provider:      Admission Information    Attending Provider Admission Dx Admitted on   Lupe Cisse MD Severe sepsis [facility]), UTI (urinary tract infection), Hyponatremia, EDWARDO (acute kidney injury) ([facility] 22   Service Isolation Code Status   Medicine -- Full Code   Allergies Advance Care Planning    No Known Allergies Jump to the Activity       Admission Information    Unit/Bed: [facility] EMERGENCY DEPT/32 Service: Medicine   Admitting provider: Hossein Oliva DO Phone: [phone]   Attending provider: Lupe Cisse MD Phone: [phone]   PCP: Gayla Capone MD Phone: [phone]   Admission dx:  Patient class: I   Admission type: ER       Patient Demographics    Patient Name   Giovanni Gonzales   [id number] Legal Sex   Male    1960 Address   [address] [place] [address] Isabela Barreto  Phone   [phone] (Home)   [phone] (Mobile) *Preferred*     H&P Notes       H&P by Charan Santillan DO at 22 documented on ED from 2022 in [facility] EMERGENCY DEPT    Author: Charan Santillan DO Author Type: Physician Filed: 22   Note Status: Addendum Beckie Dodson: Cosign Not Required Date of Service: 22   : Charan Santillan DO (Physician)       Prior Versions: 1. Charan Santillan DO (Physician) at 22 - [address] All                                        Hospitalist Admission History and Physical      NAME:            Shekhar Nunes   Age:                57 y.o.  :               1960   MRN:               [id number]  PCP: Nuris Walker MD  Consulting MD:  Treatment Team: Attending Provider: Charan Santillan DO; Primary Nurse: Comfort Borrego RN         Chief Complaint   Patient presents with    Flank Pain            HPI:   Patient is a 57 y.o. male who presented to the ED for cc lower back pain along with odor to urine. Hx of depression, HTN, HLD, CAD s/p PCI, paroxysmal a fib not on anticoagulation, metastatic urothelial/ bladder cancer on chemo last tx one week ago at [address] with mets to lung, bilateral nephrosotomy tubes since November, and recurrent UTIs growing stenotrophomonas maltophilia, klebsiella pneumoniae, and klebsiella ozaenae in the past. Wife at bedside who states she has noticed some erythema to right nephrostomy site. Both state having good urine output from nephrostomy tubes.      Vitals -      Labs- WBC 15.2. Platelets 92. UA consistent with UTI. Na 126, Creatine 1.6 from baseline 1, procal 2.2, lactic acid 3.3     CT abdomen pelvis without contrast pending. Past Medical History:   Diagnosis Date    Abnormal EKG       1. Cardiac MRI (5/14/15):  EF 68%. The left ventricular myocardium appear symmetric at the base.   There appears to be slightly thicker left ventricular myocardium at the mid and apical lateral wall compared to other segments. No wall motion advised. EF 68%. Calculated left ventricular mass is within normal limits.  Arrhythmia       occasionally has heart palpitations     Chronic obstructive pulmonary disease (HCC)       ventolin prn     Depression      Former smoker      Hypercholesteremia       controlled by lipitor    Hyperlipidemia      Hypertension       daily meds    Inguinal hernia      Left ventricular hypertrophy       ECHO 2016    Prostate troubles      Unstable angina ([address] 07/11/2014     cath in 2014=mild nonobstructive CAD               Past Surgical History:   Procedure Laterality Date    HX CARPAL TUNNEL RELEASE Left      HX COLONOSCOPY   2009    HX HEART CATHETERIZATION   2014     no stents    HX HERNIA REPAIR Right 10/24/13     inguinal    HX ORTHOPAEDIC Left 10/2016     shoulder manipulation     HX SHOULDER ARTHROSCOPY Left 07/2016     second surgery 07/17    HX TURP   10/04/2021    NC CARDIAC SURG PROCEDURE UNLIST         CCL, clean/no interventions               Family History   Problem Relation Age of Onset    Heart Disease Sister      Stroke Sister      Cancer Brother           lymphoma    Lung Disease Mother      Hypertension Sister      Hypertension Sister      Lung Disease Sister      Heart Disease Sister      Heart Disease Brother      Stroke Brother      Heart Disease Brother           Social History          Social History Narrative    Not on file         Social History            Tobacco Use    Smoking status: Current Every Day Smoker       Packs/day: 1.00       Years: 35.00       Pack years: 35.00       Types: Cigarettes    Smokeless tobacco: Never Used    Tobacco comment: nicotine supplement? Substance Use Topics    Alcohol use:  Yes       Alcohol/week: 20.0 standard drinks       Types: 24 Cans of beer per week         Social History      Substance and Sexual Activity   Drug Use No          No Known Allergies             Prior to Admission medications    Medication Sig Start Date End Date Taking? Authorizing Provider   sertraline (Zoloft) 25 mg tablet Take 25 mg by mouth daily.       Provider, Historical   dronedarone (Multaq) tab tablet Take 1 Tablet by mouth two (2) times daily (with meals). 11/12/21     Wolfgang Rucker MD   tadalafiL (Cialis) 20 mg tablet Take 1 Tablet by mouth daily as needed for Erectile Dysfunction. 10/14/21     Jose GARCIA PA-C   montelukast (SINGULAIR) 10 mg tablet Take 1 Tablet by mouth daily. 9/3/21     Jose GARCIA PA-C   metoprolol succinate (TOPROL-XL) 50 mg XL tablet Take 1 Tablet by mouth daily. 9/3/21     Albino Perez PA-C   traZODone (DESYREL) 100 mg tablet Take 1 Tablet by mouth nightly. 8/31/21     Albino Perez PA-C   hyoscyamine SL (LEVSIN/SL) 0.125 mg SL tablet 1 Tablet by SubLINGual route every four (4) hours as needed for Other (bladder pain). 8/27/21     Tano Richards NP   atorvastatin (LIPITOR) 40 mg tablet TAKE 1 TABLET BY MOUTH EVERY MORNING FOR HIGH CHOLESTEROL 4/16/21     Wolfgang Rucker MD   benazepriL (LOTENSIN) 10 mg tablet TAKE 1 TABLET BY MOUTH DAILY 4/2/21     Wolfgang Rucker MD   hydrocortisone (ANUSOL-HC) 2.5 % rectal cream Insert  into rectum four (4) times daily. 9/22/20     Kim Ferreira DO   nitroglycerin (NITROSTAT) 0.4 mg SL tablet 1 Tab by SubLINGual route every five (5) minutes as needed for Chest Pain. Up to 3 doses. 8/26/20     Wolfgang Rucker MD   apixaban (ELIQUIS) 5 mg tablet Take 1 Tab by mouth two (2) times a day. 1/9/20     GHISLAINE Coon   levocetirizine (XYZAL) 5 mg tablet Take  by mouth.       Provider, Historical   albuterol (VENTOLIN HFA) 90 mcg/actuation inhaler Take 2 Puffs by inhalation as needed for Wheezing.  1/4/18     Wolfgang Rucker MD   multivitamin (ONE A DAY) tablet Take 1 Tab by mouth daily.       Provider, Historical               Review of Systems     Constitutional: fatigued  Eyes:  no change in visual acuity, no photophobia  Ears, nose, mouth, throat, and face: no  Odynphagia, dysphagia, no thrush or exudate, negative for chronic sinus congestion, recurrent headaches  Respiratory: negative for SOB, hemoptysis or cough  Cardiovascular: negative for CP, palpitations, or PND  Gastrointestinal: negative for abdominal pain, no hematemesis, hematochezia or BRBPR  Genitourinary: no urgency, frequency, or dysuria, no nocturia  Integument/breast: negative for skin rash or skin lesions  Hematologic/lymphatic: negative for known bleeding disorder  Musculoskeletal:lower back pain  Neurological: weakness, confusion  Behavioral/Psych: negative for depression or chronic anxiety,   Endocrine: negative for polydyspia, polyuria or intolerance to heat or cold  Allergic/Immunologic: negative for chronic allergic rhinitis, or known connective tissue disorder        Objective:      Patient Vitals for the past 24 hrs:    Temp Pulse Resp BP SpO2   01/12/22 1937     94 %   01/12/22 1920 99 °F (37.2 °C) (!) 106 20 126/75 94 %   01/12/22 1558     98 %   01/12/22 1545    117/72 95 %   01/12/22 1542    115/71    01/12/22 1500    92/61 96 %   01/12/22 1445    92/68 98 %   01/12/22 1430    92/62 98 %   01/12/22 1428     98 %   01/12/22 1427    92/67    01/12/22 1118 100.3 °F (37.9 °C) (!) 111 20 111/80 95 %         01/12 1901 - 01/13 0700  In: 50 [I.V.:50]  Out: -   No intake/output data recorded.     Data Review:   Recent Results          Recent Results (from the past 24 hour(s))   EKG, 12 LEAD, INITIAL     Collection Time: 01/12/22 11:22 AM   Result Value Ref Range     Ventricular Rate 105 BPM     Atrial Rate 105 BPM     P-R Interval 130 ms     QRS Duration 80 ms     Q-T Interval 296 ms     QTC Calculation (Bezet) 391 ms     Calculated P Axis 47 degrees     Calculated R Axis 13 degrees     Calculated T Axis 175 degrees     Diagnosis           !!! Poor data quality, interpretation may be adversely affected  Sinus tachycardia  Possible Left atrial enlargement  Left ventricular hypertrophy  Inferior infarct (cited on or before 12-JAN-2022)  ST & T wave abnormality, consider lateral ischemia  Abnormal ECG  When compared with ECG of 12-JAN-2022 11:22,  ST no longer depressed in Inferior leads  T wave inversion less evident in Inferior leads  Nonspecific T wave abnormality, worse in Anterior leads  Confirmed by Melany Romero MD ()EMILIANO ([id number]) on 1/12/2022 2:54:49 PM      CULTURE, BLOOD     Collection Time: 01/12/22 11:26 AM     Specimen: Blood   Result Value Ref Range     Special Requests: RIGHT  Antecubital          Culture result: PENDING     LACTIC ACID     Collection Time: 01/12/22 11:26 AM   Result Value Ref Range     Lactic acid 3.3 (H) 0.4 - 2.0 MMOL/L   CBC WITH AUTOMATED DIFF     Collection Time: 01/12/22 11:26 AM   Result Value Ref Range     WBC 15.2 (H) 4.3 - 11.1 K/uL     RBC 4.51 4.23 - 5.6 M/uL     HGB 11.5 (L) 13.6 - 17.2 g/dL     HCT 35.8 (L) 41.1 - 50.3 %     MCV 79.4 (L) 79.6 - 97.8 FL     MCH 25.5 (L) 26.1 - 32.9 PG     MCHC 32.1 31.4 - 35.0 g/dL     RDW 13.8 11.9 - 14.6 %     PLATELET 92 (L) 905 - 450 K/uL     MPV 10.0 9.4 - 12.3 FL     ABSOLUTE NRBC 0.00 0.0 - 0.2 K/uL     NEUTROPHILS 88 (H) 43 - 78 %     LYMPHOCYTES 4 (L) 13 - 44 %     MONOCYTES 4 4.0 - 12.0 %     EOSINOPHILS 0 (L) 0.5 - 7.8 %     BASOPHILS 1 0.0 - 2.0 %     IMMATURE GRANULOCYTES 3 0.0 - 5.0 %     ABS. NEUTROPHILS 13.3 (H) 1.7 - 8.2 K/UL     ABS. LYMPHOCYTES 0.6 0.5 - 4.6 K/UL     ABS. MONOCYTES 0.6 0.1 - 1.3 K/UL     ABS. EOSINOPHILS 0.0 0.0 - 0.8 K/UL     ABS. BASOPHILS 0.2 0.0 - 0.2 K/UL     ABS. IMM. GRANS.  0.5 0.0 - 0.5 K/UL     RBC COMMENTS OCCASIONAL  MICROCYTOSIS          WBC COMMENTS Result Confirmed By Smear       PLATELET COMMENTS DECREASED       DF AUTOMATED     METABOLIC PANEL, COMPREHENSIVE     Collection Time: 01/12/22 11:26 AM   Result Value Ref Range     Sodium 126 (L) 136 - 145 mmol/L     Potassium 4.6 3.5 - 5.1 mmol/L     Chloride 92 (L) 98 - 107 mmol/L     CO2 24 21 - 32 mmol/L     Anion gap 10 7 - 16 mmol/L     Glucose 101 (H) 65 - 100 mg/dL     BUN 28 (H) 8 - 23 MG/DL     Creatinine 1.66 (H) 0.8 - 1.5 MG/DL     GFR est AA 54 (L) >60 ml/min/1.73m2     GFR est non-AA 45 (L) >60 ml/min/1.73m2     Calcium 9.2 8.3 - 10.4 MG/DL     Bilirubin, total 0.5 0.2 - 1.1 MG/DL     ALT (SGPT) 15 12 - 65 U/L     AST (SGOT) 14 (L) 15 - 37 U/L     Alk.  phosphatase 150 (H) 50 - 136 U/L     Protein, total 7.4 6.3 - 8.2 g/dL     Albumin 2.5 (L) 3.2 - 4.6 g/dL     Globulin 4.9 (H) 2.3 - 3.5 g/dL     A-G Ratio 0.5 (L) 1.2 - 3.5     PROCALCITONIN     Collection Time: 01/12/22 11:26 AM   Result Value Ref Range     Procalcitonin 2.21 (H) 0.00 - 0.49 ng/mL   URINALYSIS W/ RFLX MICROSCOPIC     Collection Time: 01/12/22  3:49 PM   Result Value Ref Range     Color MATEO       Appearance TURBID       Specific gravity 1.018 1.001 - 1.023       pH (UA) 7.5 5.0 - 9.0       Protein 100 (A) NEG mg/dL     Glucose Negative mg/dL     Ketone Negative NEG mg/dL     Bilirubin Negative NEG       Blood LARGE (A) NEG       Urobilinogen 1.0 0.2 - 1.0 EU/dL     Nitrites Negative NEG       Leukocyte Esterase SMALL (A) NEG       WBC 10-20 0 /hpf     RBC 5-10 0 /hpf     Epithelial cells 0-3 0 /hpf     Bacteria 4+ (H) 0 /hpf     Casts HYALINE 0 /lpf     Amorphous Crystals 1+ (H) 0     Other observations RESULTS VERIFIED MANUALLY     URINALYSIS W/ RFLX MICROSCOPIC     Collection Time: 01/12/22  3:50 PM   Result Value Ref Range     Color RED       Appearance TURBID       Specific gravity 1.019 1.001 - 1.023       pH (UA) 8.0 5.0 - 9.0       Protein 300 (A) NEG mg/dL     Glucose Negative mg/dL     Ketone TRACE (A) NEG mg/dL     Bilirubin Negative NEG       Blood LARGE (A) NEG       Urobilinogen 1.0 0.2 - 1.0 EU/dL     Nitrites Negative NEG       Leukocyte Esterase LARGE (A) NEG       WBC >100 0 /hpf     RBC 5-10 0 /hpf     Epithelial cells 0-3 0 /hpf     Bacteria 4+ (H) 0 /hpf     Casts HYALINE 0 /lpf     Other observations RESULTS VERIFIED MANUALLY              Physical Exam:      General:  Alert, cooperative, obvious discomfort. Mild increased work of breathing. Mild confusion   Eyes:  Conjunctivae/corneas clear. PERRL   Ears:  Normal TMs and external ear canals both ears. Nose: Nares normal   Mouth/Throat: Lips, mucosa, and tongue normal.    Neck:  no JVD. Back:   Mild erythema surrounding right nephrostomy tube. No drainage noted. Lungs:   Clear to auscultation bilaterally. Heart:  Sinus tachycardia   Abdomen:   Soft, non-tender. Bowel sounds normal. No masses,  No organomegaly. Extremities: Extremities normal, atraumatic, no cyanosis or edema. Muscle wasting   Pulses: 2+ and symmetric all extremities. Skin: Skin color, texture, turgor normal. No rashes or lesions   Lymph nodes: Cervical, supraclavicular, and axillary nodes normal.   Neurologic: Limited ability to move.       Assessment and Plan      Principal Problem:    Severe sepsis ([facility] (1/12/2022)     Active Problems:    HTN (hypertension) (10/14/2013)       Recurrent depression ([address] (1/4/2018)       PAF (paroxysmal atrial fibrillation) ([facility] (1/7/2020)      Overview: 1. Admitted 1/20 with afib and chest pain. Started on Multaq and Eliquis.              Hyponatremia (1/12/2022)       UTI (urinary tract infection) (1/12/2022)       EDWARDO (acute kidney injury) ([facility] (1/12/2022)     Severe sepsis (met by HR, WBC, and EDWARDO) secondary to UTI - Urine culture pending. Start Vancomycin and Zosyn. Mild erythema around right nephrostomy tube but good urine output from tube so holding off on nephrology consult and IR consult. CT pending.      Bladder and urothelial cancer - Follows [facility]. PRN oxycodone, but reducing dose since with some confusion and EDWARDO     EDWARDO - tx sepsis. Give IV fluids.      Hyponatremia - IV fluids, trend.  Do not overcorrect more than 8 units No in 24 hours to avoid demyelination     Holding tamsulosin, trazodone due to confusion, benazepril due to EDWARDO, and finesteride,      DVT prophylaxis - SCDs due to low platelets  Signed By: Leslie Floyd DO   2022                                   Patient Demographics    Patient Name   Nancy Sanabria   [id number] Legal Sex   Male    1960 Address   [address] [facility] Phone   [phone] (Home)   [phone] (Mobile) *Preferred*   CSN:   [id number]     Admit Date: Admit Time Room Bed   2022  1:58 PM NP63 [365] 32 [[id number]]       Attending Providers    Provider Pager From To   Reginald Jones MD  22   Samuel Craig DO  22   Elsa Carey MD  22      Emergency Contact(s)    Name Relation Home Work [place], [place] Girlfriend [phone]   Sean Lopez [phone]     Utilization Reviews         Urinary Tract Infection (UTI) - Care Day 2 (2022) by Sebastien Johnson       Review Entered Review Status   2022 14:50 Completed      Criteria Review      Care Day: 2 Care Date: 2022 Level of Care: Telemetry    Guideline Day 2    Level Of Care    (X) Floor    2022 14:50:39 EST by Sebastien Johnson      telemetry    Clinical Status    ( ) * Hypotension absent    2022 14:50:39 EST by Sebastien Johnson      BP 88/66, 85/61, 82/62    (X) * Mental status at baseline    2022 14:50:39 EST by Sebastien Johnson      LOC alert    (X) * Afebrile or fever improved    2022 14:50:39 EST by Sebastien Johnson      Temp 100.2    (X) * Vomiting absent or improved    2022 14:50:39 EST by Sebastien Johnson      none noted    Activity    (X) * Ambulatory    2022 14:50:39 EST by Sebastien Johnson      activity as tolerated with assist    Routes    (X) IV fluids    2022 14:50:39 EST by Sebastien Johnson      NS 500mL IV bolus x1  NS 100mL/hr IV (X) IV medications    1/13/2022 14:50:39 EST by Nelly Casiano      IV meds    (X) Advance diet as tolerated    1/13/2022 14:50:39 EST by Nelly Casiano      adult regular diet 2gNa    Interventions    (X) * Reversible urinary system abnormality (eg, obstruction, abscess) absent, addressed, or to be treated at next level of care    1/13/2022 14:50:39 EST by Nelly Casiano      GPC Sepsis: nidus probably either urine/kidneys or nephrostomy tube exit site; would continue Vancomycin pending ID    (X) WBC    1/13/2022 14:50:39 EST by Nelly Casiano      WBC: 7.8    (X) Renal function tests    1/13/2022 14:50:39 EST by Nelly Casiano      BUN: 23  Creatinine: 1.45    Medications    (X) Antibiotics    1/13/2022 14:50:39 EST by Nelly Casiano      Vancomycin 1,000mg IV q24  Zosyn 3.375g IV q8  Meropenem 500mg IV q6    (X) Possible analgesics    1/13/2022 14:50:39 EST by Nelly Casiano      Acetaminophen 650mg PO q6 PRN x1  Oxycodone IR 10mg PO q4 PRN x3    * Milestone   Additional Notes   Date of care: 1/13/2022      IP-LOC-Telemetry      Nephrology consult: 1. EDWARDO likely pre renal azotemia episodes of hypotension since admission, s/p IVF bolus Cr trending down, no hydronephrosis on CT   Non oliguric via bilateral nephrostomy tubes   Trend renal function        2. Hyponatremia- ? SIADH, recently started on sertraline, lung mets. Urine lytes pending   -Free water restrict 1.5 L per day, will add URE-NA   -trend SNa,        3. Leukocytosis/UTI on UA   Cultures NGTD, on vancomycin and rocephin       4. Anemia- hgb trending down, reports dribbling hematuria   Occult stool pending    Home Eliquis on hold        5.  Metastatic bladder cancer with mets to the lungs on chemo last treatment reported [facility] (next chemo scheduled next Tuesday)       ID consult: Impression:   · GPC Sepsis: nidus probably either urine/kidneys or nephrostomy tube exit site; would continue Vancomycin pending ID · UTI: patient did have significant pyuria from one nephrostomy tube (one with 10-20 WBC; other with >100); in addition right perinephric stranding seen on CT suggestive of pyelonephritis   · Metastatic Bladder Ca   Plan:   · Would continue Vancomycin pending ID GPC   · Given other prior urine isolates (stenotrophomonas, Klebsiella) have been FQ sensitive could switch Meropenem to FQ; okay to continue pending culture results   · Duration of tx TBD   · May want to consider earlier exchange of nephrostomy tubes      Vitals: Temp 100.2, , BP 88/66, 85/61, 82/62, RR 22, 22, 20, 94% on RA      Labs:   1/13/2022 03:22   RBC: 3.34 (L)   HGB: 8.5 (L)   HCT: 26.4 (L)   MCV: 79.0 (L)   MCH: 25.4 (L)   PLATELET: 69 (L)   MPV: 10.7   NEUTROPHILS: 87 (H)   LYMPHOCYTES: 4 (L)   MONOCYTES: 2 (L)   EOSINOPHILS: 0 (L)   IMMATURE GRANULOCYTES: 6 (H)   ABS. NEUTROPHILS: 6.7   ABS. IMM. GRANS.: 0.5   ABS. LYMPHOCYTES: 0.3 (L)   Sodium: 128 (L)   Potassium: 3.9   Chloride: 99   CO2: 19 (L)   Anion gap: 10   Glucose: 86   Calcium: 7.6 (L)   GFR est non-AA: 52 (L)   GFR est AA: >60   Protein, total: 5.6 (L)   Albumin: 1.8 (L)   Globulin: 3.8 (H)   A-G Ratio: 0.5 (L)   ALT: 16   AST: 22   Alk.  phosphatase: 90   1/13/2022 09:40   LD: 249 (H)   Vitamin B12: 2,652 (H)   Iron: 10 (L)   TIBC: 165 (L)   Transferrin Saturation: 6 (L)   Ferritin: 2,069 (H)   Osmolality, serum/plasma: 254 (L)      Medications:   Atorvastatin 40mg PO every bedtime   Zyrtec 5mg PO daily   Dronedarone 400mg PO BID with meals   Singulair 10mg PO every bedtime   Miralax 17g PO daily PRN x1   Urea 15gram 1 packet PO daily      Plan: BMP q6, adult regular diet 2gNa, consult urology, activity as tolerated with assist, SCDs, cardiac monitoring, elevate HOB 30 degrees, PT/OT, neurologic status assessment, spot check oximetry, continuous pulse oximetry, strict I&Os              Urinary Tract Infection (UTI) - Care Day 1 (1/12/2022) by Nadine Huff Entered Review Status   1/13/2022 10:51 Completed      Criteria Review      Care Day: 1 Care Date: 1/12/2022 Level of Care: Telemetry    Guideline Day 1    Level Of Care    (X) Floor    1/13/2022 10:51:45 EST by Nighat Brandon      telemetry    Clinical Status    (X) * Clinical Indications met    1/13/2022 10:51:45 EST by Nighat Brandon      met    Activity    (X) Activity as tolerated    1/13/2022 10:51:45 EST by Nighat Brandon      activity as tolerated with assist    Routes    (X) IV fluids    1/13/2022 10:51:45 EST by Nighat Brandon      NS 75mL/hr IV  NS 1,000mL IV bolus x1  NS 1,000mL IV bolus x1  NS 1,000mL IV bolus x1    (X) IV medications    1/13/2022 10:51:45 EST by Nighat Brandon      IV meds    (X) Diet as tolerated    1/13/2022 10:51:45 EST by Nighat Brandon      adult regular diet 2gNa    Interventions    (X) Urinalysis and urine culture    1/13/2022 10:51:45 EST by Nighat Brandon      see below    (X) Possible blood cultures    1/13/2022 10:51:45 EST by Nighat Brandon      Blood cultures: GRAM POSITIVE COCCI    (X) Renal function tests, CBC    1/13/2022 10:51:45 EST by Nighat Brandon      see below    (X) Possible CT, ultrasound, or other imaging test    1/13/2022 10:51:45 EST by Antoinette Hoffman see below    Medications    (X) Antibiotics    1/13/2022 10:51:45 EST by Nighat Brandon      Ceftriaxone 1g IV x1  Vancomycin 1,500mg IV x1  Zosyn 3.375g IV q8    (X) Possible analgesics    1/13/2022 10:51:45 EST by Nighat Brandon      Oxycodone IR 10mg PO q4 PRN x1  Acetaminophen 1,000mg PO x1  Morphine 4mg IV x1    * Milestone   Additional Notes   Date of care: 1/12/2022      IP-LOC-Telemetry      IM note: 57 y.o. male who presented to the ED for cc lower back pain along with odor to urine.  Hx of depression, HTN, HLD, CAD s/p PCI, paroxysmal a fib not on anticoagulation, metastatic urothelial/ bladder cancer on chemo last tx one week ago at [facility] [place] with mets to lung, bilateral nephrosotomy tubes since November, and recurrent UTIs growing stenotrophomonas maltophilia, klebsiella pneumoniae, and klebsiella ozaenae in the past. Wife at bedside who states she has noticed some erythema to right nephrostomy site. Both state having good urine output from nephrostomy tubes. Vitals -    Labs- WBC 15.2. Platelets 92. UA consistent with UTI. Na 126, Creatine 1.6 from baseline 1, procal 2.2, lactic acid 3.3   CT abdomen pelvis without contrast pending. General: Alert, cooperative, obvious discomfort. Mild increased work of breathing. Mild confusion   Eyes: Conjunctivae/corneas clear. PERRL   Ears: Normal TMs and external ear canals both ears. Nose: Nares normal   Mouth/Throat: Lips, mucosa, and tongue normal.    Neck: no JVD. Back:   Mild erythema surrounding right nephrostomy tube. No drainage noted. Lungs:   Clear to auscultation bilaterally. Heart: Sinus tachycardia   Abdomen:   Soft, non-tender. Bowel sounds normal. No masses,  No organomegaly. Extremities: Extremities normal, atraumatic, no cyanosis or edema. Muscle wasting   Pulses: 2+ and symmetric all extremities. Skin: Skin color, texture, turgor normal. No rashes or lesions   Lymph nodes: Cervical, supraclavicular, and axillary nodes normal.   Neurologic: Limited ability to move. Assessment and Plan   Principal Problem:     Severe sepsis ([address] (1/12/2022)   Active Problems:     HTN (hypertension) (10/14/2013)     Recurrent depression ([address] (1/4/2018)     PAF (paroxysmal atrial fibrillation) ([address] (1/7/2020)       Overview: 1. Admitted 1/20 with afib and chest pain.  Started on Multaq and Eliquis.          Hyponatremia (1/12/2022)     UTI (urinary tract infection) (1/12/2022)     EDWARDO (acute kidney injury) ([address] (1/12/2022)       Severe sepsis (met by HR, WBC, and EDWARDO) secondary to UTI - Urine culture pending. Start Vancomycin and Zosyn.  Mild erythema around right nephrostomy tube but good urine output from tube so holding off on nephrology consult and IR consult. CT pending.        Bladder and urothelial cancer - Follows [facility] [place]. PRN oxycodone, but reducing dose since with some confusion and EDWARDO       EDWARDO - tx sepsis. Give IV fluids.        Hyponatremia - IV fluids, trend. Do not overcorrect more than 8 units in 24 hours to avoid demyelination       Holding tamsulosin, trazodone due to confusion, benazepril due to EDWARDO, and finesteride,        DVT prophylaxis - SCDs due to low platelets      Vitals: Temp 100.3, , 106, 107, BP 92/67, RR 20, 20, 20, 90% on RA      Labs:   1/12/2022 11:26   WBC: 15.2 (H)   RBC: 4.51   HGB: 11.5 (L)   HCT: 35.8 (L)   MCV: 79.4 (L)   MCH: 25.5 (L)   PLATELET: 92 (L)   MPV: 10.0   NEUTROPHILS: 88 (H)   LYMPHOCYTES: 4 (L)   MONOCYTES: 4   EOSINOPHILS: 0 (L)   IMMATURE GRANULOCYTES: 3   ABS. NEUTROPHILS: 13.3 (H)   ABS. LYMPHOCYTES: 0.6   Sodium: 126 (L)   Potassium: 4.6   Chloride: 92 (L)   CO2: 24   Anion gap: 10   Glucose: 101 (H)   BUN: 28 (H)   Creatinine: 1.66 (H)   Calcium: 9.2   GFR est non-AA: 45 (L)   GFR est AA: 54 (L)   Protein, total: 7.4   Albumin: 2.5 (L)   Globulin: 4.9 (H)   A-G Ratio: 0.5 (L)   ALT: 15   AST: 14 (L)   Alk. phosphatase: 150 (H)   Lactic acid: 3.3 (H)   Procalcitonin: 2.21 (H)   1/12/2022 15:49   Appearance: TURBID   Protein: 100 (A)   Ketone: Negative   Blood: LARGE (A)   Leukocyte Esterase: SMALL (A)   WBC: 10-20   RBC: 5-10   Bacteria: 4+ (H)   Amorphous Crystals: 1+ (H)   EKG: Sinus tachycardia    Possible Left atrial enlargement    Left ventricular hypertrophy    Inferior infarct   Chest x-ray: IMPRESSION   1. Interval developing multifocal pulmonary nodules consistent with diffuse   metastatic disease. Abdomen pelvis CT:     IMPRESSION   1.  Asymmetric right perinephric stranding concerning for right pyelonephritis   in the correct clinical setting. Bilateral nephrostomy tubes in place. No   hydronephrosis. No urinary tract calculi. 2.  Bladder is decompressed with circumferential wall thickening and to bladder   diverticula the larger appearing posteriorly measuring up to 4.5 cm. Follow-up   as clinically warranted. 3.  No bowel obstruction, diverticulitis or appendicitis. 4.  Multiple bibasilar pulmonary metastatic nodules. Urine culture: pending      Medications:   Atorvastatin 40mg PO every bedtime   Singulair 10mg PO every bedtime   Zofran 4mg IV x1      Plan: BMP q6, adult regular diet 2gNa, activity as tolerated with assist, SCDs, cardiac monitoring, elevate HOB 30 degrees, PT/OT, neurologic status assessment, spot check oximetry, continuous pulse oximetry, strict I&Os                 Urinary Tract Infection (UTI) - Clinical Indications for Admission to Inpatient Care by Je Lyles       Review Entered Review Status   1/13/2022 10:48 Completed      Criteria Review      Clinical Indications for Admission to Inpatient Care    Most Recent : Je Lyles Most Recent Date: 1/13/2022 10:48:27 EST    (X) Admission is indicated for  1 or more  of the following  (1) (2) (3) (4) (5) (6) (7) (8):       (X) Hemodynamic instability       1/13/2022 10:48:27 EST by Je Lyles         , 106, 107       (X) Parenteral antibiotics needed beyond observation care (eg, patient cannot take oral medication,       known or suspected pathogen resistance to oral agents)       1/13/2022 10:48:27 EST by Je Lyles         Start Vancomycin and Zosyn   Additional Notes   ED provider note: Patient with history of bladder cancer.  Currently on chemo.  Last round was 1 week ago.  Has bilateral nephrostomy tubes in place since November.  2 weeks ago started having increased right flank pain.  Shortly thereafter pain developed all the way across the lower back.  Has continued and getting worse so came in.  Has foul odor to urine.  Has low-grade fever here with tachycardia. Flank Pain    This is a new problem.  The current episode started more than 1 week ago. The problem has been gradually worsening. The problem occurs constantly. Patient reports not work related injury. The pain is associated with no known injury. The pain is present in the lower back, left side and right side. The quality of the pain is described as aching. The pain does not radiate. The pain is moderate. Pertinent negatives include no chest pain, no fever, no numbness, no headaches, no abdominal pain, no abdominal swelling, no bowel incontinence, no perianal numbness, no bladder incontinence, no dysuria, no leg pain, no paresthesias and no weakness. He has tried nothing for the symptoms.  Risk factors include a history of cancer

## 2022-01-13 NOTE — PROGRESS NOTES
01/13/22 1715   Dual Skin Pressure Injury Assessment   Dual Skin Pressure Injury Assessment X   Second Care Provider (Based on [address] [place] [address]) John Nunez RN   Skin Integumentary   Skin Integumentary (WDL) X   Skin Color Appropriate for ethnicity; Other (comment)  (rash on hands and abdomen)   Skin Condition/Temp Warm;Dry   Skin Integrity Incision (comment)   Turgor Epidermis thin w/ loss of subcut tissue   Hair Growth Present   Nails WDL   Varicosities Absent    Pressure  Injury Documentation No Pressure Injury Noted-Pressure Ulcer Prevention Initiated

## 2022-01-13 NOTE — PROGRESS NOTES
TRANSFER - IN REPORT:    Verbal report received from Paulo De Leon RN on Shekhar Nunes  being received from ED for routine progression of care      Report consisted of patients Situation, Background, Assessment and   Recommendations(SBAR). Information from the following report(s) SBAR was reviewed with the receiving nurse. Opportunity for questions and clarification was provided. Assessment completed upon patients arrival to unit and care assumed.

## 2022-01-13 NOTE — PROGRESS NOTES
Hospitalist Progress Note   Admit Date:  2022  1:58 PM   Name:  Jayme Lambert   Age:  57 y.o. Sex:  male  :  1960   MRN:  [id number]   Room:  [place] [address]    Presenting Complaint: Flank Pain    Reason(s) for Admission: Severe sepsis ([address] [A41.9, R65.20]  UTI (urinary tract infection) [N39.0]  Hyponatremia [E87.1]  EDWARDO (acute kidney injury) [facility]) [N17.9]     Hospital Course & Interval History:   Patient is a 57 y.o. male who presented to the ED for cc lower back pain along with odor to urine. Hx of depression, HTN, HLD, CAD s/p PCI, paroxysmal a fib not on anticoagulation, metastatic urothelial/ bladder cancer on chemo last tx one week ago at [address] with mets to lung, bilateral nephrosotomy tubes since November, and recurrent UTIs growing stenotrophomonas maltophilia, klebsiella pneumoniae, and klebsiella ozaenae in the past. Wife at bedside who states she has noticed some erythema to right nephrostomy site. Both state having good urine output from nephrostomy tubes. Subjective (22): Pt is complaining of nausea. Pt denies chest pain, sob, diarrhea. Assessment & Plan:   Severe sepsis ([address] (2022)     Severe sepsis (met by HR, WBC, and EDWARDO) secondary to UTI -  LA 3.3, Procalcitoinin 2.2, Leucocytosis of 15k  S/p 3L ivf  CT scan showed Pyelonephritis  F/u  Urine culture and blood cultures   Started on Vancomycin and Zosyn.   ID is following   Urology consulted for exchange of nephrostomy tubes        HTN (hypertension) (10/14/2013)  Pt is hypotensive  Holding antihyperensives      Recurrent depression ([address] (2018)  Continue home medications      PAF (paroxysmal atrial fibrillation) ([address] (2020)  Holding Metoprolol due to hypotension  Holding Eliquis due to hematuria      Hyponatremia (2022)  Most likely due to sertraline and cancer   Sodium was 126 on admission   F/u Nephrology    Anemia due to acute blood loss  Anemia due to hematuria vs chemotherapy vs malignancy  Hb of 8 and baseline is 16  F/u FOBT       UTI (urinary tract infection) (1/12/2022)    Continue antibiotics      EDWARDO (acute kidney injury) ([facility] (1/12/2022)  Most likely due to sepsis  Cr of 1.45 and baseline is 0.81    Bladder and urothelial cancer -   Follows [facility]. PRN oxycodone        Dispo/Discharge Planning: In 1-2 days to Rehab  PPD is ordered    Diet:  ADULT DIET Regular; Low Fat/Low Chol/High Fiber/2 gm Na  DVT PPx: scd  Code status: Full Code    Hospital Problems as of 1/13/2022 Date Reviewed: 10/21/2021          Codes Class Noted - Resolved POA    Hyponatremia ICD-10-CM: E87.1  ICD-9-CM: 276.1  1/12/2022 - Present Unknown        UTI (urinary tract infection) ICD-10-CM: N39.0  ICD-9-CM: 599.0  1/12/2022 - Present Unknown        * (Principal) Severe sepsis ([facility] ICD-10-CM: A41.9, R65.20  ICD-9-CM: 038.9, 995.92  1/12/2022 - Present Unknown        EDWARDO (acute kidney injury) ([facility] ICD-10-CM: N17.9  ICD-9-CM: 584.9  1/12/2022 - Present Unknown        PAF (paroxysmal atrial fibrillation) (HCC) ICD-10-CM: I48.0  ICD-9-CM: 427.31  1/7/2020 - Present Yes    Overview Signed 8/26/2020  3:51 PM by Phill Bess MD     1. Admitted 1/20 with afib and chest pain. Started on Multaq and Eliquis.               Recurrent depression ([facility] ICD-10-CM: F33.9  ICD-9-CM: 296.30  1/4/2018 - Present Yes        HTN (hypertension) ICD-10-CM: I10  ICD-9-CM: 401.9  10/14/2013 - Present Yes              Objective:     Patient Vitals for the past 24 hrs:   Temp Pulse Resp BP SpO2   01/13/22 1430  94 15 92/60 98 %   01/13/22 1414  100  101/68 98 %   01/13/22 1354    104/88    01/13/22 1226  94 30 95/69 95 %   01/13/22 1156    (!) 89/60 96 %   01/13/22 1144    (!) 87/57 95 %   01/13/22 1129    (!) 82/62 94 %   01/13/22 1100 98.8 °F (37.1 °C)   97/69 95 %   01/13/22 1046    (!) 85/61 95 %   01/13/22 1019    (!) 88/66 96 %   01/13/22 1000    108/66 95 %   01/13/22 0955 100.2 °F (37.9 °C)  20 101/68 96 %   01/13/22 0700    115/65 98 %   01/13/22 0637    111/68 95 %   01/13/22 0622    124/68 97 %   01/13/22 0607    112/76 99 %   01/13/22 0600 98.3 °F (36.8 °C) (!) 104 18 112/76 96 %   01/13/22 0200   18     01/13/22 0153  (!) 104 20     01/13/22 0130   22     01/13/22 0100   22 115/69 94 %   01/13/22 0030   20     01/13/22 0000   18 94/67 96 %   01/12/22 2200    106/71 91 %   01/12/22 2148 98.9 °F (37.2 °C)   111/69    01/12/22 2130  (!) 104 20 115/78    01/12/22 2112    114/76 95 %   01/12/22 2000  (!) 107  120/72 90 %   01/12/22 1937     94 %   01/12/22 1920 99 °F (37.2 °C) (!) 106 20 126/75 94 %     Oxygen Therapy  O2 Sat (%): 98 % (01/13/22 1430)  Pulse via Oximetry: 94 beats per minute (01/13/22 1430)  O2 Device: None (Room air) (01/13/22 0955)    Estimated body mass index is 21.79 kg/m² as calculated from the following:    Height as of this encounter: 5' 6\" (1.676 m). Weight as of this encounter: 61.2 kg (135 lb). Intake/Output Summary (Last 24 hours) at 1/13/2022 1637  Last data filed at 1/13/2022 1357  Gross per 24 hour   Intake 1100 ml   Output 700 ml   Net 400 ml         Physical Exam:     Blood pressure 92/60, pulse 94, temperature 98.8 °F (37.1 °C), resp. rate 15, height 5' 6\" (1.676 m), weight 61.2 kg (135 lb), SpO2 98 %. General:    Well nourished. No overt distress  Head:  Normocephalic, atraumatic  Eyes:  Sclerae appear normal.  Pupils equally round. ENT:  Nares appear normal, no drainage. Moist oral mucosa  Neck:  No restricted ROM. Trachea midline   CV:   RRR. No m/r/g. No jugular venous distension. Lungs:   CTAB. No wheezing, rhonchi, or rales. Respirations even, unlabored  Abdomen: Bowel sounds present. Soft, nontender, nondistended. Genitourinary Bilateral Nephrostomy tubes are present  Extremities: No cyanosis or clubbing. No edema  Skin:     No rashes and normal coloration. Warm and dry.     Neuro:  CN II-XII grossly intact. Sensation intact. A&Ox3  Psych:  Normal mood and affect.       I have reviewed ordered lab tests and independently visualized imaging below:    Recent Labs:  Recent Results (from the past 48 hour(s))   EKG, 12 LEAD, INITIAL    Collection Time: 01/12/22 11:22 AM   Result Value Ref Range    Ventricular Rate 105 BPM    Atrial Rate 105 BPM    P-R Interval 130 ms    QRS Duration 80 ms    Q-T Interval 296 ms    QTC Calculation (Bezet) 391 ms    Calculated P Axis 47 degrees    Calculated R Axis 13 degrees    Calculated T Axis 175 degrees    Diagnosis       !!! Poor data quality, interpretation may be adversely affected  Sinus tachycardia  Possible Left atrial enlargement  Left ventricular hypertrophy  Inferior infarct (cited on or before 12-JAN-2022)  ST & T wave abnormality, consider lateral ischemia  Abnormal ECG  When compared with ECG of 12-JAN-2022 11:22,  ST no longer depressed in Inferior leads  T wave inversion less evident in Inferior leads  Nonspecific T wave abnormality, worse in Anterior leads  Confirmed by Zahraa Low MD (), EMILIANO VARELA ([id number]) on 1/12/2022 2:54:49 PM     CULTURE, BLOOD    Collection Time: 01/12/22 11:26 AM    Specimen: Blood   Result Value Ref Range    Special Requests: RIGHT  Antecubital        GRAM STAIN GRAM POSITIVE COCCI      GRAM STAIN ANAEROBIC BOTTLE POSITIVE      GRAM STAIN        RESULTS VERIFIED, PHONED TO AND READ BACK BY Ruth Ann Kauffman, RN @ 1518 ON 1/13/22 BY AMM    Culture result: CULTURE IN PROGRESS,FURTHER UPDATES TO FOLLOW      Culture result:        Refer to Blood Culture ID Panel Accession  [id number]     LACTIC ACID    Collection Time: 01/12/22 11:26 AM   Result Value Ref Range    Lactic acid 3.3 (H) 0.4 - 2.0 MMOL/L   CBC WITH AUTOMATED DIFF    Collection Time: 01/12/22 11:26 AM   Result Value Ref Range    WBC 15.2 (H) 4.3 - 11.1 K/uL    RBC 4.51 4.23 - 5.6 M/uL    HGB 11.5 (L) 13.6 - 17.2 g/dL    HCT 35.8 (L) 41.1 - 50.3 %    MCV 79.4 (L) 79.6 - 97.8 FL MCH 25.5 (L) 26.1 - 32.9 PG    MCHC 32.1 31.4 - 35.0 g/dL    RDW 13.8 11.9 - 14.6 %    PLATELET 92 (L) 160 - 450 K/uL    MPV 10.0 9.4 - 12.3 FL    ABSOLUTE NRBC 0.00 0.0 - 0.2 K/uL    NEUTROPHILS 88 (H) 43 - 78 %    LYMPHOCYTES 4 (L) 13 - 44 %    MONOCYTES 4 4.0 - 12.0 %    EOSINOPHILS 0 (L) 0.5 - 7.8 %    BASOPHILS 1 0.0 - 2.0 %    IMMATURE GRANULOCYTES 3 0.0 - 5.0 %    ABS. NEUTROPHILS 13.3 (H) 1.7 - 8.2 K/UL    ABS. LYMPHOCYTES 0.6 0.5 - 4.6 K/UL    ABS. MONOCYTES 0.6 0.1 - 1.3 K/UL    ABS. EOSINOPHILS 0.0 0.0 - 0.8 K/UL    ABS. BASOPHILS 0.2 0.0 - 0.2 K/UL    ABS. IMM. GRANS. 0.5 0.0 - 0.5 K/UL    RBC COMMENTS OCCASIONAL  MICROCYTOSIS        WBC COMMENTS Result Confirmed By Smear      PLATELET COMMENTS DECREASED      DF AUTOMATED     METABOLIC PANEL, COMPREHENSIVE    Collection Time: 01/12/22 11:26 AM   Result Value Ref Range    Sodium 126 (L) 136 - 145 mmol/L    Potassium 4.6 3.5 - 5.1 mmol/L    Chloride 92 (L) 98 - 107 mmol/L    CO2 24 21 - 32 mmol/L    Anion gap 10 7 - 16 mmol/L    Glucose 101 (H) 65 - 100 mg/dL    BUN 28 (H) 8 - 23 MG/DL    Creatinine 1.66 (H) 0.8 - 1.5 MG/DL    GFR est AA 54 (L) >60 ml/min/1.73m2    GFR est non-AA 45 (L) >60 ml/min/1.73m2    Calcium 9.2 8.3 - 10.4 MG/DL    Bilirubin, total 0.5 0.2 - 1.1 MG/DL    ALT (SGPT) 15 12 - 65 U/L    AST (SGOT) 14 (L) 15 - 37 U/L    Alk. phosphatase 150 (H) 50 - 136 U/L    Protein, total 7.4 6.3 - 8.2 g/dL    Albumin 2.5 (L) 3.2 - 4.6 g/dL    Globulin 4.9 (H) 2.3 - 3.5 g/dL    A-G Ratio 0.5 (L) 1.2 - 3.5     PROCALCITONIN    Collection Time: 01/12/22 11:26 AM   Result Value Ref Range    Procalcitonin 2.21 (H) 0.00 - 0.49 ng/mL   BLOOD CULTURE ID PANEL    Collection Time: 01/12/22 11:26 AM    Specimen: Blood   Result Value Ref Range    Acc. no. from Micro Order [id number]     Blood Culture PCR Testing        MULTIPLEX PCR NEGATIVE:  A negative FilmArray BCID result does not exclude the possibility of bloodstream infection.    URINALYSIS W/ RFLX MICROSCOPIC    Collection Time: 01/12/22  3:49 PM   Result Value Ref Range    Color MATEO      Appearance TURBID      Specific gravity 1.018 1.001 - 1.023      pH (UA) 7.5 5.0 - 9.0      Protein 100 (A) NEG mg/dL    Glucose Negative mg/dL    Ketone Negative NEG mg/dL    Bilirubin Negative NEG      Blood LARGE (A) NEG      Urobilinogen 1.0 0.2 - 1.0 EU/dL    Nitrites Negative NEG      Leukocyte Esterase SMALL (A) NEG      WBC 10-20 0 /hpf    RBC 5-10 0 /hpf    Epithelial cells 0-3 0 /hpf    Bacteria 4+ (H) 0 /hpf    Casts HYALINE 0 /lpf    Amorphous Crystals 1+ (H) 0    Other observations RESULTS VERIFIED MANUALLY     CULTURE, URINE    Collection Time: 01/12/22  3:50 PM    Specimen: Urine    LEFT KIDNEY   Result Value Ref Range    Special Requests: NO SPECIAL REQUESTS      Culture result:        NO GROWTH AFTER SHORT PERIOD OF INCUBATION. FURTHER RESULTS TO FOLLOW AFTER OVERNIGHT INCUBATION. URINALYSIS W/ RFLX MICROSCOPIC    Collection Time: 01/12/22  3:50 PM   Result Value Ref Range    Color RED      Appearance TURBID      Specific gravity 1.019 1.001 - 1.023      pH (UA) 8.0 5.0 - 9.0      Protein 300 (A) NEG mg/dL    Glucose Negative mg/dL    Ketone TRACE (A) NEG mg/dL    Bilirubin Negative NEG      Blood LARGE (A) NEG      Urobilinogen 1.0 0.2 - 1.0 EU/dL    Nitrites Negative NEG      Leukocyte Esterase LARGE (A) NEG      WBC >100 0 /hpf    RBC 5-10 0 /hpf    Epithelial cells 0-3 0 /hpf    Bacteria 4+ (H) 0 /hpf    Casts HYALINE 0 /lpf    Other observations RESULTS VERIFIED MANUALLY     CULTURE, BLOOD    Collection Time: 01/12/22  4:43 PM    Specimen: Blood   Result Value Ref Range    Special Requests: NO SPECIAL REQUESTS  LEFT  Antecubital        GRAM STAIN GRAM POSITIVE COCCI      GRAM STAIN ANAEROBIC BOTTLE POSITIVE      GRAM STAIN        CRITICAL RESULT NOT CALLED DUE TO PREVIOUS NOTIFICATION OF CRITICAL RESULT WITHIN THE LAST 24 HOURS.     Culture result: CULTURE IN PROGRESS,FURTHER UPDATES TO FOLLOW LACTIC ACID    Collection Time: 01/12/22  7:31 PM   Result Value Ref Range    Lactic acid 2.1 (H) 0.4 - 2.0 MMOL/L   METABOLIC PANEL, BASIC    Collection Time: 01/12/22  9:04 PM   Result Value Ref Range    Sodium 126 (L) 136 - 145 mmol/L    Potassium 4.6 3.5 - 5.1 mmol/L    Chloride 97 (L) 98 - 107 mmol/L    CO2 22 21 - 32 mmol/L    Anion gap 7 7 - 16 mmol/L    Glucose 90 65 - 100 mg/dL    BUN 27 (H) 8 - 23 MG/DL    Creatinine 1.51 (H) 0.8 - 1.5 MG/DL    GFR est AA >60 >60 ml/min/1.73m2    GFR est non-AA 50 (L) >60 ml/min/1.73m2    Calcium 8.2 (L) 8.3 - 10.4 MG/DL   LACTIC ACID    Collection Time: 01/12/22  9:04 PM   Result Value Ref Range    Lactic acid 1.7 0.4 - 2.0 MMOL/L   METABOLIC PANEL, COMPREHENSIVE    Collection Time: 01/13/22  3:22 AM   Result Value Ref Range    Sodium 128 (L) 136 - 145 mmol/L    Potassium 3.9 3.5 - 5.1 mmol/L    Chloride 99 98 - 107 mmol/L    CO2 19 (L) 21 - 32 mmol/L    Anion gap 10 7 - 16 mmol/L    Glucose 86 65 - 100 mg/dL    BUN 23 8 - 23 MG/DL    Creatinine 1.45 0.8 - 1.5 MG/DL    GFR est AA >60 >60 ml/min/1.73m2    GFR est non-AA 52 (L) >60 ml/min/1.73m2    Calcium 7.6 (L) 8.3 - 10.4 MG/DL    Bilirubin, total 0.6 0.2 - 1.1 MG/DL    ALT (SGPT) 16 12 - 65 U/L    AST (SGOT) 22 15 - 37 U/L    Alk.  phosphatase 90 50 - 136 U/L    Protein, total 5.6 (L) 6.3 - 8.2 g/dL    Albumin 1.8 (L) 3.2 - 4.6 g/dL    Globulin 3.8 (H) 2.3 - 3.5 g/dL    A-G Ratio 0.5 (L) 1.2 - 3.5     CBC WITH AUTOMATED DIFF    Collection Time: 01/13/22  3:22 AM   Result Value Ref Range    WBC 7.8 4.3 - 11.1 K/uL    RBC 3.34 (L) 4.23 - 5.6 M/uL    HGB 8.5 (L) 13.6 - 17.2 g/dL    HCT 26.4 (L) 41.1 - 50.3 %    MCV 79.0 (L) 79.6 - 97.8 FL    MCH 25.4 (L) 26.1 - 32.9 PG    MCHC 32.2 31.4 - 35.0 g/dL    RDW 14.0 11.9 - 14.6 %    PLATELET 69 (L) 022 - 450 K/uL    MPV 10.7 9.4 - 12.3 FL    ABSOLUTE NRBC 0.00 0.0 - 0.2 K/uL    NEUTROPHILS 87 (H) 43 - 78 %    LYMPHOCYTES 4 (L) 13 - 44 %    MONOCYTES 2 (L) 4.0 - 12.0 % EOSINOPHILS 0 (L) 0.5 - 7.8 %    BASOPHILS 1 0.0 - 2.0 %    IMMATURE GRANULOCYTES 6 (H) 0.0 - 5.0 %    ABS. NEUTROPHILS 6.7 1.7 - 8.2 K/UL    ABS. LYMPHOCYTES 0.3 (L) 0.5 - 4.6 K/UL    ABS. MONOCYTES 0.2 0.1 - 1.3 K/UL    ABS. EOSINOPHILS 0.0 0.0 - 0.8 K/UL    ABS. BASOPHILS 0.1 0.0 - 0.2 K/UL    ABS. IMM. GRANS.  0.5 0.0 - 0.5 K/UL    RBC COMMENTS NORMOCYTIC/NORMOCHROMIC      WBC COMMENTS Result Confirmed By Smear      PLATELET COMMENTS PLATELET AGGREGATES PRESENT      DF AUTOMATED     METABOLIC PANEL, BASIC    Collection Time: 01/13/22  9:39 AM   Result Value Ref Range    Sodium 126 (L) 138 - 145 mmol/L    Potassium 3.5 3.5 - 5.1 mmol/L    Chloride 98 98 - 107 mmol/L    CO2 18 (L) 21 - 32 mmol/L    Anion gap 10 7 - 16 mmol/L    Glucose 103 (H) 65 - 100 mg/dL    BUN 21 8 - 23 MG/DL    Creatinine 1.37 0.8 - 1.5 MG/DL    GFR est AA >60 >60 ml/min/1.73m2    GFR est non-AA 56 (L) >60 ml/min/1.73m2    Calcium 7.1 (L) 8.3 - 10.4 MG/DL   TRANSFERRIN SATURATION    Collection Time: 01/13/22  9:40 AM   Result Value Ref Range    Iron 10 (L) 35 - 150 ug/dL    TIBC 165 (L) 250 - 450 ug/dL    Transferrin Saturation 6 (L) >20 %   OSMOLALITY, SERUM/PLASMA    Collection Time: 01/13/22  9:40 AM   Result Value Ref Range    Osmolality, serum/plasma 254 (L) 275 - 295 MOSM/kg H2O   VITAMIN B12    Collection Time: 01/13/22  9:40 AM   Result Value Ref Range    Vitamin B12 2,652 (H) 193 - 986 pg/mL   CK    Collection Time: 01/13/22  9:40 AM   Result Value Ref Range     21 - 215 U/L   FERRITIN    Collection Time: 01/13/22  9:40 AM   Result Value Ref Range    Ferritin 2,069 (H) 8 - 388 NG/ML   FOLATE    Collection Time: 01/13/22  9:40 AM   Result Value Ref Range    Folate 11.1 3.1 - 17.5 ng/mL   LD    Collection Time: 01/13/22  9:40 AM   Result Value Ref Range     (H) 110 - 210 U/L   RETICULOCYTE COUNT    Collection Time: 01/13/22  9:41 AM   Result Value Ref Range    Reticulocyte count 0.9 0.3 - 2.0 %    Absolute Retic Cnt. 0.0297 0.026 - 0.095 M/ul    Immature Retic Fraction 21.2 (H) 2.3 - 13.4 %    Retic Hgb Conc. 30 29 - 35 pg   CBC WITH AUTOMATED DIFF    Collection Time: 01/13/22  9:41 AM   Result Value Ref Range    WBC 7.7 4.3 - 11.1 K/uL    RBC 3.23 (L) 4.23 - 5.6 M/uL    HGB 8.4 (L) 13.6 - 17.2 g/dL    HCT 25.9 (L) 41.1 - 50.3 %    MCV 80.2 79.6 - 97.8 FL    MCH 26.0 (L) 26.1 - 32.9 PG    MCHC 32.4 31.4 - 35.0 g/dL    RDW 14.0 11.9 - 14.6 %    PLATELET 73 (L) 602 - 450 K/uL    MPV 11.1 9.4 - 12.3 FL    ABSOLUTE NRBC 0.00 0.0 - 0.2 K/uL    NEUTROPHILS 87 (H) 43 - 78 %    LYMPHOCYTES 3 (L) 13 - 44 %    MONOCYTES 2 (L) 4.0 - 12.0 %    EOSINOPHILS 0 (L) 0.5 - 7.8 %    BASOPHILS 0 0.0 - 2.0 %    IMMATURE GRANULOCYTES 8 (H) 0.0 - 5.0 %    ABS. NEUTROPHILS 6.7 1.7 - 8.2 K/UL    ABS. LYMPHOCYTES 0.2 (L) 0.5 - 4.6 K/UL    ABS. MONOCYTES 0.2 0.1 - 1.3 K/UL    ABS. EOSINOPHILS 0.0 0.0 - 0.8 K/UL    ABS. BASOPHILS 0.0 0.0 - 0.2 K/UL    ABS. IMM. GRANS. 0.6 (H) 0.0 - 0.5 K/UL    RBC COMMENTS NORMOCYTIC/NORMOCHROMIC      WBC COMMENTS Result Confirmed By Smear      PLATELET COMMENTS DECREASED      DF AUTOMATED     OCCULT BLOOD, STOOL    Collection Time: 01/13/22  9:42 AM   Result Value Ref Range    Occult blood, stool Negative NEG     MSSA/MRSA SC BY PCR, NASAL SWAB    Collection Time: 01/13/22  9:42 AM    Specimen: Nasal swab   Result Value Ref Range    Special Requests: NO SPECIAL REQUESTS      Culture result:        SA target not detected. A MRSA NEGATIVE, SA NEGATIVE test result does not preclude MRSA or SA nasal colonization. SODIUM, UR, RANDOM    Collection Time: 01/13/22  9:42 AM   Result Value Ref Range    Sodium,urine random 81 MMOL/L   PROTEIN/CREATININE RATIO, URINE    Collection Time: 01/13/22  9:42 AM   Result Value Ref Range    Protein, urine random 172 (H) <11.9 mg/dL    Creatinine, urine 73.60 mg/dL    Protein/Creat.  urine Ratio 2.3     OSMOLALITY, UR    Collection Time: 01/13/22  9:42 AM   Result Value Ref Range    Osmolality,urine 500 50 - 1,400 MOSM/kg H2O   POTASSIUM, UR, RANDOM    Collection Time: 01/13/22  9:42 AM   Result Value Ref Range    Potassium urine, random 58 MMOL/L   TYPE & SCREEN    Collection Time: 01/13/22  9:53 AM   Result Value Ref Range    Crossmatch Expiration 01/16/2022,2359     ABO/Rh(D) Sky Humberto POSITIVE     Antibody screen NEG        All Micro Results     Procedure Component Value Units Date/Time    BLOOD CULTURE [[id number]] Collected: 01/12/22 1643    Order Status: Completed Specimen: Blood Updated: 01/13/22 1334     Special Requests: --        NO SPECIAL REQUESTS  LEFT  Antecubital       GRAM STAIN GRAM POSITIVE COCCI         ANAEROBIC BOTTLE POSITIVE               CRITICAL RESULT NOT CALLED DUE TO PREVIOUS NOTIFICATION OF CRITICAL RESULT WITHIN THE LAST 24 HOURS. Culture result:       CULTURE IN PROGRESS,FURTHER UPDATES TO FOLLOW          MSSA/MRSA SC BY PCR, NASAL SWAB [[id number]] Collected: 01/13/22 0942    Order Status: Completed Specimen: Nasal swab Updated: 01/13/22 1140     Special Requests: NO SPECIAL REQUESTS        Culture result:       SA target not detected. A MRSA NEGATIVE, SA NEGATIVE test result does not preclude MRSA or SA nasal colonization.           BLOOD CULTURE [[id number]] Collected: 01/12/22 1126    Order Status: Completed Specimen: Blood Updated: 01/13/22 0942     Special Requests: --        RIGHT  Antecubital       GRAM STAIN GRAM POSITIVE COCCI         ANAEROBIC BOTTLE POSITIVE               RESULTS VERIFIED, PHONED TO AND READ BACK BY Moe Kaufman RN @ 8691 ON 1/13/22 BY AMM           Culture result:       CULTURE IN [facility] UPDATES TO FOLLOW                  Refer to Blood Culture ID Panel Accession  [id number]      BLOOD CULTURE ID PANEL [[id number]] Collected: 01/12/22 1126    Order Status: Completed Specimen: Blood Updated: 01/13/22 0910     Acc. no. from Micro Order [id number]     Blood Culture PCR Testing MULTIPLEX PCR NEGATIVE:  A negative FilmArray BCID result does not exclude the possibility of bloodstream infection. CULTURE, URINE [[id number]] Collected: 01/12/22 1550    Order Status: Completed Specimen: Urine Updated: 01/13/22 0637     Special Requests: NO SPECIAL REQUESTS        Culture result:       NO GROWTH AFTER SHORT PERIOD OF INCUBATION. FURTHER RESULTS TO FOLLOW AFTER OVERNIGHT INCUBATION. Other Studies:  No results found.     Current Meds:  Current Facility-Administered Medications   Medication Dose Route Frequency    urea (URE-NA) 15 gram packet 1 Packet  1 Packet Oral DAILY    meropenem (MERREM) 500 mg in 0.9% sodium chloride (MBP/ADV) 50 mL MBP  500 mg IntraVENous Q6H    cetirizine (ZYRTEC) tablet 5 mg  5 mg Oral DAILY    oxyCODONE IR (ROXICODONE) tablet 10 mg  10 mg Oral Q4H PRN    sodium chloride (NS) flush 5-10 mL  5-10 mL IntraVENous PRN    sodium chloride (NS) flush 5-40 mL  5-40 mL IntraVENous Q8H    sodium chloride (NS) flush 5-40 mL  5-40 mL IntraVENous PRN    acetaminophen (TYLENOL) tablet 650 mg  650 mg Oral Q6H PRN    Or    acetaminophen (TYLENOL) suppository 650 mg  650 mg Rectal Q6H PRN    polyethylene glycol (MIRALAX) packet 17 g  17 g Oral DAILY PRN    ondansetron (ZOFRAN ODT) tablet 4 mg  4 mg Oral Q8H PRN    Or    ondansetron (ZOFRAN) injection 4 mg  4 mg IntraVENous Q6H PRN    montelukast (SINGULAIR) tablet 10 mg  10 mg Oral QHS    hyoscyamine (LEVSIN) elixir 0.125 mg  0.125 mg Oral Q4H PRN    atorvastatin (LIPITOR) tablet 40 mg  40 mg Oral QHS    albuterol (PROVENTIL VENTOLIN) nebulizer solution 2.5 mg  2.5 mg Nebulization Q6H PRN    oxybutynin (DITROPAN) tablet 5 mg  5 mg Oral Q8H PRN    nicotine (NICODERM CQ) 14 mg/24 hr patch 1 Patch  1 Patch TransDERmal Q24H    dronedarone (MULTAQ) tablet tab 400 mg  400 mg Oral BID WITH MEALS    tuberculin injection 5 Units  5 Units IntraDERMal ONCE    naloxone (NARCAN) injection 0.4 mg  0.4 mg IntraVENous EVERY 2 MINUTES AS NEEDED    vancomycin (VANCOCIN) 1,000 mg in 0.9% sodium chloride 250 mL (VIAL-MATE)  1,000 mg IntraVENous Q24H    0.9% sodium chloride infusion  100 mL/hr IntraVENous CONTINUOUS     Current Outpatient Medications   Medication Sig    sertraline (Zoloft) 25 mg tablet Take 25 mg by mouth daily.  dronedarone (Multaq) tab tablet Take 1 Tablet by mouth two (2) times daily (with meals).  tadalafiL (Cialis) 20 mg tablet Take 1 Tablet by mouth daily as needed for Erectile Dysfunction.  montelukast (SINGULAIR) 10 mg tablet Take 1 Tablet by mouth daily.  metoprolol succinate (TOPROL-XL) 50 mg XL tablet Take 1 Tablet by mouth daily.  traZODone (DESYREL) 100 mg tablet Take 1 Tablet by mouth nightly.  hyoscyamine SL (LEVSIN/SL) 0.125 mg SL tablet 1 Tablet by SubLINGual route every four (4) hours as needed for Other (bladder pain).  atorvastatin (LIPITOR) 40 mg tablet TAKE 1 TABLET BY MOUTH EVERY MORNING FOR HIGH CHOLESTEROL    benazepriL (LOTENSIN) 10 mg tablet TAKE 1 TABLET BY MOUTH DAILY    hydrocortisone (ANUSOL-HC) 2.5 % rectal cream Insert  into rectum four (4) times daily.  nitroglycerin (NITROSTAT) 0.4 mg SL tablet 1 Tab by SubLINGual route every five (5) minutes as needed for Chest Pain. Up to 3 doses.  apixaban (ELIQUIS) 5 mg tablet Take 1 Tab by mouth two (2) times a day.  levocetirizine (XYZAL) 5 mg tablet Take  by mouth.  albuterol (VENTOLIN HFA) 90 mcg/actuation inhaler Take 2 Puffs by inhalation as needed for Wheezing.  multivitamin (ONE A DAY) tablet Take 1 Tab by mouth daily. Signed:  Leonard Dunn MD    Part of this note may have been written by using a voice dictation software. The note has been proof read but may still contain some grammatical/other typographical errors.

## 2022-01-14 LAB
ANION GAP SERPL CALC-SCNC: 11 MMOL/L (ref 7–16)
ANION GAP SERPL CALC-SCNC: 12 MMOL/L (ref 7–16)
ANION GAP SERPL CALC-SCNC: 8 MMOL/L (ref 7–16)
ANION GAP SERPL CALC-SCNC: 8 MMOL/L (ref 7–16)
BUN SERPL-MCNC: 27 MG/DL (ref 8–23)
BUN SERPL-MCNC: 30 MG/DL (ref 8–23)
BUN SERPL-MCNC: 32 MG/DL (ref 8–23)
BUN SERPL-MCNC: 33 MG/DL (ref 8–23)
CALCIUM SERPL-MCNC: 7.4 MG/DL (ref 8.3–10.4)
CALCIUM SERPL-MCNC: 7.9 MG/DL (ref 8.3–10.4)
CALCIUM SERPL-MCNC: 8.1 MG/DL (ref 8.3–10.4)
CALCIUM SERPL-MCNC: 8.4 MG/DL (ref 8.3–10.4)
CHLORIDE SERPL-SCNC: 103 MMOL/L (ref 98–107)
CHLORIDE SERPL-SCNC: 105 MMOL/L (ref 98–107)
CHLORIDE SERPL-SCNC: 97 MMOL/L (ref 98–107)
CHLORIDE SERPL-SCNC: 97 MMOL/L (ref 98–107)
CO2 SERPL-SCNC: 17 MMOL/L (ref 21–32)
CO2 SERPL-SCNC: 20 MMOL/L (ref 21–32)
CO2 SERPL-SCNC: 20 MMOL/L (ref 21–32)
CO2 SERPL-SCNC: 21 MMOL/L (ref 21–32)
CREAT SERPL-MCNC: 1.31 MG/DL (ref 0.8–1.5)
CREAT SERPL-MCNC: 1.4 MG/DL (ref 0.8–1.5)
CREAT SERPL-MCNC: 1.45 MG/DL (ref 0.8–1.5)
CREAT SERPL-MCNC: 1.47 MG/DL (ref 0.8–1.5)
GLUCOSE SERPL-MCNC: 106 MG/DL (ref 65–100)
GLUCOSE SERPL-MCNC: 116 MG/DL (ref 65–100)
GLUCOSE SERPL-MCNC: 78 MG/DL (ref 65–100)
GLUCOSE SERPL-MCNC: 94 MG/DL (ref 65–100)
MM INDURATION POC: 0 MM (ref 0–5)
POTASSIUM SERPL-SCNC: 3.3 MMOL/L (ref 3.5–5.1)
POTASSIUM SERPL-SCNC: 3.6 MMOL/L (ref 3.5–5.1)
POTASSIUM SERPL-SCNC: 3.9 MMOL/L (ref 3.5–5.1)
POTASSIUM SERPL-SCNC: 4.1 MMOL/L (ref 3.5–5.1)
PPD POC: NEGATIVE NEGATIVE
SODIUM SERPL-SCNC: 125 MMOL/L (ref 138–145)
SODIUM SERPL-SCNC: 128 MMOL/L (ref 136–145)
SODIUM SERPL-SCNC: 132 MMOL/L (ref 136–145)
SODIUM SERPL-SCNC: 134 MMOL/L (ref 136–145)
VANCOMYCIN SERPL-MCNC: 17.9 UG/ML

## 2022-01-14 PROCEDURE — 99254 IP/OBS CNSLTJ NEW/EST MOD 60: CPT | Performed by: INTERNAL MEDICINE

## 2022-01-14 PROCEDURE — 80048 BASIC METABOLIC PNL TOTAL CA: CPT

## 2022-01-14 PROCEDURE — 74011000258 HC RX REV CODE- 258: Performed by: NURSE PRACTITIONER

## 2022-01-14 PROCEDURE — 74011250637 HC RX REV CODE- 250/637: Performed by: STUDENT IN AN ORGANIZED HEALTH CARE EDUCATION/TRAINING PROGRAM

## 2022-01-14 PROCEDURE — 74011000258 HC RX REV CODE- 258: Performed by: STUDENT IN AN ORGANIZED HEALTH CARE EDUCATION/TRAINING PROGRAM

## 2022-01-14 PROCEDURE — APPSS45 APP SPLIT SHARED TIME 31-45 MINUTES: Performed by: NURSE PRACTITIONER

## 2022-01-14 PROCEDURE — 65270000029 HC RM PRIVATE

## 2022-01-14 PROCEDURE — 74011250637 HC RX REV CODE- 250/637: Performed by: EMERGENCY MEDICINE

## 2022-01-14 PROCEDURE — 65660000000 HC RM CCU STEPDOWN

## 2022-01-14 PROCEDURE — 74011000250 HC RX REV CODE- 250: Performed by: FAMILY MEDICINE

## 2022-01-14 PROCEDURE — 2709999900 HC NON-CHARGEABLE SUPPLY

## 2022-01-14 PROCEDURE — 99222 1ST HOSP IP/OBS MODERATE 55: CPT | Performed by: NURSE PRACTITIONER

## 2022-01-14 PROCEDURE — 36415 COLL VENOUS BLD VENIPUNCTURE: CPT

## 2022-01-14 PROCEDURE — 74011250636 HC RX REV CODE- 250/636: Performed by: FAMILY MEDICINE

## 2022-01-14 PROCEDURE — 74011250637 HC RX REV CODE- 250/637: Performed by: INTERNAL MEDICINE

## 2022-01-14 PROCEDURE — 87040 BLOOD CULTURE FOR BACTERIA: CPT

## 2022-01-14 PROCEDURE — 74011250637 HC RX REV CODE- 250/637: Performed by: NURSE PRACTITIONER

## 2022-01-14 PROCEDURE — 74011250636 HC RX REV CODE- 250/636: Performed by: STUDENT IN AN ORGANIZED HEALTH CARE EDUCATION/TRAINING PROGRAM

## 2022-01-14 PROCEDURE — 74011250637 HC RX REV CODE- 250/637: Performed by: FAMILY MEDICINE

## 2022-01-14 PROCEDURE — 97530 THERAPEUTIC ACTIVITIES: CPT

## 2022-01-14 PROCEDURE — 80202 ASSAY OF VANCOMYCIN: CPT

## 2022-01-14 PROCEDURE — 74011250636 HC RX REV CODE- 250/636: Performed by: NURSE PRACTITIONER

## 2022-01-14 RX ORDER — TOLVAPTAN 15 MG/1
15 TABLET ORAL ONCE
Status: COMPLETED | OUTPATIENT
Start: 2022-01-14 | End: 2022-01-14

## 2022-01-14 RX ORDER — PANTOPRAZOLE SODIUM 40 MG/1
40 TABLET, DELAYED RELEASE ORAL
Status: DISCONTINUED | OUTPATIENT
Start: 2022-01-15 | End: 2022-01-19 | Stop reason: HOSPADM

## 2022-01-14 RX ADMIN — MONTELUKAST 10 MG: 10 TABLET, FILM COATED ORAL at 22:22

## 2022-01-14 RX ADMIN — Medication 15 MG: at 12:37

## 2022-01-14 RX ADMIN — MEROPENEM 500 MG: 500 INJECTION, POWDER, FOR SOLUTION INTRAVENOUS at 18:46

## 2022-01-14 RX ADMIN — DRONEDARONE 400 MG: 400 TABLET, FILM COATED ORAL at 18:47

## 2022-01-14 RX ADMIN — OXYCODONE 10 MG: 5 TABLET ORAL at 05:42

## 2022-01-14 RX ADMIN — DRONEDARONE 400 MG: 400 TABLET, FILM COATED ORAL at 08:55

## 2022-01-14 RX ADMIN — SODIUM CHLORIDE, PRESERVATIVE FREE 10 ML: 5 INJECTION INTRAVENOUS at 05:35

## 2022-01-14 RX ADMIN — MEROPENEM 500 MG: 500 INJECTION, POWDER, FOR SOLUTION INTRAVENOUS at 00:18

## 2022-01-14 RX ADMIN — TRAZODONE HYDROCHLORIDE 100 MG: 50 TABLET ORAL at 22:21

## 2022-01-14 RX ADMIN — VANCOMYCIN HYDROCHLORIDE 1000 MG: 1 INJECTION, POWDER, LYOPHILIZED, FOR SOLUTION INTRAVENOUS at 20:43

## 2022-01-14 RX ADMIN — MEROPENEM 500 MG: 500 INJECTION, POWDER, FOR SOLUTION INTRAVENOUS at 12:23

## 2022-01-14 RX ADMIN — Medication 1 PACKET: at 08:55

## 2022-01-14 RX ADMIN — ACETAMINOPHEN 650 MG: 325 TABLET ORAL at 08:55

## 2022-01-14 RX ADMIN — SODIUM CHLORIDE, PRESERVATIVE FREE 5 ML: 5 INJECTION INTRAVENOUS at 22:22

## 2022-01-14 RX ADMIN — CETIRIZINE HYDROCHLORIDE 5 MG: 10 TABLET, FILM COATED ORAL at 08:55

## 2022-01-14 RX ADMIN — SODIUM CHLORIDE 1000 ML: 900 INJECTION, SOLUTION INTRAVENOUS at 04:45

## 2022-01-14 RX ADMIN — MEROPENEM 500 MG: 500 INJECTION, POWDER, FOR SOLUTION INTRAVENOUS at 05:45

## 2022-01-14 RX ADMIN — SODIUM CHLORIDE 25 MG: 9 INJECTION, SOLUTION INTRAVENOUS at 16:37

## 2022-01-14 RX ADMIN — OXYCODONE 10 MG: 5 TABLET ORAL at 10:44

## 2022-01-14 RX ADMIN — OXYCODONE 10 MG: 5 TABLET ORAL at 19:17

## 2022-01-14 RX ADMIN — SODIUM CHLORIDE, PRESERVATIVE FREE 10 ML: 5 INJECTION INTRAVENOUS at 12:24

## 2022-01-14 RX ADMIN — ATORVASTATIN CALCIUM 40 MG: 40 TABLET, FILM COATED ORAL at 22:21

## 2022-01-14 NOTE — PROGRESS NOTES
ACUTE PHYSICAL THERAPY GOALS:  (Developed with and agreed upon by patient and/or caregiver.)  1. Patient will perform bed mobility with INDEPENDENCE within 7 days. 2. Patient will transfer bed to chair with MODIFIED INDEPENDENCE within 7 days. 3. Patient will demonstrate FAIR+ DYNAMIC STANDING balance within 7 day(s). 4. Patient will ambulate 150ft+ using least restrictive assistive device and MODIFIED INDEPENDENCE within 7 days. 5. Patient will tolerate 25+ minutes of therapeutic activity/exercise and/or neuromuscular re-education while maintaining stable vitals to improve functional strength and activity tolerance within 7 days    PHYSICAL THERAPY: Daily Note and PM Treatment Day # 2    Shagufta Brady is a 57 y.o. male   PRIMARY DIAGNOSIS: Severe sepsis ([facility]  Severe sepsis ([facility] [address] [A41.9, R65.20]  UTI (urinary tract infection) [N39.0]  Hyponatremia [E87.1]  EDWARDO (acute kidney injury) ([facility] [address] [N17.9]         ASSESSMENT:     REHAB RECOMMENDATIONS: CURRENT LEVEL OF FUNCTION:  (Most Recently Demonstrated)   Recommendation to date pending progress:  [address] [address] Therapy  Equipment:    Rolling Walker Bed Mobility:   Modified Independent  Sit to Stand:   Contact Guard Assistance  Transfers:   Contact Guard Assistance  Gait/Mobility:   Contact Guard Assistance     ASSESSMENT:  Mr. Elmer Carrasco got to the EOB without help. He stood into the walker and walked the whole floor. He was very slow and took several short standing rest breaks to straighten his back or lean against the wall. He is weak but moving fairly with good family support. Would benefit from a walker for home use at this time. SUBJECTIVE:   Mr. Elmer Carrasco states, \"that felt good\"    SOCIAL HISTORY/ LIVING ENVIRONMENT: At baseline, patient is an independent, community-level   ambulator without utilizing an assistive device. Endorses 1 week decline in functional strength and mobility.   Support Systems: Spouse/Significant Other  OBJECTIVE:     PAIN: VITAL SIGNS: LINES/DRAINS:   Pre Treatment: Pain Screen  Pain Scale 1: FLACC  Pain Intensity 1: 2  Post Treatment: 0   Urostomy   O2 Device: None (Room air)     MOBILITY: I Mod I S SBA CGA Min Mod Max Total  NT x2 Comments:   Bed Mobility    Rolling [] [] [] [] [] [] [] [] [] [] []    Supine to Sit [] [x] [] [] [] [] [] [] [] [] []    Scooting [x] [] [] [] [] [] [] [] [] [] []    Sit to Supine [] [] [] [] [] [] [] [] [] [] []    Transfers    Sit to Stand [] [] [] [] [x] [] [] [] [] [] []    Bed to Chair [] [] [] [] [x] [] [] [] [] [] []    Stand to Sit [] [] [x] [] [] [] [] [] [] [] []    I=Independent, Mod I=Modified Independent, S=Supervision, SBA=Standby Assistance, CGA=Contact Guard Assistance,   Min=Minimal Assistance, Mod=Moderate Assistance, Max=Maximal Assistance, Total=Total Assistance, NT=Not Tested    BALANCE: Good Fair+ Fair Fair- Poor NT Comments   Sitting Static [x] [] [] [] [] []    Sitting Dynamic [x] [] [] [] [] []              Standing Static [] [x] [x] [] [] []    Standing Dynamic [] [x] [x] [] [] []      GAIT: I Mod I S SBA CGA Min Mod Max Total  NT x2 Comments:   Level of Assistance [] [] [] [x] [x] [] [] [] [] [] []    Distance 500    DME Rolling Walker    Gait Quality Slow and fair    Weightbearing  Status N/A     I=Independent, Mod I=Modified Independent, S=Supervision, SBA=Standby Assistance, CGA=Contact Guard Assistance,   Min=Minimal Assistance, Mod=Moderate Assistance, Max=Maximal Assistance, Total=Total Assistance, NT=Not Tested    PLAN:   FREQUENCY/DURATION: PT Plan of Care: 3 times/week for duration of hospital stay or until stated goals are met, whichever comes first.  TREATMENT:     TREATMENT:   ($$ Therapeutic Activity: 23-37 mins    )  Therapeutic Activity (25 Minutes):  Therapeutic activity included Supine to Sit, Scooting, Transfer Training, Ambulation on level ground and Standing balance to improve functional Mobility, Strength and Activity tolerance.     TREATMENT GRID:  N/A    AFTER TREATMENT POSITION/PRECAUTIONS:  Chair, Needs within reach and RN notified    INTERDISCIPLINARY COLLABORATION:  RN/PCT and PT/PTA    TOTAL TREATMENT DURATION:  PT Patient Time In/Time Out  Time In: 1315  Time Out: 200 [facility]

## 2022-01-14 NOTE — PROGRESS NOTES
Hospitalist Progress Note   Admit Date:  2022  1:58 PM   Name:  Bryon Blue   Age:  57 y.o. Sex:  male  :  1960   MRN:  [id number]   Room:  18/0    Presenting Complaint: Flank Pain    Reason(s) for Admission: Severe sepsis ([address] [A41.9, R65.20]  UTI (urinary tract infection) [N39.0]  Hyponatremia [E87.1]  EDWARDO (acute kidney injury) [facility]) [N17.9]     Hospital Course & Interval History:   Patient is a 57 y.o. male who presented to the ED for cc lower back pain along with odor to urine. Hx of depression, HTN, HLD, CAD s/p PCI, paroxysmal a fib not on anticoagulation, metastatic urothelial/ bladder cancer on chemo last tx one week ago at [address] with mets to lung, bilateral nephrosotomy tubes since November, and recurrent UTIs growing stenotrophomonas maltophilia, klebsiella pneumoniae, and klebsiella ozaenae in the past. Wife at bedside who states she has noticed some erythema to right nephrostomy site. Both state having good urine output from nephrostomy tubes. Subjective (22): Pt is feeling much better today. Pt denies chest pain, sob, diarrhea. Assessment & Plan:   Severe sepsis ([address] (2022)   Severe sepsis (met by HR, WBC, and EDWARDO) secondary to UTI -  GPC Bacteremia and source is probablynephrostomy tube exit site;   LA 3.3, Procalcitoinin 2.2, Leucocytosis of 15k and s/p 3L IVF  on admission  CT scan showed Pyelonephritis  UCNTD  F/u repeat blood cultures  Continue Vancomycin and Meropenem for now  Can transition to PO FQ awaiting sensitivities  ID is following   Urology consulted for exchange of nephrostomy tubes  He is scheduled for tube exchange on 2/3 at [address].  If he doesn't respond well to antibiotics may need Exchange of tubes in this hospital.        HTN (hypertension) (10/14/2013)  Pt is hypotensive  Holding antihyperensives      Recurrent depression ([address] (2018)  Continue home medications      PAF (paroxysmal atrial fibrillation) ([address] (2020)  Holding Metoprolol due to hypotension  Holding Eliquis due to hematuria      Hyponatremia (1/12/2022)  SIADH  Most likely due to sertraline and cancer   Sodium was 126 on admission and repeat 125  S/p tolvaptan x1 dose  Goal is to reach Na 130  Fluid restriction  F/u Nephrology    Anemia due to acute blood loss  Anemia due to hematuria vs chemotherapy vs malignancy vs sepsis  OBI   S/p InFed  Hb of 8 and baseline is 16  F/u FOBT     Thrombocytopenia  Most likely due to sepsis  Transfuse platelets if <88J if bleeding  Monitor Platelets for now      UTI (urinary tract infection) (1/12/2022)    Continue antibiotics      EDWARDO (acute kidney injury) ([facility] (1/12/2022)  Most likely due to sepsis   Cr trending down  and baseline is 0.81    Bladder and urothelial cancer -   Follows [facility]. PRN oxycodone        Dispo/Discharge Planning:    Home health therapy and pt wants to go home  PPD is ordered    Diet:  ADULT DIET Regular; Low Fat/Low Chol/High Fiber/2 gm Na  DVT PPx: scd  Code status: Full Code    Hospital Problems as of 1/14/2022 Date Reviewed: 10/21/2021          Codes Class Noted - Resolved POA    Hyponatremia ICD-10-CM: E87.1  ICD-9-CM: 276.1  1/12/2022 - Present Unknown        UTI (urinary tract infection) ICD-10-CM: N39.0  ICD-9-CM: 599.0  1/12/2022 - Present Unknown        * (Principal) Severe sepsis (HCC) ICD-10-CM: A41.9, R65.20  ICD-9-CM: 038.9, 995.92  1/12/2022 - Present Unknown        EDWARDO (acute kidney injury) ([facility] ICD-10-CM: N17.9  ICD-9-CM: 584.9  1/12/2022 - Present Unknown        PAF (paroxysmal atrial fibrillation) (HCC) ICD-10-CM: I48.0  ICD-9-CM: 427.31  1/7/2020 - Present Yes    Overview Signed 8/26/2020  3:51 PM by Carlos Ormond, MD     1. Admitted 1/20 with afib and chest pain. Started on Multaq and Eliquis.               Recurrent depression ([facility] ICD-10-CM: F33.9  ICD-9-CM: 296.30  1/4/2018 - Present Yes        HTN (hypertension) ICD-10-CM: I10  ICD-9-CM: 401.9  10/14/2013 - Present Yes Objective:     Patient Vitals for the past 24 hrs:   Temp Pulse Resp BP SpO2   01/14/22 1638 97.6 °F (36.4 °C) 85 18 91/63 98 %   01/14/22 0757 99.2 °F (37.3 °C) (!) 104 20 91/61 95 %   01/14/22 0622  98  (!) 93/57    01/14/22 0621  98  (!) 89/64    01/14/22 0352 100.1 °F (37.8 °C) 93 20 (!) 83/54 94 %   01/13/22 2340 (!) 100.5 °F (38.1 °C) (!) 105 18 95/61 95 %   01/13/22 1953 97.1 °F (36.2 °C) (!) 111 22 120/60 95 %     Oxygen Therapy  O2 Sat (%): 98 % (01/14/22 1638)  Pulse via Oximetry: 98 beats per minute (01/13/22 1500)  O2 Device: None (Room air) (01/14/22 1638)    Estimated body mass index is 21.79 kg/m² as calculated from the following:    Height as of this encounter: 5' 6\" (1.676 m). Weight as of this encounter: 61.2 kg (135 lb). Intake/Output Summary (Last 24 hours) at 1/14/2022 1726  Last data filed at 1/14/2022 1700  Gross per 24 hour   Intake    Output 3675 ml   Net -3675 ml         Physical Exam:     Blood pressure 91/63, pulse 85, temperature 97.6 °F (36.4 °C), resp. rate 18, height 5' 6\" (1.676 m), weight 61.2 kg (135 lb), SpO2 98 %. General:    Well nourished. No overt distress  Head:  Normocephalic, atraumatic  Eyes:  Sclerae appear normal.  Pupils equally round. ENT:  Nares appear normal, no drainage. Moist oral mucosa  Neck:  No restricted ROM. Trachea midline   CV:   RRR. No m/r/g. No jugular venous distension. Lungs:   CTAB. No wheezing, rhonchi, or rales. Respirations even, unlabored  Abdomen: Bowel sounds present. Soft, nontender, nondistended. Genitourinary Bilateral Nephrostomy tubes are present  Extremities: No cyanosis or clubbing. No edema  Skin:     No rashes and normal coloration. Warm and dry. Neuro:  CN II-XII grossly intact. Sensation intact. A&Ox3  Psych:  Normal mood and affect.       I have reviewed ordered lab tests and independently visualized imaging below:    Recent Labs:  Recent Results (from the past 48 hour(s))   LACTIC ACID Collection Time: 01/12/22  7:31 PM   Result Value Ref Range    Lactic acid 2.1 (H) 0.4 - 2.0 MMOL/L   METABOLIC PANEL, BASIC    Collection Time: 01/12/22  9:04 PM   Result Value Ref Range    Sodium 126 (L) 136 - 145 mmol/L    Potassium 4.6 3.5 - 5.1 mmol/L    Chloride 97 (L) 98 - 107 mmol/L    CO2 22 21 - 32 mmol/L    Anion gap 7 7 - 16 mmol/L    Glucose 90 65 - 100 mg/dL    BUN 27 (H) 8 - 23 MG/DL    Creatinine 1.51 (H) 0.8 - 1.5 MG/DL    GFR est AA >60 >60 ml/min/1.73m2    GFR est non-AA 50 (L) >60 ml/min/1.73m2    Calcium 8.2 (L) 8.3 - 10.4 MG/DL   LACTIC ACID    Collection Time: 01/12/22  9:04 PM   Result Value Ref Range    Lactic acid 1.7 0.4 - 2.0 MMOL/L   METABOLIC PANEL, COMPREHENSIVE    Collection Time: 01/13/22  3:22 AM   Result Value Ref Range    Sodium 128 (L) 136 - 145 mmol/L    Potassium 3.9 3.5 - 5.1 mmol/L    Chloride 99 98 - 107 mmol/L    CO2 19 (L) 21 - 32 mmol/L    Anion gap 10 7 - 16 mmol/L    Glucose 86 65 - 100 mg/dL    BUN 23 8 - 23 MG/DL    Creatinine 1.45 0.8 - 1.5 MG/DL    GFR est AA >60 >60 ml/min/1.73m2    GFR est non-AA 52 (L) >60 ml/min/1.73m2    Calcium 7.6 (L) 8.3 - 10.4 MG/DL    Bilirubin, total 0.6 0.2 - 1.1 MG/DL    ALT (SGPT) 16 12 - 65 U/L    AST (SGOT) 22 15 - 37 U/L    Alk.  phosphatase 90 50 - 136 U/L    Protein, total 5.6 (L) 6.3 - 8.2 g/dL    Albumin 1.8 (L) 3.2 - 4.6 g/dL    Globulin 3.8 (H) 2.3 - 3.5 g/dL    A-G Ratio 0.5 (L) 1.2 - 3.5     CBC WITH AUTOMATED DIFF    Collection Time: 01/13/22  3:22 AM   Result Value Ref Range    WBC 7.8 4.3 - 11.1 K/uL    RBC 3.34 (L) 4.23 - 5.6 M/uL    HGB 8.5 (L) 13.6 - 17.2 g/dL    HCT 26.4 (L) 41.1 - 50.3 %    MCV 79.0 (L) 79.6 - 97.8 FL    MCH 25.4 (L) 26.1 - 32.9 PG    MCHC 32.2 31.4 - 35.0 g/dL    RDW 14.0 11.9 - 14.6 %    PLATELET 69 (L) 974 - 450 K/uL    MPV 10.7 9.4 - 12.3 FL    ABSOLUTE NRBC 0.00 0.0 - 0.2 K/uL    NEUTROPHILS 87 (H) 43 - 78 %    LYMPHOCYTES 4 (L) 13 - 44 %    MONOCYTES 2 (L) 4.0 - 12.0 %    EOSINOPHILS 0 (L) 0.5 - 7.8 %    BASOPHILS 1 0.0 - 2.0 %    IMMATURE GRANULOCYTES 6 (H) 0.0 - 5.0 %    ABS. NEUTROPHILS 6.7 1.7 - 8.2 K/UL    ABS. LYMPHOCYTES 0.3 (L) 0.5 - 4.6 K/UL    ABS. MONOCYTES 0.2 0.1 - 1.3 K/UL    ABS. EOSINOPHILS 0.0 0.0 - 0.8 K/UL    ABS. BASOPHILS 0.1 0.0 - 0.2 K/UL    ABS. IMM. GRANS.  0.5 0.0 - 0.5 K/UL    RBC COMMENTS NORMOCYTIC/NORMOCHROMIC      WBC COMMENTS Result Confirmed By Smear      PLATELET COMMENTS PLATELET AGGREGATES PRESENT      DF AUTOMATED     METABOLIC PANEL, BASIC    Collection Time: 01/13/22  9:39 AM   Result Value Ref Range    Sodium 126 (L) 138 - 145 mmol/L    Potassium 3.5 3.5 - 5.1 mmol/L    Chloride 98 98 - 107 mmol/L    CO2 18 (L) 21 - 32 mmol/L    Anion gap 10 7 - 16 mmol/L    Glucose 103 (H) 65 - 100 mg/dL    BUN 21 8 - 23 MG/DL    Creatinine 1.37 0.8 - 1.5 MG/DL    GFR est AA >60 >60 ml/min/1.73m2    GFR est non-AA 56 (L) >60 ml/min/1.73m2    Calcium 7.1 (L) 8.3 - 10.4 MG/DL   TRANSFERRIN SATURATION    Collection Time: 01/13/22  9:40 AM   Result Value Ref Range    Iron 10 (L) 35 - 150 ug/dL    TIBC 165 (L) 250 - 450 ug/dL    Transferrin Saturation 6 (L) >20 %   OSMOLALITY, SERUM/PLASMA    Collection Time: 01/13/22  9:40 AM   Result Value Ref Range    Osmolality, serum/plasma 254 (L) 275 - 295 MOSM/kg H2O   VITAMIN B12    Collection Time: 01/13/22  9:40 AM   Result Value Ref Range    Vitamin B12 2,652 (H) 193 - 986 pg/mL   CK    Collection Time: 01/13/22  9:40 AM   Result Value Ref Range     21 - 215 U/L   FERRITIN    Collection Time: 01/13/22  9:40 AM   Result Value Ref Range    Ferritin 2,069 (H) 8 - 388 NG/ML   FOLATE    Collection Time: 01/13/22  9:40 AM   Result Value Ref Range    Folate 11.1 3.1 - 17.5 ng/mL   LD    Collection Time: 01/13/22  9:40 AM   Result Value Ref Range     (H) 110 - 210 U/L   RETICULOCYTE COUNT    Collection Time: 01/13/22  9:41 AM   Result Value Ref Range    Reticulocyte count 0.9 0.3 - 2.0 %    Absolute Retic Cnt. 0.0297 0.026 - 0.095 M/ul Immature Retic Fraction 21.2 (H) 2.3 - 13.4 %    Retic Hgb Conc. 30 29 - 35 pg   CBC WITH AUTOMATED DIFF    Collection Time: 01/13/22  9:41 AM   Result Value Ref Range    WBC 7.7 4.3 - 11.1 K/uL    RBC 3.23 (L) 4.23 - 5.6 M/uL    HGB 8.4 (L) 13.6 - 17.2 g/dL    HCT 25.9 (L) 41.1 - 50.3 %    MCV 80.2 79.6 - 97.8 FL    MCH 26.0 (L) 26.1 - 32.9 PG    MCHC 32.4 31.4 - 35.0 g/dL    RDW 14.0 11.9 - 14.6 %    PLATELET 73 (L) 154 - 450 K/uL    MPV 11.1 9.4 - 12.3 FL    ABSOLUTE NRBC 0.00 0.0 - 0.2 K/uL    NEUTROPHILS 87 (H) 43 - 78 %    LYMPHOCYTES 3 (L) 13 - 44 %    MONOCYTES 2 (L) 4.0 - 12.0 %    EOSINOPHILS 0 (L) 0.5 - 7.8 %    BASOPHILS 0 0.0 - 2.0 %    IMMATURE GRANULOCYTES 8 (H) 0.0 - 5.0 %    ABS. NEUTROPHILS 6.7 1.7 - 8.2 K/UL    ABS. LYMPHOCYTES 0.2 (L) 0.5 - 4.6 K/UL    ABS. MONOCYTES 0.2 0.1 - 1.3 K/UL    ABS. EOSINOPHILS 0.0 0.0 - 0.8 K/UL    ABS. BASOPHILS 0.0 0.0 - 0.2 K/UL    ABS. IMM. GRANS. 0.6 (H) 0.0 - 0.5 K/UL    RBC COMMENTS NORMOCYTIC/NORMOCHROMIC      WBC COMMENTS Result Confirmed By Smear      PLATELET COMMENTS DECREASED      DF AUTOMATED     OCCULT BLOOD, STOOL    Collection Time: 01/13/22  9:42 AM   Result Value Ref Range    Occult blood, stool Negative NEG     MSSA/MRSA SC BY PCR, NASAL SWAB    Collection Time: 01/13/22  9:42 AM    Specimen: Nasal swab   Result Value Ref Range    Special Requests: NO SPECIAL REQUESTS      Culture result:        SA target not detected. A MRSA NEGATIVE, SA NEGATIVE test result does not preclude MRSA or SA nasal colonization. SODIUM, UR, RANDOM    Collection Time: 01/13/22  9:42 AM   Result Value Ref Range    Sodium,urine random 81 MMOL/L   PROTEIN/CREATININE RATIO, URINE    Collection Time: 01/13/22  9:42 AM   Result Value Ref Range    Protein, urine random 172 (H) <11.9 mg/dL    Creatinine, urine 73.60 mg/dL    Protein/Creat.  urine Ratio 2.3     OSMOLALITY, UR    Collection Time: 01/13/22  9:42 AM   Result Value Ref Range Osmolality,urine 500 50 - 1,400 MOSM/kg H2O   POTASSIUM, UR, RANDOM    Collection Time: 01/13/22  9:42 AM   Result Value Ref Range    Potassium urine, random 58 MMOL/L   TYPE & SCREEN    Collection Time: 01/13/22  9:53 AM   Result Value Ref Range    Crossmatch Expiration 01/16/2022,2359     ABO/Rh(D) O POSITIVE     Antibody screen NEG    METABOLIC PANEL, BASIC    Collection Time: 01/13/22  9:21 PM   Result Value Ref Range    Sodium 126 (L) 136 - 145 mmol/L    Potassium 3.9 3.5 - 5.1 mmol/L    Chloride 97 (L) 98 - 107 mmol/L    CO2 19 (L) 21 - 32 mmol/L    Anion gap 10 7 - 16 mmol/L    Glucose 75 65 - 100 mg/dL    BUN 32 (H) 8 - 23 MG/DL    Creatinine 1.38 0.8 - 1.5 MG/DL    GFR est AA >60 >60 ml/min/1.73m2    GFR est non-AA 55 (L) >60 ml/min/1.73m2    Calcium 7.5 (L) 8.3 - 10.4 MG/DL   VANCOMYCIN, RANDOM    Collection Time: 01/14/22  3:22 AM   Result Value Ref Range    Vancomycin, random 08.6 UG/ML   METABOLIC PANEL, BASIC    Collection Time: 01/14/22  3:22 AM   Result Value Ref Range    Sodium 125 (L) 138 - 145 mmol/L    Potassium 4.1 3.5 - 5.1 mmol/L    Chloride 97 (L) 98 - 107 mmol/L    CO2 20 (L) 21 - 32 mmol/L    Anion gap 8 7 - 16 mmol/L    Glucose 78 65 - 100 mg/dL    BUN 27 (H) 8 - 23 MG/DL    Creatinine 1.31 0.8 - 1.5 MG/DL    GFR est AA >60 >60 ml/min/1.73m2    GFR est non-AA 59 (L) >60 ml/min/1.73m2    Calcium 7.4 (L) 8.3 - 19.2 MG/DL   METABOLIC PANEL, BASIC    Collection Time: 01/14/22 10:25 AM   Result Value Ref Range    Sodium 128 (L) 136 - 145 mmol/L    Potassium 3.6 3.5 - 5.1 mmol/L    Chloride 97 (L) 98 - 107 mmol/L    CO2 20 (L) 21 - 32 mmol/L    Anion gap 11 7 - 16 mmol/L    Glucose 94 65 - 100 mg/dL    BUN 33 (H) 8 - 23 MG/DL    Creatinine 1.45 0.8 - 1.5 MG/DL    GFR est AA >60 >60 ml/min/1.73m2    GFR est non-AA 52 (L) >60 ml/min/1.73m2    Calcium 7.9 (L) 8.3 - 10.4 MG/DL   PLEASE READ & DOCUMENT PPD TEST IN 24 HRS    Collection Time: 01/14/22 10:48 AM   Result Value Ref Range    PPD Negative Negative    mm Induration 0 0 - 5 mm       All Micro Results     Procedure Component Value Units Date/Time    CULTURE, URINE [[id number]]  (Abnormal) Collected: 01/12/22 1550    Order Status: Completed Specimen: Urine Updated: 01/14/22 1232     Special Requests: NO SPECIAL REQUESTS        Culture result:       >100,000 COLONIES/mL GRAM POSITIVE COCCI SUBCULTURE IN PROGRESS          CULTURE, BLOOD [[id number]] Collected: 01/14/22 1025    Order Status: Completed Specimen: Blood Updated: 01/14/22 1055    CULTURE, BLOOD [[id number]] Collected: 01/14/22 1025    Order Status: Completed Specimen: Blood Updated: 01/14/22 1054    BLOOD CULTURE [[id number]]  (Abnormal) Collected: 01/12/22 1643    Order Status: Completed Specimen: Blood Updated: 01/14/22 0640     Special Requests: --        LEFT  Antecubital       GRAM STAIN GRAM POSITIVE COCCI         ANAEROBIC BOTTLE POSITIVE               CRITICAL RESULT NOT CALLED DUE TO PREVIOUS NOTIFICATION OF CRITICAL RESULT WITHIN THE LAST 24 HOURS. Culture result:       STAPHYLOCOCCUS SPECIES SUBCULTURE IN PROGRESS          BLOOD CULTURE [[id number]]  (Abnormal) Collected: 01/12/22 1126    Order Status: Completed Specimen: Blood Updated: 01/14/22 5327     Special Requests: --        RIGHT  Antecubital       GRAM STAIN GRAM POSITIVE COCCI               AEROBIC AND ANAEROBIC BOTTLES                  RESULTS VERIFIED, PHONED TO AND READ BACK BY Mary Mortensen RN @ 5263 ON 1/13/22 BY M           Culture result:       STAPHYLOCOCCUS SPECIES SUBCULTURE IN PROGRESS                  Refer to Blood Culture ID Panel Accession  [id number]      MSSA/MRSA SC BY PCR, NASAL SWAB [[id number]] Collected: 01/13/22 0942    Order Status: Completed Specimen: Nasal swab Updated: 01/13/22 1140     Special Requests: NO SPECIAL REQUESTS        Culture result:       SA target not detected.                                  A MRSA NEGATIVE, SA NEGATIVE test result does not preclude MRSA or SA nasal colonization. BLOOD CULTURE ID PANEL [[id number]] Collected: 01/12/22 1126    Order Status: Completed Specimen: Blood Updated: 01/13/22 0910     Acc. no. from Micro Order [id number]     Blood Culture PCR Testing       MULTIPLEX PCR NEGATIVE:  A negative FilmArray BCID result does not exclude the possibility of bloodstream infection. Other Studies:  No results found.     Current Meds:  Current Facility-Administered Medications   Medication Dose Route Frequency    [START ON 1/15/2022] pantoprazole (PROTONIX) tablet 40 mg  40 mg Oral ACB    [START ON 1/15/2022] iron dextran (INFED) 1,000 mg in 0.9% sodium chloride 500 mL IVPB  1,000 mg IntraVENous DAILY    urea (URE-NA) 15 gram packet 1 Packet  1 Packet Oral DAILY    meropenem (MERREM) 500 mg in 0.9% sodium chloride (MBP/ADV) 50 mL MBP  500 mg IntraVENous Q6H    cetirizine (ZYRTEC) tablet 5 mg  5 mg Oral DAILY    oxyCODONE IR (ROXICODONE) tablet 10 mg  10 mg Oral Q4H PRN    tuberculin injection 5 Units  5 Units IntraDERMal ONCE    tuberculin injection 5 Units  5 Units IntraDERMal ONCE    traZODone (DESYREL) tablet 100 mg  100 mg Oral QHS    sodium chloride (NS) flush 5-10 mL  5-10 mL IntraVENous PRN    sodium chloride (NS) flush 5-40 mL  5-40 mL IntraVENous Q8H    sodium chloride (NS) flush 5-40 mL  5-40 mL IntraVENous PRN    acetaminophen (TYLENOL) tablet 650 mg  650 mg Oral Q6H PRN    Or    acetaminophen (TYLENOL) suppository 650 mg  650 mg Rectal Q6H PRN    polyethylene glycol (MIRALAX) packet 17 g  17 g Oral DAILY PRN    ondansetron (ZOFRAN ODT) tablet 4 mg  4 mg Oral Q8H PRN    Or    ondansetron (ZOFRAN) injection 4 mg  4 mg IntraVENous Q6H PRN    montelukast (SINGULAIR) tablet 10 mg  10 mg Oral QHS    hyoscyamine (LEVSIN) elixir 0.125 mg  0.125 mg Oral Q4H PRN    atorvastatin (LIPITOR) tablet 40 mg  40 mg Oral QHS    albuterol (PROVENTIL VENTOLIN) nebulizer solution 2.5 mg  2.5 mg Nebulization Q6H PRN    oxybutynin (DITROPAN) tablet 5 mg  5 mg Oral Q8H PRN    nicotine (NICODERM CQ) 14 mg/24 hr patch 1 Patch  1 Patch TransDERmal Q24H    dronedarone (MULTAQ) tablet tab 400 mg  400 mg Oral BID WITH MEALS    naloxone (NARCAN) injection 0.4 mg  0.4 mg IntraVENous EVERY 2 MINUTES AS NEEDED    vancomycin (VANCOCIN) 1,000 mg in 0.9% sodium chloride 250 mL (VIAL-MATE)  1,000 mg IntraVENous Q24H       Signed:  Esvin Duran MD    Part of this note may have been written by using a voice dictation software. The note has been proof read but may still contain some grammatical/other typographical errors.

## 2022-01-14 NOTE — PROGRESS NOTES
Infectious Disease Progress note    Today's Date: 1/14/2022   Admit Date: 1/12/2022    Impression:   · GPC Sepsis: nidus probably either urine/kidneys or nephrostomy tube exit site;   · UTI: patient did have significant pyuria from one nephrostomy tube (one with 10-20 WBC; other with >100), culture NTD but sent from L side; right perinephric stranding seen on CT suggestive of pyelonephritis, with limited output from R [facility]  · Metastatic Bladder Ca    Plan:   · Would continue Vancomycin pending blood cultures; repeat BC today  · Given other prior urine isolates (stenotrophomonas, Klebsiella) have been FQ sensitive could switch Meropenem to FQ; okay to continue pending culture results  · Duration of tx TBD  · Plans for IR to exchange PNC    Anti-infectives:   · Vancomycin (01/12-)  · Piperacillin/tazobactam (01/12-01/13)  · Ceftriaxone (01/12)  · Meropenem (01/13-)    Subjective:   Reports feeling better, seen with family at bedside. R PNC with limited output       HPI:    Patient is a 57 y.o. male with a hx of metastatic bladder carcinoma followed at [address], bilateral nephrostomy tubes, recurrent UTIs admitted to [facility] on 01/12 with lower back pain, malodorous urine and some redness around right nephrostomy tube site. Patient with Tmax 100.3 on admission, WBC 15.2, procal 2.2; CT with right perinephric stranding, and U/A with >100 WBC. Cultures obtained (BC, and ?left nephrostomy tube) and patient initially given dose of ceftriaxone in ED. Admitted and placed on Vanc/Zosyn. Initial BC growing GPC in anaerobic bottle; zosyn switched to meropenem this am.  Afebrile, WBC down to 7; Cr down from 1.66 on admission to 1.37 this am.   Overall patient feels better; with resolution of back pain; no fever or chills. States he's always noted less urine output on right then left nephrostomy tubes since they were placed at [address] in 11/2021. He states they are scheduled to be exchanged in February.   He has no port or indwelling catheter; his chemo has been given through peripheral access. He makes very little urine through his urethra. He denied N/V/diarrhea. No Known Allergies     Review of Systems:  A comprehensive review of systems was negative except for that written in the History of Present Illness. Objective:     Visit Vitals  BP 91/61 (BP 1 Location: Right upper arm, BP Patient Position: Sitting)   Pulse (!) 104   Temp 99.2 °F (37.3 °C)   Resp 20   Ht 5' 6\" (1.676 m)   Wt 61.2 kg (135 lb)   SpO2 95%   BMI 21.79 kg/m²     Temp (24hrs), Av.3 °F (37.4 °C), Min:97.1 °F (36.2 °C), Max:100.5 °F (38.1 °C)     Patient examined 2022, exam remains unchanged except as noted below:    General:  Alert, cooperative, no acute distress   Head:  Normocephalic, atraumatic    Eyes:  Anicteric, no drainage/not injected   Throat: Mucus membranes moist OP clear   Neck: Supple, symmetrical, trachea midline, no JVD   Lungs:   Clear throughout lung fields, no increased work of breathing   Heart:  Regular rate and rhythm, without murmur   Abdomen:   Soft, non-tender, no guarding, no distention, bowel sounds active. Bilateral flank PNC: leaking with both, R with minimal urine   Extremities: Extremities without edema, pulses palpable   Skin: Skin without rash     Lines/Devices: PIV         Data Review:     CBC:  Recent Labs     22  0941 22  0322 22  1126 22  1126   WBC 7.7 7.8  --  15.2*   GRANS 87* 87*   < > 88*   MONOS 2* 2*   < > 4   EOS 0* 0*   < > 0*   ANEU 6.7 6.7   < > 13.3*   ABL 0.2* 0.3*   < > 0.6   HGB 8.4* 8.5*  --  11.5*   HCT 25.9* 26.4*  --  35.8*   PLT 73* 69*  --  92*    < > = values in this interval not displayed.        BMP:  Recent Labs     22  0322 22  2121 22  0939   CREA 1.31 1.38 1.37   BUN 27* 32* 21   * 126* 126*   K 4.1 3.9 3.5   CL 97* 97* 98   CO2 20* 19* 18*   AGAP 8 10 10   GLU 78 75 103*       LFTS:  Recent Labs     22  0322 22  1126   TBILI 0.6 0.5 ALT 16 15   AP 90 150*   TP 5.6* 7.4   ALB 1.8* 2.5*       Microbiology:     All Micro Results     Procedure Component Value Units Date/Time    BLOOD CULTURE [[id number]]  (Abnormal) Collected: 01/12/22 1643    Order Status: Completed Specimen: Blood Updated: 01/14/22 0640     Special Requests: --        LEFT  Antecubital       GRAM STAIN GRAM POSITIVE COCCI         ANAEROBIC BOTTLE POSITIVE               CRITICAL RESULT NOT CALLED DUE TO PREVIOUS NOTIFICATION OF CRITICAL RESULT WITHIN THE LAST 24 HOURS. Culture result:       STAPHYLOCOCCUS SPECIES SUBCULTURE IN PROGRESS          BLOOD CULTURE [[id number]]  (Abnormal) Collected: 01/12/22 1126    Order Status: Completed Specimen: Blood Updated: 01/14/22 6546     Special Requests: --        RIGHT  Antecubital       GRAM STAIN GRAM POSITIVE COCCI               AEROBIC AND ANAEROBIC BOTTLES                  RESULTS VERIFIED, PHONED TO AND READ BACK BY Chiara Lozano RN @ 0880 ON 1/13/22 BY AMM           Culture result:       STAPHYLOCOCCUS SPECIES SUBCULTURE IN PROGRESS                  Refer to Blood Culture ID Panel Accession  [id number]      MSSA/MRSA SC BY PCR, NASAL SWAB [[id number]] Collected: 01/13/22 0942    Order Status: Completed Specimen: Nasal swab Updated: 01/13/22 1140     Special Requests: NO SPECIAL REQUESTS        Culture result:       SA target not detected. A MRSA NEGATIVE, SA NEGATIVE test result does not preclude MRSA or SA nasal colonization. BLOOD CULTURE ID PANEL [[id number]] Collected: 01/12/22 1126    Order Status: Completed Specimen: Blood Updated: 01/13/22 0910     Acc. no. from Micro Order [id number]     Blood Culture PCR Testing       MULTIPLEX PCR NEGATIVE:  A negative FilmArray BCID result does not exclude the possibility of bloodstream infection.           CULTURE, URINE [[id number]] Collected: 01/12/22 4540    Order Status: Completed Specimen: Urine Updated: 01/13/22 1478     Special Requests: NO SPECIAL REQUESTS        Culture result:       NO GROWTH AFTER SHORT PERIOD OF INCUBATION. FURTHER RESULTS TO FOLLOW AFTER OVERNIGHT INCUBATION. Imaging:   CT Abd/pelvis (01/12/22): IMPRESSION  1. Asymmetric right perinephric stranding concerning for right pyelonephritis  in the correct clinical setting. Bilateral nephrostomy tubes in place. No  hydronephrosis. No urinary tract calculi.     2.  Bladder is decompressed with circumferential wall thickening and to bladder  diverticula the larger appearing posteriorly measuring up to 4.5 cm. Follow-up  as clinically warranted.     3.   No bowel obstruction, diverticulitis or appendicitis.     4.  Multiple bibasilar pulmonary metastatic nodules.       Signed By: Vazquez Zelaya NP     January 14, 2022

## 2022-01-14 NOTE — PROGRESS NOTES
Consult received on pt. Per MD we will wait until Monday to exchange Bilateral NT after receives abx.

## 2022-01-14 NOTE — MANAGEMENT PLAN
[employer] [facility]        Inpatient Hematology / Oncology Plan of Care    Reason for Consult:  Severe sepsis ([facility] [A41.9, R65.20]  UTI (urinary tract infection) [N39.0]  Hyponatremia [E87.1]  EDWARDO (acute kidney injury) ([facility] [N17.9]  Referring Physician:  Kathy Caceres MD    History of Present Illness:  Mr. Daniel Delvalle is a 57 y.o. male admitted on 1/12/2022. The primary encounter diagnosis was Malignant neoplasm of urinary bladder, unspecified site [facility]). Diagnoses of Pyelonephritis, Infection associated with nephrostomy catheter, initial encounter ([facility] and Sepsis, due to unspecified organism, unspecified whether acute organ dysfunction present [facility]) were also pertinent to this visit. Mr Daniel Delvalle has a PMH of HTN, HLD, LVH, COPD, angina, arrhythmia. He is treated by [address] for metastatic urothelial carcinoma. He was diagnosed after presenting to [place] [address] [facility] for BPH symptoms. TURP completed 9/2021 and pathology revealed high-grade urothelial carcinoma invasive into prostatic stroma. Staging CT showed multiple pulmonary nodules as well as diffuse bladder thickening and nodule near ureterovesicular junction with associated right-sided hydroneprhosis as well as right inguinal and pelvic LAD. He underwent placement of BL nephrostomy tubes with right placed 12/10/21. He has been receiving cisplatin/gemcitabine and last received C1D8 1/4/21. Of note, he has had multiple recurrent UTIs. Mr Daniel Delvalle presented to the ED 1/12/22 with flank pain. He was admitted for sepsis and suspicion of infectious process involving genitourinary system and started on Vancomycin and Zosyn. BCx 2/2 shows Staphylococcus. UC NGTD. Anemia and thrombocytopenia noted. Hgb down to 8.4 from 11.5 on admission and plts down to 73k from 92k on admission. Cr also noted to be 1.6 and has improved to 1.3. CT AP shows BL nephrostomy tubes in correct position and also noted concern for right sided pyelonephritis.  Oncology consulted for recommendations given recent chemotherapy and metastatic urothelial cancer. Review of Systems:  Constitutional Denies fever, chills, weight loss, appetite changes, fatigue, night sweats. HEENT Denies trauma, blurry vision, hearing loss, ear pain, nosebleeds, sore throat, neck pain and ear discharge. Skin Denies lesions or rashes. Lungs Denies dyspnea, cough, sputum production or hemoptysis. Cardiovascular Denies chest pain, palpitations, or lower extremity edema. Gastrointestinal +\"heartburn\" Denies nausea, vomiting, changes in bowel habits, bloody or black stools, abdominal pain.  Denies dysuria, frequency or hesitancy of urination. Neuro Denies headaches, visual changes or ataxia. Denies dizziness, tingling, tremors, sensory change, speech change, focal weakness or headaches. Hematology Denies easy bruising or bleeding, denies gingival bleeding or epistaxis. Endo Denies heat/cold intolerance, denies diabetes or thyroid abnormalities. MSK Denies back pain, arthralgias, myalgias or frequent falls. Psychiatric/Behavioral Denies depression and substance abuse. The patient is not nervous/anxious. No Known Allergies  Past Medical History:   Diagnosis Date    Abnormal EKG     1. Cardiac MRI (5/14/15):  EF 68%. The left ventricular myocardium appear symmetric at the base. There appears to be slightly thicker left ventricular myocardium at the mid and apical lateral wall compared to other segments. No wall motion advised. EF 68%. Calculated left ventricular mass is within normal limits.     Arrhythmia     occasionally has heart palpitations     Chronic obstructive pulmonary disease (HCC)     ventolin prn     Depression     Former smoker     Hypercholesteremia     controlled by lipitor    Hyperlipidemia     Hypertension     daily meds    Inguinal hernia     Left ventricular hypertrophy     ECHO 2016    Prostate troubles     Unstable angina (HCC) 07/11/2014    cath in 2014=mild nonobstructive CAD     Past Surgical History:   Procedure Laterality Date    HX CARPAL TUNNEL RELEASE Left     HX COLONOSCOPY  2009    HX HEART CATHETERIZATION  2014    no stents    HX HERNIA REPAIR Right 10/24/13    inguinal    HX ORTHOPAEDIC Left 10/2016    shoulder manipulation     HX SHOULDER ARTHROSCOPY Left 07/2016    second surgery 07/17    HX TURP  10/04/2021    IA CARDIAC SURG PROCEDURE UNLIST      CCL, clean/no interventions     Family History   Problem Relation Age of Onset    Heart Disease Sister     Stroke Sister     Cancer Brother         lymphoma    Lung Disease Mother     Hypertension Sister     Hypertension Sister     Lung Disease Sister     Heart Disease Sister     Heart Disease Brother     Stroke Brother     Heart Disease Brother      Social History     Socioeconomic History    Marital status:      Spouse name: Not on file    Number of children: Not on file    Years of education: Not on file    Highest education level: Not on file   Occupational History    Not on file   Tobacco Use    Smoking status: Current Every Day Smoker     Packs/day: 1.00     Years: 35.00     Pack years: 35.00     Types: Cigarettes    Smokeless tobacco: Never Used    Tobacco comment: nicotine supplement? Vaping Use    Vaping Use: Never used   Substance and Sexual Activity    Alcohol use: Yes     Alcohol/week: 20.0 standard drinks     Types: 24 Cans of beer per week    Drug use: No    Sexual activity: Not Currently   Other Topics Concern    Not on file   Social History Narrative    Not on file     Social Determinants of Health     Financial Resource Strain:     Difficulty of Paying Living Expenses: Not on file   Food Insecurity:     Worried About Running Out of Food in the Last Year: Not on file    Teresa of Food in the Last Year: Not on file   Transportation Needs:     Lack of Transportation (Medical):  Not on file    Lack of Transportation (Non-Medical):  Not on file   Physical Activity:     Days of Exercise per Week: Not on file    Minutes of Exercise per Session: Not on file   Stress:     Feeling of Stress : Not on file   Social Connections:     Frequency of Communication with Friends and Family: Not on file    Frequency of Social Gatherings with Friends and Family: Not on file    Attends Rastafari Services: Not on file    Active Member of Clubs or Organizations: Not on file    Attends Club or Organization Meetings: Not on file    Marital Status: Not on file   Intimate Partner Violence:     Fear of Current or Ex-Partner: Not on file    Emotionally Abused: Not on file    Physically Abused: Not on file    Sexually Abused: Not on file   Housing Stability:     Unable to Pay for Housing in the Last Year: Not on file    Number of [place] in the Last Year: Not on file    Unstable Housing in the Last Year: Not on file     Current Facility-Administered Medications   Medication Dose Route Frequency Provider Last Rate Last Admin    urea (URE-NA) 15 gram packet 1 Packet  1 Packet Oral DAILY Lamar Hook NP   1 Packet at 01/14/22 0855    meropenem (MERREM) 500 mg in 0.9% sodium chloride (MBP/ADV) 50 mL MBP  500 mg IntraVENous Q6H Jefrey Claude,  mL/hr at 01/14/22 0545 500 mg at 01/14/22 0545    cetirizine (ZYRTEC) tablet 5 mg  5 mg Oral DAILY Jefrey Claude, MD   5 mg at 01/14/22 0855    oxyCODONE IR (ROXICODONE) tablet 10 mg  10 mg Oral Q4H PRN Jefrey Claude, MD   10 mg at 01/14/22 0542    tuberculin injection 5 Units  5 Units IntraDERMal Norman Hoffman MD        tuberculin injection 5 Units  5 Units IntraDERMal Norman Hoffman MD        traZODone (DESYREL) tablet 100 mg  100 mg Oral QHS Letty Nissen, MD   100 mg at 01/13/22 2157    sodium chloride (NS) flush 5-10 mL  5-10 mL IntraVENous PRN Korin Bowling DO        sodium chloride (NS) flush 5-40 mL  5-40 mL IntraVENous Q8H Lachelle Alves, DO   10 mL at 01/14/22 0535    sodium chloride (NS) flush 5-40 mL  5-40 mL IntraVENous PRN Emigdio Reach, DO        acetaminophen (TYLENOL) tablet 650 mg  650 mg Oral Q6H PRN Emigdio Reach, DO   650 mg at 01/14/22 5401    Or    acetaminophen (TYLENOL) suppository 650 mg  650 mg Rectal Q6H PRN Emigdio Reach, DO        polyethylene glycol (MIRALAX) packet 17 g  17 g Oral DAILY PRN Emigdio Reach, DO   17 g at 01/13/22 1006    ondansetron (ZOFRAN ODT) tablet 4 mg  4 mg Oral Q8H PRN Emigdio Reach, DO        Or    ondansetron [facility]) injection 4 mg  4 mg IntraVENous Q6H PRN Emigdio Reach, DO        montelukast (SINGULAIR) tablet 10 mg  10 mg Oral QHS Emigdio Reach, DO   10 mg at 01/13/22 2157    hyoscyamine (LEVSIN) elixir 0.125 mg  0.125 mg Oral Q4H PRN Emigdio Reach, DO        atorvastatin (LIPITOR) tablet 40 mg  40 mg Oral QHS Emigdio Reach, DO   40 mg at 01/13/22 2158    albuterol (PROVENTIL VENTOLIN) nebulizer solution 2.5 mg  2.5 mg Nebulization Q6H PRN Emigdio Reach, DO        oxybutynin (DITROPAN) tablet 5 mg  5 mg Oral Q8H PRN Emigdio Reach, DO        nicotine (NICODERM CQ) 14 mg/24 hr patch 1 Patch  1 Patch TransDERmal Q24H Emigdio Reach, DO   1 Patch at 01/13/22 2158    dronedarone (MULTAQ) tablet tab 400 mg  400 mg Oral BID WITH MEALS Emigdio Reach, DO   400 mg at 01/14/22 7404    tuberculin injection 5 Units  5 Units IntraDERMal PrernaMaren Garnett, DO   5 Units at 01/13/22 1006    naloxone (NARCAN) injection 0.4 mg  0.4 mg IntraVENous EVERY 2 MINUTES AS NEEDED Emigdio Reach, DO        vancomycin (VANCOCIN) 1,000 mg in 0.9% sodium chloride 250 mL (VIAL-MATE)  1,000 mg IntraVENous Q24H Emigdio Reach,  mL/hr at 01/13/22 2157 1,000 mg at 01/13/22 2157       OBJECTIVE:  Patient Vitals for the past 8 hrs:   BP Temp Pulse Resp SpO2   01/14/22 0757 91/61 99.2 °F (37.3 °C) (!) 104 20 95 %   01/14/22 0622 (!) 93/57  98     01/14/22 0621 (!) 89/64  98     22 0352 (!) 83/54 100.1 °F (37.8 °C) 93 20 94 %     Temp (24hrs), Av.3 °F (37.4 °C), Min:97.1 °F (36.2 °C), Max:100.5 °F (38.1 °C)       0701 -  1900  In: -   Out: 650 [Urine:650]    Physical Exam:  Constitutional: Well developed, well nourished male in no acute distress, sitting comfortably in the hospital bed. HEENT: Normocephalic and atraumatic. Oropharynx is clear, mucous membranes are moist. Extraocular muscles are intact. Sclerae anicteric. Neck supple without JVD. No thyromegaly present. Skin Warm and dry. No bruising and no rash noted. No erythema. No pallor. Respiratory Lungs are clear to auscultation bilaterally without wheezes, rales or rhonchi, normal air exchange without accessory muscle use. CVS Normal rate, regular rhythm and normal S1 and S2. No murmurs, gallops, or rubs. Abdomen Soft, nontender and nondistended, normoactive bowel sounds. No palpable mass. No hepatosplenomegaly. Neuro Grossly nonfocal with no obvious sensory or motor deficits. MSK Normal range of motion in general.  No edema and no tenderness. Psych Appropriate mood and affect.         Labs:    Recent Results (from the past 24 hour(s))   METABOLIC PANEL, BASIC    Collection Time: 22  9:39 AM   Result Value Ref Range    Sodium 126 (L) 138 - 145 mmol/L    Potassium 3.5 3.5 - 5.1 mmol/L    Chloride 98 98 - 107 mmol/L    CO2 18 (L) 21 - 32 mmol/L    Anion gap 10 7 - 16 mmol/L    Glucose 103 (H) 65 - 100 mg/dL    BUN 21 8 - 23 MG/DL    Creatinine 1.37 0.8 - 1.5 MG/DL    GFR est AA >60 >60 ml/min/1.73m2    GFR est non-AA 56 (L) >60 ml/min/1.73m2    Calcium 7.1 (L) 8.3 - 10.4 MG/DL   TRANSFERRIN SATURATION    Collection Time: 22  9:40 AM   Result Value Ref Range    Iron 10 (L) 35 - 150 ug/dL    TIBC 165 (L) 250 - 450 ug/dL    Transferrin Saturation 6 (L) >20 %   OSMOLALITY, SERUM/PLASMA    Collection Time: 22  9:40 AM   Result Value Ref Range    Osmolality, serum/plasma 254 (L) 275 - 295 MOSM/kg H2O   VITAMIN B12    Collection Time: 01/13/22  9:40 AM   Result Value Ref Range    Vitamin B12 2,652 (H) 193 - 986 pg/mL   CK    Collection Time: 01/13/22  9:40 AM   Result Value Ref Range     21 - 215 U/L   FERRITIN    Collection Time: 01/13/22  9:40 AM   Result Value Ref Range    Ferritin 2,069 (H) 8 - 388 NG/ML   FOLATE    Collection Time: 01/13/22  9:40 AM   Result Value Ref Range    Folate 11.1 3.1 - 17.5 ng/mL   LD    Collection Time: 01/13/22  9:40 AM   Result Value Ref Range     (H) 110 - 210 U/L   RETICULOCYTE COUNT    Collection Time: 01/13/22  9:41 AM   Result Value Ref Range    Reticulocyte count 0.9 0.3 - 2.0 %    Absolute Retic Cnt. 0.0297 0.026 - 0.095 M/ul    Immature Retic Fraction 21.2 (H) 2.3 - 13.4 %    Retic Hgb Conc. 30 29 - 35 pg   CBC WITH AUTOMATED DIFF    Collection Time: 01/13/22  9:41 AM   Result Value Ref Range    WBC 7.7 4.3 - 11.1 K/uL    RBC 3.23 (L) 4.23 - 5.6 M/uL    HGB 8.4 (L) 13.6 - 17.2 g/dL    HCT 25.9 (L) 41.1 - 50.3 %    MCV 80.2 79.6 - 97.8 FL    MCH 26.0 (L) 26.1 - 32.9 PG    MCHC 32.4 31.4 - 35.0 g/dL    RDW 14.0 11.9 - 14.6 %    PLATELET 73 (L) 312 - 450 K/uL    MPV 11.1 9.4 - 12.3 FL    ABSOLUTE NRBC 0.00 0.0 - 0.2 K/uL    NEUTROPHILS 87 (H) 43 - 78 %    LYMPHOCYTES 3 (L) 13 - 44 %    MONOCYTES 2 (L) 4.0 - 12.0 %    EOSINOPHILS 0 (L) 0.5 - 7.8 %    BASOPHILS 0 0.0 - 2.0 %    IMMATURE GRANULOCYTES 8 (H) 0.0 - 5.0 %    ABS. NEUTROPHILS 6.7 1.7 - 8.2 K/UL    ABS. LYMPHOCYTES 0.2 (L) 0.5 - 4.6 K/UL    ABS. MONOCYTES 0.2 0.1 - 1.3 K/UL    ABS. EOSINOPHILS 0.0 0.0 - 0.8 K/UL    ABS. BASOPHILS 0.0 0.0 - 0.2 K/UL    ABS. IMM.  GRANS. 0.6 (H) 0.0 - 0.5 K/UL    RBC COMMENTS NORMOCYTIC/NORMOCHROMIC      WBC COMMENTS Result Confirmed By Smear      PLATELET COMMENTS DECREASED      DF AUTOMATED     OCCULT BLOOD, STOOL    Collection Time: 01/13/22  9:42 AM   Result Value Ref Range    Occult blood, stool Negative NEG     MSSA/MRSA SC BY PCR, NASAL SWAB    Collection Time: 01/13/22  9:42 AM    Specimen: Nasal swab   Result Value Ref Range    Special Requests: NO SPECIAL REQUESTS      Culture result:        SA target not detected. A MRSA NEGATIVE, SA NEGATIVE test result does not preclude MRSA or SA nasal colonization. SODIUM, UR, RANDOM    Collection Time: 01/13/22  9:42 AM   Result Value Ref Range    Sodium,urine random 81 MMOL/L   PROTEIN/CREATININE RATIO, URINE    Collection Time: 01/13/22  9:42 AM   Result Value Ref Range    Protein, urine random 172 (H) <11.9 mg/dL    Creatinine, urine 73.60 mg/dL    Protein/Creat.  urine Ratio 2.3     OSMOLALITY, UR    Collection Time: 01/13/22  9:42 AM   Result Value Ref Range    Osmolality,urine 500 50 - 1,400 MOSM/kg H2O   POTASSIUM, UR, RANDOM    Collection Time: 01/13/22  9:42 AM   Result Value Ref Range    Potassium urine, random 58 MMOL/L   TYPE & SCREEN    Collection Time: 01/13/22  9:53 AM   Result Value Ref Range    Crossmatch Expiration 01/16/2022,2359     ABO/Rh(D) O POSITIVE     Antibody screen NEG    METABOLIC PANEL, BASIC    Collection Time: 01/13/22  9:21 PM   Result Value Ref Range    Sodium 126 (L) 136 - 145 mmol/L    Potassium 3.9 3.5 - 5.1 mmol/L    Chloride 97 (L) 98 - 107 mmol/L    CO2 19 (L) 21 - 32 mmol/L    Anion gap 10 7 - 16 mmol/L    Glucose 75 65 - 100 mg/dL    BUN 32 (H) 8 - 23 MG/DL    Creatinine 1.38 0.8 - 1.5 MG/DL    GFR est AA >60 >60 ml/min/1.73m2    GFR est non-AA 55 (L) >60 ml/min/1.73m2    Calcium 7.5 (L) 8.3 - 10.4 MG/DL   VANCOMYCIN, RANDOM    Collection Time: 01/14/22  3:22 AM   Result Value Ref Range    Vancomycin, random 79.9 UG/ML   METABOLIC PANEL, BASIC    Collection Time: 01/14/22  3:22 AM   Result Value Ref Range    Sodium 125 (L) 138 - 145 mmol/L    Potassium 4.1 3.5 - 5.1 mmol/L    Chloride 97 (L) 98 - 107 mmol/L    CO2 20 (L) 21 - 32 mmol/L    Anion gap 8 7 - 16 mmol/L    Glucose 78 65 - 100 mg/dL    BUN 27 (H) 8 - 23 MG/DL Creatinine 1.31 0.8 - 1.5 MG/DL    GFR est AA >60 >60 ml/min/1.73m2    GFR est non-AA 59 (L) >60 ml/min/1.73m2    Calcium 7.4 (L) 8.3 - 10.4 MG/DL       Imaging:  [unfilled]    ASSESSMENT:  Problem List  Date Reviewed: 10/21/2021          Codes Class Noted    Hyponatremia ICD-10-CM: E87.1  ICD-9-CM: 276.1  1/12/2022        UTI (urinary tract infection) ICD-10-CM: N39.0  ICD-9-CM: 599.0  1/12/2022        * (Principal) Severe sepsis ([facility] ICD-10-CM: A41.9, R65.20  ICD-9-CM: 038.9, 995.92  1/12/2022        DEWARDO (acute kidney injury) ([facility] ICD-10-CM: N17.9  ICD-9-CM: 584.9  1/12/2022        Hemorrhoids ICD-10-CM: K64.9  ICD-9-CM: 455.6  10/14/2021    Overview Signed 10/14/2021 10:14 AM by Destinee Hernandez PA-C     Last Assessment & Plan:   Formatting of this note might be different from the original.  Patient complains of near daily bleeding stools and occasional bleeding without stools. He is on 3 different blood thinners including Plavix, Eliquis, and fish oil. This is the likely issue with his bleeding. He has not had a colonoscopy in over 11 years. Given the blood thinning issues with increased potential complications recommend referral to colorectal surgery for evaluation of his colon and hemorrhoids. Would not recommend umbilical hernia repair combined with hemorrhoid surgery and colonoscopy due to the risk of mesh infection. However, the colonoscopy and hemorrhoid surgery might be able to be combined on the same day. History of colon polyps ICD-10-CM: Z86.010  ICD-9-CM: V12.72  3/17/2021    Overview Signed 10/14/2021 10:14 AM by Destinee Hernandez PA-C     Formatting of this note might be different from the original.  Added automatically from request for surgery [id number]             Bilateral carotid artery stenosis ICD-10-CM: I65.23  ICD-9-CM: 433.10, 433.30  10/12/2020    Overview Signed 10/12/2020  8:19 PM by Mello Buchanan MD     Carotid Duplex (10/9/20): Bilateral 50-69% ICA stenosis. Left ECA severe stenosis. Chest pain ICD-10-CM: R07.9  ICD-9-CM: 786.50  1/7/2020        PAF (paroxysmal atrial fibrillation) (HCC) ICD-10-CM: I48.0  ICD-9-CM: 427.31  1/7/2020    Overview Signed 8/26/2020  3:51 PM by Capo Carmichael MD     1. Admitted 1/20 with afib and chest pain. Started on Multaq and Eliquis. Recurrent depression ([address] ICD-10-CM: F33.9  ICD-9-CM: 296.30  1/4/2018        Hypomagnesemia ICD-10-CM: U89.54  ICD-9-CM: 275.2  7/7/2017        Depression ICD-10-CM: F32. A  ICD-9-CM: 285  7/6/2017        Adhesive capsulitis of left shoulder ICD-10-CM: M75.02  ICD-9-CM: 726.0  6/26/2017        Strain of muscle(s) and tendon(s) of the rotator cuff of left shoulder, sequela ICD-10-CM: S46.012S  ICD-9-CM: 905.7  6/26/2017        Strain of muscle, fascia and tendon of long head of biceps, left arm, sequela ICD-10-CM: W38.420H  ICD-9-CM: 840.8  6/26/2017        SLAP lesion of left shoulder ICD-10-CM: P05.458L  ICD-9-CM: 840.7  6/26/2017        Degenerative joint disease of left acromioclavicular joint ICD-10-CM: M19.012  ICD-9-CM: 715.91  6/26/2017        Coronary atherosclerosis of native coronary vessel ICD-10-CM: I25.10  ICD-9-CM: 414.01  11/15/2016    Overview Addendum 8/26/2020  3:49 PM by Capo Carmichael MD       1. [facility] 7/2014: mild CAD up to 20% ostial LM and 30% ostial RCA, normal EF  2. NSTEMI(1/20):  Chest pain in setting of afib with RVR. Peak Troponin 1.13. a. PCI of Ostial RCA with Asif 3 x 30 mm COBY. Post dilated to 3.25 mm. Mild disease in left system. Abnormal EKG ICD-10-CM: R94.31  ICD-9-CM: 794.31  Unknown    Overview Addendum 8/26/2020  3:50 PM by Capo Carmichael MD     1.  1.  Left ventriculogram (7/11/14):  EF 65-70%. Slightspade-like appearance of LV concerning for apical hypertrophic cardioomyopathy. 2. Cardiac MRI (5/14/15):  EF 68%. The left ventricular myocardium appear symmetric at the base.   There appears to be slightly thicker left ventricular myocardium at the mid and apical lateral wall compared to other segments. No wall motion advised. EF 68%. Calculated left ventricular mass is within normal limits. 3. Echo (12/15/16):  EF 54%. Normal diastolic function. No significant valve abnormalities. 4.  LV gram (1/8/20):  Apical hypertrophic cardiomyopathy. EF 70%. History of tobacco abuse (Chronic) ICD-10-CM: B15.032  ICD-9-CM: V15.82  7/11/2014        ESPINOZA (dyspnea on exertion) ICD-10-CM: R06.00  ICD-9-CM: 786.09  7/11/2014        HTN (hypertension) ICD-10-CM: I10  ICD-9-CM: 401.9  10/14/2013        Hyperlipidemia ICD-10-CM: E78.5  ICD-9-CM: 272.4  10/14/2013        Inguinal hernia unilateral, non-recurrent ICD-10-CM: K40.90  ICD-9-CM: 550.90  10/14/2013                RECOMMENDATIONS:    Metastatic urothelial cancer (lung)  - s/p C1D8 cis/gem 1/4/22  - Followed by medical oncology at [address]  - Due for C2 1/18/22 at [address] (will obviously be delayed due to current admission for sepsis)    Sepsis / UTI  - On Vancomycin and Zosyn  - BC 2/2 bottles with Staphylococcus species  - UC NGTD    Anemia and thrombocytopenia  - Likely secondary to acute illness/antibiotics as well as recent chemotherapy  - Nutritional studies adequate, some OBI noted - will give InFed  - Continue to monitor  - Transfuse for Hgb <7 and plts <10k (unless actively bleeding)    Lab studies and imaging studies (CT) were personally reviewed. Pertinent old records were reviewed from [facility] [address] Thank you for allowing us to participate in the care of Mr. Stanton Zabala. Formal consult by Dr Ten Estes to follow. Nothing further to add from oncology standpoint. OK to DC home when medically stable and follow-up with [address] as scheduled.           [facility]a [facility]  [address]  Office : [phone]  Fax : [phone]

## 2022-01-14 NOTE — CONSULTS
[employer] [facility]        Inpatient Hematology / Oncology Consult Note    Reason for Consult:  Severe sepsis ([facility] [A41.9, R65.20];UTI (urinary tract infection) [N39.0]; Hyponatremia [E87.1]; EDWARDO (acute kidney injury) ([facility] [N17.9]  Referring Physician:  Yoseph Wilson MD    History of Present Illness:  Mr. Enrique Adam is a 57 y.o. male admitted on 1/12/2022 with a primary diagnosis of The primary encounter diagnosis was Malignant neoplasm of urinary bladder, unspecified site [facility]). Diagnoses of Pyelonephritis, Infection associated with nephrostomy catheter, initial encounter ([facility] Sepsis, due to unspecified organism, unspecified whether acute organ dysfunction present ([facility] and EDWARDO (acute kidney injury) ([facility] were also pertinent to this visit. .      57 y.o. male past medical history of hypertension, hyperlipidemia, COPD, CAD, arrhythmia, metastatic urothelial carcinoma treated in [facility]. He was diagnosed after presenting to [facility] urology for BPH symptoms and sought second opinion at [facility]. TURP completed 9/2021 showed high-grade urothelial carcinoma invasive into prostatic stroma, staging CT showed multiple pulmonary nodules as well as diffuse bladder thickening and nodule near ureterovesical junction with associated right-sided hydronephrosis as well as right inguinal and pelvic lymphadenopathy. He underwent placement of nephrostomy tubes bilaterally 12/10/2021, received cisplatin/gemcitabine and last dose cycle 1 day 81/4/21. He has had multiple recurrent UTIs. Mr. Enrique Adam presented to the emergency room on 1/12/2022 with flank pain, admitted for sepsis and suspicion of infectious process involving genitourinary system and started on vancomycin and Zosyn. Blood culture 2x2 shows Staphylococcus. Urine culture no growth to date. Anemia and thrombocytopenia noted. Hemoglobin down to 8.4 from 11.5 on admission and platelets down to 70 3K from 90 2K on admission.   Creatinine also noted to be 1.6 and improved to 1.3. CT abdomen pelvis shows bilateral nephrostomy tube in correct position and also noted concern for right-sided pyelonephritis. Oncology consulted for recommendations given recent chemotherapy for metastatic urothelial cancer and cytopenia. Review of Systems:  Constitutional Denies fever, chills, weight loss, appetite changes, fatigue, night sweats. HEENT Denies trauma, blurry vision, hearing loss, ear pain, nosebleeds, sore throat, neck pain and ear discharge. Skin Denies lesions or rashes. Lungs Denies dyspnea, cough, sputum production or hemoptysis. Cardiovascular Denies chest pain, palpitations, or lower extremity edema. Gastrointestinal  heartburn. Denies nausea, vomiting, changes in bowel habits, bloody or black stools, abdominal pain.   urothelial carcinoma and bilateral nephrostomy as detailed above   Neuro Denies headaches, visual changes or ataxia. Denies dizziness, tingling, tremors, sensory change, speech change, focal weakness or headaches. Hematology Denies easy bruising or bleeding, denies gingival bleeding or epistaxis. Endo Denies heat/cold intolerance, denies diabetes or thyroid abnormalities. MSK Denies back pain, arthralgias, myalgias or frequent falls. Psychiatric/Behavioral Denies depression and substance abuse. The patient is not nervous/anxious. No Known Allergies  Past Medical History:   Diagnosis Date    Abnormal EKG     1. Cardiac MRI (5/14/15):  EF 68%. The left ventricular myocardium appear symmetric at the base. There appears to be slightly thicker left ventricular myocardium at the mid and apical lateral wall compared to other segments. No wall motion advised. EF 68%. Calculated left ventricular mass is within normal limits.     Arrhythmia     occasionally has heart palpitations     Chronic obstructive pulmonary disease (HCC)     ventolin prn     Depression     Former smoker     Hypercholesteremia controlled by lipitor    Hyperlipidemia     Hypertension     daily meds    Inguinal hernia     Left ventricular hypertrophy     ECHO 2016    Prostate troubles     Unstable angina ([address] 07/11/2014    cath in 2014=mild nonobstructive CAD     Past Surgical History:   Procedure Laterality Date    HX CARPAL TUNNEL RELEASE Left     HX COLONOSCOPY  2009    HX HEART CATHETERIZATION  2014    no stents    HX HERNIA REPAIR Right 10/24/13    inguinal    HX ORTHOPAEDIC Left 10/2016    shoulder manipulation     HX SHOULDER ARTHROSCOPY Left 07/2016    second surgery 07/17    HX TURP  10/04/2021    CO CARDIAC SURG PROCEDURE UNLIST      CCL, clean/no interventions     Family History   Problem Relation Age of Onset    Heart Disease Sister     Stroke Sister     Cancer Brother         lymphoma    Lung Disease Mother     Hypertension Sister     Hypertension Sister     Lung Disease Sister     Heart Disease Sister     Heart Disease Brother     Stroke Brother     Heart Disease Brother      Social History     Socioeconomic History    Marital status:      Spouse name: Not on file    Number of children: Not on file    Years of education: Not on file    Highest education level: Not on file   Occupational History    Not on file   Tobacco Use    Smoking status: Current Every Day Smoker     Packs/day: 1.00     Years: 35.00     Pack years: 35.00     Types: Cigarettes    Smokeless tobacco: Never Used    Tobacco comment: nicotine supplement? Vaping Use    Vaping Use: Never used   Substance and Sexual Activity    Alcohol use:  Yes     Alcohol/week: 20.0 standard drinks     Types: 24 Cans of beer per week    Drug use: No    Sexual activity: Not Currently   Other Topics Concern    Not on file   Social History Narrative    Not on file     Social Determinants of Health     Financial Resource Strain:     Difficulty of Paying Living Expenses: Not on file   Food Insecurity:     Worried About Running Out of Food in the Last Year: Not on file    Ran Out of Food in the Last Year: Not on file   Transportation Needs:     Lack of Transportation (Medical): Not on file    Lack of Transportation (Non-Medical):  Not on file   Physical Activity:     Days of Exercise per Week: Not on file    Minutes of Exercise per Session: Not on file   Stress:     Feeling of Stress : Not on file   Social Connections:     Frequency of Communication with Friends and Family: Not on file    Frequency of Social Gatherings with Friends and Family: Not on file    Attends Christianity Services: Not on file    Active Member of [address] [place] [address] [employer] or Organizations: Not on file    Attends Club or Organization Meetings: Not on file    Marital Status: Not on file   Intimate Partner Violence:     Fear of Current or Ex-Partner: Not on file    Emotionally Abused: Not on file    Physically Abused: Not on file    Sexually Abused: Not on file   Housing Stability:     Unable to Pay for Housing in the Last Year: Not on file    Number of [place] in the Last Year: Not on file    Unstable Housing in the Last Year: Not on file     Current Facility-Administered Medications   Medication Dose Route Frequency Provider Last Rate Last Admin    [START ON 1/15/2022] pantoprazole (PROTONIX) tablet 40 mg  40 mg Oral ACB Marii Goetz MD        [START ON 1/15/2022] iron dextran (INFED) 1,000 mg in 0.9% sodium chloride 500 mL IVPB  1,000 mg IntraVENous DAILY Bhavya Sic, FNP        iron dextran (INFED) 25 mg in 0.9% sodium chloride 50 mL ivpb  25 mg IntraVENous ONCE Gerient Sin, Daja Base, FNP        urea (URE-NA) 15 gram packet 1 Packet  1 Packet Oral DAILY Pablo Michaels NP   1 Packet at 01/14/22 0855    meropenem (MERREM) 500 mg in 0.9% sodium chloride (MBP/ADV) 50 mL MBP  500 mg IntraVENous Q6H Marii Goetz  mL/hr at 01/14/22 1223 500 mg at 01/14/22 1223    cetirizine (ZYRTEC) tablet 5 mg  5 mg Oral DAILY Marii Goetz MD   5 mg at 01/14/22 0855    oxyCODONE IR (ROXICODONE) tablet 10 mg  10 mg Oral Q4H PRN Ebb MD David   10 mg at 01/14/22 1044    tuberculin injection 5 Units  5 Units IntraDERMal Chestine MD Chris        tuberculin injection 5 Units  5 Units IntraDERMal Chestine MD Chris        traZODone (DESYREL) tablet 100 mg  100 mg Oral QHS Zane Scott MD   100 mg at 01/13/22 2157    sodium chloride (NS) flush 5-10 mL  5-10 mL IntraVENous PRN Orma Barrette, DO        sodium chloride (NS) flush 5-40 mL  5-40 mL IntraVENous Q8H Orma Barrette, DO   10 mL at 01/14/22 1224    sodium chloride (NS) flush 5-40 mL  5-40 mL IntraVENous PRN Orma Barrette, DO        acetaminophen (TYLENOL) tablet 650 mg  650 mg Oral Q6H PRN Orma Barrette, DO   650 mg at 01/14/22 1065    Or    acetaminophen (TYLENOL) suppository 650 mg  650 mg Rectal Q6H PRN Orma Barrette, DO        polyethylene glycol (MIRALAX) packet 17 g  17 g Oral DAILY PRN Orma Barrette, DO   17 g at 01/13/22 1006    ondansetron (ZOFRAN ODT) tablet 4 mg  4 mg Oral Q8H PRN Orma Barrette, DO        Or    ondansetron [facility]) injection 4 mg  4 mg IntraVENous Q6H PRN Orma Barrette, DO        montelukast (SINGULAIR) tablet 10 mg  10 mg Oral QHS Orma Barrette, DO   10 mg at 01/13/22 2157    hyoscyamine (LEVSIN) elixir 0.125 mg  0.125 mg Oral Q4H PRN Orma Barrette, DO        atorvastatin (LIPITOR) tablet 40 mg  40 mg Oral QHS Orma Barrette, DO   40 mg at 01/13/22 2158    albuterol (PROVENTIL VENTOLIN) nebulizer solution 2.5 mg  2.5 mg Nebulization Q6H PRN Orma Barrette, DO        oxybutynin (DITROPAN) tablet 5 mg  5 mg Oral Q8H PRN Orma Barrette, DO        nicotine (NICODERM CQ) 14 mg/24 hr patch 1 Patch  1 Patch TransDERmal Q24H Orma Barrette, DO   1 Patch at 01/13/22 2158    dronedarone (MULTAQ) tablet tab 400 mg  400 mg Oral BID WITH MEALS Lj Thomas DO   400 mg at 01/14/22 0855    naloxone (NARCAN) injection 0.4 mg 0.4 mg IntraVENous EVERY 2 MINUTES AS NEEDED Jenny Corral DO        vancomycin (VANCOCIN) 1,000 mg in 0.9% sodium chloride 250 mL (VIAL-MATE)  1,000 mg IntraVENous Q24H Jenny Corral  mL/hr at 22 1,000 mg at 22       OBJECTIVE:  Patient Vitals for the past 8 hrs:   BP Temp Pulse Resp SpO2 Weight   22 1216      135 lb (61.2 kg)   22 0757 91/61 99.2 °F (37.3 °C) (!) 104 20 95 %      Temp (24hrs), Av.2 °F (37.3 °C), Min:97.1 °F (36.2 °C), Max:100.5 °F (38.1 °C)     0701 -  1900  In: -   Out: 1100 [Urine:1100]    Physical Exam:  Constitutional: Well developed, well nourished male in no acute distress, sitting comfortably in the hospital bed. HEENT: Normocephalic and atraumatic. Oropharynx is clear, mucous membranes are moist.  Pupils are equal, round, and reactive to light. Extraocular muscles are intact. Sclerae anicteric. Neck supple without JVD. No thyromegaly present. Lymph node   No palpable submandibular, cervical, supraclavicular, axillary or inguinal lymph nodes. Skin Warm and dry. No bruising and no rash noted. No erythema. No pallor. Respiratory Lungs are clear to auscultation bilaterally without wheezes, rales or rhonchi, normal air exchange without accessory muscle use. CVS Normal rate, regular rhythm and normal S1 and S2. No murmurs, gallops, or rubs. Abdomen Soft, nontender and nondistended, normoactive bowel sounds. No palpable mass. No hepatosplenomegaly. Neuro Grossly nonfocal with no obvious sensory or motor deficits. MSK Normal range of motion in general.  No edema and no tenderness. Psych Appropriate mood and affect.         Labs:    Recent Results (from the past 24 hour(s))   METABOLIC PANEL, BASIC    Collection Time: 22  9:21 PM   Result Value Ref Range    Sodium 126 (L) 136 - 145 mmol/L    Potassium 3.9 3.5 - 5.1 mmol/L    Chloride 97 (L) 98 - 107 mmol/L    CO2 19 (L) 21 - 32 mmol/L    Anion gap 10 7 - 16 mmol/L    Glucose 75 65 - 100 mg/dL    BUN 32 (H) 8 - 23 MG/DL    Creatinine 1.38 0.8 - 1.5 MG/DL    GFR est AA >60 >60 ml/min/1.73m2    GFR est non-AA 55 (L) >60 ml/min/1.73m2    Calcium 7.5 (L) 8.3 - 10.4 MG/DL   VANCOMYCIN, RANDOM    Collection Time: 01/14/22  3:22 AM   Result Value Ref Range    Vancomycin, random 00.8 UG/ML   METABOLIC PANEL, BASIC    Collection Time: 01/14/22  3:22 AM   Result Value Ref Range    Sodium 125 (L) 138 - 145 mmol/L    Potassium 4.1 3.5 - 5.1 mmol/L    Chloride 97 (L) 98 - 107 mmol/L    CO2 20 (L) 21 - 32 mmol/L    Anion gap 8 7 - 16 mmol/L    Glucose 78 65 - 100 mg/dL    BUN 27 (H) 8 - 23 MG/DL    Creatinine 1.31 0.8 - 1.5 MG/DL    GFR est AA >60 >60 ml/min/1.73m2    GFR est non-AA 59 (L) >60 ml/min/1.73m2    Calcium 7.4 (L) 8.3 - 82.6 MG/DL   METABOLIC PANEL, BASIC    Collection Time: 01/14/22 10:25 AM   Result Value Ref Range    Sodium 128 (L) 136 - 145 mmol/L    Potassium 3.6 3.5 - 5.1 mmol/L    Chloride 97 (L) 98 - 107 mmol/L    CO2 20 (L) 21 - 32 mmol/L    Anion gap 11 7 - 16 mmol/L    Glucose 94 65 - 100 mg/dL    BUN 33 (H) 8 - 23 MG/DL    Creatinine 1.45 0.8 - 1.5 MG/DL    GFR est AA >60 >60 ml/min/1.73m2    GFR est non-AA 52 (L) >60 ml/min/1.73m2    Calcium 7.9 (L) 8.3 - 10.4 MG/DL   PLEASE READ & DOCUMENT PPD TEST IN 24 HRS    Collection Time: 01/14/22 10:48 AM   Result Value Ref Range    PPD Negative Negative    mm Induration 0 0 - 5 mm       Imaging:  No images are attached to the encounter.     ASSESSMENT/RECOMMENDATIONS:  Problem List  Date Reviewed: 10/21/2021          Codes Class Noted    Hyponatremia ICD-10-CM: E87.1  ICD-9-CM: 276.1  1/12/2022        UTI (urinary tract infection) ICD-10-CM: N39.0  ICD-9-CM: 599.0  1/12/2022        * (Principal) Severe sepsis ([facility] ICD-10-CM: A41.9, R65.20  ICD-9-CM: 038.9, 995.92  1/12/2022        EDWARDO (acute kidney injury) ([facility] ICD-10-CM: N17.9  ICD-9-CM: 584.9  1/12/2022        Hemorrhoids ICD-10-CM: K64.9  ICD-9-CM: 455.6  10/14/2021    Overview Signed 10/14/2021 10:14 AM by Gemma Robert PA-C     Last Assessment & Plan:   Formatting of this note might be different from the original.  Patient complains of near daily bleeding stools and occasional bleeding without stools. He is on 3 different blood thinners including Plavix, Eliquis, and fish oil. This is the likely issue with his bleeding. He has not had a colonoscopy in over 11 years. Given the blood thinning issues with increased potential complications recommend referral to colorectal surgery for evaluation of his colon and hemorrhoids. Would not recommend umbilical hernia repair combined with hemorrhoid surgery and colonoscopy due to the risk of mesh infection. However, the colonoscopy and hemorrhoid surgery might be able to be combined on the same day. History of colon polyps ICD-10-CM: Z86.010  ICD-9-CM: V12.72  3/17/2021    Overview Signed 10/14/2021 10:14 AM by Gemma Robert PA-C     Formatting of this note might be different from the original.  Added automatically from request for surgery [id number]             Bilateral carotid artery stenosis ICD-10-CM: I65.23  ICD-9-CM: 433.10, 433.30  10/12/2020    Overview Signed 10/12/2020  8:19 PM by Capo Carmichael MD     Carotid Duplex (10/9/20): Bilateral 50-69% ICA stenosis. Left ECA severe stenosis. Chest pain ICD-10-CM: R07.9  ICD-9-CM: 786.50  1/7/2020        PAF (paroxysmal atrial fibrillation) (HCC) ICD-10-CM: I48.0  ICD-9-CM: 427.31  1/7/2020    Overview Signed 8/26/2020  3:51 PM by Capo Carmichael MD     1. Admitted 1/20 with afib and chest pain. Started on Multaq and Eliquis. Recurrent depression ([facility] ICD-10-CM: F33.9  ICD-9-CM: 296.30  1/4/2018        Hypomagnesemia ICD-10-CM: T24.24  ICD-9-CM: 275.2  7/7/2017        Depression ICD-10-CM: F32. A  ICD-9-CM: 311  7/6/2017        Adhesive capsulitis of left shoulder ICD-10-CM: M75.02  ICD-9-CM: 726.0 6/26/2017        Strain of muscle(s) and tendon(s) of the rotator cuff of left shoulder, sequela ICD-10-CM: S46.012S  ICD-9-CM: 905.7  6/26/2017        Strain of muscle, fascia and tendon of long head of biceps, left arm, sequela ICD-10-CM: N25.209G  ICD-9-CM: 840.8  6/26/2017        SLAP lesion of left shoulder ICD-10-CM: E16.774Z  ICD-9-CM: 840.7  6/26/2017        Degenerative joint disease of left acromioclavicular joint ICD-10-CM: M19.012  ICD-9-CM: 715.91  6/26/2017        Coronary atherosclerosis of native coronary vessel ICD-10-CM: I25.10  ICD-9-CM: 414.01  11/15/2016    Overview Addendum 8/26/2020  3:49 PM by Carlos Ormond, MD       1. [facility] 7/2014: mild CAD up to 20% ostial LM and 30% ostial RCA, normal EF  2. NSTEMI(1/20):  Chest pain in setting of afib with RVR. Peak Troponin 1.13. a. PCI of Ostial RCA with [place] 3 x 30 mm COBY. Post dilated to 3.25 mm. Mild disease in left system. Abnormal EKG ICD-10-CM: R94.31  ICD-9-CM: 794.31  Unknown    Overview Addendum 8/26/2020  3:50 PM by Carlos Ormond, MD     1.  1.  Left ventriculogram (7/11/14):  EF 65-70%. Slightspade-like appearance of LV concerning for apical hypertrophic cardioomyopathy. 2. Cardiac MRI (5/14/15):  EF 68%. The left ventricular myocardium appear symmetric at the base. There appears to be slightly thicker left ventricular myocardium at the mid and apical lateral wall compared to other segments. No wall motion advised. EF 68%. Calculated left ventricular mass is within normal limits. 3. Echo (12/15/16):  EF 54%. Normal diastolic function. No significant valve abnormalities. 4.  LV gram (1/8/20):  Apical hypertrophic cardiomyopathy. EF 70%.               History of tobacco abuse (Chronic) ICD-10-CM: Y78.354  ICD-9-CM: V15.82  7/11/2014        ESPINOZA (dyspnea on exertion) ICD-10-CM: R06.00  ICD-9-CM: 786.09  7/11/2014        HTN (hypertension) ICD-10-CM: I10  ICD-9-CM: 401.9  10/14/2013 Hyperlipidemia ICD-10-CM: E78.5  ICD-9-CM: 272.4  10/14/2013        Inguinal hernia unilateral, non-recurrent ICD-10-CM: K40.90  ICD-9-CM: 550.90  10/14/2013            59-year-old male metastatic urothelial carcinoma with bilateral hydronephrosis and pulmonary metastasis, recently started treating with gemcitabine/cisplatin, admitted for Staph sepsis and UTI, continue antibiotics and supportive care, cytopenia expected due to recent chemotherapy, transfuse if hemoglobin less than 7 and platelets less than 10 K, giving INFeD, please call for questions. Lab studies and imaging studies were personally reviewed. Pertinent old records were reviewed. Case discussed with primary team.    Thank you for allowing us to participate in the care of Mr. Rashaad Johnson. Katlyn Reno M.D.   [address]  Office : [phone]  Fax : [phone]

## 2022-01-14 NOTE — PROGRESS NOTES
Consuled for bilateral NT exchange. Pt previously followed by our group for BPH and urinary retention, he elected to go to [address] urologist - Dr. Ping Langford. He had TURP there in 9/2021 and path showed high grade urothelial carcinoma invasive to prostate. Referred to [address] urologic oncologist Dr. Devonte Garcia. Staging CT scans of the chest, abdomen, and pelvis on 11/22/2021 revealed diffuse bilateral pulmonary nodules measuring up to 1.7 cm suggestive of metastatic disease. On the CT abdomen/pelvis the patient was noted to have diffuse bladder wall thickening as well as a pedunculated nodule near the right ureterovesicular junction suspicious for neoplasm and contributing to moderate right hydroureteronephrosis. Suspicious right inguinal and pelvic lymph nodes measuring up to 1.1 cm were also appreciated. Apparently he has had bilateral NT placed since that time at [address]. He is admitted here for sepsis/UTI. His Cr is normal.  CT reports NT to be in correct position and no hydronephrosis. Right tube is putting out less than left but per pt this is normal.  Urine OP is yellow/clear. He has appt 2/3 at [address] for tube exchange and prefers to go there. Physical Exam:  GENERAL: pleasant, A&O x 3  EYE: PERRLA  THROAT & NECK: neck supple  LUNG: clear to auscultation bilaterally  HEART: regular rate and rhythm, S1, S2   ABDOMEN: soft, bilateral flank tenderness, bilateral NT in place- UOP R<L  EXTREMITIES:  extremities normal, atraumatic, no cyanosis or edema  SKIN: no rash or abnormalities  NEUROLOGIC:alert, oriented x 3      Will consult IR to take a look at this, may prefer to exchange tubes once infection is adequately txt given normal kidney fxn and no hydro on CT. Spoke with Dr. Huyen Renae. Given that pt has no hydronephrosis and kidney fxn normal, if he responds well to abx treatment, he could wait until his scheduled tube exchange 2/3 at [address].   If he does not respond well to abx and does not improve,  we would proceed with tube exchange here. If pt progresses well, okay to dc home with PO abx and go for tube exchange 2/3. I have reviewed the above note and examined the patient. I agree with the exam, assessment and plan.     Mian Herbert MD

## 2022-01-14 NOTE — PROGRESS NOTES
VANCO DAILY FOLLOW UP NOTE  [address] [facility] Pharmacokinetic Monitoring Service - Vancomycin    Consulting Provider: Dr. John Jaimes   Indication: sepsis 2/2 UTI  Target Concentration: Goal AUC/CARMELO 400-600 mg*hr/L  Day of Therapy: 3  Additional Antimicrobials: meropenem    Pertinent Laboratory Values: Wt Readings from Last 1 Encounters:   01/12/22 61.2 kg (135 lb)     Temp Readings from Last 1 Encounters:   01/14/22 100.1 °F (37.8 °C)     No components found for: PROCAL  Recent Labs     01/14/22 0322 01/13/22 2121 01/13/22  0941 01/13/22  0939 01/13/22  0322 01/13/22 0322 01/12/22  2104 01/12/22  2104 01/12/22  1931 01/12/22  1126 01/12/22  1126   BUN 27* 32*  --  21   < > 23   < > 27*  --    < > 28*   CREA 1.31 1.38  --  1.37   < > 1.45   < > 1.51*  --    < > 1.66*   WBC  --   --  7.7  --   --  7.8  --   --   --   --  15.2*   PCT  --   --   --   --   --   --   --   --   --   --  2.21*   LAC  --   --   --   --   --   --   --  1.7 2.1*  --  3.3*    < > = values in this interval not displayed. Estimated Creatinine Clearance: 50.6 mL/min (based on SCr of 1.31 mg/dL). Lab Results   Component Value Date/Time    Vancomycin, random 17.9 01/14/2022 03:22 AM       MRSA Nasal Swab: N/A. Non-respiratory infection. .      Assessment:  Date/Time Dose Concentration AUC   1/14 0322 1000 mg q24h 17.9 431   Note: Serum concentrations collected for AUC dosing may appear elevated if collected in close proximity to the dose administered, this is not necessarily an indication of toxicity    Plan:  1. Current dosing regimen is therapeutic  2. Continue current dose  3. Repeat vancomycin concentrations will be ordered as clinically appropriate  4.  Pharmacy will continue to monitor patient and adjust therapy as indicated    Thank you for the consult,  Avery Azar, JAVID

## 2022-01-14 NOTE — PROGRESS NOTES
[facility] Nephrology Progress Note      Admission Date: 1/12/2022   Subjective:          Objective:     Physical Exam:      Visit Vitals  BP 91/61 (BP 1 Location: Right upper arm, BP Patient Position: Sitting)   Pulse (!) 104   Temp 99.2 °F (37.3 °C)   Resp 20   Ht 5' 6\" (1.676 m)   Wt 61.2 kg (135 lb)   SpO2 95%   BMI 21.79 kg/m²          Current Facility-Administered Medications   Medication Dose Route Frequency    tolvaptan (SAMSCA) tablet 15 mg  15 mg Oral ONCE    urea (URE-NA) 15 gram packet 1 Packet  1 Packet Oral DAILY    meropenem (MERREM) 500 mg in 0.9% sodium chloride (MBP/ADV) 50 mL MBP  500 mg IntraVENous Q6H    cetirizine (ZYRTEC) tablet 5 mg  5 mg Oral DAILY    oxyCODONE IR (ROXICODONE) tablet 10 mg  10 mg Oral Q4H PRN    tuberculin injection 5 Units  5 Units IntraDERMal ONCE    tuberculin injection 5 Units  5 Units IntraDERMal ONCE    traZODone (DESYREL) tablet 100 mg  100 mg Oral QHS    sodium chloride (NS) flush 5-10 mL  5-10 mL IntraVENous PRN    sodium chloride (NS) flush 5-40 mL  5-40 mL IntraVENous Q8H    sodium chloride (NS) flush 5-40 mL  5-40 mL IntraVENous PRN    acetaminophen (TYLENOL) tablet 650 mg  650 mg Oral Q6H PRN    Or    acetaminophen (TYLENOL) suppository 650 mg  650 mg Rectal Q6H PRN    polyethylene glycol (MIRALAX) packet 17 g  17 g Oral DAILY PRN    ondansetron (ZOFRAN ODT) tablet 4 mg  4 mg Oral Q8H PRN    Or    ondansetron (ZOFRAN) injection 4 mg  4 mg IntraVENous Q6H PRN    montelukast (SINGULAIR) tablet 10 mg  10 mg Oral QHS    hyoscyamine (LEVSIN) elixir 0.125 mg  0.125 mg Oral Q4H PRN    atorvastatin (LIPITOR) tablet 40 mg  40 mg Oral QHS    albuterol (PROVENTIL VENTOLIN) nebulizer solution 2.5 mg  2.5 mg Nebulization Q6H PRN    oxybutynin (DITROPAN) tablet 5 mg  5 mg Oral Q8H PRN    nicotine (NICODERM CQ) 14 mg/24 hr patch 1 Patch  1 Patch TransDERmal Q24H    dronedarone (MULTAQ) tablet tab 400 mg  400 mg Oral BID WITH MEALS    naloxone (NARCAN) injection 0.4 mg  0.4 mg IntraVENous EVERY 2 MINUTES AS NEEDED    vancomycin (VANCOCIN) 1,000 mg in 0.9% sodium chloride 250 mL (VIAL-MATE)  1,000 mg IntraVENous Q24H            Data Review:     LABS:   Recent Results (from the past 12 hour(s))   VANCOMYCIN, RANDOM    Collection Time: 01/14/22  3:22 AM   Result Value Ref Range    Vancomycin, random 97.1 UG/ML   METABOLIC PANEL, BASIC    Collection Time: 01/14/22  3:22 AM   Result Value Ref Range    Sodium 125 (L) 138 - 145 mmol/L    Potassium 4.1 3.5 - 5.1 mmol/L    Chloride 97 (L) 98 - 107 mmol/L    CO2 20 (L) 21 - 32 mmol/L    Anion gap 8 7 - 16 mmol/L    Glucose 78 65 - 100 mg/dL    BUN 27 (H) 8 - 23 MG/DL    Creatinine 1.31 0.8 - 1.5 MG/DL    GFR est AA >60 >60 ml/min/1.73m2    GFR est non-AA 59 (L) >60 ml/min/1.73m2    Calcium 7.4 (L) 8.3 - 10.4 MG/DL   PLEASE READ & DOCUMENT PPD TEST IN 24 HRS    Collection Time: 01/14/22 10:48 AM   Result Value Ref Range    PPD Negative Negative    mm Induration 0 0 - 5 mm         Plan:     Principal Problem:    Severe sepsis ([facility] (1/12/2022)    Active Problems:    HTN (hypertension) (10/14/2013)      Recurrent [facility] ([facility] (1/4/2018)      PAF (paroxysmal atrial fibrillation) ([facility].) (1/7/2020)      Overview: 1. Admitted 1/20 with afib and chest pain. Started on Multaq and Eliquis. Hyponatremia (1/12/2022)      UTI (urinary tract infection) (1/12/2022)      EDWARDO (acute kidney injury) ([facility].) (1/12/2022)      1. EDWARDO likely pre renal azotemia episodes of hypotension since admission, s/p IVF bolus Cr trending down, no hydronephrosis on CT  Non oliguric via bilateral nephrostomy tubes  Trend renal function - overall creatinine is stable      2. Hyponatremia- ? SIADH, recently started on sertraline, lung mets. Urine lytes pending  -Free water restrict 1.5 L per day,  added URE-NA  - stuck at 125.   Urine studies with urine osm 500, meets criteria for SIADH  - will dose tolvaptan x1 should get him to 130 and then can continue water restriction and URE-Na       3. Leukocytosis/UTI on UA  Cultures NGTD, on vancomycin and rocephin     4. Anemia- hgb trending down, reports dribbling hematuria  Occult stool pending   Home Eliquis on hold      5.  Metastatic bladder cancer with mets to the lungs on chemo last treatment reported Jan 4th at [address] (next chemo scheduled next Tuesday)

## 2022-01-15 LAB
ANION GAP SERPL CALC-SCNC: 6 MMOL/L (ref 7–16)
ANION GAP SERPL CALC-SCNC: 8 MMOL/L (ref 7–16)
BASOPHILS # BLD: 0 K/UL (ref 0–0.2)
BASOPHILS NFR BLD: 1 % (ref 0–2)
BUN SERPL-MCNC: 21 MG/DL (ref 8–23)
BUN SERPL-MCNC: 25 MG/DL (ref 8–23)
BUN SERPL-MCNC: 27 MG/DL (ref 8–23)
BUN SERPL-MCNC: 32 MG/DL (ref 8–23)
CALCIUM SERPL-MCNC: 8.4 MG/DL (ref 8.3–10.4)
CALCIUM SERPL-MCNC: 8.5 MG/DL (ref 8.3–10.4)
CALCIUM SERPL-MCNC: 8.5 MG/DL (ref 8.3–10.4)
CALCIUM SERPL-MCNC: 8.8 MG/DL (ref 8.3–10.4)
CHLORIDE SERPL-SCNC: 102 MMOL/L (ref 98–107)
CHLORIDE SERPL-SCNC: 105 MMOL/L (ref 98–107)
CHLORIDE SERPL-SCNC: 107 MMOL/L (ref 98–107)
CHLORIDE SERPL-SCNC: 107 MMOL/L (ref 98–107)
CO2 SERPL-SCNC: 22 MMOL/L (ref 21–32)
CO2 SERPL-SCNC: 23 MMOL/L (ref 21–32)
CREAT SERPL-MCNC: 1.31 MG/DL (ref 0.8–1.5)
CREAT SERPL-MCNC: 1.33 MG/DL (ref 0.8–1.5)
CREAT SERPL-MCNC: 1.42 MG/DL (ref 0.8–1.5)
CREAT SERPL-MCNC: 1.47 MG/DL (ref 0.8–1.5)
DIFFERENTIAL METHOD BLD: ABNORMAL
EOSINOPHIL # BLD: 0 K/UL (ref 0–0.8)
EOSINOPHIL NFR BLD: 0 % (ref 0.5–7.8)
ERYTHROCYTE [DISTWIDTH] IN BLOOD BY AUTOMATED COUNT: 14.3 % (ref 11.9–14.6)
GLUCOSE SERPL-MCNC: 103 MG/DL (ref 65–100)
GLUCOSE SERPL-MCNC: 119 MG/DL (ref 65–100)
GLUCOSE SERPL-MCNC: 125 MG/DL (ref 65–100)
GLUCOSE SERPL-MCNC: 95 MG/DL (ref 65–100)
HAPTOGLOB SERPL-MCNC: 363 MG/DL (ref 30–200)
HCT VFR BLD AUTO: 25.8 % (ref 41.1–50.3)
HGB BLD-MCNC: 8.3 G/DL (ref 13.6–17.2)
IMM GRANULOCYTES # BLD AUTO: 0.1 K/UL (ref 0–0.5)
IMM GRANULOCYTES NFR BLD AUTO: 2 % (ref 0–5)
LYMPHOCYTES # BLD: 0.4 K/UL (ref 0.5–4.6)
LYMPHOCYTES NFR BLD: 9 % (ref 13–44)
MAGNESIUM SERPL-MCNC: 2.3 MG/DL (ref 1.8–2.4)
MCH RBC QN AUTO: 25.5 PG (ref 26.1–32.9)
MCHC RBC AUTO-ENTMCNC: 32.2 G/DL (ref 31.4–35)
MCV RBC AUTO: 79.1 FL (ref 79.6–97.8)
MM INDURATION POC: 0 MM (ref 0–5)
MONOCYTES # BLD: 0.2 K/UL (ref 0.1–1.3)
MONOCYTES NFR BLD: 4 % (ref 4–12)
NEUTS SEG # BLD: 3.6 K/UL (ref 1.7–8.2)
NEUTS SEG NFR BLD: 84 % (ref 43–78)
NRBC # BLD: 0 K/UL (ref 0–0.2)
PHOSPHATE SERPL-MCNC: 2.1 MG/DL (ref 2.3–3.7)
PLATELET # BLD AUTO: 146 K/UL (ref 150–450)
PLATELET COMMENTS,PCOM: SLIGHT
PMV BLD AUTO: 11.5 FL (ref 9.4–12.3)
POTASSIUM SERPL-SCNC: 3.7 MMOL/L (ref 3.5–5.1)
POTASSIUM SERPL-SCNC: 3.8 MMOL/L (ref 3.5–5.1)
POTASSIUM SERPL-SCNC: 3.8 MMOL/L (ref 3.5–5.1)
POTASSIUM SERPL-SCNC: 4 MMOL/L (ref 3.5–5.1)
PPD POC: NEGATIVE NEGATIVE
RBC # BLD AUTO: 3.26 M/UL (ref 4.23–5.6)
RBC MORPH BLD: ABNORMAL
SODIUM SERPL-SCNC: 132 MMOL/L (ref 138–145)
SODIUM SERPL-SCNC: 135 MMOL/L (ref 138–145)
SODIUM SERPL-SCNC: 136 MMOL/L (ref 136–145)
SODIUM SERPL-SCNC: 136 MMOL/L (ref 136–145)
SODIUM SERPL-SCNC: 137 MMOL/L (ref 138–145)
WBC # BLD AUTO: 4.3 K/UL (ref 4.3–11.1)
WBC MORPH BLD: ABNORMAL

## 2022-01-15 PROCEDURE — 74011250636 HC RX REV CODE- 250/636: Performed by: NURSE PRACTITIONER

## 2022-01-15 PROCEDURE — 65270000029 HC RM PRIVATE

## 2022-01-15 PROCEDURE — 65660000000 HC RM CCU STEPDOWN

## 2022-01-15 PROCEDURE — 74011000258 HC RX REV CODE- 258: Performed by: STUDENT IN AN ORGANIZED HEALTH CARE EDUCATION/TRAINING PROGRAM

## 2022-01-15 PROCEDURE — 74011250637 HC RX REV CODE- 250/637: Performed by: STUDENT IN AN ORGANIZED HEALTH CARE EDUCATION/TRAINING PROGRAM

## 2022-01-15 PROCEDURE — 99232 SBSQ HOSP IP/OBS MODERATE 35: CPT | Performed by: UROLOGY

## 2022-01-15 PROCEDURE — 80048 BASIC METABOLIC PNL TOTAL CA: CPT

## 2022-01-15 PROCEDURE — 84100 ASSAY OF PHOSPHORUS: CPT

## 2022-01-15 PROCEDURE — 74011250637 HC RX REV CODE- 250/637: Performed by: NURSE PRACTITIONER

## 2022-01-15 PROCEDURE — 74011250636 HC RX REV CODE- 250/636: Performed by: STUDENT IN AN ORGANIZED HEALTH CARE EDUCATION/TRAINING PROGRAM

## 2022-01-15 PROCEDURE — 74011250637 HC RX REV CODE- 250/637: Performed by: FAMILY MEDICINE

## 2022-01-15 PROCEDURE — 74011250637 HC RX REV CODE- 250/637: Performed by: EMERGENCY MEDICINE

## 2022-01-15 PROCEDURE — 74011000250 HC RX REV CODE- 250: Performed by: FAMILY MEDICINE

## 2022-01-15 PROCEDURE — 36415 COLL VENOUS BLD VENIPUNCTURE: CPT

## 2022-01-15 PROCEDURE — 84295 ASSAY OF SERUM SODIUM: CPT

## 2022-01-15 PROCEDURE — 74011250636 HC RX REV CODE- 250/636: Performed by: FAMILY MEDICINE

## 2022-01-15 PROCEDURE — 85025 COMPLETE CBC W/AUTO DIFF WBC: CPT

## 2022-01-15 PROCEDURE — 83735 ASSAY OF MAGNESIUM: CPT

## 2022-01-15 RX ORDER — SODIUM,POTASSIUM PHOSPHATES 280-250MG
1 POWDER IN PACKET (EA) ORAL 4 TIMES DAILY
Status: DISCONTINUED | OUTPATIENT
Start: 2022-01-15 | End: 2022-01-19 | Stop reason: HOSPADM

## 2022-01-15 RX ADMIN — SODIUM CHLORIDE 1000 MG: 9 INJECTION, SOLUTION INTRAVENOUS at 10:12

## 2022-01-15 RX ADMIN — POTASSIUM & SODIUM PHOSPHATES POWDER PACK 280-160-250 MG 1 PACKET: 280-160-250 PACK at 21:45

## 2022-01-15 RX ADMIN — ATORVASTATIN CALCIUM 40 MG: 40 TABLET, FILM COATED ORAL at 21:44

## 2022-01-15 RX ADMIN — OXYCODONE 10 MG: 5 TABLET ORAL at 17:20

## 2022-01-15 RX ADMIN — DRONEDARONE 400 MG: 400 TABLET, FILM COATED ORAL at 17:20

## 2022-01-15 RX ADMIN — Medication 1 PACKET: at 10:12

## 2022-01-15 RX ADMIN — OXYCODONE 10 MG: 5 TABLET ORAL at 10:28

## 2022-01-15 RX ADMIN — SODIUM CHLORIDE, PRESERVATIVE FREE 5 ML: 5 INJECTION INTRAVENOUS at 21:44

## 2022-01-15 RX ADMIN — POTASSIUM & SODIUM PHOSPHATES POWDER PACK 280-160-250 MG 1 PACKET: 280-160-250 PACK at 17:20

## 2022-01-15 RX ADMIN — SODIUM CHLORIDE, PRESERVATIVE FREE 10 ML: 5 INJECTION INTRAVENOUS at 11:50

## 2022-01-15 RX ADMIN — POTASSIUM & SODIUM PHOSPHATES POWDER PACK 280-160-250 MG 1 PACKET: 280-160-250 PACK at 10:12

## 2022-01-15 RX ADMIN — DRONEDARONE 400 MG: 400 TABLET, FILM COATED ORAL at 10:13

## 2022-01-15 RX ADMIN — OXYCODONE 10 MG: 5 TABLET ORAL at 00:32

## 2022-01-15 RX ADMIN — MEROPENEM 500 MG: 500 INJECTION, POWDER, FOR SOLUTION INTRAVENOUS at 00:42

## 2022-01-15 RX ADMIN — PANTOPRAZOLE SODIUM 40 MG: 40 TABLET, DELAYED RELEASE ORAL at 05:28

## 2022-01-15 RX ADMIN — MEROPENEM 500 MG: 500 INJECTION, POWDER, FOR SOLUTION INTRAVENOUS at 21:45

## 2022-01-15 RX ADMIN — VANCOMYCIN HYDROCHLORIDE 1000 MG: 1 INJECTION, POWDER, LYOPHILIZED, FOR SOLUTION INTRAVENOUS at 21:49

## 2022-01-15 RX ADMIN — MEROPENEM 500 MG: 500 INJECTION, POWDER, FOR SOLUTION INTRAVENOUS at 05:28

## 2022-01-15 RX ADMIN — MONTELUKAST 10 MG: 10 TABLET, FILM COATED ORAL at 21:44

## 2022-01-15 RX ADMIN — TRAZODONE HYDROCHLORIDE 100 MG: 50 TABLET ORAL at 21:44

## 2022-01-15 RX ADMIN — POTASSIUM & SODIUM PHOSPHATES POWDER PACK 280-160-250 MG 1 PACKET: 280-160-250 PACK at 14:22

## 2022-01-15 RX ADMIN — MEROPENEM 500 MG: 500 INJECTION, POWDER, FOR SOLUTION INTRAVENOUS at 14:18

## 2022-01-15 RX ADMIN — CETIRIZINE HYDROCHLORIDE 5 MG: 10 TABLET, FILM COATED ORAL at 10:13

## 2022-01-15 RX ADMIN — SODIUM CHLORIDE, PRESERVATIVE FREE 5 ML: 5 INJECTION INTRAVENOUS at 05:28

## 2022-01-15 NOTE — PROGRESS NOTES
VANCO DAILY FOLLOW UP NOTE  [facility] Pharmacokinetic Monitoring Service - Vancomycin    Consulting Provider: Dr. Agustin Cagle   Indication: sepsis 2/2 UTI  Target Concentration: Goal AUC/CARMELO 400-600 mg*hr/L  Day of Therapy: 4  Additional Antimicrobials: pip/tazo    Pertinent Laboratory Values: Wt Readings from Last 1 Encounters:   01/14/22 61.2 kg (135 lb)     Temp Readings from Last 1 Encounters:   01/15/22 99.1 °F (37.3 °C)     No components found for: PROCAL  Recent Labs     01/15/22  0844 01/15/22  0315 01/14/22  2117 01/13/22  2121 01/13/22  0941 01/13/22  0939 01/13/22  0322 01/12/22 2104 01/12/22 2104 01/12/22  1931 01/12/22  1126 01/12/22  1126   BUN 21 25* 30*   < >  --    < > 23   < > 27*  --    < > 28*   CREA 1.42 1.47 1.47   < >  --    < > 1.45   < > 1.51*  --    < > 1.66*   WBC  --  4.3  --   --  7.7  --  7.8  --   --   --    < > 15.2*   PCT  --   --   --   --   --   --   --   --   --   --   --  2.21*   LAC  --   --   --   --   --   --   --   --  1.7 2.1*  --  3.3*    < > = values in this interval not displayed. Estimated Creatinine Clearance: 46.7 mL/min (based on SCr of 1.42 mg/dL). Lab Results   Component Value Date/Time    Vancomycin, random 17.9 01/14/2022 03:22 AM       MRSA Nasal Swab: N/A. Non-respiratory infection. .      Assessment:  Date/Time Dose Concentration AUC   1/14 0322 1000 mg q24h 17.9 431   Note: Serum concentrations collected for AUC dosing may appear elevated if collected in close proximity to the dose administered, this is not necessarily an indication of toxicity    Plan:  Current dosing regimen is therapeutic  Continue current dose  Repeat vancomycin concentrations will be ordered as clinically appropriate  Pharmacy will continue to monitor patient and adjust therapy as indicated    Thank you for the consult,  Tavon Heath, PHARMD

## 2022-01-15 NOTE — PROGRESS NOTES
[facility] Nephrology Progress Note      Admission Date: 1/12/2022   Subjective:      Frustrated some    Objective:     Physical Exam:      Visit Vitals  /74   Pulse 100   Temp 99.1 °F (37.3 °C)   Resp 16   Ht 5' 6\" (1.676 m)   Wt 61.2 kg (135 lb)   SpO2 99%   BMI 21.79 kg/m²      Gen: comfortable , NAD  HEENT: moist membranes  CV: S1, S2  Lungs: Clear bilaterally  Extem: no edema     Current Facility-Administered Medications   Medication Dose Route Frequency    potassium, sodium phosphates (NEUTRA-PHOS) packet 1 Packet  1 Packet Oral QID    pantoprazole (PROTONIX) tablet 40 mg  40 mg Oral ACB    iron dextran (INFED) 1,000 mg in 0.9% sodium chloride 500 mL IVPB  1,000 mg IntraVENous DAILY    urea (URE-NA) 15 gram packet 1 Packet  1 Packet Oral DAILY    meropenem (MERREM) 500 mg in 0.9% sodium chloride (MBP/ADV) 50 mL MBP  500 mg IntraVENous Q6H    cetirizine (ZYRTEC) tablet 5 mg  5 mg Oral DAILY    oxyCODONE IR (ROXICODONE) tablet 10 mg  10 mg Oral Q4H PRN    tuberculin injection 5 Units  5 Units IntraDERMal ONCE    tuberculin injection 5 Units  5 Units IntraDERMal ONCE    traZODone (DESYREL) tablet 100 mg  100 mg Oral QHS    sodium chloride (NS) flush 5-10 mL  5-10 mL IntraVENous PRN    sodium chloride (NS) flush 5-40 mL  5-40 mL IntraVENous Q8H    sodium chloride (NS) flush 5-40 mL  5-40 mL IntraVENous PRN    acetaminophen (TYLENOL) tablet 650 mg  650 mg Oral Q6H PRN    Or    acetaminophen (TYLENOL) suppository 650 mg  650 mg Rectal Q6H PRN    polyethylene glycol (MIRALAX) packet 17 g  17 g Oral DAILY PRN    ondansetron (ZOFRAN ODT) tablet 4 mg  4 mg Oral Q8H PRN    Or    ondansetron (ZOFRAN) injection 4 mg  4 mg IntraVENous Q6H PRN    montelukast (SINGULAIR) tablet 10 mg  10 mg Oral QHS    hyoscyamine (LEVSIN) elixir 0.125 mg  0.125 mg Oral Q4H PRN    atorvastatin (LIPITOR) tablet 40 mg  40 mg Oral QHS    albuterol (PROVENTIL VENTOLIN) nebulizer solution 2.5 mg  2.5 mg Nebulization Q6H PRN    oxybutynin (DITROPAN) tablet 5 mg  5 mg Oral Q8H PRN    nicotine (NICODERM CQ) 14 mg/24 hr patch 1 Patch  1 Patch TransDERmal Q24H    dronedarone (MULTAQ) tablet tab 400 mg  400 mg Oral BID WITH MEALS    naloxone (NARCAN) injection 0.4 mg  0.4 mg IntraVENous EVERY 2 MINUTES AS NEEDED    vancomycin (VANCOCIN) 1,000 mg in 0.9% sodium chloride 250 mL (VIAL-MATE)  1,000 mg IntraVENous Q24H            Data Review:     LABS:   Recent Results (from the past 12 hour(s))   METABOLIC PANEL, BASIC    Collection Time: 01/15/22  3:15 AM   Result Value Ref Range    Sodium 135 (L) 138 - 145 mmol/L    Potassium 3.8 3.5 - 5.1 mmol/L    Chloride 107 98 - 107 mmol/L    CO2 22 21 - 32 mmol/L    Anion gap 6 (L) 7 - 16 mmol/L    Glucose 95 65 - 100 mg/dL    BUN 25 (H) 8 - 23 MG/DL    Creatinine 1.47 0.8 - 1.5 MG/DL    GFR est AA >60 >60 ml/min/1.73m2    GFR est non-AA 52 (L) >60 ml/min/1.73m2    Calcium 8.5 8.3 - 10.4 MG/DL   CBC WITH AUTOMATED DIFF    Collection Time: 01/15/22  3:15 AM   Result Value Ref Range    WBC 4.3 4.3 - 11.1 K/uL    RBC 3.26 (L) 4.23 - 5.6 M/uL    HGB 8.3 (L) 13.6 - 17.2 g/dL    HCT 25.8 (L) 41.1 - 50.3 %    MCV 79.1 (L) 79.6 - 97.8 FL    MCH 25.5 (L) 26.1 - 32.9 PG    MCHC 32.2 31.4 - 35.0 g/dL    RDW 14.3 11.9 - 14.6 %    PLATELET 003 (L) 738 - 450 K/uL    MPV 11.5 9.4 - 12.3 FL    ABSOLUTE NRBC 0.00 0.0 - 0.2 K/uL    NEUTROPHILS 84 (H) 43 - 78 %    LYMPHOCYTES 9 (L) 13 - 44 %    MONOCYTES 4 4.0 - 12.0 %    EOSINOPHILS 0 (L) 0.5 - 7.8 %    BASOPHILS 1 0.0 - 2.0 %    IMMATURE GRANULOCYTES 2 0.0 - 5.0 %    ABS. NEUTROPHILS 3.6 1.7 - 8.2 K/UL    ABS. LYMPHOCYTES 0.4 (L) 0.5 - 4.6 K/UL    ABS. MONOCYTES 0.2 0.1 - 1.3 K/UL    ABS. EOSINOPHILS 0.0 0.0 - 0.8 K/UL    ABS. BASOPHILS 0.0 0.0 - 0.2 K/UL    ABS. IMM.  GRANS. 0.1 0.0 - 0.5 K/UL    RBC COMMENTS NORMOCYTIC/NORMOCHROMIC      WBC COMMENTS Result Confirmed By Smear      PLATELET COMMENTS SLIGHT      DF AUTOMATED     MAGNESIUM    Collection Time: 01/15/22  3:15 AM   Result Value Ref Range    Magnesium 2.3 1.8 - 2.4 mg/dL   PHOSPHORUS    Collection Time: 01/15/22  3:15 AM   Result Value Ref Range    Phosphorus 2.1 (L) 2.3 - 3.7 MG/DL   METABOLIC PANEL, BASIC    Collection Time: 01/15/22  8:44 AM   Result Value Ref Range    Sodium 136 136 - 145 mmol/L    Potassium 3.8 3.5 - 5.1 mmol/L    Chloride 105 98 - 107 mmol/L    CO2 23 21 - 32 mmol/L    Anion gap 8 7 - 16 mmol/L    Glucose 125 (H) 65 - 100 mg/dL    BUN 21 8 - 23 MG/DL    Creatinine 1.42 0.8 - 1.5 MG/DL    GFR est AA >60 >60 ml/min/1.73m2    GFR est non-AA 54 (L) >60 ml/min/1.73m2    Calcium 8.8 8.3 - 10.4 MG/DL   PLEASE READ & DOCUMENT PPD TEST IN 48 HRS    Collection Time: 01/15/22 10:09 AM   Result Value Ref Range    PPD Negative Negative    mm Induration 0 0 - 5 mm         Plan:     Principal Problem:    Severe sepsis ([facility] (1/12/2022)    Active Problems:    HTN (hypertension) (10/14/2013)      Recurrent depression ([facility] (1/4/2018)      PAF (paroxysmal atrial fibrillation) ([facility] (1/7/2020)      Overview: 1. Admitted 1/20 with afib and chest pain. Started on Multaq and Eliquis. Hyponatremia (1/12/2022)      UTI (urinary tract infection) (1/12/2022)      EDWARDO (acute kidney injury) ([facility] (1/12/2022)      1. EDWARDO likely pre renal azotemia episodes of hypotension since admission, s/p IVF bolus Cr trending down, no hydronephrosis on CT  Non oliguric via bilateral nephrostomy tubes  -overall creatinine is stable   - he reports less drainage from right tube. May need exchange or reposition     2. Hyponatremia- ? SIADH, recently started on sertraline, lung mets. Corrected with tolvaptan  Should be able to maintain with fluid restriction 1-1.5 L/ day for now       3. Leukocytosis/UTI on UA  Blood culture positive. - on abx     4.   Metastatic bladder cancer with mets to the lungs on chemo last treatment reported Jan 4th at [facility] [address] (next chemo scheduled next Tuesday)    Will sign off as sodium is better.   thanks

## 2022-01-15 NOTE — PROGRESS NOTES
Hospitalist Progress Note   Admit Date:  2022  1:58 PM   Name:  Jaun Fuchs   Age:  57 y.o. Sex:  male  :  1960   MRN:  [id number]   Room:  18/    Presenting Complaint: Flank Pain    Reason(s) for Admission: Severe sepsis ([address] [A41.9, R65.20]  UTI (urinary tract infection) [N39.0]  Hyponatremia [E87.1]  EDWARDO (acute kidney injury) [facility]) [N17.9]     Hospital Course & Interval History:   Patient is a 57 y.o. male who presented to the ED for cc lower back pain along with odor to urine. Hx of depression, HTN, HLD, CAD s/p PCI, paroxysmal a fib not on anticoagulation, metastatic urothelial/ bladder cancer on chemo last tx one week ago at [address] with mets to lung, bilateral nephrosotomy tubes since November, and recurrent UTIs growing stenotrophomonas maltophilia, klebsiella pneumoniae, and klebsiella ozaenae in the past. Wife at bedside who states she has noticed some erythema to right nephrostomy site. Both state having good urine output from nephrostomy tubes. Subjective (01/15/22): Pt is feeling much better today. Pt denies chest pain, sob, diarrhea. Assessment & Plan:   Severe sepsis ([address] (2022)   Severe sepsis (met by HR, WBC, and EDWARDO) secondary to UTI -  Staph Bacteremia and source is probably nephrostomy tube exit site;   LA 3.3, Procalcitoinin 2.2, Leucocytosis of 15k and s/p 3L IVF  on admission  CT scan showed Pyelonephritis  UCNTD   repeat blood cultures negative  Continue Vancomycin and Meropenem for now  Can transition to PO FQ awaiting sensitivities  He is scheduled for tube exchange on 2/3 at [address].  If he doesn't respond well to antibiotics may need Exchange of tubes in this hospital  ID is following  Urology is following       HTN (hypertension) (10/14/2013)  Pt is hypotensive  Holding antihyperensives      Recurrent depression ([address] (2018)  Continue home medications      PAF (paroxysmal atrial fibrillation) ([address] (2020)  Holding Metoprolol due to hypotension  Holding Eliquis due to hematuria      Hyponatremia (1/12/2022)  SIADH  Most likely due to sertraline and cancer   Sodium was 126 on admission and resolved now   S/p tolvaptan x1 dose  Fluid restriction 1-1.5L  F/u Nephrology    Anemia due to acute blood loss  Anemia due to hematuria vs chemotherapy vs malignancy vs sepsis  OBI   S/p InFed  Hb of 8 and baseline is 16    Thrombocytopenia  Most likely due to sepsis  Transfuse platelets if <67D if bleeding  Monitor Platelets for now      UTI (urinary tract infection) (1/12/2022)    Continue antibiotics      EDWARDO (acute kidney injury) ([facility] (1/12/2022)  Most likely due to sepsis   Cr trending down  and baseline is 0.81    Bladder and urothelial cancer -   Follows [facility]. PRN oxycodone        Dispo/Discharge Planning:    Home health therapy and pt wants to go home  PPD is ordered    Diet:  ADULT DIET Regular; Low Fat/Low Chol/High Fiber/2 gm Na  DVT PPx: scd  Code status: Full Code    Hospital Problems as of 1/15/2022 Date Reviewed: 10/21/2021          Codes Class Noted - Resolved POA    Hyponatremia ICD-10-CM: E87.1  ICD-9-CM: 276.1  1/12/2022 - Present Unknown        UTI (urinary tract infection) ICD-10-CM: N39.0  ICD-9-CM: 599.0  1/12/2022 - Present Unknown        * (Principal) Severe sepsis (HCC) ICD-10-CM: A41.9, R65.20  ICD-9-CM: 038.9, 995.92  1/12/2022 - Present Unknown        EDWARDO (acute kidney injury) ([facility] ICD-10-CM: N17.9  ICD-9-CM: 584.9  1/12/2022 - Present Unknown        PAF (paroxysmal atrial fibrillation) (HCC) ICD-10-CM: I48.0  ICD-9-CM: 427.31  1/7/2020 - Present Yes    Overview Signed 8/26/2020  3:51 PM by Tanmay Reece MD     1. Admitted 1/20 with afib and chest pain. Started on Multaq and Eliquis.               Recurrent depression ([facility] ICD-10-CM: F33.9  ICD-9-CM: 296.30  1/4/2018 - Present Yes        HTN (hypertension) ICD-10-CM: I10  ICD-9-CM: 401.9  10/14/2013 - Present Yes              Objective:     Patient Vitals for the past 24 hrs:   Temp Pulse Resp BP SpO2   01/15/22 0828 99.1 °F (37.3 °C) 100 16 136/74 99 %   01/15/22 0508 99.4 °F (37.4 °C) (!) 104 18 100/74 95 %   01/15/22 0016 99 °F (37.2 °C) 99 18 95/67 96 %   01/14/22 2034 98.6 °F (37 °C) (!) 101 18 133/64 94 %   01/14/22 2000  96      01/14/22 1638 97.6 °F (36.4 °C) 85 18 91/63 98 %     Oxygen Therapy  O2 Sat (%): 99 % (01/15/22 0828)  Pulse via Oximetry: 98 beats per minute (01/13/22 1500)  O2 Device: None (Room air) (01/15/22 0508)    Estimated body mass index is 21.79 kg/m² as calculated from the following:    Height as of this encounter: 5' 6\" (1.676 m). Weight as of this encounter: 61.2 kg (135 lb). Intake/Output Summary (Last 24 hours) at 1/15/2022 1546  Last data filed at 1/15/2022 1429  Gross per 24 hour   Intake 240 ml   Output 7965 ml   Net -7725 ml         Physical Exam:     Blood pressure 136/74, pulse 100, temperature 99.1 °F (37.3 °C), resp. rate 16, height 5' 6\" (1.676 m), weight 61.2 kg (135 lb), SpO2 99 %. General:    Well nourished. No overt distress  Head:  Normocephalic, atraumatic  Eyes:  Sclerae appear normal.  Pupils equally round. ENT:  Nares appear normal, no drainage. Moist oral mucosa  Neck:  No restricted ROM. Trachea midline   CV:   RRR. No m/r/g. No jugular venous distension. Lungs:   CTAB. No wheezing, rhonchi, or rales. Respirations even, unlabored  Abdomen: Bowel sounds present. Soft, nontender, nondistended. Genitourinary Bilateral Nephrostomy tubes are present  Extremities: No cyanosis or clubbing. No edema  Skin:     No rashes and normal coloration. Warm and dry. Neuro:  CN II-XII grossly intact. Sensation intact. A&Ox3  Psych:  Normal mood and affect.       I have reviewed ordered lab tests and independently visualized imaging below:    Recent Labs:  Recent Results (from the past 48 hour(s))   METABOLIC PANEL, BASIC    Collection Time: 01/13/22  9:21 PM   Result Value Ref Range    Sodium 126 (L) 136 - 145 mmol/L    Potassium 3.9 3.5 - 5.1 mmol/L    Chloride 97 (L) 98 - 107 mmol/L    CO2 19 (L) 21 - 32 mmol/L    Anion gap 10 7 - 16 mmol/L    Glucose 75 65 - 100 mg/dL    BUN 32 (H) 8 - 23 MG/DL    Creatinine 1.38 0.8 - 1.5 MG/DL    GFR est AA >60 >60 ml/min/1.73m2    GFR est non-AA 55 (L) >60 ml/min/1.73m2    Calcium 7.5 (L) 8.3 - 10.4 MG/DL   VANCOMYCIN, RANDOM    Collection Time: 01/14/22  3:22 AM   Result Value Ref Range    Vancomycin, random 72.0 UG/ML   METABOLIC PANEL, BASIC    Collection Time: 01/14/22  3:22 AM   Result Value Ref Range    Sodium 125 (L) 138 - 145 mmol/L    Potassium 4.1 3.5 - 5.1 mmol/L    Chloride 97 (L) 98 - 107 mmol/L    CO2 20 (L) 21 - 32 mmol/L    Anion gap 8 7 - 16 mmol/L    Glucose 78 65 - 100 mg/dL    BUN 27 (H) 8 - 23 MG/DL    Creatinine 1.31 0.8 - 1.5 MG/DL    GFR est AA >60 >60 ml/min/1.73m2    GFR est non-AA 59 (L) >60 ml/min/1.73m2    Calcium 7.4 (L) 8.3 - 40.5 MG/DL   METABOLIC PANEL, BASIC    Collection Time: 01/14/22 10:25 AM   Result Value Ref Range    Sodium 128 (L) 136 - 145 mmol/L    Potassium 3.6 3.5 - 5.1 mmol/L    Chloride 97 (L) 98 - 107 mmol/L    CO2 20 (L) 21 - 32 mmol/L    Anion gap 11 7 - 16 mmol/L    Glucose 94 65 - 100 mg/dL    BUN 33 (H) 8 - 23 MG/DL    Creatinine 1.45 0.8 - 1.5 MG/DL    GFR est AA >60 >60 ml/min/1.73m2    GFR est non-AA 52 (L) >60 ml/min/1.73m2    Calcium 7.9 (L) 8.3 - 10.4 MG/DL   CULTURE, BLOOD    Collection Time: 01/14/22 10:25 AM    Specimen: Blood   Result Value Ref Range    Special Requests: RIGHT  Antecubital        Culture result: NO GROWTH AFTER 20 HOURS     CULTURE, BLOOD    Collection Time: 01/14/22 10:25 AM    Specimen: Blood   Result Value Ref Range    Special Requests: LEFT  ARM        Culture result: NO GROWTH AFTER 20 HOURS     PLEASE READ & DOCUMENT PPD TEST IN 24 HRS    Collection Time: 01/14/22 10:48 AM   Result Value Ref Range    PPD Negative Negative    mm Induration 0 0 - 5 mm   METABOLIC PANEL, BASIC    Collection Time: 01/14/22  6:06 PM   Result Value Ref Range    Sodium 132 (L) 136 - 145 mmol/L    Potassium 3.3 (L) 3.5 - 5.1 mmol/L    Chloride 103 98 - 107 mmol/L    CO2 21 21 - 32 mmol/L    Anion gap 8 7 - 16 mmol/L    Glucose 116 (H) 65 - 100 mg/dL    BUN 32 (H) 8 - 23 MG/DL    Creatinine 1.40 0.8 - 1.5 MG/DL    GFR est AA >60 >60 ml/min/1.73m2    GFR est non-AA 55 (L) >60 ml/min/1.73m2    Calcium 8.4 8.3 - 36.0 MG/DL   METABOLIC PANEL, BASIC    Collection Time: 01/14/22  9:17 PM   Result Value Ref Range    Sodium 134 (L) 136 - 145 mmol/L    Potassium 3.9 3.5 - 5.1 mmol/L    Chloride 105 98 - 107 mmol/L    CO2 17 (L) 21 - 32 mmol/L    Anion gap 12 7 - 16 mmol/L    Glucose 106 (H) 65 - 100 mg/dL    BUN 30 (H) 8 - 23 MG/DL    Creatinine 1.47 0.8 - 1.5 MG/DL    GFR est AA >60 >60 ml/min/1.73m2    GFR est non-AA 52 (L) >60 ml/min/1.73m2    Calcium 8.1 (L) 8.3 - 10.4 MG/DL   SODIUM    Collection Time: 01/15/22 12:09 AM   Result Value Ref Range    Sodium 136 136 - 752 mmol/L   METABOLIC PANEL, BASIC    Collection Time: 01/15/22  3:15 AM   Result Value Ref Range    Sodium 135 (L) 138 - 145 mmol/L    Potassium 3.8 3.5 - 5.1 mmol/L    Chloride 107 98 - 107 mmol/L    CO2 22 21 - 32 mmol/L    Anion gap 6 (L) 7 - 16 mmol/L    Glucose 95 65 - 100 mg/dL    BUN 25 (H) 8 - 23 MG/DL    Creatinine 1.47 0.8 - 1.5 MG/DL    GFR est AA >60 >60 ml/min/1.73m2    GFR est non-AA 52 (L) >60 ml/min/1.73m2    Calcium 8.5 8.3 - 10.4 MG/DL   CBC WITH AUTOMATED DIFF    Collection Time: 01/15/22  3:15 AM   Result Value Ref Range    WBC 4.3 4.3 - 11.1 K/uL    RBC 3.26 (L) 4.23 - 5.6 M/uL    HGB 8.3 (L) 13.6 - 17.2 g/dL    HCT 25.8 (L) 41.1 - 50.3 %    MCV 79.1 (L) 79.6 - 97.8 FL    MCH 25.5 (L) 26.1 - 32.9 PG    MCHC 32.2 31.4 - 35.0 g/dL    RDW 14.3 11.9 - 14.6 %    PLATELET 347 (L) 521 - 450 K/uL    MPV 11.5 9.4 - 12.3 FL    ABSOLUTE NRBC 0.00 0.0 - 0.2 K/uL    NEUTROPHILS 84 (H) 43 - 78 %    LYMPHOCYTES 9 (L) 13 - 44 %    MONOCYTES 4 4.0 - 12.0 % EOSINOPHILS 0 (L) 0.5 - 7.8 %    BASOPHILS 1 0.0 - 2.0 %    IMMATURE GRANULOCYTES 2 0.0 - 5.0 %    ABS. NEUTROPHILS 3.6 1.7 - 8.2 K/UL    ABS. LYMPHOCYTES 0.4 (L) 0.5 - 4.6 K/UL    ABS. MONOCYTES 0.2 0.1 - 1.3 K/UL    ABS. EOSINOPHILS 0.0 0.0 - 0.8 K/UL    ABS. BASOPHILS 0.0 0.0 - 0.2 K/UL    ABS. IMM.  GRANS. 0.1 0.0 - 0.5 K/UL    RBC COMMENTS NORMOCYTIC/NORMOCHROMIC      WBC COMMENTS Result Confirmed By Smear      PLATELET COMMENTS SLIGHT      DF AUTOMATED     MAGNESIUM    Collection Time: 01/15/22  3:15 AM   Result Value Ref Range    Magnesium 2.3 1.8 - 2.4 mg/dL   PHOSPHORUS    Collection Time: 01/15/22  3:15 AM   Result Value Ref Range    Phosphorus 2.1 (L) 2.3 - 3.7 MG/DL   METABOLIC PANEL, BASIC    Collection Time: 01/15/22  8:44 AM   Result Value Ref Range    Sodium 136 136 - 145 mmol/L    Potassium 3.8 3.5 - 5.1 mmol/L    Chloride 105 98 - 107 mmol/L    CO2 23 21 - 32 mmol/L    Anion gap 8 7 - 16 mmol/L    Glucose 125 (H) 65 - 100 mg/dL    BUN 21 8 - 23 MG/DL    Creatinine 1.42 0.8 - 1.5 MG/DL    GFR est AA >60 >60 ml/min/1.73m2    GFR est non-AA 54 (L) >60 ml/min/1.73m2    Calcium 8.8 8.3 - 10.4 MG/DL   PLEASE READ & DOCUMENT PPD TEST IN 48 HRS    Collection Time: 01/15/22 10:09 AM   Result Value Ref Range    PPD Negative Negative    mm Induration 0 0 - 5 mm   METABOLIC PANEL, BASIC    Collection Time: 01/15/22  2:12 PM   Result Value Ref Range    Sodium 137 (L) 138 - 145 mmol/L    Potassium 3.7 3.5 - 5.1 mmol/L    Chloride 107 98 - 107 mmol/L    CO2 22 21 - 32 mmol/L    Anion gap 8 7 - 16 mmol/L    Glucose 119 (H) 65 - 100 mg/dL    BUN 32 (H) 8 - 23 MG/DL    Creatinine 1.33 0.8 - 1.5 MG/DL    GFR est AA >60 >60 ml/min/1.73m2    GFR est non-AA 58 (L) >60 ml/min/1.73m2    Calcium 8.4 8.3 - 10.4 MG/DL       All Micro Results     Procedure Component Value Units Date/Time    CULTURE, URINE [[id number]]  (Abnormal) Collected: 01/12/22 4978    Order Status: Completed Specimen: Urine Updated: 01/15/22 4197 Special Requests: NO SPECIAL REQUESTS        Culture result:       >100,000 COLONIES/mL GRAM POSITIVE COCCI IDENTIFICATION AND SUSCEPTIBILITY TO FOLLOW          BLOOD CULTURE [[id number]]  (Abnormal) Collected: 01/12/22 1126    Order Status: Completed Specimen: Blood Updated: 01/15/22 0735     Special Requests: --        RIGHT  Antecubital       GRAM STAIN GRAM POSITIVE COCCI               AEROBIC AND ANAEROBIC BOTTLES                  RESULTS VERIFIED, PHONED TO AND READ BACK BY Moe Kaufman, RN @ 4554 ON 1/13/22 BY AMM           Culture result: STAPHYLOCOCCUS SPECIES               Refer to Blood Culture ID Panel Accession  [id number]              CULTURE IN PROGRESS,FURTHER UPDATES TO FOLLOW          CULTURE, BLOOD [[id number]] Collected: 01/14/22 1025    Order Status: Completed Specimen: Blood Updated: 01/15/22 0714     Special Requests: --        RIGHT  Antecubital       Culture result: NO GROWTH AFTER 20 HOURS       CULTURE, BLOOD [[id number]] Collected: 01/14/22 1025    Order Status: Completed Specimen: Blood Updated: 01/15/22 0714     Special Requests: --        LEFT  ARM       Culture result: NO GROWTH AFTER 20 HOURS       BLOOD CULTURE [[id number]]  (Abnormal) Collected: 01/12/22 1643    Order Status: Completed Specimen: Blood Updated: 01/14/22 0640     Special Requests: --        LEFT  Antecubital       GRAM STAIN GRAM POSITIVE COCCI         ANAEROBIC BOTTLE POSITIVE               CRITICAL RESULT NOT CALLED DUE TO PREVIOUS NOTIFICATION OF CRITICAL RESULT WITHIN THE LAST 24 HOURS. Culture result:       STAPHYLOCOCCUS SPECIES SUBCULTURE IN PROGRESS          MSSA/MRSA SC BY PCR, NASAL SWAB [[id number]] Collected: 01/13/22 0942    Order Status: Completed Specimen: Nasal swab Updated: 01/13/22 1140     Special Requests: NO SPECIAL REQUESTS        Culture result:       SA target not detected.                                  A MRSA NEGATIVE, SA NEGATIVE test result does not preclude MRSA or SA nasal colonization. BLOOD CULTURE ID PANEL [[id number]] Collected: 01/12/22 1126    Order Status: Completed Specimen: Blood Updated: 01/13/22 0910     Acc. no. from Micro Order [id number]     Blood Culture PCR Testing       MULTIPLEX PCR NEGATIVE:  A negative FilmArray BCID result does not exclude the possibility of bloodstream infection. Other Studies:  No results found.     Current Meds:  Current Facility-Administered Medications   Medication Dose Route Frequency    potassium, sodium phosphates (NEUTRA-PHOS) packet 1 Packet  1 Packet Oral QID    pantoprazole (PROTONIX) tablet 40 mg  40 mg Oral ACB    iron dextran (INFED) 1,000 mg in 0.9% sodium chloride 500 mL IVPB  1,000 mg IntraVENous DAILY    meropenem (MERREM) 500 mg in 0.9% sodium chloride (MBP/ADV) 50 mL MBP  500 mg IntraVENous Q6H    cetirizine (ZYRTEC) tablet 5 mg  5 mg Oral DAILY    oxyCODONE IR (ROXICODONE) tablet 10 mg  10 mg Oral Q4H PRN    tuberculin injection 5 Units  5 Units IntraDERMal ONCE    tuberculin injection 5 Units  5 Units IntraDERMal ONCE    traZODone (DESYREL) tablet 100 mg  100 mg Oral QHS    sodium chloride (NS) flush 5-10 mL  5-10 mL IntraVENous PRN    sodium chloride (NS) flush 5-40 mL  5-40 mL IntraVENous Q8H    sodium chloride (NS) flush 5-40 mL  5-40 mL IntraVENous PRN    acetaminophen (TYLENOL) tablet 650 mg  650 mg Oral Q6H PRN    Or    acetaminophen (TYLENOL) suppository 650 mg  650 mg Rectal Q6H PRN    polyethylene glycol (MIRALAX) packet 17 g  17 g Oral DAILY PRN    ondansetron (ZOFRAN ODT) tablet 4 mg  4 mg Oral Q8H PRN    Or    ondansetron (ZOFRAN) injection 4 mg  4 mg IntraVENous Q6H PRN    montelukast (SINGULAIR) tablet 10 mg  10 mg Oral QHS    hyoscyamine (LEVSIN) elixir 0.125 mg  0.125 mg Oral Q4H PRN    atorvastatin (LIPITOR) tablet 40 mg  40 mg Oral QHS    albuterol (PROVENTIL VENTOLIN) nebulizer solution 2.5 mg  2.5 mg Nebulization Q6H PRN    oxybutynin (DITROPAN) tablet 5 mg  5 mg Oral Q8H PRN    nicotine (NICODERM CQ) 14 mg/24 hr patch 1 Patch  1 Patch TransDERmal Q24H    dronedarone (MULTAQ) tablet tab 400 mg  400 mg Oral BID WITH MEALS    naloxone (NARCAN) injection 0.4 mg  0.4 mg IntraVENous EVERY 2 MINUTES AS NEEDED    vancomycin (VANCOCIN) 1,000 mg in 0.9% sodium chloride 250 mL (VIAL-MATE)  1,000 mg IntraVENous Q24H       Signed:  Milena Felix MD    Part of this note may have been written by using a voice dictation software. The note has been proof read but may still contain some grammatical/other typographical errors.

## 2022-01-15 NOTE — PROGRESS NOTES
Subjective:   Daily Progress Note: 1/15/2022 10:40 AM  No Complaints. feels better.   Objective:     Visit Vitals  /74   Pulse 100   Temp 99.1 °F (37.3 °C)   Resp 16   Ht 5' 6\" (1.676 m)   Wt 135 lb (61.2 kg)   SpO2 99%   BMI 21.79 kg/m²      O2 Device: None (Room air)    Temp (24hrs), Av.7 °F (37.1 °C), Min:97.6 °F (36.4 °C), Max:99.4 °F (37.4 °C)      1901 - 01/15 07  In: 240 [P.O.:240]  Out: 8165 [Urine:8165]  01/15 0701 - 01/15 1900  In: -   Out: 030 [Urine:825]    [unfilled]  [unfilled]  [unfilled]    Exam:       Data Review    Recent Results (from the past 24 hour(s))   PLEASE READ & DOCUMENT PPD TEST IN 24 HRS    Collection Time: 22 10:48 AM   Result Value Ref Range    PPD Negative Negative    mm Induration 0 0 - 5 mm   METABOLIC PANEL, BASIC    Collection Time: 22  6:06 PM   Result Value Ref Range    Sodium 132 (L) 136 - 145 mmol/L    Potassium 3.3 (L) 3.5 - 5.1 mmol/L    Chloride 103 98 - 107 mmol/L    CO2 21 21 - 32 mmol/L    Anion gap 8 7 - 16 mmol/L    Glucose 116 (H) 65 - 100 mg/dL    BUN 32 (H) 8 - 23 MG/DL    Creatinine 1.40 0.8 - 1.5 MG/DL    GFR est AA >60 >60 ml/min/1.73m2    GFR est non-AA 55 (L) >60 ml/min/1.73m2    Calcium 8.4 8.3 - 02.7 MG/DL   METABOLIC PANEL, BASIC    Collection Time: 22  9:17 PM   Result Value Ref Range    Sodium 134 (L) 136 - 145 mmol/L    Potassium 3.9 3.5 - 5.1 mmol/L    Chloride 105 98 - 107 mmol/L    CO2 17 (L) 21 - 32 mmol/L    Anion gap 12 7 - 16 mmol/L    Glucose 106 (H) 65 - 100 mg/dL    BUN 30 (H) 8 - 23 MG/DL    Creatinine 1.47 0.8 - 1.5 MG/DL    GFR est AA >60 >60 ml/min/1.73m2    GFR est non-AA 52 (L) >60 ml/min/1.73m2    Calcium 8.1 (L) 8.3 - 10.4 MG/DL   SODIUM    Collection Time: 01/15/22 12:09 AM   Result Value Ref Range    Sodium 136 136 - 509 mmol/L   METABOLIC PANEL, BASIC    Collection Time: 01/15/22  3:15 AM   Result Value Ref Range    Sodium 135 (L) 138 - 145 mmol/L    Potassium 3.8 3.5 - 5.1 mmol/L    Chloride 107 98 - 107 mmol/L    CO2 22 21 - 32 mmol/L    Anion gap 6 (L) 7 - 16 mmol/L    Glucose 95 65 - 100 mg/dL    BUN 25 (H) 8 - 23 MG/DL    Creatinine 1.47 0.8 - 1.5 MG/DL    GFR est AA >60 >60 ml/min/1.73m2    GFR est non-AA 52 (L) >60 ml/min/1.73m2    Calcium 8.5 8.3 - 10.4 MG/DL   CBC WITH AUTOMATED DIFF    Collection Time: 01/15/22  3:15 AM   Result Value Ref Range    WBC 4.3 4.3 - 11.1 K/uL    RBC 3.26 (L) 4.23 - 5.6 M/uL    HGB 8.3 (L) 13.6 - 17.2 g/dL    HCT 25.8 (L) 41.1 - 50.3 %    MCV 79.1 (L) 79.6 - 97.8 FL    MCH 25.5 (L) 26.1 - 32.9 PG    MCHC 32.2 31.4 - 35.0 g/dL    RDW 14.3 11.9 - 14.6 %    PLATELET 766 (L) 883 - 450 K/uL    MPV 11.5 9.4 - 12.3 FL    ABSOLUTE NRBC 0.00 0.0 - 0.2 K/uL    NEUTROPHILS 84 (H) 43 - 78 %    LYMPHOCYTES 9 (L) 13 - 44 %    MONOCYTES 4 4.0 - 12.0 %    EOSINOPHILS 0 (L) 0.5 - 7.8 %    BASOPHILS 1 0.0 - 2.0 %    IMMATURE GRANULOCYTES 2 0.0 - 5.0 %    ABS. NEUTROPHILS 3.6 1.7 - 8.2 K/UL    ABS. LYMPHOCYTES 0.4 (L) 0.5 - 4.6 K/UL    ABS. MONOCYTES 0.2 0.1 - 1.3 K/UL    ABS. EOSINOPHILS 0.0 0.0 - 0.8 K/UL    ABS. BASOPHILS 0.0 0.0 - 0.2 K/UL    ABS. IMM.  GRANS. 0.1 0.0 - 0.5 K/UL    RBC COMMENTS NORMOCYTIC/NORMOCHROMIC      WBC COMMENTS Result Confirmed By Smear      PLATELET COMMENTS SLIGHT      DF AUTOMATED     MAGNESIUM    Collection Time: 01/15/22  3:15 AM   Result Value Ref Range    Magnesium 2.3 1.8 - 2.4 mg/dL   PHOSPHORUS    Collection Time: 01/15/22  3:15 AM   Result Value Ref Range    Phosphorus 2.1 (L) 2.3 - 3.7 MG/DL   METABOLIC PANEL, BASIC    Collection Time: 01/15/22  8:44 AM   Result Value Ref Range    Sodium 136 136 - 145 mmol/L    Potassium 3.8 3.5 - 5.1 mmol/L    Chloride 105 98 - 107 mmol/L    CO2 23 21 - 32 mmol/L    Anion gap 8 7 - 16 mmol/L    Glucose 125 (H) 65 - 100 mg/dL    BUN 21 8 - 23 MG/DL    Creatinine 1.42 0.8 - 1.5 MG/DL    GFR est AA >60 >60 ml/min/1.73m2    GFR est non-AA 54 (L) >60 ml/min/1.73m2    Calcium 8.8 8.3 - 10.4 MG/DL   PLEASE READ & DOCUMENT PPD TEST IN 48 HRS    Collection Time: 01/15/22 10:09 AM   Result Value Ref Range    PPD Negative Negative    mm Induration 0 0 - 5 mm       Assessment   Principal Problem:    Severe sepsis ([facility] (1/12/2022)    Active Problems:    HTN (hypertension) (10/14/2013)      Recurrent depression ([facility] (1/4/2018)      PAF (paroxysmal atrial fibrillation) ([facility] (1/7/2020)      Overview: 1. Admitted 1/20 with afib and chest pain. Started on Multaq and Eliquis. Hyponatremia (1/12/2022)      UTI (urinary tract infection) (1/12/2022)      EDWARDO (acute kidney injury) ([facility] (1/12/2022)        Plan:  Blood culture positive for Staph, urine with GPC. He had concerns about staff not irrigating the nephrostomies. . however, since they are draining well, I would not irrigate them at this time. Continue abx for urosepsis.

## 2022-01-16 ENCOUNTER — APPOINTMENT (OUTPATIENT)
Dept: NON INVASIVE DIAGNOSTICS | Age: 62
DRG: 698 | End: 2022-01-16
Attending: STUDENT IN AN ORGANIZED HEALTH CARE EDUCATION/TRAINING PROGRAM
Payer: COMMERCIAL

## 2022-01-16 LAB
ANION GAP SERPL CALC-SCNC: 11 MMOL/L (ref 7–16)
ANION GAP SERPL CALC-SCNC: 7 MMOL/L (ref 7–16)
ANION GAP SERPL CALC-SCNC: 7 MMOL/L (ref 7–16)
ANION GAP SERPL CALC-SCNC: 8 MMOL/L (ref 7–16)
BACTERIA SPEC CULT: ABNORMAL
BASOPHILS # BLD: 0 K/UL (ref 0–0.2)
BASOPHILS NFR BLD: 0 % (ref 0–2)
BUN SERPL-MCNC: 18 MG/DL (ref 8–23)
BUN SERPL-MCNC: 21 MG/DL (ref 8–23)
BUN SERPL-MCNC: 22 MG/DL (ref 8–23)
BUN SERPL-MCNC: 24 MG/DL (ref 8–23)
CALCIUM SERPL-MCNC: 8.4 MG/DL (ref 8.3–10.4)
CALCIUM SERPL-MCNC: 8.6 MG/DL (ref 8.3–10.4)
CALCIUM SERPL-MCNC: 8.6 MG/DL (ref 8.3–10.4)
CALCIUM SERPL-MCNC: 9 MG/DL (ref 8.3–10.4)
CHLORIDE SERPL-SCNC: 101 MMOL/L (ref 98–107)
CHLORIDE SERPL-SCNC: 104 MMOL/L (ref 98–107)
CHLORIDE SERPL-SCNC: 104 MMOL/L (ref 98–107)
CHLORIDE SERPL-SCNC: 98 MMOL/L (ref 98–107)
CO2 SERPL-SCNC: 21 MMOL/L (ref 21–32)
CO2 SERPL-SCNC: 24 MMOL/L (ref 21–32)
CO2 SERPL-SCNC: 25 MMOL/L (ref 21–32)
CO2 SERPL-SCNC: 26 MMOL/L (ref 21–32)
CREAT SERPL-MCNC: 1.29 MG/DL (ref 0.8–1.5)
CREAT SERPL-MCNC: 1.31 MG/DL (ref 0.8–1.5)
CREAT SERPL-MCNC: 1.32 MG/DL (ref 0.8–1.5)
CREAT SERPL-MCNC: 1.37 MG/DL (ref 0.8–1.5)
DIFFERENTIAL METHOD BLD: ABNORMAL
ECHO AO ASC DIAM: 3.2 CM
ECHO AO ASCENDING AORTA INDEX: 1.89 CM/M2
ECHO AO ROOT DIAM: 3.3 CM
ECHO AO ROOT INDEX: 1.95 CM/M2
ECHO AV AREA PEAK VELOCITY: 2 CM2
ECHO AV AREA PEAK VELOCITY: 2 CM2
ECHO AV AREA VTI: 2.4 CM2
ECHO AV AREA VTI: 2.4 CM2
ECHO AV MEAN GRADIENT: 4 MMHG
ECHO AV MEAN VELOCITY: 0.9 M/S
ECHO AV PEAK GRADIENT: 8 MMHG
ECHO AV PEAK VELOCITY: 1.4 M/S
ECHO AV VELOCITY RATIO: 0.64
ECHO AV VTI: 23 CM
ECHO LA DIAMETER INDEX: 2.19 CM/M2
ECHO LA DIAMETER: 3.7 CM
ECHO LA TO AORTIC ROOT RATIO: 1.12
ECHO LA VOL 2C: 50 ML (ref 18–58)
ECHO LA VOL 4C: 52 ML (ref 18–58)
ECHO LA VOLUME INDEX A2C: 30 ML/M2 (ref 16–34)
ECHO LA VOLUME INDEX A4C: 31 ML/M2 (ref 16–34)
ECHO LV E' LATERAL VELOCITY: 8 CM/S
ECHO LV E' SEPTAL VELOCITY: 7 CM/S
ECHO LV EDV A2C: 59 ML
ECHO LV EDV A4C: 61 ML
ECHO LV EDV BP: 56 ML (ref 67–155)
ECHO LV EDV BP: 56 ML (ref 67–155)
ECHO LV EDV INDEX A4C: 36 ML/M2
ECHO LV EDV NDEX A2C: 35 ML/M2
ECHO LV EJECTION FRACTION BIPLANE: 58 % (ref 55–100)
ECHO LV ESV A2C: 27 ML
ECHO LV ESV A4C: 24 ML
ECHO LV ESV BP: 24 ML (ref 22–58)
ECHO LV ESV INDEX A2C: 16 ML/M2
ECHO LV ESV INDEX A4C: 14 ML/M2
ECHO LV ESV INDEX BP: 14 ML/M2
ECHO LV FRACTIONAL SHORTENING: 22 % (ref 28–44)
ECHO LV INTERNAL DIMENSION DIASTOLE INDEX: 2.66 CM/M2
ECHO LV INTERNAL DIMENSION DIASTOLIC: 4.5 CM (ref 4.2–5.9)
ECHO LV INTERNAL DIMENSION SYSTOLIC INDEX: 2.07 CM/M2
ECHO LV INTERNAL DIMENSION SYSTOLIC: 3.5 CM
ECHO LV IVSD: 0.8 CM (ref 0.6–1)
ECHO LV MASS 2D: 123.1 G (ref 88–224)
ECHO LV MASS INDEX 2D: 72.8 G/M2 (ref 49–115)
ECHO LV POSTERIOR WALL DIASTOLIC: 0.9 CM (ref 0.6–1)
ECHO LV RELATIVE WALL THICKNESS RATIO: 0.4
ECHO LVOT AREA: 3.1 CM2
ECHO LVOT AV VTI INDEX: 0.76
ECHO LVOT DIAM: 2 CM
ECHO LVOT MEAN GRADIENT: 1 MMHG
ECHO LVOT PEAK GRADIENT: 3 MMHG
ECHO LVOT PEAK VELOCITY: 0.9 M/S
ECHO LVOT STROKE VOLUME INDEX: 32.3 ML/M2
ECHO LVOT SV: 54.6 ML
ECHO LVOT VTI: 17.4 CM
ECHO MV A VELOCITY: 0.47 M/S
ECHO MV AREA PHT: 4.4 CM2
ECHO MV E DECELERATION TIME (DT): 174.1 MS
ECHO MV E VELOCITY: 0.6 M/S
ECHO MV E/A RATIO: 1.28
ECHO MV E/E' LATERAL: 7.5
ECHO MV E/E' RATIO (AVERAGED): 8.04
ECHO MV E/E' SEPTAL: 8.57
ECHO MV PRESSURE HALF TIME (PHT): 50.5 MS
ECHO RV INTERNAL DIMENSION: 2.9 CM
ECHO RV TAPSE: 2.2 CM (ref 1.5–2)
EOSINOPHIL # BLD: 0 K/UL (ref 0–0.8)
EOSINOPHIL NFR BLD: 0 % (ref 0.5–7.8)
ERYTHROCYTE [DISTWIDTH] IN BLOOD BY AUTOMATED COUNT: 14.4 % (ref 11.9–14.6)
GLUCOSE SERPL-MCNC: 101 MG/DL (ref 65–100)
GLUCOSE SERPL-MCNC: 129 MG/DL (ref 65–100)
GLUCOSE SERPL-MCNC: 84 MG/DL (ref 65–100)
GLUCOSE SERPL-MCNC: 93 MG/DL (ref 65–100)
GRAM STN SPEC: ABNORMAL
HCT VFR BLD AUTO: 24 % (ref 41.1–50.3)
HGB BLD-MCNC: 8 G/DL (ref 13.6–17.2)
IMM GRANULOCYTES # BLD AUTO: 0.1 K/UL (ref 0–0.5)
IMM GRANULOCYTES NFR BLD AUTO: 2 % (ref 0–5)
LYMPHOCYTES # BLD: 0.7 K/UL (ref 0.5–4.6)
LYMPHOCYTES NFR BLD: 10 % (ref 13–44)
MAGNESIUM SERPL-MCNC: 1.8 MG/DL (ref 1.8–2.4)
MCH RBC QN AUTO: 25.8 PG (ref 26.1–32.9)
MCHC RBC AUTO-ENTMCNC: 33.3 G/DL (ref 31.4–35)
MCV RBC AUTO: 77.4 FL (ref 79.6–97.8)
MONOCYTES # BLD: 0.4 K/UL (ref 0.1–1.3)
MONOCYTES NFR BLD: 6 % (ref 4–12)
NEUTS SEG # BLD: 6.1 K/UL (ref 1.7–8.2)
NEUTS SEG NFR BLD: 82 % (ref 43–78)
NRBC # BLD: 0 K/UL (ref 0–0.2)
PHOSPHATE SERPL-MCNC: 2.3 MG/DL (ref 2.3–3.7)
PLATELET # BLD AUTO: 219 K/UL (ref 150–450)
PLATELET COMMENTS,PCOM: ADEQUATE
PMV BLD AUTO: 11.1 FL (ref 9.4–12.3)
POTASSIUM SERPL-SCNC: 4 MMOL/L (ref 3.5–5.1)
POTASSIUM SERPL-SCNC: 4 MMOL/L (ref 3.5–5.1)
POTASSIUM SERPL-SCNC: 4.1 MMOL/L (ref 3.5–5.1)
POTASSIUM SERPL-SCNC: 4.2 MMOL/L (ref 3.5–5.1)
RBC # BLD AUTO: 3.1 M/UL (ref 4.23–5.6)
RBC MORPH BLD: ABNORMAL
SERVICE CMNT-IMP: ABNORMAL
SERVICE CMNT-IMP: ABNORMAL
SODIUM SERPL-SCNC: 131 MMOL/L (ref 138–145)
SODIUM SERPL-SCNC: 133 MMOL/L (ref 138–145)
SODIUM SERPL-SCNC: 136 MMOL/L (ref 138–145)
SODIUM SERPL-SCNC: 136 MMOL/L (ref 138–145)
WBC # BLD AUTO: 7.3 K/UL (ref 4.3–11.1)

## 2022-01-16 PROCEDURE — 74011250637 HC RX REV CODE- 250/637: Performed by: STUDENT IN AN ORGANIZED HEALTH CARE EDUCATION/TRAINING PROGRAM

## 2022-01-16 PROCEDURE — 74011250637 HC RX REV CODE- 250/637: Performed by: EMERGENCY MEDICINE

## 2022-01-16 PROCEDURE — 74011250636 HC RX REV CODE- 250/636: Performed by: NURSE PRACTITIONER

## 2022-01-16 PROCEDURE — 83735 ASSAY OF MAGNESIUM: CPT

## 2022-01-16 PROCEDURE — 74011000250 HC RX REV CODE- 250: Performed by: STUDENT IN AN ORGANIZED HEALTH CARE EDUCATION/TRAINING PROGRAM

## 2022-01-16 PROCEDURE — 65270000029 HC RM PRIVATE

## 2022-01-16 PROCEDURE — 74011250637 HC RX REV CODE- 250/637: Performed by: FAMILY MEDICINE

## 2022-01-16 PROCEDURE — 65660000000 HC RM CCU STEPDOWN

## 2022-01-16 PROCEDURE — 85025 COMPLETE CBC W/AUTO DIFF WBC: CPT

## 2022-01-16 PROCEDURE — 2709999900 HC NON-CHARGEABLE SUPPLY

## 2022-01-16 PROCEDURE — 74011000250 HC RX REV CODE- 250: Performed by: FAMILY MEDICINE

## 2022-01-16 PROCEDURE — 84100 ASSAY OF PHOSPHORUS: CPT

## 2022-01-16 PROCEDURE — 80048 BASIC METABOLIC PNL TOTAL CA: CPT

## 2022-01-16 PROCEDURE — 74011000250 HC RX REV CODE- 250: Performed by: NURSE PRACTITIONER

## 2022-01-16 PROCEDURE — 74011250636 HC RX REV CODE- 250/636: Performed by: STUDENT IN AN ORGANIZED HEALTH CARE EDUCATION/TRAINING PROGRAM

## 2022-01-16 PROCEDURE — 74011000258 HC RX REV CODE- 258: Performed by: STUDENT IN AN ORGANIZED HEALTH CARE EDUCATION/TRAINING PROGRAM

## 2022-01-16 PROCEDURE — 36415 COLL VENOUS BLD VENIPUNCTURE: CPT

## 2022-01-16 PROCEDURE — C8929 TTE W OR WO FOL WCON,DOPPLER: HCPCS

## 2022-01-16 RX ORDER — CEFAZOLIN SODIUM/WATER 2 G/20 ML
2 SYRINGE (ML) INTRAVENOUS EVERY 8 HOURS
Status: DISCONTINUED | OUTPATIENT
Start: 2022-01-16 | End: 2022-01-19 | Stop reason: HOSPADM

## 2022-01-16 RX ORDER — LANOLIN ALCOHOL/MO/W.PET/CERES
400 CREAM (GRAM) TOPICAL 2 TIMES DAILY
Status: DISCONTINUED | OUTPATIENT
Start: 2022-01-16 | End: 2022-01-17

## 2022-01-16 RX ORDER — METOPROLOL TARTRATE 5 MG/5ML
5 INJECTION INTRAVENOUS
Status: DISCONTINUED | OUTPATIENT
Start: 2022-01-16 | End: 2022-01-19 | Stop reason: HOSPADM

## 2022-01-16 RX ADMIN — MEROPENEM 500 MG: 500 INJECTION, POWDER, FOR SOLUTION INTRAVENOUS at 09:41

## 2022-01-16 RX ADMIN — PERFLUTREN 1 ML: 6.52 INJECTION, SUSPENSION INTRAVENOUS at 12:00

## 2022-01-16 RX ADMIN — CETIRIZINE HYDROCHLORIDE 5 MG: 10 TABLET, FILM COATED ORAL at 09:42

## 2022-01-16 RX ADMIN — MONTELUKAST 10 MG: 10 TABLET, FILM COATED ORAL at 22:17

## 2022-01-16 RX ADMIN — TRAZODONE HYDROCHLORIDE 100 MG: 50 TABLET ORAL at 22:17

## 2022-01-16 RX ADMIN — SODIUM CHLORIDE, PRESERVATIVE FREE 10 ML: 5 INJECTION INTRAVENOUS at 14:05

## 2022-01-16 RX ADMIN — DRONEDARONE 400 MG: 400 TABLET, FILM COATED ORAL at 17:43

## 2022-01-16 RX ADMIN — Medication 400 MG: at 17:43

## 2022-01-16 RX ADMIN — POTASSIUM & SODIUM PHOSPHATES POWDER PACK 280-160-250 MG 1 PACKET: 280-160-250 PACK at 22:17

## 2022-01-16 RX ADMIN — POTASSIUM & SODIUM PHOSPHATES POWDER PACK 280-160-250 MG 1 PACKET: 280-160-250 PACK at 17:43

## 2022-01-16 RX ADMIN — CEFAZOLIN SODIUM 2 G: 100 INJECTION, POWDER, LYOPHILIZED, FOR SOLUTION INTRAVENOUS at 22:22

## 2022-01-16 RX ADMIN — CEFAZOLIN SODIUM 2 G: 100 INJECTION, POWDER, LYOPHILIZED, FOR SOLUTION INTRAVENOUS at 14:32

## 2022-01-16 RX ADMIN — SODIUM CHLORIDE, PRESERVATIVE FREE 10 ML: 5 INJECTION INTRAVENOUS at 22:17

## 2022-01-16 RX ADMIN — SODIUM CHLORIDE, PRESERVATIVE FREE 10 ML: 5 INJECTION INTRAVENOUS at 05:46

## 2022-01-16 RX ADMIN — DRONEDARONE 400 MG: 400 TABLET, FILM COATED ORAL at 09:42

## 2022-01-16 RX ADMIN — METOPROLOL TARTRATE 5 MG: 5 INJECTION INTRAVENOUS at 04:45

## 2022-01-16 RX ADMIN — Medication 400 MG: at 09:42

## 2022-01-16 RX ADMIN — MEROPENEM 500 MG: 500 INJECTION, POWDER, FOR SOLUTION INTRAVENOUS at 03:00

## 2022-01-16 RX ADMIN — OXYCODONE 10 MG: 5 TABLET ORAL at 09:46

## 2022-01-16 RX ADMIN — SODIUM CHLORIDE 1000 MG: 9 INJECTION, SOLUTION INTRAVENOUS at 11:04

## 2022-01-16 RX ADMIN — POTASSIUM & SODIUM PHOSPHATES POWDER PACK 280-160-250 MG 1 PACKET: 280-160-250 PACK at 13:42

## 2022-01-16 RX ADMIN — MEROPENEM 500 MG: 500 INJECTION, POWDER, FOR SOLUTION INTRAVENOUS at 13:42

## 2022-01-16 RX ADMIN — PANTOPRAZOLE SODIUM 40 MG: 40 TABLET, DELAYED RELEASE ORAL at 04:44

## 2022-01-16 RX ADMIN — ATORVASTATIN CALCIUM 40 MG: 40 TABLET, FILM COATED ORAL at 22:17

## 2022-01-16 RX ADMIN — POTASSIUM & SODIUM PHOSPHATES POWDER PACK 280-160-250 MG 1 PACKET: 280-160-250 PACK at 09:42

## 2022-01-16 RX ADMIN — OXYCODONE 10 MG: 5 TABLET ORAL at 17:55

## 2022-01-16 NOTE — PROGRESS NOTES
Hospitalist Progress Note   Admit Date:  2022  1:58 PM   Name:  Dang Montoya   Age:  57 y.o. Sex:  male  :  1960   MRN:  [id number]   Room:  18/0    Presenting Complaint: Flank Pain    Reason(s) for Admission: Severe sepsis ([address] [A41.9, R65.20]  UTI (urinary tract infection) [N39.0]  Hyponatremia [E87.1]  EDWARDO (acute kidney injury) [facility]) [N17.9]     Hospital Course & Interval History:   Patient is a 57 y.o. male who presented to the ED for cc lower back pain along with odor to urine. Hx of depression, HTN, HLD, CAD s/p PCI, paroxysmal a fib not on anticoagulation, metastatic urothelial/ bladder cancer on chemo last tx one week ago at [address] with mets to lung, bilateral nephrosotomy tubes since November, and recurrent UTIs growing stenotrophomonas maltophilia, klebsiella pneumoniae, and klebsiella ozaenae in the past. Wife at bedside who states she has noticed some erythema to right nephrostomy site. Both state having good urine output from nephrostomy tubes. Subjective (22): Pt is feeling much better today. He complains that his left nephrostomy tub is leaking and Right nephrostomy tube has output. Pt denies chest pain, sob, diarrhea. Assessment & Plan:   Severe sepsis ([address] (2022)   Severe sepsis (met by HR, WBC, and EDWARDO) secondary to UTI -  Staph Bacteremia and source is probably nephrostomy tube exit site;   LA 3.3, Procalcitoinin 2.2, Leucocytosis of 15k and s/p 3L IVF  on admission  CT scan showed Pyelonephritis  UCNTD   repeat blood cultures negative  Discontinued  Vancomycin and Meropenem for now  Started on Ancef   He is scheduled for tube exchange on 2/3 at [address].  If he doesn't respond well to antibiotics may need Exchange of tubes in this hospital  ID is following  Urology is following       HTN (hypertension) (10/14/2013)  Pt is hypotensive  Holding antihyperensives      Recurrent depression ([address] (2018)  Continue home medications      PAF (paroxysmal atrial fibrillation) ([facility] (1/7/2020)  Holding Metoprolol due to hypotension  Holding Eliquis due to hematuria      Hyponatremia (1/12/2022)  SIADH  Most likely due to sertraline and cancer   Sodium was 126 on admission and resolved now   S/p tolvaptan x1 dose  Fluid restriction 1-1.5L  F/u Nephrology    Anemia due to acute blood loss  Anemia due to hematuria vs chemotherapy vs malignancy vs sepsis  OBI   S/p InFed  Hb of 8 and baseline is 16    Thrombocytopenia  Most likely due to sepsis  Transfuse platelets if <20O if bleeding  Monitor Platelets for now      UTI (urinary tract infection) (1/12/2022)    Continue antibiotics      EDWARDO (acute kidney injury) ([facility] (1/12/2022)  Most likely due to sepsis   Cr trending down  and baseline is 0.81    Bladder and urothelial cancer -   Follows [facility]. PRN oxycodone        Dispo/Discharge Planning:    Home health therapy and pt wants to go home  PPD is ordered    Diet:  ADULT DIET Regular; Low Fat/Low Chol/High Fiber/2 gm Na  DVT PPx: scd  Code status: Full Code    Hospital Problems as of 1/16/2022 Date Reviewed: 10/21/2021          Codes Class Noted - Resolved POA    Hyponatremia ICD-10-CM: E87.1  ICD-9-CM: 276.1  1/12/2022 - Present Unknown        UTI (urinary tract infection) ICD-10-CM: N39.0  ICD-9-CM: 599.0  1/12/2022 - Present Unknown        * (Principal) Severe sepsis (HCC) ICD-10-CM: A41.9, R65.20  ICD-9-CM: 038.9, 995.92  1/12/2022 - Present Unknown        EDWARDO (acute kidney injury) ([facility] ICD-10-CM: N17.9  ICD-9-CM: 584.9  1/12/2022 - Present Unknown        PAF (paroxysmal atrial fibrillation) (HCC) ICD-10-CM: I48.0  ICD-9-CM: 427.31  1/7/2020 - Present Yes    Overview Signed 8/26/2020  3:51 PM by Bolivar Payton MD     1. Admitted 1/20 with afib and chest pain. Started on Multaq and Eliquis.               Recurrent depression ([facility] ICD-10-CM: F33.9  ICD-9-CM: 296.30  1/4/2018 - Present Yes        HTN (hypertension) ICD-10-CM: I10  ICD-9-CM: 401.9  10/14/2013 - Present Yes              Objective:     Patient Vitals for the past 24 hrs:   Temp Pulse Resp BP SpO2   01/16/22 1208    121/72    01/16/22 0835 97.6 °F (36.4 °C) 95 20 121/72 96 %   01/16/22 0425 97.9 °F (36.6 °C) 94 18 116/76 94 %   01/16/22 0043 98.4 °F (36.9 °C) 90 19 105/75 97 %   01/15/22 2349  91      01/15/22 1952 98.6 °F (37 °C) 91 18 121/67 97 %   01/15/22 1540 98.4 °F (36.9 °C) 99 16 139/87 96 %     Oxygen Therapy  O2 Sat (%): 96 % (01/16/22 0835)  Pulse via Oximetry: 98 beats per minute (01/13/22 1500)  O2 Device: None (Room air) (01/15/22 0508)    Estimated body mass index is 21.79 kg/m² as calculated from the following:    Height as of this encounter: 5' 6\" (1.676 m). Weight as of this encounter: 61.2 kg (135 lb). Intake/Output Summary (Last 24 hours) at 1/16/2022 1427  Last data filed at 1/16/2022 1341  Gross per 24 hour   Intake    Output 2000 ml   Net -2000 ml         Physical Exam:     Blood pressure 121/72, pulse 95, temperature 97.6 °F (36.4 °C), resp. rate 20, height 5' 6\" (1.676 m), weight 61.2 kg (135 lb), SpO2 96 %. General:    Well nourished. No overt distress  Head:  Normocephalic, atraumatic  Eyes:  Sclerae appear normal.  Pupils equally round. ENT:  Nares appear normal, no drainage. Moist oral mucosa  Neck:  No restricted ROM. Trachea midline   CV:   RRR. No m/r/g. No jugular venous distension. Lungs:   CTAB. No wheezing, rhonchi, or rales. Respirations even, unlabored  Abdomen: Bowel sounds present. Soft, nontender, nondistended. Genitourinary Bilateral Nephrostomy tubes are present  Extremities: No cyanosis or clubbing. No edema  Skin:     No rashes and normal coloration. Warm and dry. Neuro:  CN II-XII grossly intact. Sensation intact. A&Ox3  Psych:  Normal mood and affect.       I have reviewed ordered lab tests and independently visualized imaging below:    Recent Labs:  Recent Results (from the past 48 hour(s))   METABOLIC PANEL, BASIC Collection Time: 01/14/22  6:06 PM   Result Value Ref Range    Sodium 132 (L) 136 - 145 mmol/L    Potassium 3.3 (L) 3.5 - 5.1 mmol/L    Chloride 103 98 - 107 mmol/L    CO2 21 21 - 32 mmol/L    Anion gap 8 7 - 16 mmol/L    Glucose 116 (H) 65 - 100 mg/dL    BUN 32 (H) 8 - 23 MG/DL    Creatinine 1.40 0.8 - 1.5 MG/DL    GFR est AA >60 >60 ml/min/1.73m2    GFR est non-AA 55 (L) >60 ml/min/1.73m2    Calcium 8.4 8.3 - 60.4 MG/DL   METABOLIC PANEL, BASIC    Collection Time: 01/14/22  9:17 PM   Result Value Ref Range    Sodium 134 (L) 136 - 145 mmol/L    Potassium 3.9 3.5 - 5.1 mmol/L    Chloride 105 98 - 107 mmol/L    CO2 17 (L) 21 - 32 mmol/L    Anion gap 12 7 - 16 mmol/L    Glucose 106 (H) 65 - 100 mg/dL    BUN 30 (H) 8 - 23 MG/DL    Creatinine 1.47 0.8 - 1.5 MG/DL    GFR est AA >60 >60 ml/min/1.73m2    GFR est non-AA 52 (L) >60 ml/min/1.73m2    Calcium 8.1 (L) 8.3 - 10.4 MG/DL   SODIUM    Collection Time: 01/15/22 12:09 AM   Result Value Ref Range    Sodium 136 136 - 617 mmol/L   METABOLIC PANEL, BASIC    Collection Time: 01/15/22  3:15 AM   Result Value Ref Range    Sodium 135 (L) 138 - 145 mmol/L    Potassium 3.8 3.5 - 5.1 mmol/L    Chloride 107 98 - 107 mmol/L    CO2 22 21 - 32 mmol/L    Anion gap 6 (L) 7 - 16 mmol/L    Glucose 95 65 - 100 mg/dL    BUN 25 (H) 8 - 23 MG/DL    Creatinine 1.47 0.8 - 1.5 MG/DL    GFR est AA >60 >60 ml/min/1.73m2    GFR est non-AA 52 (L) >60 ml/min/1.73m2    Calcium 8.5 8.3 - 10.4 MG/DL   CBC WITH AUTOMATED DIFF    Collection Time: 01/15/22  3:15 AM   Result Value Ref Range    WBC 4.3 4.3 - 11.1 K/uL    RBC 3.26 (L) 4.23 - 5.6 M/uL    HGB 8.3 (L) 13.6 - 17.2 g/dL    HCT 25.8 (L) 41.1 - 50.3 %    MCV 79.1 (L) 79.6 - 97.8 FL    MCH 25.5 (L) 26.1 - 32.9 PG    MCHC 32.2 31.4 - 35.0 g/dL    RDW 14.3 11.9 - 14.6 %    PLATELET 473 (L) 023 - 450 K/uL    MPV 11.5 9.4 - 12.3 FL    ABSOLUTE NRBC 0.00 0.0 - 0.2 K/uL    NEUTROPHILS 84 (H) 43 - 78 %    LYMPHOCYTES 9 (L) 13 - 44 %    MONOCYTES 4 4.0 - 12.0 %    EOSINOPHILS 0 (L) 0.5 - 7.8 %    BASOPHILS 1 0.0 - 2.0 %    IMMATURE GRANULOCYTES 2 0.0 - 5.0 %    ABS. NEUTROPHILS 3.6 1.7 - 8.2 K/UL    ABS. LYMPHOCYTES 0.4 (L) 0.5 - 4.6 K/UL    ABS. MONOCYTES 0.2 0.1 - 1.3 K/UL    ABS. EOSINOPHILS 0.0 0.0 - 0.8 K/UL    ABS. BASOPHILS 0.0 0.0 - 0.2 K/UL    ABS. IMM.  GRANS. 0.1 0.0 - 0.5 K/UL    RBC COMMENTS NORMOCYTIC/NORMOCHROMIC      WBC COMMENTS Result Confirmed By Smear      PLATELET COMMENTS SLIGHT      DF AUTOMATED     MAGNESIUM    Collection Time: 01/15/22  3:15 AM   Result Value Ref Range    Magnesium 2.3 1.8 - 2.4 mg/dL   PHOSPHORUS    Collection Time: 01/15/22  3:15 AM   Result Value Ref Range    Phosphorus 2.1 (L) 2.3 - 3.7 MG/DL   METABOLIC PANEL, BASIC    Collection Time: 01/15/22  8:44 AM   Result Value Ref Range    Sodium 136 136 - 145 mmol/L    Potassium 3.8 3.5 - 5.1 mmol/L    Chloride 105 98 - 107 mmol/L    CO2 23 21 - 32 mmol/L    Anion gap 8 7 - 16 mmol/L    Glucose 125 (H) 65 - 100 mg/dL    BUN 21 8 - 23 MG/DL    Creatinine 1.42 0.8 - 1.5 MG/DL    GFR est AA >60 >60 ml/min/1.73m2    GFR est non-AA 54 (L) >60 ml/min/1.73m2    Calcium 8.8 8.3 - 10.4 MG/DL   PLEASE READ & DOCUMENT PPD TEST IN 48 HRS    Collection Time: 01/15/22 10:09 AM   Result Value Ref Range    PPD Negative Negative    mm Induration 0 0 - 5 mm   METABOLIC PANEL, BASIC    Collection Time: 01/15/22  2:12 PM   Result Value Ref Range    Sodium 137 (L) 138 - 145 mmol/L    Potassium 3.7 3.5 - 5.1 mmol/L    Chloride 107 98 - 107 mmol/L    CO2 22 21 - 32 mmol/L    Anion gap 8 7 - 16 mmol/L    Glucose 119 (H) 65 - 100 mg/dL    BUN 32 (H) 8 - 23 MG/DL    Creatinine 1.33 0.8 - 1.5 MG/DL    GFR est AA >60 >60 ml/min/1.73m2    GFR est non-AA 58 (L) >60 ml/min/1.73m2    Calcium 8.4 8.3 - 18.7 MG/DL   METABOLIC PANEL, BASIC    Collection Time: 01/15/22  9:03 PM   Result Value Ref Range    Sodium 132 (L) 138 - 145 mmol/L    Potassium 4.0 3.5 - 5.1 mmol/L    Chloride 102 98 - 107 mmol/L    CO2 22 21 - 32 mmol/L    Anion gap 8 7 - 16 mmol/L    Glucose 103 (H) 65 - 100 mg/dL    BUN 27 (H) 8 - 23 MG/DL    Creatinine 1.31 0.8 - 1.5 MG/DL    GFR est AA >60 >60 ml/min/1.73m2    GFR est non-AA 59 (L) >60 ml/min/1.73m2    Calcium 8.5 8.3 - 84.7 MG/DL   METABOLIC PANEL, BASIC    Collection Time: 01/16/22  2:12 AM   Result Value Ref Range    Sodium 133 (L) 138 - 145 mmol/L    Potassium 4.0 3.5 - 5.1 mmol/L    Chloride 104 98 - 107 mmol/L    CO2 21 21 - 32 mmol/L    Anion gap 8 7 - 16 mmol/L    Glucose 93 65 - 100 mg/dL    BUN 24 (H) 8 - 23 MG/DL    Creatinine 1.29 0.8 - 1.5 MG/DL    GFR est AA >60 >60 ml/min/1.73m2    GFR est non-AA 60 (L) >60 ml/min/1.73m2    Calcium 8.4 8.3 - 10.4 MG/DL   CBC WITH AUTOMATED DIFF    Collection Time: 01/16/22  2:12 AM   Result Value Ref Range    WBC 7.3 4.3 - 11.1 K/uL    RBC 3.10 (L) 4.23 - 5.6 M/uL    HGB 8.0 (L) 13.6 - 17.2 g/dL    HCT 24.0 (L) 41.1 - 50.3 %    MCV 77.4 (L) 79.6 - 97.8 FL    MCH 25.8 (L) 26.1 - 32.9 PG    MCHC 33.3 31.4 - 35.0 g/dL    RDW 14.4 11.9 - 14.6 %    PLATELET 900 598 - 533 K/uL    MPV 11.1 9.4 - 12.3 FL    ABSOLUTE NRBC 0.00 0.0 - 0.2 K/uL    NEUTROPHILS 82 (H) 43 - 78 %    LYMPHOCYTES 10 (L) 13 - 44 %    MONOCYTES 6 4.0 - 12.0 %    EOSINOPHILS 0 (L) 0.5 - 7.8 %    BASOPHILS 0 0.0 - 2.0 %    IMMATURE GRANULOCYTES 2 0.0 - 5.0 %    ABS. NEUTROPHILS 6.1 1.7 - 8.2 K/UL    ABS. LYMPHOCYTES 0.7 0.5 - 4.6 K/UL    ABS. MONOCYTES 0.4 0.1 - 1.3 K/UL    ABS. EOSINOPHILS 0.0 0.0 - 0.8 K/UL    ABS. BASOPHILS 0.0 0.0 - 0.2 K/UL    ABS. IMM.  GRANS. 0.1 0.0 - 0.5 K/UL    RBC COMMENTS SLIGHT  ANISOCYTOSIS + POIKILOCYTOSIS        PLATELET COMMENTS ADEQUATE      DF AUTOMATED     MAGNESIUM    Collection Time: 01/16/22  2:12 AM   Result Value Ref Range    Magnesium 1.8 1.8 - 2.4 mg/dL   PHOSPHORUS    Collection Time: 01/16/22  2:12 AM   Result Value Ref Range    Phosphorus 2.3 2.3 - 3.7 MG/DL   METABOLIC PANEL, BASIC    Collection Time: 01/16/22  9:38 AM   Result Value Ref Range Sodium 136 (L) 138 - 145 mmol/L    Potassium 4.1 3.5 - 5.1 mmol/L    Chloride 101 98 - 107 mmol/L    CO2 24 21 - 32 mmol/L    Anion gap 11 7 - 16 mmol/L    Glucose 84 65 - 100 mg/dL    BUN 22 8 - 23 MG/DL    Creatinine 1.37 0.8 - 1.5 MG/DL    GFR est AA >60 >60 ml/min/1.73m2    GFR est non-AA 56 (L) >60 ml/min/1.73m2    Calcium 9.0 8.3 - 10.4 MG/DL   ECHO ADULT COMPLETE    Collection Time: 01/16/22 12:09 PM   Result Value Ref Range    IVSd 0.8 0.6 - 1.0 cm    LVIDd 4.5 4.2 - 5.9 cm    LVIDs 3.5 cm    LVOT Diameter 2.0 cm    LVPWd 0.9 0.6 - 1.0 cm    LVOT SV 54.6 ml    EF BP 58 55 - 100 %    LV EDV A2C 59 mL    LV EDV A4C 61 mL    LV EDV BP 56 (A) 67 - 155 mL    LV EDV BP 56 (A) 67 - 155 mL    LV ESV A2C 27 mL    LV ESV A4C 24 mL    LV ESV BP 24 22 - 58 mL    LVOT Peak Gradient 3 mmHg    LVOT Mean Gradient 1 mmHg    LVOT Peak Velocity 0.9 m/s    LVOT VTI 17.4 cm    RVIDd 2.9 cm    LA Diameter 3.7 cm    AV Area by Peak Velocity 2.0 cm2    AV Area by Peak Velocity 2.0 cm2    AV Area by VTI 2.4 cm2    AV Area by VTI 2.4 cm2    AV Peak Gradient 8 mmHg    AV Mean Gradient 4 mmHg    AV Peak Velocity 1.4 m/s    AV Mean Velocity 0.9 m/s    AV VTI 23.0 cm    MV A Velocity 0.47 m/s    MV E Wave Deceleration Time 174.1 ms    MV E Velocity 0.60 m/s    LV E' Lateral Velocity 8 cm/s    LV E' Septal Velocity 7 cm/s    MV PHT 50.5 ms    MV Area by PHT 4.4 cm2    TAPSE 2.2 (A) 1.5 - 2.0 cm    Ascending Aorta 3.2 cm    Aortic Root 3.3 cm    Fractional Shortening 2D 22 28 - 44 %    LV ESV Index BP 14 mL/m2    LV ESV Index A4C 14 mL/m2    LV EDV Index A4C 36 mL/m2    LV ESV Index A2C 16 mL/m2    LV EDV Index A2C 35 mL/m2    LVIDd Index 2.66 cm/m2    LVIDs Index 2.07 cm/m2    LV RWT Ratio 0.40     LV Mass 2D 123.1 88 - 224 g    LV Mass 2D Index 72.8 49 - 115 g/m2    MV E/A 1.28     E/E' Ratio (Averaged) 8.04     E/E' Lateral 7.50     E/E' Septal 8.57     LVOT Stroke Volume Index 32.3 mL/m2    LVOT Area 3.1 cm2    LA Size Index 2.19 cm/m2    LA/AO Root Ratio 1.12     Ao Root Index 1.95 cm/m2    Ascending Aorta Index 1.89 cm/m2    AV Velocity Ratio 0.64     LVOT:AV VTI Index 0.76     LA Volume 2C 50 18 - 58 mL    LA Volume Index 2C 30 16 - 34 mL/m2    LA Volume 4C 52 18 - 58 mL    LA Volume Index 4C 31 16 - 34 mL/m2       All Micro Results     Procedure Component Value Units Date/Time    CULTURE, URINE [[id number]]  (Abnormal)  (Susceptibility) Collected: 01/12/22 1550    Order Status: Completed Specimen: Urine Updated: 01/16/22 1023     Special Requests: NO SPECIAL REQUESTS        Culture result:       >100,000 COLONIES/mL Staphylococcus intermedius                  >100,000 COLONIES/mL STAPHYLOCOCCUS SCHLEIFERI                  CULTURE IN PROGRESS,FURTHER UPDATES TO FOLLOW          BLOOD CULTURE [[id number]]  (Abnormal) Collected: 01/12/22 1126    Order Status: Completed Specimen: Blood Updated: 01/16/22 0835     Special Requests: --        RIGHT  Antecubital       GRAM STAIN GRAM POSITIVE COCCI               AEROBIC AND ANAEROBIC BOTTLES                  RESULTS VERIFIED, PHONED TO AND READ BACK BY Shanell Sidhu RN @ 0544 ON 1/13/22 BY AMM           Culture result:       STAPHYLOCOCCUS SPECIES, COAGULASE NEGATIVE This organism may be indicative of culture contamination. Clinical correlation needs to be evaluated as each case is unique. Refer to Blood Culture ID Panel Accession  [id number]      BLOOD CULTURE [[id number]]  (Abnormal) Collected: 01/12/22 1643    Order Status: Completed Specimen: Blood Updated: 01/16/22 0829     Special Requests: --        LEFT  Antecubital       GRAM STAIN GRAM POSITIVE COCCI         ANAEROBIC BOTTLE POSITIVE               CRITICAL RESULT NOT CALLED DUE TO PREVIOUS NOTIFICATION OF CRITICAL RESULT WITHIN THE LAST 24 HOURS. Culture result:       STAPHYLOCOCCUS SPECIES, COAGULASE NEGATIVE This organism may be indicative of culture contamination.  Clinical correlation needs to be evaluated as each case is unique. CULTURE, BLOOD [[id number]] Collected: 01/14/22 1025    Order Status: Completed Specimen: Blood Updated: 01/16/22 0632     Special Requests: --        LEFT  ARM       Culture result: NO GROWTH 2 DAYS       CULTURE, BLOOD [[id number]] Collected: 01/14/22 1025    Order Status: Completed Specimen: Blood Updated: 01/16/22 6814     Special Requests: --        RIGHT  Antecubital       Culture result: NO GROWTH 2 DAYS       MSSA/MRSA SC BY PCR, NASAL SWAB [[id number]] Collected: 01/13/22 0942    Order Status: Completed Specimen: Nasal swab Updated: 01/13/22 1140     Special Requests: NO SPECIAL REQUESTS        Culture result:       SA target not detected. A MRSA NEGATIVE, SA NEGATIVE test result does not preclude MRSA or SA nasal colonization. BLOOD CULTURE ID PANEL [[id number]] Collected: 01/12/22 1126    Order Status: Completed Specimen: Blood Updated: 01/13/22 0910     Acc. no. from Micro Order [id number]     Blood Culture PCR Testing       MULTIPLEX PCR NEGATIVE:  A negative FilmArray BCID result does not exclude the possibility of bloodstream infection. Other Studies:  ECHO ADULT COMPLETE    Result Date: 1/16/2022    Left Ventricle: Left ventricle size is normal. Normal wall thickness. Normal left ventricular systolic function with a visually estimated EF of 55 - 60%. Normal diastolic function.        Current Meds:  Current Facility-Administered Medications   Medication Dose Route Frequency    metoprolol (LOPRESSOR) injection 5 mg  5 mg IntraVENous Q4H PRN    magnesium oxide (MAG-OX) tablet 400 mg  400 mg Oral BID    ceFAZolin (ANCEF) 2 g/20 mL in sterile water IV syringe  2 g IntraVENous Q8H    potassium, sodium phosphates (NEUTRA-PHOS) packet 1 Packet  1 Packet Oral QID    pantoprazole (PROTONIX) tablet 40 mg  40 mg Oral ACB    iron dextran (INFED) 1,000 mg in 0.9% sodium chloride 500 mL IVPB  1,000 mg IntraVENous DAILY    cetirizine (ZYRTEC) tablet 5 mg  5 mg Oral DAILY    oxyCODONE IR (ROXICODONE) tablet 10 mg  10 mg Oral Q4H PRN    traZODone (DESYREL) tablet 100 mg  100 mg Oral QHS    sodium chloride (NS) flush 5-10 mL  5-10 mL IntraVENous PRN    sodium chloride (NS) flush 5-40 mL  5-40 mL IntraVENous Q8H    sodium chloride (NS) flush 5-40 mL  5-40 mL IntraVENous PRN    acetaminophen (TYLENOL) tablet 650 mg  650 mg Oral Q6H PRN    Or    acetaminophen (TYLENOL) suppository 650 mg  650 mg Rectal Q6H PRN    polyethylene glycol (MIRALAX) packet 17 g  17 g Oral DAILY PRN    ondansetron (ZOFRAN ODT) tablet 4 mg  4 mg Oral Q8H PRN    Or    ondansetron (ZOFRAN) injection 4 mg  4 mg IntraVENous Q6H PRN    montelukast (SINGULAIR) tablet 10 mg  10 mg Oral QHS    hyoscyamine (LEVSIN) elixir 0.125 mg  0.125 mg Oral Q4H PRN    atorvastatin (LIPITOR) tablet 40 mg  40 mg Oral QHS    albuterol (PROVENTIL VENTOLIN) nebulizer solution 2.5 mg  2.5 mg Nebulization Q6H PRN    oxybutynin (DITROPAN) tablet 5 mg  5 mg Oral Q8H PRN    nicotine (NICODERM CQ) 14 mg/24 hr patch 1 Patch  1 Patch TransDERmal Q24H    dronedarone (MULTAQ) tablet tab 400 mg  400 mg Oral BID WITH MEALS    naloxone (NARCAN) injection 0.4 mg  0.4 mg IntraVENous EVERY 2 MINUTES AS NEEDED       Signed:  Darion Gill MD    Part of this note may have been written by using a voice dictation software. The note has been proof read but may still contain some grammatical/other typographical errors.

## 2022-01-16 NOTE — PROGRESS NOTES
Infectious Disease Progress note    Today's Date: 1/16/2022   Admit Date: 1/12/2022    Impression:   · Staph aureus (PCR negative), ID CNS species/ Sepsis: nidus probably either urine/kidneys or nephrostomy tube exit site;   · UTI: patient did have significant pyuria from one nephrostomy tube (one with 10-20 WBC; other with >100), culture Staph intermedius & staph scheiferi  sent from L side; right perinephric stranding seen on CT suggestive of pyelonephritis, with limited output from [facility]  · Metastatic Bladder Ca    Plan:   · Discontinue Vancomycin & Merrem today  · Start Ancef 2 gm IV Q 8 hrs  · Follow cultures; ask micro to work up CNS species in blood cx dated 1/12  · Duration of tx TBD  · Per Dr. Mason Kidney. \"Given that pt has no hydronephrosis and kidney fxn normal, if he responds well to abx treatment, he could wait until his scheduled tube exchange 2/3 at [address]. If he does not respond well to abx and does not improve,  we would proceed with tube exchange here. If pt progresses well, okay to dc home with PO abx and go for tube exchange 2/3\"     Anti-infectives:   · Vancomycin (01/12-1/16)  · Piperacillin/tazobactam (01/12-01/13)  · Ceftriaxone (01/12)  · Meropenem (01/13-1/16)  · IV Ancef (1/16-    Subjective:   Afebrile today. Reports feeling better. WBC 7.3. Scr improved 1.29. L nephrostomy still leaking when in supine position. HPI:    Patient is a 57 y.o. male with a hx of metastatic bladder carcinoma followed at [address], bilateral nephrostomy tubes, recurrent UTIs admitted to [facility] on 01/12 with lower back pain, malodorous urine and some redness around right nephrostomy tube site. Patient with Tmax 100.3 on admission, WBC 15.2, procal 2.2; CT with right perinephric stranding, and U/A with >100 WBC. Cultures obtained (BC, and ?left nephrostomy tube) and patient initially given dose of ceftriaxone in ED. Admitted and placed on Vanc/Zosyn.  Initial BC growing GPC in anaerobic bottle; zosyn switched to meropenem this am.  Afebrile, WBC down to 7; Cr down from 1.66 on admission to 1.37 this am.   Overall patient feels better; with resolution of back pain; no fever or chills. States he's always noted less urine output on right then left nephrostomy tubes since they were placed at [address] in 2021. He states they are scheduled to be exchanged in February. He has no port or indwelling catheter; his chemo has been given through peripheral access. He makes very little urine through his urethra. He denied N/V/diarrhea. No Known Allergies     Review of Systems:  A comprehensive review of systems was negative except for that written in the History of Present Illness. Objective:     Visit Vitals  /76 (BP 1 Location: Left upper arm, BP Patient Position: At rest)   Pulse 94   Temp 97.9 °F (36.6 °C)   Resp 18   Ht 5' 6\" (1.676 m)   Wt 61.3 kg (135 lb 3.2 oz)   SpO2 94%   BMI 21.82 kg/m²     Temp (24hrs), Av.5 °F (36.9 °C), Min:97.9 °F (36.6 °C), Max:99.1 °F (37.3 °C)     Patient examined 2022, exam remains unchanged except as noted below:    General:  Alert, cooperative, no acute distress   Head:  Normocephalic, atraumatic    Eyes:  Anicteric, no drainage/not injected   Throat: Mucus membranes moist OP clear   Neck: Supple, symmetrical, trachea midline, no JVD   Lungs:   Clear throughout lung fields, no increased work of breathing   Heart:  Regular rate and rhythm, without murmur   Abdomen:   Soft, non-tender, no guarding, no distention, bowel sounds active.  Bilateral flank PNC: leaking on left, R with minimal urine out   Extremities: Extremities without edema, pulses palpable   Skin: Skin without rash     Lines/Devices: PIV   Left    Right          Data Review:     CBC:  Recent Labs     22  0212 01/15/22  0315 22  0941 22  0941   WBC 7.3 4.3  --  7.7   GRANS 82* 84*   < > 87*   MONOS 6 4   < > 2*   EOS 0* 0*   < > 0*   ANEU 6.1 3.6   < > 6.7   ABL 0.7 0.4*   < > 0.2*   HGB 8.0* 8.3*  --  8.4*   HCT 24.0* 25.8*  --  25.9*    146*  --  73*    < > = values in this interval not displayed. BMP:  Recent Labs     01/16/22  0212 01/15/22  2103 01/15/22  1412   CREA 1.29 1.31 1.33   BUN 24* 27* 32*   * 132* 137*   K 4.0 4.0 3.7    102 107   CO2 21 22 22   AGAP 8 8 8   GLU 93 103* 119*       LFTS:  No results for input(s): TBILI, ALT, AP, TP, ALB in the last 72 hours.     No lab exists for component: SGOT    Microbiology:     All Micro Results     Procedure Component Value Units Date/Time    CULTURE, BLOOD [[id number]] Collected: 01/14/22 1025    Order Status: Completed Specimen: Blood Updated: 01/16/22 0632     Special Requests: --        LEFT  ARM       Culture result: NO GROWTH 2 DAYS       CULTURE, BLOOD [[id number]] Collected: 01/14/22 1025    Order Status: Completed Specimen: Blood Updated: 01/16/22 0632     Special Requests: --        RIGHT  Antecubital       Culture result: NO GROWTH 2 DAYS       CULTURE, URINE [[id number]]  (Abnormal) Collected: 01/12/22 1550    Order Status: Completed Specimen: Urine Updated: 01/15/22 1404     Special Requests: NO SPECIAL REQUESTS        Culture result:       >100,000 COLONIES/mL GRAM POSITIVE COCCI IDENTIFICATION AND SUSCEPTIBILITY TO FOLLOW          BLOOD CULTURE [[id number]]  (Abnormal) Collected: 01/12/22 1126    Order Status: Completed Specimen: Blood Updated: 01/15/22 0735     Special Requests: --        RIGHT  Antecubital       GRAM STAIN GRAM POSITIVE COCCI               AEROBIC AND ANAEROBIC BOTTLES                  RESULTS VERIFIED, PHONED TO AND READ BACK BY Oscar Grier, ALISHA @ 6716 ON 1/13/22 BY AMM           Culture result: STAPHYLOCOCCUS SPECIES               Refer to Blood Culture ID Panel Accession  [id number]              CULTURE IN PROGRESS,FURTHER UPDATES TO FOLLOW          BLOOD CULTURE [[id number]]  (Abnormal) Collected: 01/12/22 1643    Order Status: Completed Specimen: Blood Updated: 01/14/22 0640     Special Requests: --        LEFT  Antecubital       GRAM STAIN GRAM POSITIVE COCCI         ANAEROBIC BOTTLE POSITIVE               CRITICAL RESULT NOT CALLED DUE TO PREVIOUS NOTIFICATION OF CRITICAL RESULT WITHIN THE LAST 24 HOURS. Culture result:       STAPHYLOCOCCUS SPECIES SUBCULTURE IN PROGRESS          MSSA/MRSA SC BY PCR, NASAL SWAB [[id number]] Collected: 01/13/22 0942    Order Status: Completed Specimen: Nasal swab Updated: 01/13/22 1140     Special Requests: NO SPECIAL REQUESTS        Culture result:       SA target not detected. A MRSA NEGATIVE, SA NEGATIVE test result does not preclude MRSA or SA nasal colonization. BLOOD CULTURE ID PANEL [[id number]] Collected: 01/12/22 1126    Order Status: Completed Specimen: Blood Updated: 01/13/22 0910     Acc. no. from Micro Order [id number]     Blood Culture PCR Testing       MULTIPLEX PCR NEGATIVE:  A negative FilmArray BCID result does not exclude the possibility of bloodstream infection. Imaging:   CT Abd/pelvis (01/12/22): IMPRESSION  1. Asymmetric right perinephric stranding concerning for right pyelonephritis  in the correct clinical setting. Bilateral nephrostomy tubes in place. No  hydronephrosis. No urinary tract calculi.     2.  Bladder is decompressed with circumferential wall thickening and to bladder  diverticula the larger appearing posteriorly measuring up to 4.5 cm. Follow-up  as clinically warranted.     3.   No bowel obstruction, diverticulitis or appendicitis.     4.  Multiple bibasilar pulmonary metastatic nodules.       Signed By: Madiha Pedro NP     January 16, 2022

## 2022-01-16 NOTE — PROGRESS NOTES
VANCO DAILY FOLLOW UP NOTE  [facility] Pharmacokinetic Monitoring Service - Vancomycin    Consulting Provider: Dr. Love Avilez   Indication: sepsis 2/2 UTI  Target Concentration: Goal AUC/CARMELO 400-600 mg*hr/L  Day of Therapy: 5/7  Additional Antimicrobials: pip/tazo    Pertinent Laboratory Values: Wt Readings from Last 1 Encounters:   01/16/22 61.3 kg (135 lb 3.2 oz)     Temp Readings from Last 1 Encounters:   01/16/22 97.6 °F (36.4 °C)     No components found for: PROCAL  Recent Labs     01/16/22  0212 01/15/22  2103 01/15/22  1412 01/15/22  0844 01/15/22  0315   BUN 24* 27* 32*   < > 25*   CREA 1.29 1.31 1.33   < > 1.47   WBC 7.3  --   --   --  4.3    < > = values in this interval not displayed. Estimated Creatinine Clearance: 51.5 mL/min (based on SCr of 1.29 mg/dL). Lab Results   Component Value Date/Time    Vancomycin, random 17.9 01/14/2022 03:22 AM       MRSA Nasal Swab: N/A. Non-respiratory infection. .      Assessment:  Date/Time Dose Concentration AUC   1/14 0322 1000 mg q24h 17.9 431   Note: Serum concentrations collected for AUC dosing may appear elevated if collected in close proximity to the dose administered, this is not necessarily an indication of toxicity    Plan:  Current dosing regimen is therapeutic  Continue current dose  Repeat vancomycin concentrations will be ordered as clinically appropriate  Pharmacy will continue to monitor patient and adjust therapy as indicated    Thank you for the consult,  Lori Zelaya, JAVID

## 2022-01-17 LAB
ANION GAP SERPL CALC-SCNC: 8 MMOL/L (ref 7–16)
ANION GAP SERPL CALC-SCNC: 9 MMOL/L (ref 7–16)
BASOPHILS # BLD: 0 K/UL (ref 0–0.2)
BASOPHILS NFR BLD: 0 % (ref 0–2)
BUN SERPL-MCNC: 18 MG/DL (ref 8–23)
BUN SERPL-MCNC: 19 MG/DL (ref 8–23)
CALCIUM SERPL-MCNC: 8.5 MG/DL (ref 8.3–10.4)
CALCIUM SERPL-MCNC: 8.8 MG/DL (ref 8.3–10.4)
CALCIUM SERPL-MCNC: 8.8 MG/DL (ref 8.3–10.4)
CALCIUM SERPL-MCNC: 8.9 MG/DL (ref 8.3–10.4)
CHLORIDE SERPL-SCNC: 100 MMOL/L (ref 98–107)
CHLORIDE SERPL-SCNC: 100 MMOL/L (ref 98–107)
CHLORIDE SERPL-SCNC: 101 MMOL/L (ref 98–107)
CHLORIDE SERPL-SCNC: 98 MMOL/L (ref 98–107)
CO2 SERPL-SCNC: 24 MMOL/L (ref 21–32)
CO2 SERPL-SCNC: 24 MMOL/L (ref 21–32)
CO2 SERPL-SCNC: 25 MMOL/L (ref 21–32)
CO2 SERPL-SCNC: 25 MMOL/L (ref 21–32)
CREAT SERPL-MCNC: 1.26 MG/DL (ref 0.8–1.5)
CREAT SERPL-MCNC: 1.27 MG/DL (ref 0.8–1.5)
CREAT SERPL-MCNC: 1.4 MG/DL (ref 0.8–1.5)
CREAT SERPL-MCNC: 1.41 MG/DL (ref 0.8–1.5)
DIFFERENTIAL METHOD BLD: ABNORMAL
EOSINOPHIL # BLD: 0 K/UL (ref 0–0.8)
EOSINOPHIL NFR BLD: 0 % (ref 0.5–7.8)
ERYTHROCYTE [DISTWIDTH] IN BLOOD BY AUTOMATED COUNT: 14.6 % (ref 11.9–14.6)
GLUCOSE SERPL-MCNC: 101 MG/DL (ref 65–100)
GLUCOSE SERPL-MCNC: 106 MG/DL (ref 65–100)
GLUCOSE SERPL-MCNC: 108 MG/DL (ref 65–100)
GLUCOSE SERPL-MCNC: 120 MG/DL (ref 65–100)
HCT VFR BLD AUTO: 25.7 % (ref 41.1–50.3)
HGB BLD-MCNC: 8.4 G/DL (ref 13.6–17.2)
IMM GRANULOCYTES # BLD AUTO: 0.4 K/UL (ref 0–0.5)
IMM GRANULOCYTES NFR BLD AUTO: 4 % (ref 0–5)
LYMPHOCYTES # BLD: 0.9 K/UL (ref 0.5–4.6)
LYMPHOCYTES NFR BLD: 9 % (ref 13–44)
MAGNESIUM SERPL-MCNC: 1.9 MG/DL (ref 1.8–2.4)
MCH RBC QN AUTO: 25.6 PG (ref 26.1–32.9)
MCHC RBC AUTO-ENTMCNC: 32.7 G/DL (ref 31.4–35)
MCV RBC AUTO: 78.4 FL (ref 79.6–97.8)
MONOCYTES # BLD: 0.6 K/UL (ref 0.1–1.3)
MONOCYTES NFR BLD: 6 % (ref 4–12)
NEUTS SEG # BLD: 8.2 K/UL (ref 1.7–8.2)
NEUTS SEG NFR BLD: 81 % (ref 43–78)
NRBC # BLD: 0 K/UL (ref 0–0.2)
PHOSPHATE SERPL-MCNC: 2.7 MG/DL (ref 2.3–3.7)
PLATELET # BLD AUTO: 316 K/UL (ref 150–450)
PMV BLD AUTO: 10.8 FL (ref 9.4–12.3)
POTASSIUM SERPL-SCNC: 3.9 MMOL/L (ref 3.5–5.1)
RBC # BLD AUTO: 3.28 M/UL (ref 4.23–5.6)
SODIUM SERPL-SCNC: 131 MMOL/L (ref 138–145)
SODIUM SERPL-SCNC: 132 MMOL/L (ref 138–145)
SODIUM SERPL-SCNC: 133 MMOL/L (ref 138–145)
SODIUM SERPL-SCNC: 134 MMOL/L (ref 138–145)
WBC # BLD AUTO: 10.2 K/UL (ref 4.3–11.1)

## 2022-01-17 PROCEDURE — 74011250637 HC RX REV CODE- 250/637: Performed by: EMERGENCY MEDICINE

## 2022-01-17 PROCEDURE — 99232 SBSQ HOSP IP/OBS MODERATE 35: CPT | Performed by: NURSE PRACTITIONER

## 2022-01-17 PROCEDURE — 36415 COLL VENOUS BLD VENIPUNCTURE: CPT

## 2022-01-17 PROCEDURE — 80048 BASIC METABOLIC PNL TOTAL CA: CPT

## 2022-01-17 PROCEDURE — 85025 COMPLETE CBC W/AUTO DIFF WBC: CPT

## 2022-01-17 PROCEDURE — 74011000250 HC RX REV CODE- 250: Performed by: FAMILY MEDICINE

## 2022-01-17 PROCEDURE — 65660000000 HC RM CCU STEPDOWN

## 2022-01-17 PROCEDURE — 84100 ASSAY OF PHOSPHORUS: CPT

## 2022-01-17 PROCEDURE — 83735 ASSAY OF MAGNESIUM: CPT

## 2022-01-17 PROCEDURE — 74011250636 HC RX REV CODE- 250/636: Performed by: NURSE PRACTITIONER

## 2022-01-17 PROCEDURE — 74011250637 HC RX REV CODE- 250/637: Performed by: NURSE PRACTITIONER

## 2022-01-17 PROCEDURE — 74011250637 HC RX REV CODE- 250/637: Performed by: STUDENT IN AN ORGANIZED HEALTH CARE EDUCATION/TRAINING PROGRAM

## 2022-01-17 PROCEDURE — 65270000029 HC RM PRIVATE

## 2022-01-17 PROCEDURE — 74011000250 HC RX REV CODE- 250: Performed by: NURSE PRACTITIONER

## 2022-01-17 PROCEDURE — 74011250637 HC RX REV CODE- 250/637: Performed by: FAMILY MEDICINE

## 2022-01-17 RX ORDER — DOCUSATE SODIUM 100 MG/1
100 CAPSULE, LIQUID FILLED ORAL DAILY
Status: DISCONTINUED | OUTPATIENT
Start: 2022-01-17 | End: 2022-01-19 | Stop reason: HOSPADM

## 2022-01-17 RX ORDER — LANOLIN ALCOHOL/MO/W.PET/CERES
400 CREAM (GRAM) TOPICAL 2 TIMES DAILY
Status: DISCONTINUED | OUTPATIENT
Start: 2022-01-17 | End: 2022-01-19 | Stop reason: HOSPADM

## 2022-01-17 RX ORDER — LANOLIN ALCOHOL/MO/W.PET/CERES
1 CREAM (GRAM) TOPICAL
Status: DISCONTINUED | OUTPATIENT
Start: 2022-01-18 | End: 2022-01-19 | Stop reason: HOSPADM

## 2022-01-17 RX ADMIN — POTASSIUM & SODIUM PHOSPHATES POWDER PACK 280-160-250 MG 1 PACKET: 280-160-250 PACK at 13:12

## 2022-01-17 RX ADMIN — POTASSIUM & SODIUM PHOSPHATES POWDER PACK 280-160-250 MG 1 PACKET: 280-160-250 PACK at 08:53

## 2022-01-17 RX ADMIN — CETIRIZINE HYDROCHLORIDE 5 MG: 10 TABLET, FILM COATED ORAL at 08:53

## 2022-01-17 RX ADMIN — TRAZODONE HYDROCHLORIDE 100 MG: 50 TABLET ORAL at 21:57

## 2022-01-17 RX ADMIN — PANTOPRAZOLE SODIUM 40 MG: 40 TABLET, DELAYED RELEASE ORAL at 05:04

## 2022-01-17 RX ADMIN — CEFAZOLIN SODIUM 2 G: 100 INJECTION, POWDER, LYOPHILIZED, FOR SOLUTION INTRAVENOUS at 05:32

## 2022-01-17 RX ADMIN — CEFAZOLIN SODIUM 2 G: 100 INJECTION, POWDER, LYOPHILIZED, FOR SOLUTION INTRAVENOUS at 13:11

## 2022-01-17 RX ADMIN — SODIUM CHLORIDE, PRESERVATIVE FREE 10 ML: 5 INJECTION INTRAVENOUS at 05:04

## 2022-01-17 RX ADMIN — CEFAZOLIN SODIUM 2 G: 100 INJECTION, POWDER, LYOPHILIZED, FOR SOLUTION INTRAVENOUS at 21:57

## 2022-01-17 RX ADMIN — DOCUSATE SODIUM 100 MG: 100 CAPSULE ORAL at 17:27

## 2022-01-17 RX ADMIN — Medication 400 MG: at 17:27

## 2022-01-17 RX ADMIN — Medication 400 MG: at 08:53

## 2022-01-17 RX ADMIN — ACETAMINOPHEN 650 MG: 325 TABLET ORAL at 21:57

## 2022-01-17 RX ADMIN — ATORVASTATIN CALCIUM 40 MG: 40 TABLET, FILM COATED ORAL at 21:57

## 2022-01-17 RX ADMIN — DRONEDARONE 400 MG: 400 TABLET, FILM COATED ORAL at 08:54

## 2022-01-17 RX ADMIN — SODIUM CHLORIDE, PRESERVATIVE FREE 10 ML: 5 INJECTION INTRAVENOUS at 22:03

## 2022-01-17 RX ADMIN — DRONEDARONE 400 MG: 400 TABLET, FILM COATED ORAL at 17:26

## 2022-01-17 RX ADMIN — POTASSIUM & SODIUM PHOSPHATES POWDER PACK 280-160-250 MG 1 PACKET: 280-160-250 PACK at 21:57

## 2022-01-17 RX ADMIN — POTASSIUM & SODIUM PHOSPHATES POWDER PACK 280-160-250 MG 1 PACKET: 280-160-250 PACK at 17:27

## 2022-01-17 RX ADMIN — MONTELUKAST 10 MG: 10 TABLET, FILM COATED ORAL at 21:57

## 2022-01-17 RX ADMIN — SODIUM CHLORIDE, PRESERVATIVE FREE 10 ML: 5 INJECTION INTRAVENOUS at 13:14

## 2022-01-17 NOTE — PROGRESS NOTES
Chart review complete. Possible need for [facility] therapy and a RW based on 1/14/22 PT note. Pt continuing on IV ABX. CM following to assist with dc needs.

## 2022-01-17 NOTE — PROGRESS NOTES
Hospitalist Progress Note   Admit Date:  2022  1:58 PM   Name:  Martín Morris   Age:  57 y.o. Sex:  male  :  1960   MRN:  [id number]   Room:  18/0    Presenting Complaint: Flank Pain    Reason(s) for Admission: Severe sepsis ([facility] [A41.9, R65.20]  UTI (urinary tract infection) [N39.0]  Hyponatremia [E87.1]  EDWARDO (acute kidney injury) [facility]) [N17.9]     Hospital Course & Interval History:   Patient is a 57 y.o. male who presented to the ED for cc lower back pain along with odor to urine. Hx of depression, HTN, HLD, CAD s/p PCI, paroxysmal a fib not on anticoagulation, metastatic urothelial/ bladder cancer on chemo last tx one week ago at [address] with mets to lung, bilateral nephrosotomy tubes since November, and recurrent UTIs growing stenotrophomonas maltophilia, klebsiella pneumoniae, and klebsiella ozaenae in the past. Wife at bedside who states she has noticed some erythema to right nephrostomy site. Both state having good urine output from nephrostomy tubes. Labs remarkable for sodium of 126. Nephrology consulted and gave tolvaptan x1. Sertraline was discontinued. He was given InFed for anemia. Subjective (22): Pt is feeling much better today. His leaking form left nephrostomy tube stopped if he is sitting. Pt denies chest pain, sob, diarrhea. Assessment & Plan:   Severe sepsis ([facility] (2022)   Severe sepsis (met by HR, WBC, and EDWARDO) secondary to UTI -  Staph Bacteremia and source is probably nephrostomy tube exit site;   LA 3.3, Procalcitoinin 2.2, Leucocytosis of 15k and s/p 3L IVF  on admission  CT scan showed Pyelonephritis  UCNTD, BCNTD  Discontinued  Vancomycin and Meropenem for now  · Continue  Ancef for 2 weeks and then transition to oral abx for another two weeks. IR is consulted for exchange of tubes.    ID is following  Urology is following       HTN (hypertension) (10/14/2013)  Pt is hypotensive  Holding antihyperensives      Recurrent depression ([facility] (1/4/2018)  Continue home medications      PAF (paroxysmal atrial fibrillation) ([facility] (1/7/2020)  Holding Metoprolol due to hypotension  Holding Eliquis due to hematuria      Hyponatremia (1/12/2022)  Resolved     Anemia due to acute blood loss  OBI   Hb of 8, stable and baseline is 16    Thrombocytopenia  Resolved      UTI (urinary tract infection) (1/12/2022)    Continue antibiotics      EDWARDO (acute kidney injury) ([facility] (1/12/2022)  Most likely due to sepsis   Cr trending down  and baseline is 0.81    Bladder and urothelial cancer -   Follows [facility]. PRN oxycodone      Dispo/Discharge Planning:    Home health therapy and pt wants to go home  PPD is ordered    Diet:  ADULT DIET Regular; Low Fat/Low Chol/High Fiber/2 gm Na  DIET NPO Sips of Water with Meds  DVT PPx: scd  Code status: Full Code    Hospital Problems as of 1/17/2022 Date Reviewed: 10/21/2021          Codes Class Noted - Resolved POA    Hyponatremia ICD-10-CM: E87.1  ICD-9-CM: 276.1  1/12/2022 - Present Unknown        UTI (urinary tract infection) ICD-10-CM: N39.0  ICD-9-CM: 599.0  1/12/2022 - Present Unknown        * (Principal) Severe sepsis (HCC) ICD-10-CM: A41.9, R65.20  ICD-9-CM: 038.9, 995.92  1/12/2022 - Present Unknown        EDWARDO (acute kidney injury) ([facility] ICD-10-CM: N17.9  ICD-9-CM: 584.9  1/12/2022 - Present Unknown        PAF (paroxysmal atrial fibrillation) (HCC) ICD-10-CM: I48.0  ICD-9-CM: 427.31  1/7/2020 - Present Yes    Overview Signed 8/26/2020  3:51 PM by Nicolas Leblanc MD     1. Admitted 1/20 with afib and chest pain. Started on Multaq and Eliquis.               Recurrent depression ([facility] ICD-10-CM: F33.9  ICD-9-CM: 296.30  1/4/2018 - Present Yes        HTN (hypertension) ICD-10-CM: I10  ICD-9-CM: 401.9  10/14/2013 - Present Yes              Objective:     Patient Vitals for the past 24 hrs:   Temp Pulse Resp BP SpO2   01/17/22 1622 97.7 °F (36.5 °C) 68 18 131/78 97 %   01/17/22 1151 98.5 °F (36.9 °C) 67 17 116/72 95 % 01/17/22 0718 98.4 °F (36.9 °C) 77 18 118/63 94 %   01/17/22 0458 98.2 °F (36.8 °C) 71 20 136/76 96 %   01/16/22 2357 98.2 °F (36.8 °C) 78 20 99/64 97 %   01/16/22 1948 98.3 °F (36.8 °C) 83 16 120/81      Oxygen Therapy  O2 Sat (%): 97 % (01/17/22 1622)  Pulse via Oximetry: 98 beats per minute (01/13/22 1500)  O2 Device: None (Room air) (01/16/22 1948)    Estimated body mass index is 21.79 kg/m² as calculated from the following:    Height as of this encounter: 5' 6\" (1.676 m). Weight as of this encounter: 61.2 kg (135 lb). Intake/Output Summary (Last 24 hours) at 1/17/2022 1635  Last data filed at 1/17/2022 1322  Gross per 24 hour   Intake    Output 1300 ml   Net -1300 ml         Physical Exam:     Blood pressure 131/78, pulse 68, temperature 97.7 °F (36.5 °C), resp. rate 18, height 5' 6\" (1.676 m), weight 61.2 kg (135 lb), SpO2 97 %. General:    Well nourished. No overt distress  Head:  Normocephalic, atraumatic  Eyes:  Sclerae appear normal.  Pupils equally round. ENT:  Nares appear normal, no drainage. Moist oral mucosa  Neck:  No restricted ROM. Trachea midline   CV:   RRR. No m/r/g. No jugular venous distension. Lungs:   CTAB. No wheezing, rhonchi, or rales. Respirations even, unlabored  Abdomen: Bowel sounds present. Soft, nontender, nondistended. Genitourinary Bilateral Nephrostomy tubes are present  Extremities: No cyanosis or clubbing. No edema  Skin:     No rashes and normal coloration. Warm and dry. Neuro:  CN II-XII grossly intact. Sensation intact. A&Ox3  Psych:  Normal mood and affect.       I have reviewed ordered lab tests and independently visualized imaging below:    Recent Labs:  Recent Results (from the past 48 hour(s))   METABOLIC PANEL, BASIC    Collection Time: 01/15/22  9:03 PM   Result Value Ref Range    Sodium 132 (L) 138 - 145 mmol/L    Potassium 4.0 3.5 - 5.1 mmol/L    Chloride 102 98 - 107 mmol/L    CO2 22 21 - 32 mmol/L    Anion gap 8 7 - 16 mmol/L Glucose 103 (H) 65 - 100 mg/dL    BUN 27 (H) 8 - 23 MG/DL    Creatinine 1.31 0.8 - 1.5 MG/DL    GFR est AA >60 >60 ml/min/1.73m2    GFR est non-AA 59 (L) >60 ml/min/1.73m2    Calcium 8.5 8.3 - 08.5 MG/DL   METABOLIC PANEL, BASIC    Collection Time: 01/16/22  2:12 AM   Result Value Ref Range    Sodium 133 (L) 138 - 145 mmol/L    Potassium 4.0 3.5 - 5.1 mmol/L    Chloride 104 98 - 107 mmol/L    CO2 21 21 - 32 mmol/L    Anion gap 8 7 - 16 mmol/L    Glucose 93 65 - 100 mg/dL    BUN 24 (H) 8 - 23 MG/DL    Creatinine 1.29 0.8 - 1.5 MG/DL    GFR est AA >60 >60 ml/min/1.73m2    GFR est non-AA 60 (L) >60 ml/min/1.73m2    Calcium 8.4 8.3 - 10.4 MG/DL   CBC WITH AUTOMATED DIFF    Collection Time: 01/16/22  2:12 AM   Result Value Ref Range    WBC 7.3 4.3 - 11.1 K/uL    RBC 3.10 (L) 4.23 - 5.6 M/uL    HGB 8.0 (L) 13.6 - 17.2 g/dL    HCT 24.0 (L) 41.1 - 50.3 %    MCV 77.4 (L) 79.6 - 97.8 FL    MCH 25.8 (L) 26.1 - 32.9 PG    MCHC 33.3 31.4 - 35.0 g/dL    RDW 14.4 11.9 - 14.6 %    PLATELET 703 889 - 743 K/uL    MPV 11.1 9.4 - 12.3 FL    ABSOLUTE NRBC 0.00 0.0 - 0.2 K/uL    NEUTROPHILS 82 (H) 43 - 78 %    LYMPHOCYTES 10 (L) 13 - 44 %    MONOCYTES 6 4.0 - 12.0 %    EOSINOPHILS 0 (L) 0.5 - 7.8 %    BASOPHILS 0 0.0 - 2.0 %    IMMATURE GRANULOCYTES 2 0.0 - 5.0 %    ABS. NEUTROPHILS 6.1 1.7 - 8.2 K/UL    ABS. LYMPHOCYTES 0.7 0.5 - 4.6 K/UL    ABS. MONOCYTES 0.4 0.1 - 1.3 K/UL    ABS. EOSINOPHILS 0.0 0.0 - 0.8 K/UL    ABS. BASOPHILS 0.0 0.0 - 0.2 K/UL    ABS. IMM.  GRANS. 0.1 0.0 - 0.5 K/UL    RBC COMMENTS SLIGHT  ANISOCYTOSIS + POIKILOCYTOSIS        PLATELET COMMENTS ADEQUATE      DF AUTOMATED     MAGNESIUM    Collection Time: 01/16/22  2:12 AM   Result Value Ref Range    Magnesium 1.8 1.8 - 2.4 mg/dL   PHOSPHORUS    Collection Time: 01/16/22  2:12 AM   Result Value Ref Range    Phosphorus 2.3 2.3 - 3.7 MG/DL   METABOLIC PANEL, BASIC    Collection Time: 01/16/22  9:38 AM   Result Value Ref Range    Sodium 136 (L) 138 - 145 mmol/L Potassium 4.1 3.5 - 5.1 mmol/L    Chloride 101 98 - 107 mmol/L    CO2 24 21 - 32 mmol/L    Anion gap 11 7 - 16 mmol/L    Glucose 84 65 - 100 mg/dL    BUN 22 8 - 23 MG/DL    Creatinine 1.37 0.8 - 1.5 MG/DL    GFR est AA >60 >60 ml/min/1.73m2    GFR est non-AA 56 (L) >60 ml/min/1.73m2    Calcium 9.0 8.3 - 10.4 MG/DL   ECHO ADULT COMPLETE    Collection Time: 01/16/22 12:09 PM   Result Value Ref Range    IVSd 0.8 0.6 - 1.0 cm    LVIDd 4.5 4.2 - 5.9 cm    LVIDs 3.5 cm    LVOT Diameter 2.0 cm    LVPWd 0.9 0.6 - 1.0 cm    LVOT SV 54.6 ml    EF BP 58 55 - 100 %    LV EDV A2C 59 mL    LV EDV A4C 61 mL    LV EDV BP 56 (A) 67 - 155 mL    LV EDV BP 56 (A) 67 - 155 mL    LV ESV A2C 27 mL    LV ESV A4C 24 mL    LV ESV BP 24 22 - 58 mL    LVOT Peak Gradient 3 mmHg    LVOT Mean Gradient 1 mmHg    LVOT Peak Velocity 0.9 m/s    LVOT VTI 17.4 cm    RVIDd 2.9 cm    LA Diameter 3.7 cm    AV Area by Peak Velocity 2.0 cm2    AV Area by Peak Velocity 2.0 cm2    AV Area by VTI 2.4 cm2    AV Area by VTI 2.4 cm2    AV Peak Gradient 8 mmHg    AV Mean Gradient 4 mmHg    AV Peak Velocity 1.4 m/s    AV Mean Velocity 0.9 m/s    AV VTI 23.0 cm    MV A Velocity 0.47 m/s    MV E Wave Deceleration Time 174.1 ms    MV E Velocity 0.60 m/s    LV E' Lateral Velocity 8 cm/s    LV E' Septal Velocity 7 cm/s    MV PHT 50.5 ms    MV Area by PHT 4.4 cm2    TAPSE 2.2 (A) 1.5 - 2.0 cm    Ascending Aorta 3.2 cm    Aortic Root 3.3 cm    Fractional Shortening 2D 22 28 - 44 %    LV ESV Index BP 14 mL/m2    LV ESV Index A4C 14 mL/m2    LV EDV Index A4C 36 mL/m2    LV ESV Index A2C 16 mL/m2    LV EDV Index A2C 35 mL/m2    LVIDd Index 2.66 cm/m2    LVIDs Index 2.07 cm/m2    LV RWT Ratio 0.40     LV Mass 2D 123.1 88 - 224 g    LV Mass 2D Index 72.8 49 - 115 g/m2    MV E/A 1.28     E/E' Ratio (Averaged) 8.04     E/E' Lateral 7.50     E/E' Septal 8.57     LVOT Stroke Volume Index 32.3 mL/m2    LVOT Area 3.1 cm2    LA Size Index 2.19 cm/m2    LA/AO Root Ratio 1.12     Ao Root Index 1.95 cm/m2    Ascending Aorta Index 1.89 cm/m2    AV Velocity Ratio 0.64     LVOT:AV VTI Index 0.76     LA Volume 2C 50 18 - 58 mL    LA Volume Index 2C 30 16 - 34 mL/m2    LA Volume 4C 52 18 - 58 mL    LA Volume Index 4C 31 16 - 34 mL/m2   METABOLIC PANEL, BASIC    Collection Time: 01/16/22  3:17 PM   Result Value Ref Range    Sodium 136 (L) 138 - 145 mmol/L    Potassium 4.0 3.5 - 5.1 mmol/L    Chloride 104 98 - 107 mmol/L    CO2 25 21 - 32 mmol/L    Anion gap 7 7 - 16 mmol/L    Glucose 129 (H) 65 - 100 mg/dL    BUN 21 8 - 23 MG/DL    Creatinine 1.31 0.8 - 1.5 MG/DL    GFR est AA >60 >60 ml/min/1.73m2    GFR est non-AA 59 (L) >60 ml/min/1.73m2    Calcium 8.6 8.3 - 97.6 MG/DL   METABOLIC PANEL, BASIC    Collection Time: 01/16/22  9:49 PM   Result Value Ref Range    Sodium 131 (L) 138 - 145 mmol/L    Potassium 4.2 3.5 - 5.1 mmol/L    Chloride 98 98 - 107 mmol/L    CO2 26 21 - 32 mmol/L    Anion gap 7 7 - 16 mmol/L    Glucose 101 (H) 65 - 100 mg/dL    BUN 18 8 - 23 MG/DL    Creatinine 1.32 0.8 - 1.5 MG/DL    GFR est AA >60 >60 ml/min/1.73m2    GFR est non-AA 58 (L) >60 ml/min/1.73m2    Calcium 8.6 8.3 - 92.7 MG/DL   METABOLIC PANEL, BASIC    Collection Time: 01/17/22  3:11 AM   Result Value Ref Range    Sodium 131 (L) 138 - 145 mmol/L    Potassium 3.9 3.5 - 5.1 mmol/L    Chloride 98 98 - 107 mmol/L    CO2 24 21 - 32 mmol/L    Anion gap 9 7 - 16 mmol/L    Glucose 101 (H) 65 - 100 mg/dL    BUN 18 8 - 23 MG/DL    Creatinine 1.27 0.8 - 1.5 MG/DL    GFR est AA >60 >60 ml/min/1.73m2    GFR est non-AA >60 >60 ml/min/1.73m2    Calcium 8.5 8.3 - 10.4 MG/DL   CBC WITH AUTOMATED DIFF    Collection Time: 01/17/22  3:11 AM   Result Value Ref Range    WBC 10.2 4.3 - 11.1 K/uL    RBC 3.28 (L) 4.23 - 5.6 M/uL    HGB 8.4 (L) 13.6 - 17.2 g/dL    HCT 25.7 (L) 41.1 - 50.3 %    MCV 78.4 (L) 79.6 - 97.8 FL    MCH 25.6 (L) 26.1 - 32.9 PG    MCHC 32.7 31.4 - 35.0 g/dL    RDW 14.6 11.9 - 14.6 %    PLATELET 108 978 - 762 K/uL    MPV 10.8 9.4 - 12.3 FL    ABSOLUTE NRBC 0.00 0.0 - 0.2 K/uL    DF AUTOMATED      NEUTROPHILS 81 (H) 43 - 78 %    LYMPHOCYTES 9 (L) 13 - 44 %    MONOCYTES 6 4.0 - 12.0 %    EOSINOPHILS 0 (L) 0.5 - 7.8 %    BASOPHILS 0 0.0 - 2.0 %    IMMATURE GRANULOCYTES 4 0.0 - 5.0 %    ABS. NEUTROPHILS 8.2 1.7 - 8.2 K/UL    ABS. LYMPHOCYTES 0.9 0.5 - 4.6 K/UL    ABS. MONOCYTES 0.6 0.1 - 1.3 K/UL    ABS. EOSINOPHILS 0.0 0.0 - 0.8 K/UL    ABS. BASOPHILS 0.0 0.0 - 0.2 K/UL    ABS. IMM. GRANS.  0.4 0.0 - 0.5 K/UL   MAGNESIUM    Collection Time: 01/17/22  3:11 AM   Result Value Ref Range    Magnesium 1.9 1.8 - 2.4 mg/dL   PHOSPHORUS    Collection Time: 01/17/22  3:11 AM   Result Value Ref Range    Phosphorus 2.7 2.3 - 3.7 MG/DL   METABOLIC PANEL, BASIC    Collection Time: 01/17/22 10:00 AM   Result Value Ref Range    Sodium 132 (L) 138 - 145 mmol/L    Potassium 3.9 3.5 - 5.1 mmol/L    Chloride 100 98 - 107 mmol/L    CO2 24 21 - 32 mmol/L    Anion gap 8 7 - 16 mmol/L    Glucose 106 (H) 65 - 100 mg/dL    BUN 18 8 - 23 MG/DL    Creatinine 1.40 0.8 - 1.5 MG/DL    GFR est AA >60 >60 ml/min/1.73m2    GFR est non-AA 55 (L) >60 ml/min/1.73m2    Calcium 8.9 8.3 - 08.0 MG/DL   METABOLIC PANEL, BASIC    Collection Time: 01/17/22  2:19 PM   Result Value Ref Range    Sodium 133 (L) 138 - 145 mmol/L    Potassium 3.9 3.5 - 5.1 mmol/L    Chloride 100 98 - 107 mmol/L    CO2 25 21 - 32 mmol/L    Anion gap 8 7 - 16 mmol/L    Glucose 120 (H) 65 - 100 mg/dL    BUN 19 8 - 23 MG/DL    Creatinine 1.41 0.8 - 1.5 MG/DL    GFR est AA >60 >60 ml/min/1.73m2    GFR est non-AA 54 (L) >60 ml/min/1.73m2    Calcium 8.8 8.3 - 10.4 MG/DL       All Micro Results     Procedure Component Value Units Date/Time    CULTURE, BLOOD [[id number]] Collected: 01/14/22 1025    Order Status: Completed Specimen: Blood Updated: 01/17/22 0802     Special Requests: --        RIGHT  Antecubital       Culture result: NO GROWTH 3 DAYS       CULTURE, BLOOD [[id number]] Collected: 01/14/22 102 Order Status: Completed Specimen: Blood Updated: 01/17/22 0802     Special Requests: --        LEFT  ARM       Culture result: NO GROWTH 3 DAYS       BLOOD CULTURE [[id number]]  (Abnormal)  (Susceptibility) Collected: 01/12/22 1126    Order Status: Completed Specimen: Blood Updated: 01/16/22 1507     Special Requests: --        RIGHT  Antecubital       GRAM STAIN GRAM POSITIVE COCCI               AEROBIC AND ANAEROBIC BOTTLES                  RESULTS VERIFIED, PHONED TO AND READ BACK BY Avril Ramirez RN @ [facility] ON 1/13/22 BY AMM           Culture result:       STAPHYLOCOCCUS SCHLEIFERI This organism may be indicative of culture contamination. Clinical correlation needs to be evaluated as each case is unique. Refer to Blood Culture ID Panel Accession  [id number]        REQUESTED BY Angelic Mann NP ON 01/16/22    CULTURE, URINE [[id number]]  (Abnormal)  (Susceptibility) Collected: 01/12/22 1550    Order Status: Completed Specimen: Urine Updated: 01/16/22 1023     Special Requests: NO SPECIAL REQUESTS        Culture result:       >100,000 COLONIES/mL Staphylococcus intermedius                  >100,000 COLONIES/mL STAPHYLOCOCCUS SCHLEIFERI                  CULTURE IN PROGRESS,FURTHER UPDATES TO FOLLOW          BLOOD CULTURE [[id number]]  (Abnormal) Collected: 01/12/22 1643    Order Status: Completed Specimen: Blood Updated: 01/16/22 0829     Special Requests: --        LEFT  Antecubital       GRAM STAIN GRAM POSITIVE COCCI         ANAEROBIC BOTTLE POSITIVE               CRITICAL RESULT NOT CALLED DUE TO PREVIOUS NOTIFICATION OF CRITICAL RESULT WITHIN THE LAST 24 HOURS. Culture result:       STAPHYLOCOCCUS SPECIES, COAGULASE NEGATIVE This organism may be indicative of culture contamination. Clinical correlation needs to be evaluated as each case is unique.           MSSA/MRSA SC BY PCR, NASAL SWAB [[id number]] Collected: 01/13/22 0942    Order Status: Completed Specimen: Nasal swab Updated: 01/13/22 1140     Special Requests: NO SPECIAL REQUESTS        Culture result:       SA target not detected. A MRSA NEGATIVE, SA NEGATIVE test result does not preclude MRSA or SA nasal colonization. BLOOD CULTURE ID PANEL [[id number]] Collected: 01/12/22 1126    Order Status: Completed Specimen: Blood Updated: 01/13/22 0910     Acc. no. from Micro Order [id number]     Blood Culture PCR Testing       MULTIPLEX PCR NEGATIVE:  A negative FilmArray BCID result does not exclude the possibility of bloodstream infection. Other Studies:  No results found.     Current Meds:  Current Facility-Administered Medications   Medication Dose Route Frequency    magnesium oxide (MAG-OX) tablet 400 mg  400 mg Oral BID    [START ON 1/18/2022] ferrous sulfate tablet 325 mg  1 Tablet Oral DAILY WITH BREAKFAST    docusate sodium (COLACE) capsule 100 mg  100 mg Oral DAILY    metoprolol (LOPRESSOR) injection 5 mg  5 mg IntraVENous Q4H PRN    ceFAZolin (ANCEF) 2 g/20 mL in sterile water IV syringe  2 g IntraVENous Q8H    potassium, sodium phosphates (NEUTRA-PHOS) packet 1 Packet  1 Packet Oral QID    pantoprazole (PROTONIX) tablet 40 mg  40 mg Oral ACB    cetirizine (ZYRTEC) tablet 5 mg  5 mg Oral DAILY    oxyCODONE IR (ROXICODONE) tablet 10 mg  10 mg Oral Q4H PRN    traZODone (DESYREL) tablet 100 mg  100 mg Oral QHS    sodium chloride (NS) flush 5-10 mL  5-10 mL IntraVENous PRN    sodium chloride (NS) flush 5-40 mL  5-40 mL IntraVENous Q8H    sodium chloride (NS) flush 5-40 mL  5-40 mL IntraVENous PRN    acetaminophen (TYLENOL) tablet 650 mg  650 mg Oral Q6H PRN    Or    acetaminophen (TYLENOL) suppository 650 mg  650 mg Rectal Q6H PRN    polyethylene glycol (MIRALAX) packet 17 g  17 g Oral DAILY PRN    ondansetron (ZOFRAN ODT) tablet 4 mg  4 mg Oral Q8H PRN    Or    ondansetron (ZOFRAN) injection 4 mg  4 mg IntraVENous Q6H PRN    montelukast (SINGULAIR) tablet 10 mg  10 mg Oral QHS    hyoscyamine (LEVSIN) elixir 0.125 mg  0.125 mg Oral Q4H PRN    atorvastatin (LIPITOR) tablet 40 mg  40 mg Oral QHS    albuterol (PROVENTIL VENTOLIN) nebulizer solution 2.5 mg  2.5 mg Nebulization Q6H PRN    oxybutynin (DITROPAN) tablet 5 mg  5 mg Oral Q8H PRN    nicotine (NICODERM CQ) 14 mg/24 hr patch 1 Patch  1 Patch TransDERmal Q24H    dronedarone (MULTAQ) tablet tab 400 mg  400 mg Oral BID WITH MEALS    naloxone (NARCAN) injection 0.4 mg  0.4 mg IntraVENous EVERY 2 MINUTES AS NEEDED       Signed:  Linette Odell MD    Part of this note may have been written by using a voice dictation software. The note has been proof read but may still contain some grammatical/other typographical errors.

## 2022-01-17 NOTE — PROGRESS NOTES
Infectious Disease Progress note    Today's Date: 1/17/2022   Admit Date: 1/12/2022    Impression:   · Ox-S Staph schleiferi bacteremia  (3/4 bottles on 1/12): nidus probably either urine/kidneys or nephrostomy tube exit site  · R pyelonephritis: patient did have significant pyuria from one nephrostomy tube (one with 10-20 WBC; other with >100), culture Staph intermedius & staph scheiferi  sent from L side; right perinephric stranding seen on CT suggestive of pyelonephritis, with limited output from R [facility]  · Metastatic Bladder Ca s/p malena PCNs (placed in 11/2021). Last chemo 1/4/22 at [address]:   · Continue cefazolin. We are at least planning for 2 weeks IV then de-escalation to oral abx for another two weeks. · We are high concerned that PCNs are infected and seeding blood/kidneys with bacteria. IR has evaluated and has preferred for [facility] to exchange ~2/3. · R PCN has very little outpt. Pt reports that this is new. Also L PCN leaks urine depending on position. · Dispo: undetermined. Anti-infectives:   · Vancomycin (01/12-1/16)  · Piperacillin/tazobactam (01/12-01/13)  · Ceftriaxone (01/12)  · Meropenem (01/13-1/16)  · IV Ancef (1/16-    Subjective:   Afebrile, no urine output from R PCN yesterday. Significant other in room. Tolerating abx well. HPI:    Patient is a 57 y.o. male with a hx of metastatic bladder carcinoma followed at [address], bilateral nephrostomy tubes, recurrent UTIs admitted to [facility] on 01/12 with lower back pain, malodorous urine and some redness around right nephrostomy tube site. Patient with Tmax 100.3 on admission, WBC 15.2, procal 2.2; CT with right perinephric stranding, and U/A with >100 WBC. Cultures obtained (BC, and ?left nephrostomy tube) and patient initially given dose of ceftriaxone in ED. Admitted and placed on Vanc/Zosyn.  Initial BC growing GPC in anaerobic bottle; zosyn switched to meropenem this am.  Afebrile, WBC down to 7; Cr down from 1.66 on admission to 1.37 this am.   Overall patient feels better; with resolution of back pain; no fever or chills. States he's always noted less urine output on right then left nephrostomy tubes since they were placed at [address] in 2021. He states they are scheduled to be exchanged in February. He has no port or indwelling catheter; his chemo has been given through peripheral access. He makes very little urine through his urethra. He denied N/V/diarrhea. No Known Allergies     Review of Systems:  A comprehensive review of systems was negative except for that written in the History of Present Illness. Objective:     Visit Vitals  /72 (BP 1 Location: Right upper arm, BP Patient Position: Sitting)   Pulse 67   Temp 98.5 °F (36.9 °C)   Resp 17   Ht 5' 6\" (1.676 m)   Wt 61.2 kg (135 lb)   SpO2 95%   BMI 21.79 kg/m²     Temp (24hrs), Av.3 °F (36.8 °C), Min:98.2 °F (36.8 °C), Max:98.5 °F (36.9 °C)     Patient examined 2022, exam remains unchanged except as noted below:    General:  Alert, cooperative, no acute distress   Head:  Normocephalic, atraumatic    Eyes:  Anicteric, no drainage/not injected   Throat: Mucus membranes moist OP clear   Neck: Supple, symmetrical, trachea midline, no JVD   Lungs:   Clear throughout lung fields, no increased work of breathing   Heart:  Regular rate and rhythm, without murmur   Abdomen:   Soft, non-tender, no guarding, no distention, bowel sounds active.  Bilateral flank PNC: leaking on left, R with minimal urine out   Extremities: Extremities without edema, pulses palpable   Skin: Skin without rash         Right          Data Review:     CBC:  Recent Labs     22  0311 22  0212 01/15/22  0315 01/15/22  031   WBC 10.2 7.3  --  4.3   GRANS 81* 82*   < > 84*   MONOS 6 6   < > 4   EOS 0* 0*   < > 0*   ANEU 8.2 6.1   < > 3.6   ABL 0.9 0.7   < > 0.4*   HGB 8.4* 8.0*  --  8.3*   HCT 25.7* 24.0*  --  25.8*    219  --  146*    < > = values in this interval not displayed. BMP:  Recent Labs     01/17/22  1000 01/17/22  0311 01/16/22 2149   CREA 1.40 1.27 1.32   BUN 18 18 18   * 131* 131*   K 3.9 3.9 4.2    98 98   CO2 24 24 26   AGAP 8 9 7   * 101* 101*       LFTS:  No results for input(s): TBILI, ALT, AP, TP, ALB in the last 72 hours. No lab exists for component: SGOT    Microbiology:     All Micro Results     Procedure Component Value Units Date/Time    CULTURE, BLOOD [[id number]] Collected: 01/14/22 1025    Order Status: Completed Specimen: Blood Updated: 01/17/22 0802     Special Requests: --        RIGHT  Antecubital       Culture result: NO GROWTH 3 DAYS       CULTURE, BLOOD [[id number]] Collected: 01/14/22 1025    Order Status: Completed Specimen: Blood Updated: 01/17/22 0802     Special Requests: --        LEFT  ARM       Culture result: NO GROWTH 3 DAYS       BLOOD CULTURE [[id number]]  (Abnormal)  (Susceptibility) Collected: 01/12/22 1126    Order Status: Completed Specimen: Blood Updated: 01/16/22 1507     Special Requests: --        RIGHT  Antecubital       GRAM STAIN GRAM POSITIVE COCCI               AEROBIC AND ANAEROBIC BOTTLES                  RESULTS VERIFIED, PHONED TO AND READ BACK BY Nolan Marin RN @ 2942 ON 1/13/22 BY AMM           Culture result:       STAPHYLOCOCCUS SCHLEIFERI This organism may be indicative of culture contamination. Clinical correlation needs to be evaluated as each case is unique.                   Refer to Blood Culture ID Panel Accession  [id number]        REQUESTED BY Gage Beach NP ON 01/16/22    CULTURE, URINE [[id number]]  (Abnormal)  (Susceptibility) Collected: 01/12/22 1550    Order Status: Completed Specimen: Urine Updated: 01/16/22 1023     Special Requests: NO SPECIAL REQUESTS        Culture result:       >100,000 COLONIES/mL Staphylococcus intermedius                  >100,000 COLONIES/mL STAPHYLOCOCCUS SCHLEIFERI                  CULTURE IN PROGRESS,FURTHER UPDATES TO FOLLOW          BLOOD CULTURE [[id number]]  (Abnormal) Collected: 01/12/22 1643    Order Status: Completed Specimen: Blood Updated: 01/16/22 0829     Special Requests: --        LEFT  Antecubital       GRAM STAIN GRAM POSITIVE COCCI         ANAEROBIC BOTTLE POSITIVE               CRITICAL RESULT NOT CALLED DUE TO PREVIOUS NOTIFICATION OF CRITICAL RESULT WITHIN THE LAST 24 HOURS. Culture result:       STAPHYLOCOCCUS SPECIES, COAGULASE NEGATIVE This organism may be indicative of culture contamination. Clinical correlation needs to be evaluated as each case is unique. MSSA/MRSA SC BY PCR, NASAL SWAB [[id number]] Collected: 01/13/22 0942    Order Status: Completed Specimen: Nasal swab Updated: 01/13/22 1140     Special Requests: NO SPECIAL REQUESTS        Culture result:       SA target not detected. A MRSA NEGATIVE, SA NEGATIVE test result does not preclude MRSA or SA nasal colonization. BLOOD CULTURE ID PANEL [[id number]] Collected: 01/12/22 1126    Order Status: Completed Specimen: Blood Updated: 01/13/22 0910     Acc. no. from Micro Order [id number]     Blood Culture PCR Testing       MULTIPLEX PCR NEGATIVE:  A negative FilmArray BCID result does not exclude the possibility of bloodstream infection. Imaging:   CT Abd/pelvis (01/12/22): IMPRESSION  1. Asymmetric right perinephric stranding concerning for right pyelonephritis  in the correct clinical setting. Bilateral nephrostomy tubes in place. No  hydronephrosis. No urinary tract calculi.     2.  Bladder is decompressed with circumferential wall thickening and to bladder  diverticula the larger appearing posteriorly measuring up to 4.5 cm. Follow-up  as clinically warranted.     3.   No bowel obstruction, diverticulitis or appendicitis.     4.  Multiple bibasilar pulmonary metastatic nodules.       Signed By: Bird Gatica NP     January 17, 2022

## 2022-01-17 NOTE — PROGRESS NOTES
Admit Date: 1/12/2022    Subjective:     Mani Brooks reports discomfort from nephrostomy tubes. Reports little OP on the right side and intermittent leaking of tubes.     Objective:     Patient Vitals for the past 8 hrs:   BP Temp Pulse Resp SpO2   01/17/22 1151 116/72 98.5 °F (36.9 °C) 67 17 95 %   01/17/22 0718 118/63 98.4 °F (36.9 °C) 77 18 94 %     01/17 0701 - 01/17 1900  In: -   Out: 800 [Urine:800]  01/15 1901 - 01/17 0700  In: -   Out: 1600 [Urine:1600]    Physical Exam:  GENERAL: alert, cooperative, no distress  LUNG: clear to auscultation bilaterally  HEART: regular rate and rhythm, S1, S2  ABDOMEN: soft, non-tender  NEUROLOGIC: AOx3    Data Review   Recent Results (from the past 24 hour(s))   METABOLIC PANEL, BASIC    Collection Time: 01/16/22  3:17 PM   Result Value Ref Range    Sodium 136 (L) 138 - 145 mmol/L    Potassium 4.0 3.5 - 5.1 mmol/L    Chloride 104 98 - 107 mmol/L    CO2 25 21 - 32 mmol/L    Anion gap 7 7 - 16 mmol/L    Glucose 129 (H) 65 - 100 mg/dL    BUN 21 8 - 23 MG/DL    Creatinine 1.31 0.8 - 1.5 MG/DL    GFR est AA >60 >60 ml/min/1.73m2    GFR est non-AA 59 (L) >60 ml/min/1.73m2    Calcium 8.6 8.3 - 91.1 MG/DL   METABOLIC PANEL, BASIC    Collection Time: 01/16/22  9:49 PM   Result Value Ref Range    Sodium 131 (L) 138 - 145 mmol/L    Potassium 4.2 3.5 - 5.1 mmol/L    Chloride 98 98 - 107 mmol/L    CO2 26 21 - 32 mmol/L    Anion gap 7 7 - 16 mmol/L    Glucose 101 (H) 65 - 100 mg/dL    BUN 18 8 - 23 MG/DL    Creatinine 1.32 0.8 - 1.5 MG/DL    GFR est AA >60 >60 ml/min/1.73m2    GFR est non-AA 58 (L) >60 ml/min/1.73m2    Calcium 8.6 8.3 - 80.9 MG/DL   METABOLIC PANEL, BASIC    Collection Time: 01/17/22  3:11 AM   Result Value Ref Range    Sodium 131 (L) 138 - 145 mmol/L    Potassium 3.9 3.5 - 5.1 mmol/L    Chloride 98 98 - 107 mmol/L    CO2 24 21 - 32 mmol/L    Anion gap 9 7 - 16 mmol/L    Glucose 101 (H) 65 - 100 mg/dL    BUN 18 8 - 23 MG/DL    Creatinine 1.27 0.8 - 1.5 MG/DL GFR est AA >60 >60 ml/min/1.73m2    GFR est non-AA >60 >60 ml/min/1.73m2    Calcium 8.5 8.3 - 10.4 MG/DL   CBC WITH AUTOMATED DIFF    Collection Time: 01/17/22  3:11 AM   Result Value Ref Range    WBC 10.2 4.3 - 11.1 K/uL    RBC 3.28 (L) 4.23 - 5.6 M/uL    HGB 8.4 (L) 13.6 - 17.2 g/dL    HCT 25.7 (L) 41.1 - 50.3 %    MCV 78.4 (L) 79.6 - 97.8 FL    MCH 25.6 (L) 26.1 - 32.9 PG    MCHC 32.7 31.4 - 35.0 g/dL    RDW 14.6 11.9 - 14.6 %    PLATELET 360 027 - 830 K/uL    MPV 10.8 9.4 - 12.3 FL    ABSOLUTE NRBC 0.00 0.0 - 0.2 K/uL    DF AUTOMATED      NEUTROPHILS 81 (H) 43 - 78 %    LYMPHOCYTES 9 (L) 13 - 44 %    MONOCYTES 6 4.0 - 12.0 %    EOSINOPHILS 0 (L) 0.5 - 7.8 %    BASOPHILS 0 0.0 - 2.0 %    IMMATURE GRANULOCYTES 4 0.0 - 5.0 %    ABS. NEUTROPHILS 8.2 1.7 - 8.2 K/UL    ABS. LYMPHOCYTES 0.9 0.5 - 4.6 K/UL    ABS. MONOCYTES 0.6 0.1 - 1.3 K/UL    ABS. EOSINOPHILS 0.0 0.0 - 0.8 K/UL    ABS. BASOPHILS 0.0 0.0 - 0.2 K/UL    ABS. IMM. GRANS. 0.4 0.0 - 0.5 K/UL   MAGNESIUM    Collection Time: 01/17/22  3:11 AM   Result Value Ref Range    Magnesium 1.9 1.8 - 2.4 mg/dL   PHOSPHORUS    Collection Time: 01/17/22  3:11 AM   Result Value Ref Range    Phosphorus 2.7 2.3 - 3.7 MG/DL   METABOLIC PANEL, BASIC    Collection Time: 01/17/22 10:00 AM   Result Value Ref Range    Sodium 132 (L) 138 - 145 mmol/L    Potassium 3.9 3.5 - 5.1 mmol/L    Chloride 100 98 - 107 mmol/L    CO2 24 21 - 32 mmol/L    Anion gap 8 7 - 16 mmol/L    Glucose 106 (H) 65 - 100 mg/dL    BUN 18 8 - 23 MG/DL    Creatinine 1.40 0.8 - 1.5 MG/DL    GFR est AA >60 >60 ml/min/1.73m2    GFR est non-AA 55 (L) >60 ml/min/1.73m2    Calcium 8.9 8.3 - 10.4 MG/DL       Assessment:     Principal Problem:    Severe sepsis (HCC) (1/12/2022)    Active Problems:    HTN (hypertension) (10/14/2013)      Recurrent depression ([facility] [address] (1/4/2018)      PAF (paroxysmal atrial fibrillation) ([facility] (1/7/2020)      Overview: 1. Admitted 1/20 with afib and chest pain.   Started on [address] 1 and Eliquis. Hyponatremia (1/12/2022)      UTI (urinary tract infection) (1/12/2022)      EDWARDO (acute kidney injury) ([address] (1/12/2022)      Consuled for bilateral NT exchange. Pt previously followed by our group for BPH and urinary retention, he elected to go to [address] urologist - Dr. Yadira Richards. He had TURP there in 9/2021 and path showed high grade urothelial carcinoma invasive to prostate. Referred to [address] urologic oncologist Dr. Jaya Kaufman. Staging CT scans of the chest, abdomen, and pelvis on 11/22/2021 revealed diffuse bilateral pulmonary nodules measuring up to 1.7 cm suggestive of metastatic disease. On the CT abdomen/pelvis the patient was noted to have diffuse bladder wall thickening as well as a pedunculated nodule near the right ureterovesicular junction suspicious for neoplasm and contributing to moderate right hydroureteronephrosis. Suspicious right inguinal and pelvic lymph nodes measuring up to 1.1 cm were also appreciated. Apparently he has had bilateral NT placed since that time at [address]. He is admitted here for sepsis/UTI. His Cr is normal.  CT reports NT to be in correct position and no hydronephrosis. Right tube is putting out less than left but per pt this is normal.  Urine OP is yellow/clear. He has appt 2/3 at [address] for tube exchange and prefers to go there. Plan:     Initially spoke with Dr. Siomara Weiner and given that pt had no hydronephrosis and kidney fxn normal, if he responded well to abx treatment, he could wait until his scheduled tube exchange 2/3 at [address] (as pt preferred tube exchange at [address] if possible). His blood/urine cultures both positive for staph, final cultures pending. Today pt reports little OP on R side and intermittent leaking from L tube. ID concerned for PCN infection with seeding of blood/kidneys with bacteria. Pt continues on IV cefazolin. Will ask IR to re-evaluate stent exchange while pt is here.        Patient is seen and examined by  Vidhi Prieto. Assessment and plan as per Dr. Vidhi Prieto. Versa Medicus, NP  [facility]  I have reviewed the above note and examined the patient. I agree with the exam, assessment and plan.     Mian Herbert MD

## 2022-01-18 ENCOUNTER — APPOINTMENT (OUTPATIENT)
Dept: INTERVENTIONAL RADIOLOGY/VASCULAR | Age: 62
DRG: 698 | End: 2022-01-18
Attending: NURSE PRACTITIONER
Payer: COMMERCIAL

## 2022-01-18 LAB
ANION GAP SERPL CALC-SCNC: 5 MMOL/L (ref 7–16)
ANION GAP SERPL CALC-SCNC: 6 MMOL/L (ref 7–16)
ANION GAP SERPL CALC-SCNC: 6 MMOL/L (ref 7–16)
BASOPHILS # BLD: 0.1 K/UL (ref 0–0.2)
BASOPHILS NFR BLD: 1 % (ref 0–2)
BUN SERPL-MCNC: 19 MG/DL (ref 8–23)
BUN SERPL-MCNC: 19 MG/DL (ref 8–23)
BUN SERPL-MCNC: 20 MG/DL (ref 8–23)
CALCIUM SERPL-MCNC: 8.7 MG/DL (ref 8.3–10.4)
CALCIUM SERPL-MCNC: 8.8 MG/DL (ref 8.3–10.4)
CALCIUM SERPL-MCNC: 8.9 MG/DL (ref 8.3–10.4)
CHLORIDE SERPL-SCNC: 102 MMOL/L (ref 98–107)
CHLORIDE SERPL-SCNC: 103 MMOL/L (ref 98–107)
CHLORIDE SERPL-SCNC: 104 MMOL/L (ref 98–107)
CO2 SERPL-SCNC: 26 MMOL/L (ref 21–32)
CO2 SERPL-SCNC: 27 MMOL/L (ref 21–32)
CO2 SERPL-SCNC: 27 MMOL/L (ref 21–32)
CREAT SERPL-MCNC: 1.15 MG/DL (ref 0.8–1.5)
CREAT SERPL-MCNC: 1.16 MG/DL (ref 0.8–1.5)
CREAT SERPL-MCNC: 1.22 MG/DL (ref 0.8–1.5)
DIFFERENTIAL METHOD BLD: ABNORMAL
EOSINOPHIL # BLD: 0 K/UL (ref 0–0.8)
EOSINOPHIL NFR BLD: 0 % (ref 0.5–7.8)
ERYTHROCYTE [DISTWIDTH] IN BLOOD BY AUTOMATED COUNT: 14.7 % (ref 11.9–14.6)
GLUCOSE SERPL-MCNC: 91 MG/DL (ref 65–100)
GLUCOSE SERPL-MCNC: 92 MG/DL (ref 65–100)
GLUCOSE SERPL-MCNC: 93 MG/DL (ref 65–100)
HCT VFR BLD AUTO: 26 % (ref 41.1–50.3)
HGB BLD-MCNC: 8.6 G/DL (ref 13.6–17.2)
IMM GRANULOCYTES # BLD AUTO: 0.4 K/UL (ref 0–0.5)
IMM GRANULOCYTES NFR BLD AUTO: 5 % (ref 0–5)
LYMPHOCYTES # BLD: 1.1 K/UL (ref 0.5–4.6)
LYMPHOCYTES NFR BLD: 13 % (ref 13–44)
MAGNESIUM SERPL-MCNC: 2.2 MG/DL (ref 1.8–2.4)
MCH RBC QN AUTO: 26 PG (ref 26.1–32.9)
MCHC RBC AUTO-ENTMCNC: 33.1 G/DL (ref 31.4–35)
MCV RBC AUTO: 78.5 FL (ref 79.6–97.8)
MONOCYTES # BLD: 0.7 K/UL (ref 0.1–1.3)
MONOCYTES NFR BLD: 8 % (ref 4–12)
NEUTS SEG # BLD: 6.2 K/UL (ref 1.7–8.2)
NEUTS SEG NFR BLD: 73 % (ref 43–78)
NRBC # BLD: 0.02 K/UL (ref 0–0.2)
PHOSPHATE SERPL-MCNC: 3.3 MG/DL (ref 2.3–3.7)
PLATELET # BLD AUTO: 398 K/UL (ref 150–450)
PLATELET COMMENTS,PCOM: ADEQUATE
PMV BLD AUTO: 10.2 FL (ref 9.4–12.3)
POTASSIUM SERPL-SCNC: 4 MMOL/L (ref 3.5–5.1)
POTASSIUM SERPL-SCNC: 4 MMOL/L (ref 3.5–5.1)
POTASSIUM SERPL-SCNC: 4.1 MMOL/L (ref 3.5–5.1)
RBC # BLD AUTO: 3.31 M/UL (ref 4.23–5.6)
RBC MORPH BLD: ABNORMAL
SODIUM SERPL-SCNC: 134 MMOL/L (ref 138–145)
SODIUM SERPL-SCNC: 135 MMOL/L (ref 138–145)
SODIUM SERPL-SCNC: 137 MMOL/L (ref 138–145)
WBC # BLD AUTO: 8.5 K/UL (ref 4.3–11.1)
WBC MORPH BLD: ABNORMAL

## 2022-01-18 PROCEDURE — 83735 ASSAY OF MAGNESIUM: CPT

## 2022-01-18 PROCEDURE — 74011250637 HC RX REV CODE- 250/637: Performed by: EMERGENCY MEDICINE

## 2022-01-18 PROCEDURE — 74011000250 HC RX REV CODE- 250: Performed by: RADIOLOGY

## 2022-01-18 PROCEDURE — C1769 GUIDE WIRE: HCPCS

## 2022-01-18 PROCEDURE — 74011000250 HC RX REV CODE- 250: Performed by: NURSE PRACTITIONER

## 2022-01-18 PROCEDURE — 74011250636 HC RX REV CODE- 250/636: Performed by: RADIOLOGY

## 2022-01-18 PROCEDURE — 0T25X0Z CHANGE DRAINAGE DEVICE IN KIDNEY, EXTERNAL APPROACH: ICD-10-PCS | Performed by: RADIOLOGY

## 2022-01-18 PROCEDURE — C1729 CATH, DRAINAGE: HCPCS

## 2022-01-18 PROCEDURE — 85025 COMPLETE CBC W/AUTO DIFF WBC: CPT

## 2022-01-18 PROCEDURE — 74011250637 HC RX REV CODE- 250/637: Performed by: STUDENT IN AN ORGANIZED HEALTH CARE EDUCATION/TRAINING PROGRAM

## 2022-01-18 PROCEDURE — 80048 BASIC METABOLIC PNL TOTAL CA: CPT

## 2022-01-18 PROCEDURE — 36415 COLL VENOUS BLD VENIPUNCTURE: CPT

## 2022-01-18 PROCEDURE — 74011250636 HC RX REV CODE- 250/636: Performed by: NURSE PRACTITIONER

## 2022-01-18 PROCEDURE — 65660000000 HC RM CCU STEPDOWN

## 2022-01-18 PROCEDURE — 2709999900 HC NON-CHARGEABLE SUPPLY

## 2022-01-18 PROCEDURE — 74011250637 HC RX REV CODE- 250/637: Performed by: FAMILY MEDICINE

## 2022-01-18 PROCEDURE — 84100 ASSAY OF PHOSPHORUS: CPT

## 2022-01-18 PROCEDURE — 74011000636 HC RX REV CODE- 636: Performed by: RADIOLOGY

## 2022-01-18 PROCEDURE — 74011000250 HC RX REV CODE- 250: Performed by: FAMILY MEDICINE

## 2022-01-18 PROCEDURE — 50435 EXCHANGE NEPHROSTOMY CATH: CPT

## 2022-01-18 PROCEDURE — 77030025702 HC BG URIN DRN MRTM -A

## 2022-01-18 PROCEDURE — 77030002916 HC SUT ETHLN J&J -A

## 2022-01-18 PROCEDURE — 74011250637 HC RX REV CODE- 250/637: Performed by: NURSE PRACTITIONER

## 2022-01-18 RX ORDER — LIDOCAINE HYDROCHLORIDE 20 MG/ML
20-300 INJECTION, SOLUTION INFILTRATION; PERINEURAL
Status: DISCONTINUED | OUTPATIENT
Start: 2022-01-18 | End: 2022-01-18

## 2022-01-18 RX ORDER — MIDAZOLAM HYDROCHLORIDE 1 MG/ML
.5-2 INJECTION, SOLUTION INTRAMUSCULAR; INTRAVENOUS
Status: DISCONTINUED | OUTPATIENT
Start: 2022-01-18 | End: 2022-01-18

## 2022-01-18 RX ORDER — FENTANYL CITRATE 50 UG/ML
25-50 INJECTION, SOLUTION INTRAMUSCULAR; INTRAVENOUS
Status: DISCONTINUED | OUTPATIENT
Start: 2022-01-18 | End: 2022-01-18

## 2022-01-18 RX ORDER — SODIUM CHLORIDE 9 MG/ML
25 INJECTION, SOLUTION INTRAVENOUS ONCE
Status: COMPLETED | OUTPATIENT
Start: 2022-01-18 | End: 2022-01-19

## 2022-01-18 RX ADMIN — FENTANYL CITRATE 50 MCG: 50 INJECTION, SOLUTION INTRAMUSCULAR; INTRAVENOUS at 14:00

## 2022-01-18 RX ADMIN — SODIUM CHLORIDE, PRESERVATIVE FREE 10 ML: 5 INJECTION INTRAVENOUS at 16:03

## 2022-01-18 RX ADMIN — Medication 400 MG: at 17:28

## 2022-01-18 RX ADMIN — MIDAZOLAM 1 MG: 1 INJECTION INTRAMUSCULAR; INTRAVENOUS at 14:00

## 2022-01-18 RX ADMIN — POTASSIUM & SODIUM PHOSPHATES POWDER PACK 280-160-250 MG 1 PACKET: 280-160-250 PACK at 17:28

## 2022-01-18 RX ADMIN — LIDOCAINE HYDROCHLORIDE 7 ML: 20 INJECTION, SOLUTION INFILTRATION; PERINEURAL at 14:14

## 2022-01-18 RX ADMIN — DRONEDARONE 400 MG: 400 TABLET, FILM COATED ORAL at 17:28

## 2022-01-18 RX ADMIN — CEFAZOLIN SODIUM 2 G: 100 INJECTION, POWDER, LYOPHILIZED, FOR SOLUTION INTRAVENOUS at 21:40

## 2022-01-18 RX ADMIN — TRAZODONE HYDROCHLORIDE 100 MG: 50 TABLET ORAL at 21:40

## 2022-01-18 RX ADMIN — SODIUM CHLORIDE, PRESERVATIVE FREE 10 ML: 5 INJECTION INTRAVENOUS at 05:42

## 2022-01-18 RX ADMIN — MIDAZOLAM 1 MG: 1 INJECTION INTRAMUSCULAR; INTRAVENOUS at 14:10

## 2022-01-18 RX ADMIN — Medication 400 MG: at 09:13

## 2022-01-18 RX ADMIN — SODIUM CHLORIDE, PRESERVATIVE FREE 5 ML: 5 INJECTION INTRAVENOUS at 21:41

## 2022-01-18 RX ADMIN — FERROUS SULFATE TAB 325 MG (65 MG ELEMENTAL FE) 325 MG: 325 (65 FE) TAB at 09:14

## 2022-01-18 RX ADMIN — CETIRIZINE HYDROCHLORIDE 5 MG: 10 TABLET, FILM COATED ORAL at 09:13

## 2022-01-18 RX ADMIN — DOCUSATE SODIUM 100 MG: 100 CAPSULE ORAL at 09:13

## 2022-01-18 RX ADMIN — DRONEDARONE 400 MG: 400 TABLET, FILM COATED ORAL at 09:13

## 2022-01-18 RX ADMIN — FENTANYL CITRATE 50 MCG: 50 INJECTION, SOLUTION INTRAMUSCULAR; INTRAVENOUS at 14:10

## 2022-01-18 RX ADMIN — CEFAZOLIN SODIUM 2 G: 100 INJECTION, POWDER, LYOPHILIZED, FOR SOLUTION INTRAVENOUS at 16:02

## 2022-01-18 RX ADMIN — IOPAMIDOL 12 ML: 612 INJECTION, SOLUTION INTRAVENOUS at 14:20

## 2022-01-18 RX ADMIN — POTASSIUM & SODIUM PHOSPHATES POWDER PACK 280-160-250 MG 1 PACKET: 280-160-250 PACK at 09:14

## 2022-01-18 RX ADMIN — POTASSIUM & SODIUM PHOSPHATES POWDER PACK 280-160-250 MG 1 PACKET: 280-160-250 PACK at 21:40

## 2022-01-18 RX ADMIN — OXYCODONE 10 MG: 5 TABLET ORAL at 22:54

## 2022-01-18 RX ADMIN — OXYCODONE 10 MG: 5 TABLET ORAL at 17:35

## 2022-01-18 RX ADMIN — CEFAZOLIN SODIUM 2 G: 100 INJECTION, POWDER, LYOPHILIZED, FOR SOLUTION INTRAVENOUS at 05:42

## 2022-01-18 RX ADMIN — LIDOCAINE HYDROCHLORIDE 7 ML: 20 INJECTION, SOLUTION INFILTRATION; PERINEURAL at 14:10

## 2022-01-18 RX ADMIN — PANTOPRAZOLE SODIUM 40 MG: 40 TABLET, DELAYED RELEASE ORAL at 05:42

## 2022-01-18 RX ADMIN — SODIUM CHLORIDE 25 ML/HR: 900 INJECTION, SOLUTION INTRAVENOUS at 13:45

## 2022-01-18 RX ADMIN — MONTELUKAST 10 MG: 10 TABLET, FILM COATED ORAL at 21:40

## 2022-01-18 RX ADMIN — ATORVASTATIN CALCIUM 40 MG: 40 TABLET, FILM COATED ORAL at 21:40

## 2022-01-18 NOTE — PROGRESS NOTES
PT NOTE:    PT treatment attempted, pt still off the floor for procedure this PM. Will plan to check back another time/day as patient is available.     Adam Acosta, PTA

## 2022-01-18 NOTE — PROGRESS NOTES
Problem: Falls - Risk of  Goal: *Absence of Falls  Description: Document Lettie Duverney Fall Risk and appropriate interventions in the flowsheet.   1/17/2022 2053 by Antonia Taveras RN  Outcome: Progressing Towards Goal  Note: Fall Risk Interventions:  Mobility Interventions: Bed/chair exit alarm         Medication Interventions: Assess postural VS orthostatic hypotension    Elimination Interventions: Bed/chair exit alarm           1/17/2022 2052 by Antonia Taveras RN  Outcome: Progressing Towards Goal  Note: Fall Risk Interventions:  Mobility Interventions: Bed/chair exit alarm         Medication Interventions: Assess postural VS orthostatic hypotension    Elimination Interventions: Bed/chair exit alarm              Problem: Patient Education: Go to Patient Education Activity  Goal: Patient/Family Education  1/17/2022 2053 by Antonia Taveras RN  Outcome: Progressing Towards Goal  1/17/2022 2052 by Antonia Taveras RN  Outcome: Progressing Towards Goal     Problem: General Medical Care Plan  Goal: *Vital signs within specified parameters  Outcome: Progressing Towards Goal  Goal: *Labs within defined limits  Outcome: Progressing Towards Goal  Goal: *Absence of infection signs and symptoms  Outcome: Progressing Towards Goal  Goal: *Optimal pain control at patient's stated goal  Outcome: Progressing Towards Goal  Goal: *Skin integrity maintained  Outcome: Progressing Towards Goal  Goal: *Fluid volume balance  Outcome: Progressing Towards Goal  Goal: *Optimize nutritional status  Outcome: Progressing Towards Goal  Goal: *Anxiety reduced or absent  Outcome: Progressing Towards Goal  Goal: *Progressive mobility and function (eg: ADL's)  Outcome: Progressing Towards Goal     Problem: Patient Education: Go to Patient Education Activity  Goal: Patient/Family Education  Outcome: Progressing Towards Goal

## 2022-01-18 NOTE — PROGRESS NOTES
TRANSFER - OUT REPORT:    Verbal report given to Allen Montague on Reginold Blanks  being transferred to IR prep/recovery room #1 for routine progression of care       Report consisted of patients Situation, Background, Assessment and   Recommendations(SBAR). Information from the following report(s) SBAR, Kardex, Procedure Summary and MAR was reviewed with the receiving nurse. Lines:   Peripheral IV 01/16/22 Right;Upper Basilic (Active)   Site Assessment Clean, dry, & intact 01/18/22 0700   Phlebitis Assessment 0 01/18/22 0700   Infiltration Assessment 0 01/18/22 0700   Dressing Status Clean, dry, & intact 01/18/22 0700   Dressing Type Tape;Transparent 01/18/22 0700   Hub Color/Line Status Capped 01/18/22 0700        Opportunity for questions and clarification was provided.       Patient transported with:   Registered Nurse

## 2022-01-18 NOTE — PROGRESS NOTES
Hospitalist Progress Note   Admit Date:  2022  1:58 PM   Name:  Jessica Chakraborty   Age:  57 y.o. Sex:  male  :  1960   MRN:  [id number]   Room:      Presenting Complaint: Flank Pain    Reason(s) for Admission: Severe sepsis ([facility] [A41.9, R65.20]  UTI (urinary tract infection) [N39.0]  Hyponatremia [E87.1]  EDWARDO (acute kidney injury) [facility]) [N17.9]     Hospital Course & Interval History:   Patient is a 57 y.o. male who presented to the ED for cc lower back pain along with odor to urine. Hx of depression, HTN, HLD, CAD s/p PCI, paroxysmal a fib not on anticoagulation, metastatic urothelial/ bladder cancer on chemo last tx one week ago at [address] with mets to lung, bilateral nephrosotomy tubes since November, and recurrent UTIs growing stenotrophomonas maltophilia, klebsiella pneumoniae, and klebsiella ozaenae in the past.   ED course. He was hypotensive.  labs remarkable for WBC of 15.2, hemoglobin 11.5, HCT 35.8, MCV 79.4, sodium of 126, BUN 28/creatinine, lactic acid 3.3, procalcitonin 2.21. CT scan showed Pyelonephritis. He was treated with broad-spectrum antibiotics and IV fluids. Blood cultures and urine cultures positive for MSSA. ID is consulted and there was concern that source was nephrostomy tubes. urine cultures negative and repeat blood cultures negative. Antibiotics de-escalated to Ancef based on sensitivities. Nephrology consulted for hyponatremia and gave tolvaptan x1. Sertraline was discontinued. He was given InFed for anemia. IR was consulted for exchange of nephrostomy tubes. Subjective (22): Pt is feeling much better today. Patient states he is urinating from the penis which is unusual and also complains of pain while passing urine. Pt denies chest pain, sob, diarrhea.     Assessment & Plan:   Severe sepsis ([facility] (2022)   Severe sepsis (met by HR, WBC, and EDWARDO) secondary to UTI -  Staph Bacteremia and source is probably nephrostomy tube exit site;   LA 3.3, Procalcitoinin 2.2, Leucocytosis of 15k and s/p 3L IVF  on admission  CT scan showed Pyelonephritis  UCNTD, BCNTD  Discontinued  Vancomycin and Meropenem for now  Continue  Ancef for 2 weeks and then transition to oral cefadroxil 500mg BID for another two weeks. IR is consulted for exchange of tubes. ID is following  Urology is following       HTN (hypertension) (10/14/2013)  Pt is hypotensive  Holding antihyperensives      Recurrent depression ([facility] (1/4/2018)  Continue home medications      PAF (paroxysmal atrial fibrillation) ([facility] (1/7/2020)  Holding Metoprolol due to hypotension  Holding Eliquis due to hematuria      Hyponatremia (1/12/2022)  Resolved     Anemia due to acute blood loss  OBI   Hb of 8, stable and baseline is 16    Thrombocytopenia  Resolved      UTI (urinary tract infection) (1/12/2022)    Continue antibiotics      EDWARDO (acute kidney injury) ([facility] (1/12/2022)  Most likely due to sepsis   Cr trending down  and baseline is 0.81    Bladder and urothelial cancer -   Follows [facility]. PRN oxycodone      Dispo/Discharge Planning:    Pending OP AT set up by  and can be discharged home  PPD is ordered    Diet:  ADULT DIET Regular  DVT PPx: scd  Code status: Full Code    Hospital Problems as of 1/18/2022 Date Reviewed: 10/21/2021          Codes Class Noted - Resolved POA    Hyponatremia ICD-10-CM: E87.1  ICD-9-CM: 276.1  1/12/2022 - Present Unknown        UTI (urinary tract infection) ICD-10-CM: N39.0  ICD-9-CM: 599.0  1/12/2022 - Present Unknown        * (Principal) Severe sepsis ([facility] ICD-10-CM: A41.9, R65.20  ICD-9-CM: 038.9, 995.92  1/12/2022 - Present Unknown        EDWARDO (acute kidney injury) ([facility] ICD-10-CM: N17.9  ICD-9-CM: 584.9  1/12/2022 - Present Unknown        PAF (paroxysmal atrial fibrillation) ([facility] ICD-10-CM: I48.0  ICD-9-CM: 427.31  1/7/2020 - Present Yes    Overview Signed 8/26/2020  3:51 PM by Jonathan Gama MD     1. Admitted 1/20 with afib and chest pain. Started on Multaq and Eliquis. Recurrent depression ([facility] ICD-10-CM: F33.9  ICD-9-CM: 296.30  1/4/2018 - Present Yes        HTN (hypertension) ICD-10-CM: I10  ICD-9-CM: 401.9  10/14/2013 - Present Yes              Objective:     Patient Vitals for the past 24 hrs:   Temp Pulse Resp BP SpO2   01/18/22 1604 97.4 °F (36.3 °C) 71 20 118/67 97 %   01/18/22 1522  67  123/72 97 %   01/18/22 1511  68 16 120/72 95 %   01/18/22 1502  69 16 122/72 96 %   01/18/22 1452  70 16 110/64 95 %   01/18/22 1442  70 16 113/68 93 %   01/18/22 1432  71 16 107/65 97 %   01/18/22 1425  74 14 114/72 99 %   01/18/22 1420  72 14 112/75 99 %   01/18/22 1415  74 16 116/74 100 %   01/18/22 1410  72 16 111/76 99 %   01/18/22 1405  98 18 128/80 100 %   01/18/22 1400  89 20 118/76 100 %   01/18/22 1259 97.5 °F (36.4 °C) 66 20 129/65 96 %   01/18/22 1137 97.8 °F (36.6 °C) 66 20 125/68 97 %   01/18/22 0752 97.7 °F (36.5 °C) 66 16 130/73 95 %   01/18/22 0358 97.4 °F (36.3 °C) 67 16 122/69 95 %   01/17/22 2305 98.3 °F (36.8 °C) 75 16 124/70 96 %   01/17/22 1930 98.4 °F (36.9 °C) 78 18 111/72 94 %   01/17/22 1622 97.7 °F (36.5 °C) 68 18 131/78 97 %     Oxygen Therapy  O2 Sat (%): 97 % (01/18/22 1604)  Pulse via Oximetry: 67 beats per minute (01/18/22 1522)  O2 Device: None (Room air) (01/18/22 1511)  O2 Flow Rate (L/min): 3 l/min (01/18/22 1425)  ETCO2 (mmHg): 30 mmHg (01/18/22 1425)    Estimated body mass index is 21.79 kg/m² as calculated from the following:    Height as of this encounter: 5' 6\" (1.676 m). Weight as of this encounter: 61.2 kg (135 lb). Intake/Output Summary (Last 24 hours) at 1/18/2022 1620  Last data filed at 1/18/2022 1228  Gross per 24 hour   Intake    Output 925 ml   Net -925 ml         Physical Exam:     Blood pressure 118/67, pulse 71, temperature 97.4 °F (36.3 °C), resp. rate 20, height 5' 6\" (1.676 m), weight 61.2 kg (135 lb), SpO2 97 %. General:    Well nourished.   No overt distress  Head:  Normocephalic, atraumatic  Eyes:  Sclerae appear normal.  Pupils equally round. ENT:  Nares appear normal, no drainage. Moist oral mucosa  Neck:  No restricted ROM. Trachea midline   CV:   RRR. No m/r/g. No jugular venous distension. Lungs:   CTAB. No wheezing, rhonchi, or rales. Respirations even, unlabored  Abdomen: Bowel sounds present. Soft, nontender, nondistended. Genitourinary Bilateral Nephrostomy tubes are present  Extremities: No cyanosis or clubbing. No edema  Skin:     No rashes and normal coloration. Warm and dry. Neuro:  CN II-XII grossly intact. Sensation intact. A&Ox3  Psych:  Normal mood and affect.       I have reviewed ordered lab tests and independently visualized imaging below:    Recent Labs:  Recent Results (from the past 48 hour(s))   METABOLIC PANEL, BASIC    Collection Time: 01/16/22  9:49 PM   Result Value Ref Range    Sodium 131 (L) 138 - 145 mmol/L    Potassium 4.2 3.5 - 5.1 mmol/L    Chloride 98 98 - 107 mmol/L    CO2 26 21 - 32 mmol/L    Anion gap 7 7 - 16 mmol/L    Glucose 101 (H) 65 - 100 mg/dL    BUN 18 8 - 23 MG/DL    Creatinine 1.32 0.8 - 1.5 MG/DL    GFR est AA >60 >60 ml/min/1.73m2    GFR est non-AA 58 (L) >60 ml/min/1.73m2    Calcium 8.6 8.3 - 78.7 MG/DL   METABOLIC PANEL, BASIC    Collection Time: 01/17/22  3:11 AM   Result Value Ref Range    Sodium 131 (L) 138 - 145 mmol/L    Potassium 3.9 3.5 - 5.1 mmol/L    Chloride 98 98 - 107 mmol/L    CO2 24 21 - 32 mmol/L    Anion gap 9 7 - 16 mmol/L    Glucose 101 (H) 65 - 100 mg/dL    BUN 18 8 - 23 MG/DL    Creatinine 1.27 0.8 - 1.5 MG/DL    GFR est AA >60 >60 ml/min/1.73m2    GFR est non-AA >60 >60 ml/min/1.73m2    Calcium 8.5 8.3 - 10.4 MG/DL   CBC WITH AUTOMATED DIFF    Collection Time: 01/17/22  3:11 AM   Result Value Ref Range    WBC 10.2 4.3 - 11.1 K/uL    RBC 3.28 (L) 4.23 - 5.6 M/uL    HGB 8.4 (L) 13.6 - 17.2 g/dL    HCT 25.7 (L) 41.1 - 50.3 %    MCV 78.4 (L) 79.6 - 97.8 FL    MCH 25.6 (L) 26.1 - 32.9 PG    MCHC 32.7 31.4 - 35.0 g/dL    RDW 14.6 11.9 - 14.6 %    PLATELET 820 796 - 331 K/uL    MPV 10.8 9.4 - 12.3 FL    ABSOLUTE NRBC 0.00 0.0 - 0.2 K/uL    DF AUTOMATED      NEUTROPHILS 81 (H) 43 - 78 %    LYMPHOCYTES 9 (L) 13 - 44 %    MONOCYTES 6 4.0 - 12.0 %    EOSINOPHILS 0 (L) 0.5 - 7.8 %    BASOPHILS 0 0.0 - 2.0 %    IMMATURE GRANULOCYTES 4 0.0 - 5.0 %    ABS. NEUTROPHILS 8.2 1.7 - 8.2 K/UL    ABS. LYMPHOCYTES 0.9 0.5 - 4.6 K/UL    ABS. MONOCYTES 0.6 0.1 - 1.3 K/UL    ABS. EOSINOPHILS 0.0 0.0 - 0.8 K/UL    ABS. BASOPHILS 0.0 0.0 - 0.2 K/UL    ABS. IMM. GRANS.  0.4 0.0 - 0.5 K/UL   MAGNESIUM    Collection Time: 01/17/22  3:11 AM   Result Value Ref Range    Magnesium 1.9 1.8 - 2.4 mg/dL   PHOSPHORUS    Collection Time: 01/17/22  3:11 AM   Result Value Ref Range    Phosphorus 2.7 2.3 - 3.7 MG/DL   METABOLIC PANEL, BASIC    Collection Time: 01/17/22 10:00 AM   Result Value Ref Range    Sodium 132 (L) 138 - 145 mmol/L    Potassium 3.9 3.5 - 5.1 mmol/L    Chloride 100 98 - 107 mmol/L    CO2 24 21 - 32 mmol/L    Anion gap 8 7 - 16 mmol/L    Glucose 106 (H) 65 - 100 mg/dL    BUN 18 8 - 23 MG/DL    Creatinine 1.40 0.8 - 1.5 MG/DL    GFR est AA >60 >60 ml/min/1.73m2    GFR est non-AA 55 (L) >60 ml/min/1.73m2    Calcium 8.9 8.3 - 16.6 MG/DL   METABOLIC PANEL, BASIC    Collection Time: 01/17/22  2:19 PM   Result Value Ref Range    Sodium 133 (L) 138 - 145 mmol/L    Potassium 3.9 3.5 - 5.1 mmol/L    Chloride 100 98 - 107 mmol/L    CO2 25 21 - 32 mmol/L    Anion gap 8 7 - 16 mmol/L    Glucose 120 (H) 65 - 100 mg/dL    BUN 19 8 - 23 MG/DL    Creatinine 1.41 0.8 - 1.5 MG/DL    GFR est AA >60 >60 ml/min/1.73m2    GFR est non-AA 54 (L) >60 ml/min/1.73m2    Calcium 8.8 8.3 - 84.5 MG/DL   METABOLIC PANEL, BASIC    Collection Time: 01/17/22  9:07 PM   Result Value Ref Range    Sodium 134 (L) 138 - 145 mmol/L    Potassium 3.9 3.5 - 5.1 mmol/L    Chloride 101 98 - 107 mmol/L    CO2 25 21 - 32 mmol/L    Anion gap 8 7 - 16 mmol/L    Glucose 108 (H) 65 - 100 mg/dL    BUN 18 8 - 23 MG/DL    Creatinine 1.26 0.8 - 1.5 MG/DL    GFR est AA >60 >60 ml/min/1.73m2    GFR est non-AA >60 >60 ml/min/1.73m2    Calcium 8.8 8.3 - 10.4 MG/DL   CBC WITH AUTOMATED DIFF    Collection Time: 01/18/22  4:45 AM   Result Value Ref Range    WBC 8.5 4.3 - 11.1 K/uL    RBC 3.31 (L) 4.23 - 5.6 M/uL    HGB 8.6 (L) 13.6 - 17.2 g/dL    HCT 26.0 (L) 41.1 - 50.3 %    MCV 78.5 (L) 79.6 - 97.8 FL    MCH 26.0 (L) 26.1 - 32.9 PG    MCHC 33.1 31.4 - 35.0 g/dL    RDW 14.7 (H) 11.9 - 14.6 %    PLATELET 473 523 - 419 K/uL    MPV 10.2 9.4 - 12.3 FL    ABSOLUTE NRBC 0.02 0.0 - 0.2 K/uL    NEUTROPHILS 73 43 - 78 %    LYMPHOCYTES 13 13 - 44 %    MONOCYTES 8 4.0 - 12.0 %    EOSINOPHILS 0 (L) 0.5 - 7.8 %    BASOPHILS 1 0.0 - 2.0 %    IMMATURE GRANULOCYTES 5 0.0 - 5.0 %    ABS. NEUTROPHILS 6.2 1.7 - 8.2 K/UL    ABS. LYMPHOCYTES 1.1 0.5 - 4.6 K/UL    ABS. MONOCYTES 0.7 0.1 - 1.3 K/UL    ABS. EOSINOPHILS 0.0 0.0 - 0.8 K/UL    ABS. BASOPHILS 0.1 0.0 - 0.2 K/UL    ABS. IMM. GRANS.  0.4 0.0 - 0.5 K/UL    RBC COMMENTS SLIGHT  MICROCYTOSIS        WBC COMMENTS Result Confirmed By Smear      PLATELET COMMENTS ADEQUATE      DF AUTOMATED     METABOLIC PANEL, BASIC    Collection Time: 01/18/22  4:45 AM   Result Value Ref Range    Sodium 135 (L) 138 - 145 mmol/L    Potassium 4.0 3.5 - 5.1 mmol/L    Chloride 102 98 - 107 mmol/L    CO2 27 21 - 32 mmol/L    Anion gap 6 (L) 7 - 16 mmol/L    Glucose 93 65 - 100 mg/dL    BUN 19 8 - 23 MG/DL    Creatinine 1.15 0.8 - 1.5 MG/DL    GFR est AA >60 >60 ml/min/1.73m2    GFR est non-AA >60 >60 ml/min/1.73m2    Calcium 8.7 8.3 - 10.4 MG/DL   MAGNESIUM    Collection Time: 01/18/22  4:45 AM   Result Value Ref Range    Magnesium 2.2 1.8 - 2.4 mg/dL   PHOSPHORUS    Collection Time: 01/18/22  4:45 AM   Result Value Ref Range    Phosphorus 3.3 2.3 - 3.7 MG/DL   METABOLIC PANEL, BASIC    Collection Time: 01/18/22  9:45 AM   Result Value Ref Range    Sodium 134 (L) 138 - 145 mmol/L    Potassium 4.0 3.5 - 5.1 mmol/L    Chloride 103 98 - 107 mmol/L    CO2 26 21 - 32 mmol/L    Anion gap 5 (L) 7 - 16 mmol/L    Glucose 91 65 - 100 mg/dL    BUN 19 8 - 23 MG/DL    Creatinine 1.16 0.8 - 1.5 MG/DL    GFR est AA >60 >60 ml/min/1.73m2    GFR est non-AA >60 >60 ml/min/1.73m2    Calcium 8.9 8.3 - 10.4 MG/DL       All Micro Results     Procedure Component Value Units Date/Time    CULTURE, BLOOD [[id number]] Collected: 01/14/22 1025    Order Status: Completed Specimen: Blood Updated: 01/18/22 0624     Special Requests: --        LEFT  ARM       Culture result: NO GROWTH 4 DAYS       CULTURE, BLOOD [[id number]] Collected: 01/14/22 1025    Order Status: Completed Specimen: Blood Updated: 01/18/22 0624     Special Requests: --        RIGHT  Antecubital       Culture result: NO GROWTH 4 DAYS       BLOOD CULTURE [[id number]]  (Abnormal)  (Susceptibility) Collected: 01/12/22 1126    Order Status: Completed Specimen: Blood Updated: 01/16/22 1507     Special Requests: --        RIGHT  Antecubital       GRAM STAIN GRAM POSITIVE COCCI               AEROBIC AND ANAEROBIC BOTTLES                  RESULTS VERIFIED, PHONED TO AND READ BACK BY Annie Singleton RN @ 2392 ON 1/13/22 BY AMM           Culture result:       STAPHYLOCOCCUS SCHLEIFERI This organism may be indicative of culture contamination. Clinical correlation needs to be evaluated as each case is unique.                   Refer to Blood Culture ID Panel Accession  [id number]        REQUESTED BY Leonie Hoover NP ON 01/16/22    CULTURE, URINE [[id number]]  (Abnormal)  (Susceptibility) Collected: 01/12/22 1550    Order Status: Completed Specimen: Urine Updated: 01/16/22 1023     Special Requests: NO SPECIAL REQUESTS        Culture result:       >100,000 COLONIES/mL Staphylococcus intermedius                  >100,000 COLONIES/mL STAPHYLOCOCCUS SCHLEIFERI                  CULTURE IN PROGRESS,FURTHER UPDATES TO FOLLOW          BLOOD CULTURE [[id number]] (Abnormal) Collected: 01/12/22 1643    Order Status: Completed Specimen: Blood Updated: 01/16/22 0829     Special Requests: --        LEFT  Antecubital       GRAM STAIN GRAM POSITIVE COCCI         ANAEROBIC BOTTLE POSITIVE               CRITICAL RESULT NOT CALLED DUE TO PREVIOUS NOTIFICATION OF CRITICAL RESULT WITHIN THE LAST 24 HOURS. Culture result:       STAPHYLOCOCCUS SPECIES, COAGULASE NEGATIVE This organism may be indicative of culture contamination. Clinical correlation needs to be evaluated as each case is unique. MSSA/MRSA SC BY PCR, NASAL SWAB [[id number]] Collected: 01/13/22 0942    Order Status: Completed Specimen: Nasal swab Updated: 01/13/22 1140     Special Requests: NO SPECIAL REQUESTS        Culture result:       SA target not detected. A MRSA NEGATIVE, SA NEGATIVE test result does not preclude MRSA or SA nasal colonization. BLOOD CULTURE ID PANEL [[id number]] Collected: 01/12/22 1126    Order Status: Completed Specimen: Blood Updated: 01/13/22 0910     Acc. no. from Micro Order [id number]     Blood Culture PCR Testing       MULTIPLEX PCR NEGATIVE:  A negative FilmArray BCID result does not exclude the possibility of bloodstream infection. Other Studies:  IR CHANGE NEPHROSTOMY  PYELOS TUBE    Result Date: 1/18/2022  Indication: History of bilateral ureteral obstruction status post bilateral nephrostomy tube placement at an outside facility. The patient currently has persistent infection and some leaking from the left-sided drain. .  Consent: Informed written and oral consent was obtained from the patient after explanation of benefits and risks of procedure (including, but not limited to: infection, hemorrhage, loss of access). The Patient's questions were answered to their satisfaction. The patient stated understanding and requested that we proceed.   Procedure:  Maximal sterile barrier technique (including:  cap, mask, sterile gown, sterile gloves, sterile sheet, hand hygiene, and chlorhexidene for cutaneous antisepsis) were used. With the patient prone the skin around the left nephrostomy  was prepped and draped in the standard fashion. Lidocaine was administered for local field block. Using fluoroscopic guidance, the indwelling nephrostomy catheter was removed over an Amplatz wire. A new 10 Vincentian nephrostomy catheter was positioned into the renal pelvis. The procedure was then repeated in the contralateral side. A 10 Vincentian drain was placed into the renal pelvis. Contrast was administered confirming appropriate position. The catheter was secured with a suture and stat lock device. A drainage bag was attached. Complications: None. Radiation Exposure Indices: Reference Air Kerma (Ka,r) = 8 mGy Dose Area Product/Kerma Area Product (DAP/TEOFILO/PKA) = 282 cGy-cm2 Fluoroscopy Exposure Time = 2.6 minutes Contrast:  12 milliliters. Medications:  Under physician supervision, intraservice time of  25 minutes of moderate intravenous conscious sedation was performed by administering Versed, Fentanyl intravenously. Continuous pulse oximetry, heart rate, and blood pressure monitoring was performed with an independent, trained observer present. Findings: Both nephrostomy catheters were pulled back into a lower pole calyx. The lower ureters were not evaluated. Lesley Crumble Uncomplicated bilateral nephrostomy drain exchange. Plan: The patient will recover for approximately 1 hour prior to discharge home. The patient should return in  [3 months]  for routine evaluation.        Current Meds:  Current Facility-Administered Medications   Medication Dose Route Frequency    magnesium oxide (MAG-OX) tablet 400 mg  400 mg Oral BID    ferrous sulfate tablet 325 mg  1 Tablet Oral DAILY WITH BREAKFAST    docusate sodium (COLACE) capsule 100 mg  100 mg Oral DAILY    metoprolol (LOPRESSOR) injection 5 mg  5 mg IntraVENous Q4H PRN    ceFAZolin (ANCEF) 2 g/20 mL in sterile water IV syringe  2 g IntraVENous Q8H    potassium, sodium phosphates (NEUTRA-PHOS) packet 1 Packet  1 Packet Oral QID    pantoprazole (PROTONIX) tablet 40 mg  40 mg Oral ACB    cetirizine (ZYRTEC) tablet 5 mg  5 mg Oral DAILY    oxyCODONE IR (ROXICODONE) tablet 10 mg  10 mg Oral Q4H PRN    traZODone (DESYREL) tablet 100 mg  100 mg Oral QHS    sodium chloride (NS) flush 5-10 mL  5-10 mL IntraVENous PRN    sodium chloride (NS) flush 5-40 mL  5-40 mL IntraVENous Q8H    sodium chloride (NS) flush 5-40 mL  5-40 mL IntraVENous PRN    acetaminophen (TYLENOL) tablet 650 mg  650 mg Oral Q6H PRN    Or    acetaminophen (TYLENOL) suppository 650 mg  650 mg Rectal Q6H PRN    polyethylene glycol (MIRALAX) packet 17 g  17 g Oral DAILY PRN    ondansetron (ZOFRAN ODT) tablet 4 mg  4 mg Oral Q8H PRN    Or    ondansetron (ZOFRAN) injection 4 mg  4 mg IntraVENous Q6H PRN    montelukast (SINGULAIR) tablet 10 mg  10 mg Oral QHS    hyoscyamine (LEVSIN) elixir 0.125 mg  0.125 mg Oral Q4H PRN    atorvastatin (LIPITOR) tablet 40 mg  40 mg Oral QHS    albuterol (PROVENTIL VENTOLIN) nebulizer solution 2.5 mg  2.5 mg Nebulization Q6H PRN    oxybutynin (DITROPAN) tablet 5 mg  5 mg Oral Q8H PRN    nicotine (NICODERM CQ) 14 mg/24 hr patch 1 Patch  1 Patch TransDERmal Q24H    dronedarone (MULTAQ) tablet tab 400 mg  400 mg Oral BID WITH MEALS    naloxone (NARCAN) injection 0.4 mg  0.4 mg IntraVENous EVERY 2 MINUTES AS NEEDED       Signed:  Milena Felix MD    Part of this note may have been written by using a voice dictation software. The note has been proof read but may still contain some grammatical/other typographical errors.

## 2022-01-18 NOTE — PROGRESS NOTES
TRANSFER - OUT REPORT:    Verbal report given to [facility]. on Willma Makah  being transferred to room 711 for routine progression of care       Report consisted of patients Situation, Background, Assessment and   Recommendations(SBAR). Information from the following report(s) SBAR, Kardex, Procedure Summary and MAR was reviewed with the receiving nurse. Lines:   Peripheral IV 01/16/22 Right;Upper Basilic (Active)   Site Assessment Clean, dry, & intact 01/18/22 0700   Phlebitis Assessment 0 01/18/22 0700   Infiltration Assessment 0 01/18/22 0700   Dressing Status Clean, dry, & intact 01/18/22 0700   Dressing Type Tape;Transparent 01/18/22 0700   Hub Color/Line Status Capped 01/18/22 0700        Opportunity for questions and clarification was provided.       Patient transported with:   Transporter

## 2022-01-18 NOTE — PROGRESS NOTES
Infectious Disease Progress note    Today's Date: 1/18/2022   Admit Date: 1/12/2022    Impression:   · Ox-S Staph schleiferi bacteremia  (3/4 bottles on 1/12): nidus probably either urine/kidneys or nephrostomy tube exit site  · R pyelonephritis: patient did have significant pyuria from one nephrostomy tube (one with 10-20 WBC; other with >100), culture Staph intermedius & staph scheiferi  sent from L side; right perinephric stranding seen on CT suggestive of pyelonephritis, with limited output from R [facility]  · Metastatic Bladder Ca s/p malena PCNs (placed in 11/2021). Last chemo 1/4/22 at [address]:   · Continue cefazolin 2g IV q8h. We are at least planning for 2 weeks IV (1/28/22) then de-escalation to oral cefadroxil 500mg BID for another two weeks. · We are high concerned that PCNs are infected and seeding blood/kidneys with bacteria. IR has evaluated and has [place] [facility] [place]3. IR asked to re-evaluate. · Dispo: undetermined. [facility] written OPAT orders so CM can arrange. No ID appt needed as [place] will [place] [facility]. Anti-infectives:   · Vancomycin (01/12-1/16)  · Piperacillin/tazobactam (01/12-01/13)  · Ceftriaxone (01/12)  · Meropenem (01/13-1/16)  · IV Ancef (1/16-    Subjective:   Afebrile, tearful. NPO for possible PCN exchange but I have not seen this officially recorded. HPI:    Patient is a 57 y.o. male with a hx of metastatic bladder carcinoma followed at [address], bilateral nephrostomy tubes, recurrent UTIs admitted to [facility] on 01/12 with lower back pain, malodorous urine and some redness around right nephrostomy tube site. Patient with Tmax 100.3 on admission, WBC 15.2, procal 2.2; CT with right perinephric stranding, and U/A with >100 WBC. Cultures obtained (BC, and ?left nephrostomy tube) and patient initially given dose of ceftriaxone in ED. Admitted and placed on Vanc/Zosyn.  Initial BC growing GPC in anaerobic bottle; zosyn switched to meropenem this am.  Afebrile, WBC down to 7; Cr down from 1.66 on admission to 1.37 this am.   Overall patient feels better; with resolution of back pain; no fever or chills. States he's always noted less urine output on right then left nephrostomy tubes since they were placed at [address] in 2021. He states they are scheduled to be exchanged in February. He has no port or indwelling catheter; his chemo has been given through peripheral access. He makes very little urine through his urethra. He denied N/V/diarrhea. No Known Allergies     Review of Systems:  A comprehensive review of systems was negative except for that written in the History of Present Illness. Objective:     Visit Vitals  /73 (BP 1 Location: Right upper arm, BP Patient Position: Supine)   Pulse 66   Temp 97.7 °F (36.5 °C)   Resp 16   Ht 5' 6\" (1.676 m)   Wt 61.2 kg (135 lb)   SpO2 95%   BMI 21.79 kg/m²     Temp (24hrs), Av °F (36.7 °C), Min:97.4 °F (36.3 °C), Max:98.5 °F (36.9 °C)     Patient examined 2022, exam remains unchanged except as noted below:    General:  Alert, cooperative, no acute distress   Head:  Normocephalic, atraumatic    Eyes:  Anicteric, no drainage/not injected   Throat: Mucus membranes moist OP clear   Neck: Supple, symmetrical, trachea midline, no JVD   Lungs:   Clear throughout lung fields, no increased work of breathing   Heart:  Regular rate and rhythm, without murmur   Abdomen:   Soft, non-tender, no guarding, no distention, bowel sounds active.  PCN insertion sites have improved with minimal erythema    Extremities: Extremities without edema, pulses palpable   Skin: Skin without rash         Right          Data Review:     CBC:  Recent Labs     22  0445 22  0311 22  0212 22  0212   WBC 8.5 10.2  --  7.3   GRANS 73 81*   < > 82*   MONOS 8 6   < > 6   EOS 0* 0*   < > 0*   ANEU 6.2 8.2   < > 6.1   ABL 1.1 0.9   < > 0.7   HGB 8.6* 8.4*  --  8.0*   HCT 26.0* 25.7*  --  24.0*    316  --  219    < > = values in this interval not displayed. BMP:  Recent Labs     01/18/22  0445 01/17/22  2107 01/17/22  1419   CREA 1.15 1.26 1.41   BUN 19 18 19   * 134* 133*   K 4.0 3.9 3.9    101 100   CO2 27 25 25   AGAP 6* 8 8   GLU 93 108* 120*       LFTS:  No results for input(s): TBILI, ALT, AP, TP, ALB in the last 72 hours. No lab exists for component: SGOT    Microbiology:     All Micro Results     Procedure Component Value Units Date/Time    CULTURE, BLOOD [[id number]] Collected: 01/14/22 1025    Order Status: Completed Specimen: Blood Updated: 01/18/22 0624     Special Requests: --        LEFT  ARM       Culture result: NO GROWTH 4 DAYS       CULTURE, BLOOD [[id number]] Collected: 01/14/22 1025    Order Status: Completed Specimen: Blood Updated: 01/18/22 0624     Special Requests: --        RIGHT  Antecubital       Culture result: NO GROWTH 4 DAYS       BLOOD CULTURE [[id number]]  (Abnormal)  (Susceptibility) Collected: 01/12/22 1126    Order Status: Completed Specimen: Blood Updated: 01/16/22 1507     Special Requests: --        RIGHT  Antecubital       GRAM STAIN GRAM POSITIVE COCCI               AEROBIC AND ANAEROBIC BOTTLES                  RESULTS VERIFIED, PHONED TO AND READ BACK BY Cristi Lemus RN @ 4530 ON 1/13/22 BY [facility]           Culture result:       STAPHYLOCOCCUS SCHLEIFERI This organism may be indicative of culture contamination. Clinical correlation needs to be evaluated as each case is unique.                   Refer to Blood Culture ID Panel Accession  [id number]        REQUESTED BY Janak Payton NP ON 01/16/22    CULTURE, URINE [[id number]]  (Abnormal)  (Susceptibility) Collected: 01/12/22 1550    Order Status: Completed Specimen: Urine Updated: 01/16/22 1023     Special Requests: NO SPECIAL REQUESTS        Culture result:       >100,000 COLONIES/mL Staphylococcus intermedius                  >100,000 COLONIES/mL STAPHYLOCOCCUS SCHLEIFERI                  CULTURE IN PROGRESS,FURTHER UPDATES TO FOLLOW          BLOOD CULTURE [[id number]]  (Abnormal) Collected: 01/12/22 1643    Order Status: Completed Specimen: Blood Updated: 01/16/22 0829     Special Requests: --        LEFT  Antecubital       GRAM STAIN GRAM POSITIVE COCCI         ANAEROBIC BOTTLE POSITIVE               CRITICAL RESULT NOT CALLED DUE TO PREVIOUS NOTIFICATION OF CRITICAL RESULT WITHIN THE LAST 24 HOURS. Culture result:       STAPHYLOCOCCUS SPECIES, COAGULASE NEGATIVE This organism may be indicative of culture contamination. Clinical correlation needs to be evaluated as each case is unique. MSSA/MRSA SC BY PCR, NASAL SWAB [[id number]] Collected: 01/13/22 0942    Order Status: Completed Specimen: Nasal swab Updated: 01/13/22 1140     Special Requests: NO SPECIAL REQUESTS        Culture result:       SA target not detected. A MRSA NEGATIVE, SA NEGATIVE test result does not preclude MRSA or SA nasal colonization. BLOOD CULTURE ID PANEL [[id number]] Collected: 01/12/22 1126    Order Status: Completed Specimen: Blood Updated: 01/13/22 0910     Acc. no. from Micro Order [id number]     Blood Culture PCR Testing       MULTIPLEX PCR NEGATIVE:  A negative FilmArray BCID result does not exclude the possibility of bloodstream infection. Imaging:   CT Abd/pelvis (01/12/22): IMPRESSION  1. Asymmetric right perinephric stranding concerning for right pyelonephritis  in the correct clinical setting. Bilateral nephrostomy tubes in place. No  hydronephrosis. No urinary tract calculi.     2.  Bladder is decompressed with circumferential wall thickening and to bladder  diverticula the larger appearing posteriorly measuring up to 4.5 cm. Follow-up  as clinically warranted.     3.   No bowel obstruction, diverticulitis or appendicitis.     4.  Multiple bibasilar pulmonary metastatic nodules.       Signed By: Yuni Mendez NP     January 18, 2022

## 2022-01-18 NOTE — PROCEDURES
Department of Interventional Radiology  [phone]        Interventional Radiology Brief Procedure Note    Patient: Bertin Jimenez MRN: [id number]  SSN: [id number]    YOB: 1960  Age: 57 y.o. Sex: male      Date of Procedure: 1/18/2022    Pre-Procedure Diagnosis: ureteral obstruction    Post-Procedure Diagnosis: SAME    Procedure(s): Percutaneous Nephrostomy Tube Exchange    Brief Description of Procedure: as above    Performed By: Hart Paget, MD     Assistants: None    Anesthesia:Moderate Sedation    Estimated Blood Loss: Less than 10ml    Specimens:  None    Implants:  Nephrostomy Drain    Findings: bilateral neph tubes were pulled back into lower calyces.   New 10 F drains placed    Complications: None    Recommendations: external drainage     Follow Up: 3 months or [facility]    Signed By: Hart Paget, MD     January 18, 2022

## 2022-01-18 NOTE — PROGRESS NOTES
Occupational Therapy Note:    OT treatment attempted, pt still off the floor for procedure this PM. Will plan to check back another time/day as patient is available to complete OT treatment.      Thank you,   Sher Quarles, OT

## 2022-01-18 NOTE — PROGRESS NOTES
Patient to IR procedure room #1/[facility] room for bilateral nephrostomy tube exchanges per Dr. Sheldon Kramer.

## 2022-01-19 ENCOUNTER — HOME HEALTH ADMISSION (OUTPATIENT)
Dept: HOME HEALTH SERVICES | Facility: HOME HEALTH | Age: 62
End: 2022-01-19
Payer: COMMERCIAL

## 2022-01-19 VITALS
OXYGEN SATURATION: 98 % | HEART RATE: 70 BPM | RESPIRATION RATE: 17 BRPM | DIASTOLIC BLOOD PRESSURE: 74 MMHG | SYSTOLIC BLOOD PRESSURE: 122 MMHG | TEMPERATURE: 97.5 F | HEIGHT: 66 IN | WEIGHT: 135 LBS | BODY MASS INDEX: 21.69 KG/M2

## 2022-01-19 PROBLEM — D62 ABLA (ACUTE BLOOD LOSS ANEMIA): Status: ACTIVE | Noted: 2022-01-19

## 2022-01-19 PROBLEM — A41.1: Status: ACTIVE | Noted: 2022-01-12

## 2022-01-19 LAB
ANION GAP SERPL CALC-SCNC: 9 MMOL/L (ref 7–16)
BACTERIA SPEC CULT: ABNORMAL
BACTERIA SPEC CULT: ABNORMAL
BACTERIA SPEC CULT: NORMAL
BACTERIA SPEC CULT: NORMAL
BASOPHILS # BLD: 0.1 K/UL (ref 0–0.2)
BASOPHILS NFR BLD: 1 % (ref 0–2)
BUN SERPL-MCNC: 18 MG/DL (ref 8–23)
CALCIUM SERPL-MCNC: 8.6 MG/DL (ref 8.3–10.4)
CHLORIDE SERPL-SCNC: 100 MMOL/L (ref 98–107)
CO2 SERPL-SCNC: 23 MMOL/L (ref 21–32)
CREAT SERPL-MCNC: 1.09 MG/DL (ref 0.8–1.5)
DIFFERENTIAL METHOD BLD: ABNORMAL
EOSINOPHIL # BLD: 0 K/UL (ref 0–0.8)
EOSINOPHIL NFR BLD: 0 % (ref 0.5–7.8)
ERYTHROCYTE [DISTWIDTH] IN BLOOD BY AUTOMATED COUNT: 15.6 % (ref 11.9–14.6)
GLUCOSE SERPL-MCNC: 77 MG/DL (ref 65–100)
HCT VFR BLD AUTO: 28.6 % (ref 41.1–50.3)
HGB BLD-MCNC: 9 G/DL (ref 13.6–17.2)
IMM GRANULOCYTES # BLD AUTO: 0.4 K/UL (ref 0–0.5)
IMM GRANULOCYTES NFR BLD AUTO: 4 % (ref 0–5)
LYMPHOCYTES # BLD: 1.4 K/UL (ref 0.5–4.6)
LYMPHOCYTES NFR BLD: 15 % (ref 13–44)
MAGNESIUM SERPL-MCNC: 2.2 MG/DL (ref 1.8–2.4)
MCH RBC QN AUTO: 26 PG (ref 26.1–32.9)
MCHC RBC AUTO-ENTMCNC: 31.5 G/DL (ref 31.4–35)
MCV RBC AUTO: 82.7 FL (ref 79.6–97.8)
MONOCYTES # BLD: 0.9 K/UL (ref 0.1–1.3)
MONOCYTES NFR BLD: 10 % (ref 4–12)
NEUTS SEG # BLD: 6.7 K/UL (ref 1.7–8.2)
NEUTS SEG NFR BLD: 70 % (ref 43–78)
NRBC # BLD: 0 K/UL (ref 0–0.2)
PHOSPHATE SERPL-MCNC: 2.8 MG/DL (ref 2.3–3.7)
PLATELET # BLD AUTO: 497 K/UL (ref 150–450)
PMV BLD AUTO: 10.2 FL (ref 9.4–12.3)
POTASSIUM SERPL-SCNC: 3.7 MMOL/L (ref 3.5–5.1)
RBC # BLD AUTO: 3.46 M/UL (ref 4.23–5.6)
SERVICE CMNT-IMP: ABNORMAL
SERVICE CMNT-IMP: NORMAL
SERVICE CMNT-IMP: NORMAL
SODIUM SERPL-SCNC: 132 MMOL/L (ref 138–145)
WBC # BLD AUTO: 9.6 K/UL (ref 4.3–11.1)

## 2022-01-19 PROCEDURE — 74011250637 HC RX REV CODE- 250/637: Performed by: STUDENT IN AN ORGANIZED HEALTH CARE EDUCATION/TRAINING PROGRAM

## 2022-01-19 PROCEDURE — 83735 ASSAY OF MAGNESIUM: CPT

## 2022-01-19 PROCEDURE — 80048 BASIC METABOLIC PNL TOTAL CA: CPT

## 2022-01-19 PROCEDURE — 74011000250 HC RX REV CODE- 250: Performed by: FAMILY MEDICINE

## 2022-01-19 PROCEDURE — 74011250636 HC RX REV CODE- 250/636: Performed by: NURSE PRACTITIONER

## 2022-01-19 PROCEDURE — 84100 ASSAY OF PHOSPHORUS: CPT

## 2022-01-19 PROCEDURE — C1751 CATH, INF, PER/CENT/MIDLINE: HCPCS

## 2022-01-19 PROCEDURE — 74011000250 HC RX REV CODE- 250: Performed by: NURSE PRACTITIONER

## 2022-01-19 PROCEDURE — 76937 US GUIDE VASCULAR ACCESS: CPT

## 2022-01-19 PROCEDURE — 36415 COLL VENOUS BLD VENIPUNCTURE: CPT

## 2022-01-19 PROCEDURE — 85025 COMPLETE CBC W/AUTO DIFF WBC: CPT

## 2022-01-19 PROCEDURE — 97530 THERAPEUTIC ACTIVITIES: CPT

## 2022-01-19 PROCEDURE — 74011250637 HC RX REV CODE- 250/637: Performed by: FAMILY MEDICINE

## 2022-01-19 PROCEDURE — 74011250637 HC RX REV CODE- 250/637: Performed by: NURSE PRACTITIONER

## 2022-01-19 RX ORDER — LANOLIN ALCOHOL/MO/W.PET/CERES
325 CREAM (GRAM) TOPICAL
Qty: 60 TABLET | Refills: 0 | Status: SHIPPED | OUTPATIENT
Start: 2022-01-20 | End: 2022-03-21

## 2022-01-19 RX ORDER — CEFADROXIL 1000 MG/1
500 TABLET ORAL 2 TIMES DAILY
Qty: 14 TABLET | Refills: 0 | Status: SHIPPED | OUTPATIENT
Start: 2022-01-19 | End: 2022-02-02

## 2022-01-19 RX ADMIN — ACETAMINOPHEN 650 MG: 325 TABLET ORAL at 05:43

## 2022-01-19 RX ADMIN — CETIRIZINE HYDROCHLORIDE 5 MG: 10 TABLET, FILM COATED ORAL at 09:23

## 2022-01-19 RX ADMIN — DOCUSATE SODIUM 100 MG: 100 CAPSULE ORAL at 09:24

## 2022-01-19 RX ADMIN — SODIUM CHLORIDE, PRESERVATIVE FREE 10 ML: 5 INJECTION INTRAVENOUS at 13:23

## 2022-01-19 RX ADMIN — Medication 400 MG: at 17:51

## 2022-01-19 RX ADMIN — Medication 400 MG: at 09:24

## 2022-01-19 RX ADMIN — POTASSIUM & SODIUM PHOSPHATES POWDER PACK 280-160-250 MG 1 PACKET: 280-160-250 PACK at 09:22

## 2022-01-19 RX ADMIN — FERROUS SULFATE TAB 325 MG (65 MG ELEMENTAL FE) 325 MG: 325 (65 FE) TAB at 09:24

## 2022-01-19 RX ADMIN — POTASSIUM & SODIUM PHOSPHATES POWDER PACK 280-160-250 MG 1 PACKET: 280-160-250 PACK at 17:52

## 2022-01-19 RX ADMIN — CEFAZOLIN SODIUM 2 G: 100 INJECTION, POWDER, LYOPHILIZED, FOR SOLUTION INTRAVENOUS at 13:23

## 2022-01-19 RX ADMIN — CEFAZOLIN SODIUM 2 G: 100 INJECTION, POWDER, LYOPHILIZED, FOR SOLUTION INTRAVENOUS at 05:39

## 2022-01-19 RX ADMIN — DRONEDARONE 400 MG: 400 TABLET, FILM COATED ORAL at 09:23

## 2022-01-19 RX ADMIN — PANTOPRAZOLE SODIUM 40 MG: 40 TABLET, DELAYED RELEASE ORAL at 05:39

## 2022-01-19 RX ADMIN — SODIUM CHLORIDE, PRESERVATIVE FREE 5 ML: 5 INJECTION INTRAVENOUS at 05:39

## 2022-01-19 RX ADMIN — POTASSIUM & SODIUM PHOSPHATES POWDER PACK 280-160-250 MG 1 PACKET: 280-160-250 PACK at 13:23

## 2022-01-19 RX ADMIN — DRONEDARONE 400 MG: 400 TABLET, FILM COATED ORAL at 17:52

## 2022-01-19 NOTE — DISCHARGE SUMMARY
Hospitalist Discharge Summary   Admit Date:  2022  1:58 PM   DC Note date: 2022  Name:  Paolo Huddleston   Age:  57 y.o. Sex:  male  :  1960   MRN:  [id number]   Room:  [facility]  PCP:  Misael Nicole MD    Presenting Complaint: Flank Pain    Initial Admission Diagnosis: Severe sepsis ([facility] [A41.9, R65.20]  UTI (urinary tract infection) [N39.0]  Hyponatremia [E87.1]  EDWARDO (acute kidney injury) ([facility] [N17.9]     Problem List for this Hospitalization:  Hospital Problems as of 2022 Date Reviewed: 10/21/2021          Codes Class Noted - Resolved POA    ABLA (acute blood loss anemia) ICD-10-CM: D62  ICD-9-CM: 285.1  2022 - Present Yes        Hyponatremia ICD-10-CM: E87.1  ICD-9-CM: 276.1  2022 - Present Yes        UTI (urinary tract infection) ICD-10-CM: N39.0  ICD-9-CM: 599.0  2022 - Present Yes        * (Principal) Severe sepsis with acute organ dysfunction due to coagulase-negative Staphylococcus species [facility]) ICD-10-CM: A41.1, R65.20  ICD-9-CM: 038.19, 995.92  2022 - Present Yes        EDWARDO (acute kidney injury) ([facility] ICD-10-CM: N17.9  ICD-9-CM: 584.9  2022 - Present Yes        PAF (paroxysmal atrial fibrillation) ([facility] ICD-10-CM: I48.0  ICD-9-CM: 427.31  2020 - Present Yes    Overview Signed 2020  3:51 PM by Cristi Montano MD     1. Admitted  with afib and chest pain. Started on Multaq and Eliquis. Recurrent depression ([facility] ICD-10-CM: F33.9  ICD-9-CM: 296.30  2018 - Present Yes        HTN (hypertension) ICD-10-CM: I10  ICD-9-CM: 401.9  10/14/2013 - Present Yes            Did Patient have Sepsis (YES OR NO): yes    Admission HPI from 2022:  \" Patient is a 57 y.o. male who presented to the ED for cc lower back pain along with odor to urine.  Hx of depression, HTN, HLD, CAD s/p PCI, paroxysmal a fib not on anticoagulation, metastatic urothelial/ bladder cancer on chemo last tx one week ago at [address] with mets to lung, bilateral nephrosotomy tubes since November, and recurrent UTIs growing stenotrophomonas maltophilia, klebsiella pneumoniae, and klebsiella ozaenae in the past. Wife at bedside who states she has noticed some erythema to right nephrostomy site. Both state having good urine output from nephrostomy tubes.      Vitals -      Labs- WBC 15.2. Platelets 92. UA consistent with UTI. Na 126, Creatine 1.6 from baseline 1, procal 2.2, lactic acid 3.3 \"    Hospital Course:  CT scan showed Pyelonephritis. He was treated with broad-spectrum antibiotics and IV fluids. Blood cultures and urine cultures positive for MSSA. ID is consulted and source was nephrostomy tubes. Antibiotics de-escalated to Ancef based on sensitivities. Nephrology consulted for hyponatremia and gave tolvaptan x1; Sertraline was discontinued. He was given InFed for anemia. IR was consulted for exchange of nephrostomy tubes which was done 1-18. Feeling better. See ID note below. Stable enough for discharge. BP controlled without ACEi so not resuming at DC    Had some ABLA from hematuria which is improving. Iron studies did show deficiency. Give PO iron for 2 months and re-assess      Infectious Disease Progress note     Today's Date: 1/19/2022   Admit Date: 1/12/2022     Impression:   · Ox-S Staph schleiferi bacteremia  (3/4 bottles on 1/12): nidus probably either urine/kidneys or nephrostomy tube exit site  · R pyelonephritis: patient did have significant pyuria from one nephrostomy tube (one with 10-20 WBC; other with >100), culture Staph intermedius & staph scheiferi  sent from L side; right perinephric stranding seen on CT suggestive of pyelonephritis, with limited output from R [facility]  -s/p malena PCN exchange 1/18/22  · Metastatic Bladder Ca s/p malena PCNs (placed in 11/2021). Last chemo 1/4/22 at [facility]:   · Continue cefazolin 2g IV q8h.  We are at least planning for 2 weeks IV (1/28/22) then de-escalation to oral cefadroxil 500mg BID for another two weeks. I have sent in prescription to outpt pharmacy. · Place midline for home infusions    · Dispo: home soon. I have written OPAT orders so CM can arrange. No ID appt needed as pt will follow up with [facility]. Disposition: Home Health Care Svc  Diet: ADULT DIET Regular  Code Status: Full Code    Follow Up Orders:  No orders of the defined types were placed in this encounter. Follow-up Information     Follow up With Specialties Details [facility] [address] nursing and physical therapy services. A home health representative will contact you to schedule the initial home visit. [address] [place] [address]  Sachin Villarreal [facility]5    Raynelle Apgar, MD Family Medicine   [address] [place] [address] [address]  [address] [address]  [phone]            Follow up labs/diagnostics (ultimately defer to outpatient provider):  CBC, CMP    Time spent in patient discharge and coordination 34 minutes. Plan was discussed with pt. All questions answered. Patient was stable at time of discharge. Instructions given to call a physician or return if any concerns. Discharge Info:   Current Discharge Medication List      START taking these medications    Details   ferrous sulfate 325 mg (65 mg iron) tablet Take 1 Tablet by mouth daily (with breakfast) for 60 days. Qty: 60 Tablet, Refills: 0  Start date: 2022, End date: 3/21/2022      cefadroxil (DURICEF) 1 gram tablet Take 0.5 Tablets by mouth two (2) times a day for 14 days. Start taking after finishing IV antibiotics  Qty: 14 Tablet, Refills: 0  Start date: 2022, End date: 2022         CONTINUE these medications which have NOT CHANGED    Details   dronedarone (Multaq) tab tablet Take 1 Tablet by mouth two (2) times daily (with meals). Qty: 180 Tablet, Refills: 3      tadalafiL (Cialis) 20 mg tablet Take 1 Tablet by mouth daily as needed for Erectile Dysfunction.   Qty: 15 Tablet, Refills: 2    Associated Diagnoses: Erectile dysfunction, unspecified erectile dysfunction type      montelukast (SINGULAIR) 10 mg tablet Take 1 Tablet by mouth daily. Qty: 90 Tablet, Refills: 3    Associated Diagnoses: Environmental and seasonal allergies      metoprolol succinate (TOPROL-XL) 50 mg XL tablet Take 1 Tablet by mouth daily. Qty: 90 Tablet, Refills: 3    Associated Diagnoses: Atherosclerosis of native coronary artery of native heart without angina pectoris; PAF (paroxysmal atrial fibrillation) ([facility])      traZODone (DESYREL) 100 mg tablet Take 1 Tablet by mouth nightly. Qty: 90 Tablet, Refills: 1    Associated Diagnoses: Primary insomnia      hyoscyamine SL (LEVSIN/SL) 0.125 mg SL tablet 1 Tablet by SubLINGual route every four (4) hours as needed for Other (bladder pain). Qty: 21 Tablet, Refills: 0      atorvastatin (LIPITOR) 40 mg tablet TAKE 1 TABLET BY MOUTH EVERY MORNING FOR HIGH CHOLESTEROL  Qty: 90 Tab, Refills: 3      hydrocortisone (ANUSOL-HC) 2.5 % rectal cream Insert  into rectum four (4) times daily. Qty: 30 g, Refills: 0    Associated Diagnoses: Hemorrhoids, unspecified hemorrhoid type      nitroglycerin (NITROSTAT) 0.4 mg SL tablet 1 Tab by SubLINGual route every five (5) minutes as needed for Chest Pain. Up to 3 doses. Qty: 1 Bottle, Refills: 11    Associated Diagnoses: Atherosclerosis of native coronary artery of native heart without angina pectoris      apixaban (ELIQUIS) 5 mg tablet Take 1 Tab by mouth two (2) times a day. Qty: 60 Tab, Refills: 11      levocetirizine (XYZAL) 5 mg tablet Take  by mouth. albuterol (VENTOLIN HFA) 90 mcg/actuation inhaler Take 2 Puffs by inhalation as needed for Wheezing. Qty: 1 Inhaler, Refills: 3      multivitamin (ONE A DAY) tablet Take 1 Tab by mouth daily.          STOP taking these medications       sertraline (Zoloft) 25 mg tablet Comments:   Reason for Stopping:         benazepriL (LOTENSIN) 10 mg tablet Comments:   Reason for Stopping: Procedures done this admission:  * No surgery found *    Consults this admission:  IP CONSULT TO NEPHROLOGY  IP CONSULT TO UROLOGY  IP CONSULT TO INFECTIOUS DISEASES  IP CONSULT TO HEMATOLOGY  IP CONSULT TO INTERVENTIONAL RADIOLOGY  IP CONSULT TO INTERVENTIONAL RADIOLOGY    Echocardiogram/EKG results:  Results from Hospital Encounter encounter on 01/12/22    ECHO ADULT COMPLETE    Interpretation Summary    Left Ventricle: Left ventricle size is normal. Normal wall thickness. Normal left ventricular systolic function with a visually estimated EF of 55 - 60%. Normal diastolic function. EKG Results     Procedure 720 Value Units Date/Time    EKG, 12 LEAD, INITIAL [[id number]] Collected: 01/12/22 1122    Order Status: Completed Updated: 01/12/22 1454     Ventricular Rate 105 BPM      Atrial Rate 105 BPM      P-R Interval 130 ms      QRS Duration 80 ms      Q-T Interval 296 ms      QTC Calculation (Bezet) 391 ms      Calculated P Axis 47 degrees      Calculated R Axis 13 degrees      Calculated T Axis 175 degrees      Diagnosis --     !!! Poor data quality, interpretation may be adversely affected  Sinus tachycardia  Possible Left atrial enlargement  Left ventricular hypertrophy  Inferior infarct (cited on or before 12-JAN-2022)  ST & T wave abnormality, consider lateral ischemia  Abnormal ECG  When compared with ECG of 12-JAN-2022 11:22,  ST no longer depressed in Inferior leads  T wave inversion less evident in Inferior leads  Nonspecific T wave abnormality, worse in Anterior leads  Confirmed by Dominga Garcia MD ()EMILIANO ([id number]) on 1/12/2022 2:54:49 PM            Diagnostic Imaging/Tests:   CT ABD PELV WO CONT    Result Date: 1/12/2022  CT ABD PELV WO CONT 1/12/2022 3:20 PM HISTORY: History of kidney cancer with bilateral nephrostomy tubes. Worsening flank pain over the past 2 weeks. Chills but no fever.  TECHNIQUE: CT scan of the abdomen and pelvis was performed without IV contrast. Multiplanar reformats were obtained. Dose reduction techniques were used. CONTRAST: None. FINDINGS: LOWER CHEST: Numerous bilateral lung base metastases are present. No pleural fluid. HEPATOBILIARY: Calcified gallstones noted in the gallbladder which is decompressed. Liver unremarkable allowing for the noncontrast technique. PANCREAS: Normal. SPLEEN: Normal. ADRENAL GLANDS: Normal. KIDNEYS/BLADDER: Bilateral nephrostomy tubes in place and in good position. There is asymmetric right perinephric stranding raising concern for the possibility of pyelonephritis. This is incompletely characterized on the noncontrast exam. No hydronephrosis or urinary tract calculi. Bladder is decompressed. Circumferential bladder wall thickening is noted. Posterior right bladder diverticulum measures 1.5 cm. A larger posterior bladder diverticulum measures up to 4.5 cm. BOWEL: No bowel obstruction, diverticulitis or appendicitis. LYMPH NODES: No enlarged abdominal or pelvic lymph nodes. VASCULATURE: Aorta demonstrates calcified plaque without aneurysm. PELVIC ORGANS: Prostate gland does not appear significantly enlarged but demonstrates multiple calcifications. Rectum is unremarkable. No pelvic free fluid or mass. MUSCULOSKELETAL: No destructive bone lesions. 1.  Asymmetric right perinephric stranding concerning for right pyelonephritis in the correct clinical setting. Bilateral nephrostomy tubes in place. No hydronephrosis. No urinary tract calculi. 2.  Bladder is decompressed with circumferential wall thickening and to bladder diverticula the larger appearing posteriorly measuring up to 4.5 cm. Follow-up as clinically warranted. 3.  No bowel obstruction, diverticulitis or appendicitis. 4.  Multiple bibasilar pulmonary metastatic nodules. XR CHEST PORT    Result Date: 1/12/2022  Exam: XR CHEST PORT on 1/12/2022 11:40 AM Clinical History: The Male patient is 57years old  presenting for meets SIRS criteria.  Comparison:  Chest x-ray 1/7/2020 Findings:  Frontal view of the chest was obtained. Multiple bilateral pulmonary nodules are demonstrated bridging size up to 1 cm. No pleural effusions are seen. The cardiomediastinal silhouette is within normal limits. There are no acute osseous abnormalities. 1. Interval developing multifocal pulmonary nodules consistent with diffuse metastatic disease. CPT code(s) 24315       All Micro Results     Procedure Component Value Units Date/Time    CULTURE, BLOOD [[id number]] Collected: 01/14/22 1025    Order Status: Completed Specimen: Blood Updated: 01/19/22 0742     Special Requests: --        LEFT  ARM       Culture result: NO GROWTH 5 DAYS       CULTURE, BLOOD [[id number]] Collected: 01/14/22 1025    Order Status: Completed Specimen: Blood Updated: 01/19/22 0742     Special Requests: --        RIGHT  Antecubital       Culture result: NO GROWTH 5 DAYS       CULTURE, URINE [[id number]]  (Abnormal)  (Susceptibility) Collected: 01/12/22 1550    Order Status: Completed Specimen: Urine Updated: 01/19/22 0655     Special Requests: NO SPECIAL REQUESTS        Culture result:       >100,000 COLONIES/mL Staphylococcus intermedius                  >100,000 COLONIES/mL STAPHYLOCOCCUS SCHLEIFERI          BLOOD CULTURE [[id number]]  (Abnormal)  (Susceptibility) Collected: 01/12/22 1126    Order Status: Completed Specimen: Blood Updated: 01/16/22 1507     Special Requests: --        RIGHT  Antecubital       GRAM STAIN GRAM POSITIVE COCCI               AEROBIC AND ANAEROBIC BOTTLES                  RESULTS VERIFIED, PHONED TO AND READ BACK BY Bruno To RN @ 9005 ON 1/13/22 BY AMM           Culture result:       STAPHYLOCOCCUS SCHLEIFERI This organism may be indicative of culture contamination. Clinical correlation needs to be evaluated as each case is unique.                   Refer to Blood Culture ID Panel Accession  [id number]        REQUESTED BY Walt Leigh NP ON 01/16/22    BLOOD CULTURE [[id number]]  (Abnormal) Collected: 01/12/22 1643    Order Status: Completed Specimen: Blood Updated: 01/16/22 0829     Special Requests: --        LEFT  Antecubital       GRAM STAIN GRAM POSITIVE COCCI         ANAEROBIC BOTTLE POSITIVE               CRITICAL RESULT NOT CALLED DUE TO PREVIOUS NOTIFICATION OF CRITICAL RESULT WITHIN THE LAST 24 HOURS. Culture result:       STAPHYLOCOCCUS SPECIES, COAGULASE NEGATIVE This organism may be indicative of culture contamination. Clinical correlation needs to be evaluated as each case is unique. MSSA/MRSA SC BY PCR, NASAL SWAB [[id number]] Collected: 01/13/22 0942    Order Status: Completed Specimen: Nasal swab Updated: 01/13/22 1140     Special Requests: NO SPECIAL REQUESTS        Culture result:       SA target not detected. A MRSA NEGATIVE, SA NEGATIVE test result does not preclude MRSA or SA nasal colonization. BLOOD CULTURE ID PANEL [[id number]] Collected: 01/12/22 1126    Order Status: Completed Specimen: Blood Updated: 01/13/22 0910     Acc. no. from Micro Order [id number]     Blood Culture PCR Testing       MULTIPLEX PCR NEGATIVE:  A negative FilmArray BCID result does not exclude the possibility of bloodstream infection. Labs: Results:       BMP, Mg, Phos Recent Labs     01/19/22  0542 01/18/22  1554 01/18/22  0945 01/18/22  0445 01/18/22  0445 01/17/22  1000 01/17/22  0311   * 137* 134*   < > 135*   < > 131*   K 3.7 4.1 4.0   < > 4.0   < > 3.9    104 103   < > 102   < > 98   CO2 23 27 26   < > 27   < > 24   AGAP 9 6* 5*   < > 6*   < > 9   BUN 18 20 19   < > 19   < > 18   CREA 1.09 1.22 1.16   < > 1.15   < > 1.27   CA 8.6 8.8 8.9   < > 8.7   < > 8.5   GLU 77 92 91   < > 93   < > 101*   MG 2.2  --   --   --  2.2  --  1.9   PHOS 2.8  --   --   --  3.3  --  2.7    < > = values in this interval not displayed.       CBC Recent Labs     01/19/22  0542 01/18/22  0445 01/17/22  0311   WBC 9.6 8.5 10.2   RBC 3.46* 3.31* 3.28* HGB 9.0* 8.6* 8.4*   HCT 28.6* 26.0* 25.7*   * 398 316   GRANS 70 73 81*   LYMPH 15 13 9*   EOS 0* 0* 0*   MONOS 10 8 6   BASOS 1 1 0   IG 4 5 4   ANEU 6.7 6.2 8.2   ABL 1.4 1.1 0.9   REGINALD 0.0 0.0 0.0   ABM 0.9 0.7 0.6   ABB 0.1 0.1 0.0   AIG 0.4 0.4 0.4      LFT No results for input(s): ALT, TBIL, AP, TP, ALB, GLOB, AGRAT in the last 72 hours.     No lab exists for component: SGOT, GPT   Cardiac Testing Lab Results   Component Value Date/Time     01/13/2022 09:40 AM    Troponin-I, Qt. 1.13 (HH) 01/08/2020 09:49 AM    Troponin-I, Qt. 1.09 (HH) 01/08/2020 04:07 AM    Troponin-I, Qt. 0.07 (H) 01/07/2020 10:48 PM      Coagulation Tests Lab Results   Component Value Date/Time    aPTT 106.4 (H) 01/08/2020 11:44 AM      A1c Lab Results   Component Value Date/Time    Hemoglobin A1c 5.5 07/06/2017 09:45 AM      Lipid Panel Lab Results   Component Value Date/Time    Cholesterol, total 115 08/12/2021 04:30 PM    HDL Cholesterol 37 (L) 08/12/2021 04:30 PM    LDL, calculated 53 08/12/2021 04:30 PM    LDL, calculated 45.4 08/26/2020 04:23 PM    VLDL, calculated 25 08/12/2021 04:30 PM    VLDL, calculated 27.6 (H) 08/26/2020 04:23 PM    Triglyceride 142 08/12/2021 04:30 PM    CHOL/HDL Ratio 3.0 08/26/2020 04:23 PM      Thyroid Panel Lab Results   Component Value Date/Time    TSH 0.789 10/14/2021 10:31 AM        Most Recent UA Lab Results   Component Value Date/Time    Color RED 01/12/2022 03:50 PM    Appearance TURBID 01/12/2022 03:50 PM    pH (UA) 8.0 01/12/2022 03:50 PM    Protein 300 (A) 01/12/2022 03:50 PM    Glucose Negative 01/12/2022 03:50 PM    Ketone TRACE (A) 01/12/2022 03:50 PM    Bilirubin Negative 01/12/2022 03:50 PM    Blood LARGE (A) 01/12/2022 03:50 PM    Urobilinogen 1.0 01/12/2022 03:50 PM    Nitrites Negative 01/12/2022 03:50 PM    Leukocyte Esterase LARGE (A) 01/12/2022 03:50 PM    WBC >100 01/12/2022 03:50 PM    RBC 5-10 01/12/2022 03:50 PM    Epithelial cells 0-3 01/12/2022 03:50 PM    Bacteria 4+ (H) 01/12/2022 03:50 PM    Casts HYALINE 01/12/2022 03:50 PM    Other observations RESULTS VERIFIED MANUALLY 01/12/2022 03:50 PM          All Labs from Last 24 Hrs:  Recent Results (from the past 24 hour(s))   METABOLIC PANEL, BASIC    Collection Time: 01/18/22  3:54 PM   Result Value Ref Range    Sodium 137 (L) 138 - 145 mmol/L    Potassium 4.1 3.5 - 5.1 mmol/L    Chloride 104 98 - 107 mmol/L    CO2 27 21 - 32 mmol/L    Anion gap 6 (L) 7 - 16 mmol/L    Glucose 92 65 - 100 mg/dL    BUN 20 8 - 23 MG/DL    Creatinine 1.22 0.8 - 1.5 MG/DL    GFR est AA >60 >60 ml/min/1.73m2    GFR est non-AA >60 >60 ml/min/1.73m2    Calcium 8.8 8.3 - 10.4 MG/DL   CBC WITH AUTOMATED DIFF    Collection Time: 01/19/22  5:42 AM   Result Value Ref Range    WBC 9.6 4.3 - 11.1 K/uL    RBC 3.46 (L) 4.23 - 5.6 M/uL    HGB 9.0 (L) 13.6 - 17.2 g/dL    HCT 28.6 (L) 41.1 - 50.3 %    MCV 82.7 79.6 - 97.8 FL    MCH 26.0 (L) 26.1 - 32.9 PG    MCHC 31.5 31.4 - 35.0 g/dL    RDW 15.6 (H) 11.9 - 14.6 %    PLATELET 022 (H) 469 - 450 K/uL    MPV 10.2 9.4 - 12.3 FL    ABSOLUTE NRBC 0.00 0.0 - 0.2 K/uL    DF AUTOMATED      NEUTROPHILS 70 43 - 78 %    LYMPHOCYTES 15 13 - 44 %    MONOCYTES 10 4.0 - 12.0 %    EOSINOPHILS 0 (L) 0.5 - 7.8 %    BASOPHILS 1 0.0 - 2.0 %    IMMATURE GRANULOCYTES 4 0.0 - 5.0 %    ABS. NEUTROPHILS 6.7 1.7 - 8.2 K/UL    ABS. LYMPHOCYTES 1.4 0.5 - 4.6 K/UL    ABS. MONOCYTES 0.9 0.1 - 1.3 K/UL    ABS. EOSINOPHILS 0.0 0.0 - 0.8 K/UL    ABS. BASOPHILS 0.1 0.0 - 0.2 K/UL    ABS. IMM. GRANS.  0.4 0.0 - 0.5 K/UL   METABOLIC PANEL, BASIC    Collection Time: 01/19/22  5:42 AM   Result Value Ref Range    Sodium 132 (L) 138 - 145 mmol/L    Potassium 3.7 3.5 - 5.1 mmol/L    Chloride 100 98 - 107 mmol/L    CO2 23 21 - 32 mmol/L    Anion gap 9 7 - 16 mmol/L    Glucose 77 65 - 100 mg/dL    BUN 18 8 - 23 MG/DL    Creatinine 1.09 0.8 - 1.5 MG/DL    GFR est AA >60 >60 ml/min/1.73m2    GFR est non-AA >60 >60 ml/min/1.73m2    Calcium 8.6 8.3 - 10.4 MG/DL MAGNESIUM    Collection Time: 01/19/22  5:42 AM   Result Value Ref Range    Magnesium 2.2 1.8 - 2.4 mg/dL   PHOSPHORUS    Collection Time: 01/19/22  5:42 AM   Result Value Ref Range    Phosphorus 2.8 2.3 - 3.7 MG/DL       Current Med List in Hospital:   Current Facility-Administered Medications   Medication Dose Route Frequency    magnesium oxide (MAG-OX) tablet 400 mg  400 mg Oral BID    ferrous sulfate tablet 325 mg  1 Tablet Oral DAILY WITH BREAKFAST    docusate sodium (COLACE) capsule 100 mg  100 mg Oral DAILY    metoprolol (LOPRESSOR) injection 5 mg  5 mg IntraVENous Q4H PRN    ceFAZolin (ANCEF) 2 g/20 mL in sterile water IV syringe  2 g IntraVENous Q8H    potassium, sodium phosphates (NEUTRA-PHOS) packet 1 Packet  1 Packet Oral QID    pantoprazole (PROTONIX) tablet 40 mg  40 mg Oral ACB    cetirizine (ZYRTEC) tablet 5 mg  5 mg Oral DAILY    oxyCODONE IR (ROXICODONE) tablet 10 mg  10 mg Oral Q4H PRN    traZODone (DESYREL) tablet 100 mg  100 mg Oral QHS    sodium chloride (NS) flush 5-10 mL  5-10 mL IntraVENous PRN    sodium chloride (NS) flush 5-40 mL  5-40 mL IntraVENous Q8H    sodium chloride (NS) flush 5-40 mL  5-40 mL IntraVENous PRN    acetaminophen (TYLENOL) tablet 650 mg  650 mg Oral Q6H PRN    Or    acetaminophen (TYLENOL) suppository 650 mg  650 mg Rectal Q6H PRN    polyethylene glycol (MIRALAX) packet 17 g  17 g Oral DAILY PRN    ondansetron (ZOFRAN ODT) tablet 4 mg  4 mg Oral Q8H PRN    Or    ondansetron (ZOFRAN) injection 4 mg  4 mg IntraVENous Q6H PRN    montelukast (SINGULAIR) tablet 10 mg  10 mg Oral QHS    hyoscyamine (LEVSIN) elixir 0.125 mg  0.125 mg Oral Q4H PRN    atorvastatin (LIPITOR) tablet 40 mg  40 mg Oral QHS    albuterol (PROVENTIL VENTOLIN) nebulizer solution 2.5 mg  2.5 mg Nebulization Q6H PRN    oxybutynin (DITROPAN) tablet 5 mg  5 mg Oral Q8H PRN    nicotine (NICODERM CQ) 14 mg/24 hr patch 1 Patch  1 Patch TransDERmal Q24H    dronedarone (MULTAQ) tablet tab 400 mg  400 mg Oral BID WITH MEALS    naloxone (NARCAN) injection 0.4 mg  0.4 mg IntraVENous EVERY 2 MINUTES AS NEEDED       No Known Allergies  Immunization History   Administered Date(s) Administered    COVID-19, Moderna, Primary or Immunocompromised Series, MRNA, PF, 100mcg/0.5mL 03/10/2021, 04/08/2021    Influenza Vaccine (Quad) Mdck Pf (>2 Yrs Flucelvax QUAD [id number]) 10/14/2021    Influenza Vaccine (Quadrivalent)(>18 Yrs Flublok [id number]) 12/07/2019    TB Skin Test (PPD) Intradermal 01/13/2022       Recent Vital Data:  Patient Vitals for the past 24 hrs:   Temp Pulse Resp BP SpO2   01/19/22 1115 97.3 °F (36.3 °C) 63 18 130/74 98 %   01/19/22 0710 97.5 °F (36.4 °C) 66 17 127/74 95 %   01/19/22 0458 97.5 °F (36.4 °C) 60 18 118/70 95 %   01/18/22 2238 97.2 °F (36.2 °C) 71 18 113/70 94 %   01/18/22 1949 97.9 °F (36.6 °C) 67 16 123/78 97 %   01/18/22 1604 97.4 °F (36.3 °C) 71 20 118/67 97 %   01/18/22 1522  67  123/72 97 %   01/18/22 1511  68 16 120/72 95 %   01/18/22 1502  69 16 122/72 96 %   01/18/22 1452  70 16 110/64 95 %     Oxygen Therapy  O2 Sat (%): 98 % (01/19/22 1115)  Pulse via Oximetry: 67 beats per minute (01/18/22 1522)  O2 Device: None (Room air) (01/19/22 0718)  O2 Flow Rate (L/min): 3 l/min (01/18/22 1425)  ETCO2 (mmHg): 30 mmHg (01/18/22 1425)    Estimated body mass index is 21.79 kg/m² as calculated from the following:    Height as of this encounter: 5' 6\" (1.676 m). Weight as of this encounter: 61.2 kg (135 lb). Intake/Output Summary (Last 24 hours) at 1/19/2022 1447  Last data filed at 1/19/2022 1115  Gross per 24 hour   Intake    Output 1250 ml   Net -1250 ml         Physical Exam:    General:    Well nourished. No overt distress  Head:  Normocephalic, atraumatic  Eyes:  Sclerae appear normal.  Pupils equally round. HENT:  Nares appear normal, no drainage. Moist mucous membranes  Neck:  No restricted ROM.   Trachea midline  CV:   RRR  No JVD  Lungs:   Even, unlabored  Abdomen:   nondistended. Extremities: No cyanosis or clubbing. No edema. Skin:     No rashes. Normal coloration  Neuro:  CN II-XII grossly intact. Psych:  Normal mood and affect. Signed:  Shalom Smith MD    Part of this note may have been written by using a voice dictation software. The note has been proof read but may still contain some grammatical/other typographical errors.

## 2022-01-19 NOTE — PROGRESS NOTES
Problem: Falls - Risk of  Goal: *Absence of Falls  Description: Document Troy Lujan Fall Risk and appropriate interventions in the flowsheet.   Outcome: Progressing Towards Goal  Note: Fall Risk Interventions:  Mobility Interventions: Bed/chair exit alarm,Patient to call before getting OOB,PT Consult for mobility concerns,PT Consult for assist device competence         Medication Interventions: Bed/chair exit alarm,Evaluate medications/consider consulting pharmacy,Patient to call before getting OOB,Teach patient to arise slowly    Elimination Interventions: Call light in reach,Bed/chair exit alarm,Stay With Me (per policy),Patient to call for help with toileting needs              Problem: Patient Education: Go to Patient Education Activity  Goal: Patient/Family Education  Outcome: Progressing Towards Goal     Problem: General Medical Care Plan  Goal: *Vital signs within specified parameters  Outcome: Progressing Towards Goal  Goal: *Labs within defined limits  Outcome: Progressing Towards Goal  Goal: *Absence of infection signs and symptoms  Outcome: Progressing Towards Goal  Goal: *Optimal pain control at patient's stated goal  Outcome: Progressing Towards Goal  Goal: *Skin integrity maintained  Outcome: Progressing Towards Goal  Goal: *Fluid volume balance  Outcome: Progressing Towards Goal  Goal: *Optimize nutritional status  Outcome: Progressing Towards Goal  Goal: *Anxiety reduced or absent  Outcome: Progressing Towards Goal  Goal: *Progressive mobility and function (eg: ADL's)  Outcome: Progressing Towards Goal     Problem: Patient Education: Go to Patient Education Activity  Goal: Patient/Family Education  Outcome: Progressing Towards Goal

## 2022-01-19 NOTE — PROGRESS NOTES
Admit Date: 1/12/2022    Subjective:     Scot Harness seen/examined by Dr. Adolfo Frazier. Pt reports he is feeling better this morning sp NT exchange. He has had 900 ml OP total (450/450). Objective:     Patient Vitals for the past 8 hrs:   BP Temp Pulse Resp SpO2   01/19/22 0710 127/74 97.5 °F (36.4 °C) 66 17 95 %   01/19/22 0458 118/70 97.5 °F (36.4 °C) 60 18 95 %     No intake/output data recorded. 01/17 1901 - 01/19 0700  In: -   Out: 1000 [Urine:1000]    Physical Exam:  GENERAL: alert, cooperative, no distress  LUNG: clear to auscultation bilaterally  HEART: regular rate and rhythm, S1, S2   ABDOMEN: soft, non-tender  NEUROLOGIC: AOx3    Data Review   Recent Results (from the past 24 hour(s))   METABOLIC PANEL, BASIC    Collection Time: 01/18/22  3:54 PM   Result Value Ref Range    Sodium 137 (L) 138 - 145 mmol/L    Potassium 4.1 3.5 - 5.1 mmol/L    Chloride 104 98 - 107 mmol/L    CO2 27 21 - 32 mmol/L    Anion gap 6 (L) 7 - 16 mmol/L    Glucose 92 65 - 100 mg/dL    BUN 20 8 - 23 MG/DL    Creatinine 1.22 0.8 - 1.5 MG/DL    GFR est AA >60 >60 ml/min/1.73m2    GFR est non-AA >60 >60 ml/min/1.73m2    Calcium 8.8 8.3 - 10.4 MG/DL   CBC WITH AUTOMATED DIFF    Collection Time: 01/19/22  5:42 AM   Result Value Ref Range    WBC 9.6 4.3 - 11.1 K/uL    RBC 3.46 (L) 4.23 - 5.6 M/uL    HGB 9.0 (L) 13.6 - 17.2 g/dL    HCT 28.6 (L) 41.1 - 50.3 %    MCV 82.7 79.6 - 97.8 FL    MCH 26.0 (L) 26.1 - 32.9 PG    MCHC 31.5 31.4 - 35.0 g/dL    RDW 15.6 (H) 11.9 - 14.6 %    PLATELET 012 (H) 319 - 450 K/uL    MPV 10.2 9.4 - 12.3 FL    ABSOLUTE NRBC 0.00 0.0 - 0.2 K/uL    DF AUTOMATED      NEUTROPHILS 70 43 - 78 %    LYMPHOCYTES 15 13 - 44 %    MONOCYTES 10 4.0 - 12.0 %    EOSINOPHILS 0 (L) 0.5 - 7.8 %    BASOPHILS 1 0.0 - 2.0 %    IMMATURE GRANULOCYTES 4 0.0 - 5.0 %    ABS. NEUTROPHILS 6.7 1.7 - 8.2 K/UL    ABS. LYMPHOCYTES 1.4 0.5 - 4.6 K/UL    ABS. MONOCYTES 0.9 0.1 - 1.3 K/UL    ABS.  EOSINOPHILS 0.0 0.0 - 0.8 K/UL ABS. BASOPHILS 0.1 0.0 - 0.2 K/UL    ABS. IMM. GRANS. 0.4 0.0 - 0.5 K/UL   METABOLIC PANEL, BASIC    Collection Time: 01/19/22  5:42 AM   Result Value Ref Range    Sodium 132 (L) 138 - 145 mmol/L    Potassium 3.7 3.5 - 5.1 mmol/L    Chloride 100 98 - 107 mmol/L    CO2 23 21 - 32 mmol/L    Anion gap 9 7 - 16 mmol/L    Glucose 77 65 - 100 mg/dL    BUN 18 8 - 23 MG/DL    Creatinine 1.09 0.8 - 1.5 MG/DL    GFR est AA >60 >60 ml/min/1.73m2    GFR est non-AA >60 >60 ml/min/1.73m2    Calcium 8.6 8.3 - 10.4 MG/DL   MAGNESIUM    Collection Time: 01/19/22  5:42 AM   Result Value Ref Range    Magnesium 2.2 1.8 - 2.4 mg/dL   PHOSPHORUS    Collection Time: 01/19/22  5:42 AM   Result Value Ref Range    Phosphorus 2.8 2.3 - 3.7 MG/DL       Assessment:     Principal Problem:    Severe sepsis ([facility] (1/12/2022)    Active Problems:    HTN (hypertension) (10/14/2013)      Recurrent depression ([facility] (1/4/2018)      PAF (paroxysmal atrial fibrillation) ([facility] (1/7/2020)      Overview: 1. Admitted 1/20 with afib and chest pain. Started on Multaq and Eliquis. Hyponatremia (1/12/2022)      UTI (urinary tract infection) (1/12/2022)      EDWARDO (acute kidney injury) ([facility] (1/12/2022)      Consuled for bilateral NT exchange. Pt previously followed by our group for BPH and urinary retention, he elected to go to [address] urologist - Dr. Willy Lea. He had TURP there in 9/2021 and path showed high grade urothelial carcinoma invasive to prostate. Referred to [address] urologic oncologist Dr. Rahul Jo. Staging CT scans of the chest, abdomen, and pelvis on 11/22/2021 revealed diffuse bilateral pulmonary nodules measuring up to 1.7 cm suggestive of metastatic disease. On the CT abdomen/pelvis the patient was noted to have diffuse bladder wall thickening as well as a pedunculated nodule near the right ureterovesicular junction suspicious for neoplasm and contributing to moderate right hydroureteronephrosis.  Suspicious right inguinal and pelvic lymph nodes measuring up to 1.1 cm were also appreciated. Apparently he has had bilateral NT placed since that time at [address]. He is admitted here for sepsis/UTI. His Cr is normal.  CT reports NT to be in correct position and no hydronephrosis. Right tube is putting out less than left but per pt this is normal.  Urine OP is yellow/clear. He has appt 2/3 at [address] for tube exchange and prefers to go there. However, pt had little OP on R side and intermittent leaking from L tube. ID concerned for PCN infection with seeding of blood/kidneys with bacteria. Pt continues on IV cefazolin. IR re-evaluated and he is s/p bilateral NT exchange on 1/18/22. Today pt reports he is feeling better. Plan:     Continue NT to drainage. Continue txt for staph bacteremia and R pyelonephritis as outlined per ID. He will follow up with [address] urology/oncology as scheduled as per pt wishes. Emeli Langley NP  [facility]  I have reviewed the above note and examined the patient. I agree with the exam, assessment and plan.     Brittany Alexis MD

## 2022-01-19 NOTE — PROGRESS NOTES
ACUTE PHYSICAL THERAPY GOALS:  (Developed with and agreed upon by patient and/or caregiver.)  1. Patient will perform bed mobility with INDEPENDENCE within 7 days. 2. Patient will transfer bed to chair with MODIFIED INDEPENDENCE within 7 days. 3. Patient will demonstrate FAIR+ DYNAMIC STANDING balance within 7 day(s). 4. Patient will ambulate 150ft+ using least restrictive assistive device and MODIFIED INDEPENDENCE within 7 days. 5. Patient will tolerate 25+ minutes of therapeutic activity/exercise and/or neuromuscular re-education while maintaining stable vitals to improve functional strength and activity tolerance within 7 days    PHYSICAL THERAPY: Daily Note and AM Treatment Day # 3    Marc Locke is a 57 y.o. male   PRIMARY DIAGNOSIS: Severe sepsis ([facility]  Severe sepsis ([address] [A41.9, R65.20]  UTI (urinary tract infection) [N39.0]  Hyponatremia [E87.1]  EDWARDO (acute kidney injury) ([facility] [N17.9]         ASSESSMENT:     REHAB RECOMMENDATIONS: CURRENT LEVEL OF FUNCTION:  (Most Recently Demonstrated)   Recommendation to date pending progress:  [address] [place] [address] Therapy  Equipment:    Rolling Walker Bed Mobility:   Modified Independent  Sit to Stand:  [employer] Assistance  Transfers:   Contact Guard Assistance  Gait/Mobility:   Contact Guard Assistance to [facility]     ASSESSMENT:  Mr. Jd Salas presents sitting on side of bed with family present. He stood and ambulated in grajeda using rolling walker for part of the time and then without the rest of the time. He does require CGA/SBA and used the rail in grajeda at times. Continue to recommend rolling walker for home for increased balance. He requires rest breaks along the way for mild SOB. He returned to room and was left sitting on side of bed. He is making progress towards goals. He is hoping to go home later today. Will continue with POC. Would benefit from a walker for home use at this time.      SUBJECTIVE:   Mr. Jd Salas states, \"that felt good\"    SOCIAL HISTORY/ LIVING ENVIRONMENT: At baseline, patient is an independent, community-level   ambulator without utilizing an assistive device. Endorses 1 week decline in functional strength and mobility.   Support Systems: Spouse/Significant Other  OBJECTIVE:     PAIN: VITAL SIGNS: LINES/DRAINS:   Pre Treatment:    Post Treatment: 0   Urostomy   O2 Device: None (Room air)     MOBILITY: I Mod I S SBA CGA Min Mod Max Total  NT x2 Comments:   Bed Mobility    Rolling [] [] [] [] [] [] [] [] [] [] []    Supine to Sit [] [x] [] [] [] [] [] [] [] [] []    Scooting [x] [] [] [] [] [] [] [] [] [] []    Sit to Supine [] [] [] [] [] [] [] [] [] [] []    Transfers    Sit to Stand [] [] [] [] [x] [] [] [] [] [] []    Bed to Chair [] [] [] [] [x] [] [] [] [] [] []    Stand to Sit [] [] [x] [] [] [] [] [] [] [] []    I=Independent, Mod I=Modified Independent, S=Supervision, SBA=Standby Assistance, CGA=Contact Guard Assistance,   Min=Minimal Assistance, Mod=Moderate Assistance, Max=Maximal Assistance, Total=Total Assistance, NT=Not Tested    BALANCE: Good Fair+ Fair Fair- Poor NT Comments   Sitting Static [x] [] [] [] [] []    Sitting Dynamic [x] [] [] [] [] []              Standing Static [] [x] [x] [] [] []    Standing Dynamic [] [x] [x] [] [] []      GAIT: I Mod I S SBA CGA Min Mod Max Total  NT x2 Comments:   Level of Assistance [] [] [] [x] [x] [] [] [] [] [] []    Distance 450'    DME Rolling Walker and part without RW    Gait Quality Slow and fair    Weightbearing  Status N/A     I=Independent, Mod I=Modified Independent, S=Supervision, SBA=Standby Assistance, CGA=Contact Guard Assistance,   Min=Minimal Assistance, Mod=Moderate Assistance, Max=Maximal Assistance, Total=Total Assistance, NT=Not Tested    PLAN:   FREQUENCY/DURATION: PT Plan of Care: 3 times/week for duration of hospital stay or until stated goals are met, whichever comes first.  TREATMENT:     TREATMENT:   ($$ Therapeutic Activity: 8-22 mins )  Therapeutic Activity (15 Minutes): Therapeutic activity included Scooting, Transfer Training, Ambulation on level ground and Standing balance to improve functional Mobility, Strength and Activity tolerance.     TREATMENT GRID:  N/A    AFTER TREATMENT POSITION/PRECAUTIONS:  Needs within reach, RN notified, Visitors at bedside and sitting on side of bed    INTERDISCIPLINARY COLLABORATION:  RN/PCT and PT/PTA    TOTAL TREATMENT DURATION:  PT Patient Time In/Time Out  Time In: 3168  Time Out: CHARLES Dangelo

## 2022-01-19 NOTE — PROGRESS NOTES
OPAT orders received and referral submitted to [facility]. SW met with pt and dtr/Julia (#[phone]) and discussed his financial responsibility ($0897.95 until he meets his deductible then $3482. 35/week until he meets his OOP maximum. [employer] will [employer] first and then bill the patient. They will offer a payment plan if needed). Pt is in agreement with his financial responsibility. SW notified [employer] liaison. She is unavailable to meet with the pt at the hospital this afternoon but can meet with the pt tomorrow morning at 10am at his home if he can miss this evening's ABX dose. Pt is anxious to dc home today so SW contacted MD and ID NP to confirm it is ok for the pt to miss this dose. They stated it would be ok for him to miss it and pt has been cleared for dc to home by hospitalist.  VAT will place pt's midline cathter today and pt will dc to home after that. Pt expressed no preference of [facility] so referral submitted to [facility] for RN & PT services. [facility] will start RN services on Friday since [employer] RN liaison will see the pt at home tomorrow. RW supplied by [facility] delivered to pt's room. Consent signed and faxed to their office. No other dc needs or concerns identified or reported. SW remains available to assist as needed. Care Management Interventions  PCP Verified by CM:  Yes  Last Visit to PCP: 10/14/21  Mode of Transport at Discharge: Self  Transition of Care Consult (CM Consult): [facility]: Yes  Discharge Durable Medical Equipment: Yes  Physical Therapy Consult: Yes  Occupational Therapy Consult: Yes  Speech Therapy Consult: Yes  Support Systems: Other Family Member(s),Child(stuart),Spouse/Significant Other  Confirm Follow Up Transport: Family  The Plan for Transition of Care is Related to the Following Treatment Goals : Home infusion services for IV ABX; home health nursing and therapy services to improve pt's strength and functional abilities  The Patient and/or Patient Representative was Provided with a Choice of Provider and Agrees with the Discharge Plan?: Yes  Name of the Patient Representative Who was Provided with a Choice of Provider and Agrees with the Discharge Plan: Patient  Freedom of Choice List was Provided with Basic Dialogue that Supports the Patient's Individualized Plan of Care/Goals, Treatment Preferences and Shares the Quality Data Associated with the Providers?: Yes  Discharge Location  Patient Expects to be Discharged to[de-identified] Home with [facility] ([facility]

## 2022-01-19 NOTE — PROGRESS NOTES
Infectious Disease Progress note    Today's Date: 1/19/2022   Admit Date: 1/12/2022    Impression:   · Ox-S Staph schleiferi bacteremia  (3/4 bottles on 1/12): nidus probably either urine/kidneys or nephrostomy tube exit site  · R pyelonephritis: patient did have significant pyuria from one nephrostomy tube (one with 10-20 WBC; other with >100), culture Staph intermedius & staph scheiferi  sent from L side; right perinephric stranding seen on CT suggestive of pyelonephritis, with limited output from [facility]  -s/p malena PCN exchange 1/18/22  · Metastatic Bladder Ca s/p malena PCNs (placed in 11/2021). Last chemo 1/4/22 at [address]:   · Continue cefazolin 2g IV q8h. We are at least planning for 2 weeks IV (1/28/22) then de-escalation to oral cefadroxil 500mg BID for another two weeks. I have sent in prescription to outpt pharmacy. · Place midline for home infusions    · Dispo: home soon. I have written OPAT orders so CM can arrange. No ID appt needed as pt will follow up with [facility]. 40 min spent with patient/ 50% c/c/c as in discussing outpt abx plans with case management and patient, coordinating ID plans. Anti-infectives:   · Vancomycin (01/12-1/16)  · Piperacillin/tazobactam (01/12-01/13)  · Ceftriaxone (01/12)  · Meropenem (01/13-1/16)  · IV Ancef (1/16-    Subjective:   Afebrile, PCN sites feeling better per pt. I had a long discussion with pt about abx plan. RN in room also. I talked to [facility] and urology. HPI:    Patient is a 57 y.o. male with a hx of metastatic bladder carcinoma followed at [address], bilateral nephrostomy tubes, recurrent UTIs admitted to [facility] on 01/12 with lower back pain, malodorous urine and some redness around right nephrostomy tube site. Patient with Tmax 100.3 on admission, WBC 15.2, procal 2.2; CT with right perinephric stranding, and U/A with >100 WBC. Cultures obtained (BC, and ?left nephrostomy tube) and patient initially given dose of ceftriaxone in ED.   Admitted and placed on Vanc/Zosyn. Initial BC growing GPC in anaerobic bottle; zosyn switched to meropenem this am.  Afebrile, WBC down to 7; Cr down from 1.66 on admission to 1.37 this am.   Overall patient feels better; with resolution of back pain; no fever or chills. States he's always noted less urine output on right then left nephrostomy tubes since they were placed at [address] in 2021. He states they are scheduled to be exchanged in February. He has no port or indwelling catheter; his chemo has been given through peripheral access. He makes very little urine through his urethra. He denied N/V/diarrhea. No Known Allergies     Review of Systems:  A comprehensive review of systems was negative except for that written in the History of Present Illness. Objective:     Visit Vitals  /74 (BP 1 Location: Right lower arm, BP Patient Position: Sitting)   Pulse 66   Temp 97.5 °F (36.4 °C)   Resp 17   Ht 5' 6\" (1.676 m)   Wt 61.2 kg (135 lb)   SpO2 95%   BMI 21.79 kg/m²     Temp (24hrs), Av.5 °F (36.4 °C), Min:97.2 °F (36.2 °C), Max:97.9 °F (36.6 °C)     Patient examined 2022, exam remains unchanged except as noted below:    General:  Alert, cooperative, no acute distress   Head:  Normocephalic, atraumatic    Eyes:  Anicteric, no drainage/not injected   Throat: Mucus membranes moist OP clear   Neck: Supple, symmetrical, trachea midline, no JVD   Lungs:   Clear throughout lung fields, no increased work of breathing   Heart:  Regular rate and rhythm, without murmur   Abdomen:   Soft, non-tender, no guarding, no distention, bowel sounds active.  PCN insertion sites have improved with minimal erythema    Extremities: Extremities without edema, pulses palpable   Skin: Skin without rash               Data Review:     CBC:  Recent Labs     22  0542 22  0445 22  0311 22  0311   WBC 9.6 8.5  --  10.2   GRANS 70 73   < > 81*   MONOS 10 8   < > 6   EOS 0* 0*   < > 0*   ANEU 6.7 6.2   < > 8.2   ABL 1.4 1.1 < > 0.9   HGB 9.0* 8.6*  --  8.4*   HCT 28.6* 26.0*  --  25.7*   * 398  --  316    < > = values in this interval not displayed. BMP:  Recent Labs     01/19/22  0542 01/18/22  1554 01/18/22  0945   CREA 1.09 1.22 1.16   BUN 18 20 19   * 137* 134*   K 3.7 4.1 4.0    104 103   CO2 23 27 26   AGAP 9 6* 5*   GLU 77 92 91       LFTS:  No results for input(s): TBILI, ALT, AP, TP, ALB in the last 72 hours. No lab exists for component: SGOT    Microbiology:     All Micro Results     Procedure Component Value Units Date/Time    CULTURE, BLOOD [[id number]] Collected: 01/14/22 1025    Order Status: Completed Specimen: Blood Updated: 01/19/22 0742     Special Requests: --        LEFT  ARM       Culture result: NO GROWTH 5 DAYS       CULTURE, BLOOD [[id number]] Collected: 01/14/22 1025    Order Status: Completed Specimen: Blood Updated: 01/19/22 0742     Special Requests: --        RIGHT  Antecubital       Culture result: NO GROWTH 5 DAYS       CULTURE, URINE [[id number]]  (Abnormal)  (Susceptibility) Collected: 01/12/22 1550    Order Status: Completed Specimen: Urine Updated: 01/19/22 0655     Special Requests: NO SPECIAL REQUESTS        Culture result:       >100,000 COLONIES/mL Staphylococcus intermedius                  >100,000 COLONIES/mL STAPHYLOCOCCUS SCHLEIFERI          BLOOD CULTURE [[id number]]  (Abnormal)  (Susceptibility) Collected: 01/12/22 1126    Order Status: Completed Specimen: Blood Updated: 01/16/22 1507     Special Requests: --        RIGHT  Antecubital       GRAM STAIN GRAM POSITIVE COCCI               AEROBIC AND ANAEROBIC BOTTLES                  RESULTS VERIFIED, PHONED TO AND READ BACK BY Alfonso Ramirez RN @ 0061 ON 1/13/22 BY AMM           Culture result:       STAPHYLOCOCCUS SCHLEIFERI This organism may be indicative of culture contamination. Clinical correlation needs to be evaluated as each case is unique.                   Refer to Blood Culture ID Panel Accession  [id number] REQUESTED BY Rita Sheehan NP ON 01/16/22    BLOOD CULTURE [[id number]]  (Abnormal) Collected: 01/12/22 1643    Order Status: Completed Specimen: Blood Updated: 01/16/22 0829     Special Requests: --        LEFT  Antecubital       GRAM STAIN GRAM POSITIVE COCCI         ANAEROBIC BOTTLE POSITIVE               CRITICAL RESULT NOT CALLED DUE TO PREVIOUS NOTIFICATION OF CRITICAL RESULT WITHIN THE LAST 24 HOURS. Culture result:       STAPHYLOCOCCUS SPECIES, COAGULASE NEGATIVE This organism may be indicative of culture contamination. Clinical correlation needs to be evaluated as each case is unique. MSSA/MRSA SC BY PCR, NASAL SWAB [[id number]] Collected: 01/13/22 0942    Order Status: Completed Specimen: Nasal swab Updated: 01/13/22 1140     Special Requests: NO SPECIAL REQUESTS        Culture result:       SA target not detected. A MRSA NEGATIVE, SA NEGATIVE test result does not preclude MRSA or SA nasal colonization. BLOOD CULTURE ID PANEL [[id number]] Collected: 01/12/22 1126    Order Status: Completed Specimen: Blood Updated: 01/13/22 0910     Acc. no. from Micro Order [id number]     Blood Culture PCR Testing       MULTIPLEX PCR NEGATIVE:  A negative FilmArray BCID result does not exclude the possibility of bloodstream infection. Imaging:   CT Abd/pelvis (01/12/22): IMPRESSION  1. Asymmetric right perinephric stranding concerning for right pyelonephritis  in the correct clinical setting. Bilateral nephrostomy tubes in place. No  hydronephrosis. No urinary tract calculi.     2.  Bladder is decompressed with circumferential wall thickening and to bladder  diverticula the larger appearing posteriorly measuring up to 4.5 cm. Follow-up  as clinically warranted.     3.   No bowel obstruction, diverticulitis or appendicitis.     4.  Multiple bibasilar pulmonary metastatic nodules.       Signed By: Montana Street NP     January 19, 2022

## 2022-01-20 ENCOUNTER — TELEPHONE (OUTPATIENT)
Dept: HOME HEALTH SERVICES | Facility: HOME HEALTH | Age: 62
End: 2022-01-20

## 2022-01-20 NOTE — TELEPHONE ENCOUNTER
[address] [place] [address] Monty Stanford Pharmacist consult. Mr. Nadia Carlson was discharged from [facility] after having spesis due to UTI. I spoke with him and explained my part of the [facility] team.  I reviewed his new discharge medications. I verified that he will be getting 14 days of Cefazolin 2mg. every 8 hours, then he will start the oral cefadroxil for 14 more days. He said they were there now setting everything up. He is fixing to get his IV dose now. He had no questions at this time. I gave him my cell phone number and asked him to call with any medication questions or issues. He said OK to call back.

## 2022-01-20 NOTE — ADT AUTH CERT NOTES
Urinary Tract Infection (UTI) - Care Day 5 (1/16/2022) by Norma Morton RN       Review Status Review Entered   Completed 1/19/2022 11:54      Criteria Review      Care Day: 5 Care Date: 1/16/2022 Level of Care: Telemetry    Guideline Day 3    Level Of Care    (X) Floor to discharge    Clinical Status    ( ) * Hemodynamic stability    ( ) * Mental status at baseline    ( ) * Antibiotic regimen for next level of care established    ( ) * Urine output adequate    ( ) * Renal function at baseline or acceptable for next level of care    ( ) * Pain absent or managed    ( ) * Oral intake adequate    ( ) * Afebrile or temperature acceptable for next level of care    ( ) * Vomiting absent    ( ) * Discharge plans and education understood    Activity    ( ) * Ambulatory or acceptable for next level of care    Routes    (X) * Oral hydration    1/19/2022 11:54:39 EST by Андрей Vo      Taking orals    (X) * Oral medications or regimen acceptable for next level of care    1/19/2022 11:54:39 EST by Андрей Vo      Taking oral medications    (X) * Oral diet or acceptable for next level of care    1/19/2022 11:54:39 EST by Андрей Vo      adult diet regualr    Interventions    (X) Renal function tests, urinalysis    1/19/2022 11:54:39 EST by Андрей Vo      Results for Nino Austin (MRN [id number]) as of 1/19/2022 11:40    1/16/2022 21:49  BUN: 18  Creatinine: 1.32    * Milestone   Additional Notes   Subjective (01/16/22): Pt is feeling much better today. He complains that his left nephrostomy tub is leaking and Right nephrostomy tube has output. Pt denies chest pain, sob, diarrhea. 1/16/22 0835 97.6 °F (36.4 °C) 95 20 121/72 96 %      Oxygen Therapy   O2 Sat (%): 96 % (01/16/22 0835)      General:          Well nourished.  No overt distress   Head:               Normocephalic, atraumatic   Eyes:               Sclerae appear normal.  Pupils equally round.    ENT:                Nares appear normal, no drainage.  Moist oral mucosa   Neck:               No restricted ROM.  Trachea midline    CV:                  RRR.  No m/r/g.  No jugular venous distension. Lungs:             CTAB.  No wheezing, rhonchi, or rales.  Respirations even, unlabored   Abdomen:        Bowel sounds present.  Soft, nontender, nondistended.    Genitourinary Bilateral Nephrostomy tubes are present   Extremities:     No cyanosis or clubbing.  No edema   Skin:                No rashes and normal coloration.   Warm and dry.     Neuro:             CN II-XII grossly intact.   Sensation intact.  A&Ox3   Psych:             Normal mood and affect.        Medications,   atorvastatin (LIPITOR) tablet 40 mg   Dose: 40 mg   Freq: EVERY BEDTIME Route: PO      atorvastatin (LIPITOR) tablet 40 mg   Dose: 40 mg   Freq: EVERY BEDTIME Route: PO      dronedarone (MULTAQ) tablet tab 400 mg   Dose: 400 mg   Freq: 2 TIMES DAILY WITH MEALS Route: PO      oxyCODONE IR (ROXICODONE) tablet 10 mg   Dose: 10 mg   Freq: EVERY 4 HOURS AS NEEDED Route: PO x2      potassium, sodium phosphates (NEUTRA-PHOS) packet 1 Packet   Dose: 1 Packet   Freq: 4 TIMES DAILY Route: PO           Urinary Tract Infection (UTI) - Care Day 4 (1/15/2022) by Melanie Lee RN       Review Status Review Entered   Completed 1/19/2022 11:41      Criteria Review      Care Day: 4 Care Date: 1/15/2022 Level of Care: Telemetry    Guideline Day 3    Level Of Care    (X) Floor to discharge    1/19/2022 11:41:29 EST by Fatimah Clif      Telemetry    Clinical Status    ( ) * Hemodynamic stability    ( ) * Mental status at baseline    ( ) * Antibiotic regimen for next level of care established    ( ) * Urine output adequate    ( ) * Renal function at baseline or acceptable for next level of care    ( ) * Pain absent or managed    ( ) * Oral intake adequate    ( ) * Afebrile or temperature acceptable for next level of care    ( ) * Vomiting absent    ( ) * Discharge plans and education understood    Activity    ( ) * Ambulatory or acceptable for next level of care    Routes    (X) * Oral hydration    1/19/2022 11:41:29 EST by Pepe Hall      taking orals    (X) * Oral medications or regimen acceptable for next level of care    1/19/2022 11:41:29 EST by Pepe Hall      Taking orals    (X) * Oral diet or acceptable for next level of care    1/19/2022 11:41:29 EST by Pepe Hall      adult diet regular    Interventions    (X) Renal function tests, urinalysis    1/19/2022 11:41:29 EST by Pepe Hall      Results for Joanna Dias (MRN [id number]) as of 1/19/2022 11:40    1/15/2022 14:12  Anion gap: 8  Glucose: 119 (H)  BUN: 32 (H)    * Milestone   Additional Notes   Subjective (01/15/22): Pt is feeling much better today. Pt denies chest pain, sob, diarrhea.    01/15/22 0828 99.1 °F (37.3 °C) 100 16 136/74 99 %      Oxygen Therapy   O2 Sat (%): 99 % (01/15/22 0828)      01/15/22 0828 99.1 °F (37.3 °C) 100 16 136/74 99 %         General:          Well nourished.  No overt distress   Head:               Normocephalic, atraumatic   Eyes:               Sclerae appear normal.  Pupils equally round. ENT:                Nares appear normal, no drainage.  Moist oral mucosa   Neck:               No restricted ROM.  Trachea midline    CV:                  RRR.  No m/r/g.  No jugular venous distension. Lungs:             CTAB.  No wheezing, rhonchi, or rales.  Respirations even, unlabored   Abdomen:        Bowel sounds present.  Soft, nontender, nondistended.    Genitourinary Bilateral Nephrostomy tubes are present   Extremities:     No cyanosis or clubbing.  No edema   Skin:                No rashes and normal coloration.   Warm and dry.     Neuro:             CN II-XII grossly intact.   Sensation intact.  A&Ox3   Psych:             Normal mood and affect.        Medications,      atorvastatin (LIPITOR) tablet 40 mg   Dose: 40 mg   Freq: EVERY BEDTIME Route: PO      cetirizine (ZYRTEC) tablet 5 mg   Dose: 5 mg   Freq: DAILY Route: PO      potassium, sodium phosphates (NEUTRA-PHOS) packet 1 Packet   Dose: 1 Packet   Freq: 4 TIMES DAILY Route: PO        Urinary Tract Infection (UTI) - Care Day 3 (1/14/2022) by Cathryn Womack, RN       Review Status Review Entered   Completed 1/18/2022 15:43      Criteria Review      Care Day: 3 Care Date: 1/14/2022 Level of Care: Telemetry    Guideline Day 2    Level Of Care    (X) Floor    1/18/2022 15:43:01 EST by Ewelina Lozano      LOC IP TELEMETRY    Clinical Status    ( ) * Hypotension absent    1/18/2022 15:43:01 EST by Magdalena Corbett      VS 99 101 133/64 18 94% ROOM AIR    (X) * Mental status at baseline    1/18/2022 15:43:01 EST by Magdalena Corbett      ALERT,ORIENTED X3    ( ) * Afebrile or fever improved    1/18/2022 15:43:01 EST by Magdalena Corbett      TEMP 101    (X) * Vomiting absent or improved    Activity    (X) * Ambulatory    1/18/2022 15:43:01 EST by Magdalena Corbett      ACTIVITY AS TOLERATED W/ ASSISTANCE    Routes    (X) IV fluids    1/18/2022 15:43:01 EST by Magdalena Corbett      NS bolus 1000ml iv x1    (X) IV medications    1/18/2022 15:43:01 EST by Magdalena Corbett      infed 25mg iv x1    (X) Advance diet as tolerated    1/18/2022 15:43:01 EST by Magdalena Corbett      ADULT REG DIET 2GM NA    Interventions    (X) * Reversible urinary system abnormality (eg, obstruction, abscess) absent, addressed, or to be treated at next level of care    (X) Renal function tests    1/18/2022 15:43:01 EST by Magdalena Corbett      BUN: 33 (H)  Creatinine: 1.45    Medications    (X) Antibiotics    1/18/2022 15:43:01 EST by Magdalena Corbett      merrem 500mg iv q6 vancomycin 1000mg iv qday    (X) Possible analgesics    1/18/2022 15:43:01 EST by Magdalena Corbetticodone 10mg po q4 prn x3    * Milestone   Additional Notes   1/14/2022   LOC IP TELEMETRY   VS 99 101 133/64 18 94% ROOM AIR   LABS     Sodium: 128 (L)   Potassium: 3.6   Chloride: 97 (L)   CO2: 20 (L)   Anion gap: 11   Glucose: 94   BUN: 33 (H)   Creatinine: 1.45   Calcium: 7.9 (L)   GFR est non-AA: 52 (L)      MEDS   Tylenol 650mg po q6 prn x1   Lipitor 40mg po qhs   Zyrtec 5mg po qday   multaq 400mg po bid   singulair 10mg po qhs      desyrel 100mg po qhs   samsca 15mg po x1   urea 1 packet po qday         ATTENDING NOTE   Pt is feeling much better today. Pt denies chest pain, sob, diarrhea.       Assessment & Plan:   Severe sepsis ([facility] (1/12/2022)    Severe sepsis (met by HR, WBC, and EDWARDO) secondary to UTI -   GPC Bacteremia and source is probablynephrostomy tube exit site;    LA 3.3, Procalcitoinin 2.2, Leucocytosis of 15k and s/p 3L IVF  on admission   CT scan showed Pyelonephritis   UCNTD   F/u repeat blood cultures   Continue Vancomycin and Meropenem for now   Can transition to PO FQ awaiting sensitivities   ID is following    Urology consulted for exchange of nephrostomy tubes   He is scheduled for tube exchange on 2/3 at [address].  If he doesn't respond well to antibiotics may need Exchange of tubes in this hospital.             HTN (hypertension) (10/14/2013)   Pt is hypotensive   Holding antihyperensives         Recurrent depression ([facility] (1/4/2018)   Continue home medications         PAF (paroxysmal atrial fibrillation) ([facility] [facility] (1/7/2020)   Holding Metoprolol due to hypotension   Holding Eliquis due to hematuria         Hyponatremia (1/12/2022)   SIADH   Most likely due to sertraline and cancer    Sodium was 126 on admission and repeat 125   S/p tolvaptan x1 dose   Goal is to reach Na 130   Fluid restriction   F/u Nephrology       Anemia due to acute blood loss   Anemia due to hematuria vs chemotherapy vs malignancy vs sepsis   OBI    S/p InFed   Hb of 8 and baseline is 16   F/u FOBT        Thrombocytopenia   Most likely due to sepsis   Transfuse platelets if <86T if bleeding   Monitor Platelets for now         UTI (urinary tract infection) (1/12/2022)     Continue antibiotics         EDWARDO (acute kidney injury) ([facility] (1/12/2022)   Most likely due to sepsis    Cr trending down  and baseline is 0.81       Bladder and urothelial cancer -    Follows [facility]. PRN oxycodone             Dispo/Discharge Planning:     Home health therapy and pt wants to go home      ONCOLOGY NOTE   History of Present Illness:   Mr. Rashaad Johnson is a 57 y.o. male admitted on 1/12/2022 with a primary diagnosis of The primary encounter diagnosis was Malignant neoplasm of urinary bladder, unspecified site [facility]). Diagnoses of Pyelonephritis, Infection associated with nephrostomy catheter, initial encounter ([facility] [address] Sepsis, due to unspecified organism, unspecified whether acute organ dysfunction present ([facility] [address] and EDWARDO (acute kidney injury) ([facility] [address] were also pertinent to this visit. .         57 y.o. male past medical history of hypertension, hyperlipidemia, COPD, CAD, arrhythmia, metastatic urothelial carcinoma treated in [facility] was diagnosed after presenting to [facility] urology for BPH symptoms and sought second opinion at [place] [id number] completed 9/2021 showed high-grade urothelial carcinoma invasive into prostatic stroma, staging CT showed multiple pulmonary nodules as well as diffuse bladder thickening and nodule near ureterovesical junction with associated right-sided hydronephrosis as well as right inguinal and pelvic lymphadenopathy.  He underwent placement of nephrostomy tubes bilaterally 12/10/2021, received cisplatin/gemcitabine and last dose cycle 1 day 81/4/21. Rosie Matthews has had multiple recurrent UTIs.       Mr. Rashaad Johnson presented to the emergency room on 1/12/2022 with flank pain, admitted for sepsis and suspicion of infectious process involving genitourinary system and started on vancomycin and Zosyn.  Blood culture 2x2 shows Staphylococcus.  Urine culture no growth to date.  Anemia and thrombocytopenia noted.  Hemoglobin down to 8.4 from 11.5 on admission and platelets down to 70 3K from 90 2K on admission.  Creatinine also noted to be 1.6 and improved to 1.3.  CT abdomen pelvis shows bilateral nephrostomy tube in correct position and also noted concern for right-sided pyelonephritis.  Oncology consulted for recommendations given recent chemotherapy for metastatic urothelial cancer and cytopenia. 59-year-old male metastatic urothelial carcinoma with bilateral hydronephrosis and pulmonary metastasis, recently started treating with gemcitabine/cisplatin, admitted for Staph sepsis and UTI, continue antibiotics and supportive care, cytopenia expected due to recent chemotherapy, transfuse if hemoglobin less than 7 and platelets less than 10 K, giving INFeD, please call for questions. UROLOGY NOTE   Apparently he has had bilateral NT placed since that time at [address].     He is admitted here for sepsis/UTI.  His Cr is normal.  CT reports NT to be in correct position and no hydronephrosis. Right tube is putting out less than left but per pt this is normal.  Urine OP is yellow/clear. [facility] has appt 2/3 at [address] for tube exchange and prefers to go there.     Will consult IR to take a look at this, may prefer to exchange tubes once infection is adequately txt given normal kidney fxn and no hydro on CT.        Spoke with Dr. Samantha Gomez. Sarah Brooks that pt has no hydronephrosis and kidney fxn normal, if he responds well to abx treatment, he could wait until his scheduled tube exchange 2/3 at [facility] he does not respond well to abx and does not improve,  we would proceed with tube exchange here.  If pt progresses well, okay to dc home with PO abx and go for tube exchange 2/3.        INFECTIOUS DISEASE    Impression:   · GPC Sepsis: nidus probably either urine/kidneys or nephrostomy tube exit site;    · UTI: patient did have significant pyuria from one nephrostomy tube (one with 10-20 WBC; other with >100), culture NTD but sent from L side; right perinephric stranding seen on CT suggestive of pyelonephritis, with limited output from R [facility]   · Metastatic Bladder Ca       Plan:   · Would continue Vancomycin pending blood cultures; repeat BC today   · Given other prior urine isolates (stenotrophomonas, Klebsiella) have been FQ sensitive could switch Meropenem to FQ; okay to continue pending culture results   · Duration of tx TBD   · Plans for IR to exchange [facility]       Anti-infectives:   · Vancomycin (01/12-)   · Piperacillin/tazobactam (01/12-01/13)   · Ceftriaxone (01/12)   · Meropenem (01/13-)       Subjective:   Reports feeling better, seen with family at bedside.  R PNC with limited output

## 2022-01-20 NOTE — PROGRESS NOTES
MIDLINE Placement Note    PRE-PROCEDURE VERIFICATION  PROCEDURE DETAIL  Time out completed with Annetta Walker RN, [facility], and everyone in agreement with procedure. A single lumen Midline was started for antibiotic therapy. The following documentation is in addition to the Midline properties in the lines/airways flowsheet :  Lot #: [id number]  Xylocaine used: yes  Mid-Arm Circumference: 26 (cm)  Internal Catheter Length: 10 (cm)  Total Catheter Length: 10 (cm)  Vein Selection for Midline:right basilic    Line is okay to use: yes    Felicia L. Reyna Claude

## 2022-01-21 ENCOUNTER — HOME CARE VISIT (OUTPATIENT)
Dept: SCHEDULING | Facility: HOME HEALTH | Age: 62
End: 2022-01-21
Payer: COMMERCIAL

## 2022-01-21 VITALS
DIASTOLIC BLOOD PRESSURE: 56 MMHG | BODY MASS INDEX: 21.19 KG/M2 | SYSTOLIC BLOOD PRESSURE: 104 MMHG | HEIGHT: 67 IN | HEART RATE: 58 BPM | OXYGEN SATURATION: 97 % | WEIGHT: 135 LBS | RESPIRATION RATE: 17 BRPM | TEMPERATURE: 97.8 F

## 2022-01-21 PROCEDURE — G0299 HHS/HOSPICE OF RN EA 15 MIN: HCPCS

## 2022-01-21 PROCEDURE — 400013 HH SOC

## 2022-01-24 ENCOUNTER — HOSPITAL ENCOUNTER (OUTPATIENT)
Dept: LAB | Age: 62
Discharge: HOME | End: 2022-01-24
Payer: COMMERCIAL

## 2022-01-24 ENCOUNTER — HOME CARE VISIT (OUTPATIENT)
Dept: SCHEDULING | Facility: HOME HEALTH | Age: 62
End: 2022-01-24
Payer: COMMERCIAL

## 2022-01-24 VITALS
DIASTOLIC BLOOD PRESSURE: 58 MMHG | OXYGEN SATURATION: 96 % | TEMPERATURE: 97.4 F | HEART RATE: 78 BPM | SYSTOLIC BLOOD PRESSURE: 100 MMHG | RESPIRATION RATE: 18 BRPM

## 2022-01-24 LAB
ALBUMIN SERPL-MCNC: 2.3 G/DL (ref 3.2–4.6)
ALBUMIN/GLOB SERPL: 0.5 {RATIO} (ref 1.2–3.5)
ALP SERPL-CCNC: 148 U/L (ref 50–136)
ALT SERPL-CCNC: 13 U/L (ref 12–65)
AST SERPL-CCNC: 21 U/L (ref 15–37)
BASOPHILS # BLD: 0 K/UL (ref 0–0.2)
BASOPHILS NFR BLD: 0 % (ref 0–2)
BILIRUB DIRECT SERPL-MCNC: <0.1 MG/DL
BILIRUB SERPL-MCNC: 0.2 MG/DL (ref 0.2–1.1)
CREAT SERPL-MCNC: 1.33 MG/DL (ref 0.8–1.5)
DIFFERENTIAL METHOD BLD: ABNORMAL
EOSINOPHIL # BLD: 0.1 K/UL (ref 0–0.8)
EOSINOPHIL NFR BLD: 1 % (ref 0.5–7.8)
ERYTHROCYTE [DISTWIDTH] IN BLOOD BY AUTOMATED COUNT: 16.6 % (ref 11.9–14.6)
GLOBULIN SER CALC-MCNC: 5 G/DL (ref 2.3–3.5)
HCT VFR BLD AUTO: 12.8 % (ref 41.1–50.3)
HGB BLD-MCNC: 3.9 G/DL (ref 13.6–17.2)
IMM GRANULOCYTES # BLD AUTO: 0.3 K/UL (ref 0–0.5)
IMM GRANULOCYTES NFR BLD AUTO: 2 % (ref 0–5)
LYMPHOCYTES # BLD: 2.3 K/UL (ref 0.5–4.6)
LYMPHOCYTES NFR BLD: 18 % (ref 13–44)
MCH RBC QN AUTO: 26 PG (ref 26.1–32.9)
MCHC RBC AUTO-ENTMCNC: 30.5 G/DL (ref 31.4–35)
MCV RBC AUTO: 85.3 FL (ref 79.6–97.8)
MONOCYTES # BLD: 1.8 K/UL (ref 0.1–1.3)
MONOCYTES NFR BLD: 14 % (ref 4–12)
NEUTS SEG # BLD: 8.1 K/UL (ref 1.7–8.2)
NEUTS SEG NFR BLD: 65 % (ref 43–78)
NRBC # BLD: 0 K/UL (ref 0–0.2)
PLATELET # BLD AUTO: 734 K/UL (ref 150–450)
PMV BLD AUTO: 9.9 FL (ref 9.4–12.3)
PROT SERPL-MCNC: 7.3 G/DL (ref 6.3–8.2)
RBC # BLD AUTO: 1.5 M/UL (ref 4.23–5.6)
WBC # BLD AUTO: 12.6 K/UL (ref 4.3–11.1)

## 2022-01-24 PROCEDURE — G0299 HHS/HOSPICE OF RN EA 15 MIN: HCPCS

## 2022-01-24 PROCEDURE — 80076 HEPATIC FUNCTION PANEL: CPT

## 2022-01-24 PROCEDURE — 82565 ASSAY OF CREATININE: CPT

## 2022-01-24 PROCEDURE — 85025 COMPLETE CBC W/AUTO DIFF WBC: CPT

## 2022-01-24 PROCEDURE — G0151 HHCP-SERV OF PT,EA 15 MIN: HCPCS

## 2022-01-25 ENCOUNTER — HOSPITAL ENCOUNTER (OUTPATIENT)
Dept: LAB | Age: 62
Discharge: HOME | End: 2022-01-25
Payer: COMMERCIAL

## 2022-01-25 LAB
BASOPHILS # BLD: 0.1 K/UL (ref 0–0.2)
BASOPHILS NFR BLD: 1 % (ref 0–2)
CREAT SERPL-MCNC: 1.31 MG/DL (ref 0.8–1.5)
DIFFERENTIAL METHOD BLD: ABNORMAL
EOSINOPHIL # BLD: 0 K/UL (ref 0–0.8)
EOSINOPHIL NFR BLD: 0 % (ref 0.5–7.8)
ERYTHROCYTE [DISTWIDTH] IN BLOOD BY AUTOMATED COUNT: 16.8 % (ref 11.9–14.6)
HCT VFR BLD AUTO: 32.1 % (ref 41.1–50.3)
HGB BLD-MCNC: 9.7 G/DL (ref 13.6–17.2)
IMM GRANULOCYTES # BLD AUTO: 0.1 K/UL (ref 0–0.5)
IMM GRANULOCYTES NFR BLD AUTO: 2 % (ref 0–5)
LYMPHOCYTES # BLD: 1.5 K/UL (ref 0.5–4.6)
LYMPHOCYTES NFR BLD: 17 % (ref 13–44)
MCH RBC QN AUTO: 25.3 PG (ref 26.1–32.9)
MCHC RBC AUTO-ENTMCNC: 30.2 G/DL (ref 31.4–35)
MCV RBC AUTO: 83.6 FL (ref 79.6–97.8)
MONOCYTES # BLD: 1 K/UL (ref 0.1–1.3)
MONOCYTES NFR BLD: 11 % (ref 4–12)
NEUTS SEG # BLD: 6.2 K/UL (ref 1.7–8.2)
NEUTS SEG NFR BLD: 69 % (ref 43–78)
NRBC # BLD: 0 K/UL (ref 0–0.2)
PLATELET # BLD AUTO: 559 K/UL (ref 150–450)
PMV BLD AUTO: 9.9 FL (ref 9.4–12.3)
RBC # BLD AUTO: 3.84 M/UL (ref 4.23–5.6)
WBC # BLD AUTO: 9 K/UL (ref 4.3–11.1)

## 2022-01-25 PROCEDURE — 82565 ASSAY OF CREATININE: CPT

## 2022-01-25 PROCEDURE — 85025 COMPLETE CBC W/AUTO DIFF WBC: CPT

## 2022-01-25 PROCEDURE — 36415 COLL VENOUS BLD VENIPUNCTURE: CPT

## 2022-01-26 ENCOUNTER — TELEPHONE (OUTPATIENT)
Dept: HOME HEALTH SERVICES | Facility: HOME HEALTH | Age: 62
End: 2022-01-26

## 2022-01-26 VITALS
OXYGEN SATURATION: 97 % | DIASTOLIC BLOOD PRESSURE: 60 MMHG | RESPIRATION RATE: 17 BRPM | TEMPERATURE: 98 F | SYSTOLIC BLOOD PRESSURE: 102 MMHG | HEART RATE: 80 BPM

## 2022-01-26 NOTE — ADT AUTH CERT NOTES
Utilization Reviews         Urinary Tract Infection (UTI) - Care Day 7 (1/18/2022) by John Tilley RN       Review Status Review Entered   Completed 1/26/2022 09:49      Criteria Review      Care Day: 7 Care Date: 1/18/2022 Level of Care: Telemetry    Guideline Day 3    Level Of Care    (X) Floor to discharge    1/26/2022 09:49:25 EST by [employer]      Telemetry    Clinical Status    ( ) * Hemodynamic stability    ( ) * Mental status at baseline    ( ) * Antibiotic regimen for next level of care established    ( ) * Urine output adequate    ( ) * Renal function at baseline or acceptable for next level of care    ( ) * Pain absent or managed    ( ) * Oral intake adequate    ( ) * Afebrile or temperature acceptable for next level of care    ( ) * Vomiting absent    ( ) * Discharge plans and education understood    Activity    ( ) * Ambulatory or acceptable for next level of care    Routes    (X) * Oral hydration    1/26/2022 09:49:25 EST by [employer]      taking orals    (X) * Oral medications or regimen acceptable for next level of care    1/26/2022 09:49:25 EST by [employer]      Taking oral medications    (X) * Oral diet or acceptable for next level of care    1/26/2022 09:49:25 EST by [employer]      adult diet regular    Interventions    (X) Renal function tests, urinalysis    1/26/2022 09:49:25 EST by [employer]      Results for Kip Deng (MRN [id number]) as of 1/26/2022 09:40    1/18/2022 15:54  BUN: 20  Creatinine: 1.22    * Milestone   Additional Notes   Subjective (01/18/22):    Pt is feeling much better today. Rosa Fernandez states he is urinating from the penis which is unusual and also complains of pain while passing urine.  Pt denies chest pain, sob, diarrhea.       01/18/22 97.4 °F (36.3 °C) 71 20 118/67 97 %            Oxygen Therapy   O2 Sat (%): 97 % (01/18/22   O2 Device: None (Room air) (01/18/22    O2 Flow Rate (L/min): 3 l/min (01/18/22    ETCO2 (mmHg): 30 mmHg (01/18/22       General:          Well nourished.  No overt distress   Head:               Normocephalic, atraumatic   Eyes:               Sclerae appear normal.  Pupils equally round. ENT:                Nares appear normal, no drainage.  Moist oral mucosa   Neck:               No restricted ROM.  Trachea midline    CV:                  RRR.  No m/r/g.  No jugular venous distension. Lungs:             CTAB.  No wheezing, rhonchi, or rales.  Respirations even, unlabored   Abdomen:        Bowel sounds present.  Soft, nontender, nondistended.    Genitourinary Bilateral Nephrostomy tubes are present   Extremities:     No cyanosis or clubbing.  No edema   Skin:                No rashes and normal coloration.   Warm and dry.     Neuro:             CN II-XII grossly intact.   Sensation intact.  A&Ox3   Psych:             Normal mood and affect.              1/18/2022 04:45   WBC: 8.5   RBC: 3.31 (L)   HGB: 8.6 (L)   HCT: 26.0 (L)         1/18/2022 15:54   Sodium: 137 (L)   Potassium: 4.1   Chloride: 104   CO2: 27   Anion gap: 6 (L)   Glucose: 92   BUN: 20   Creatinine: 1.22      Medications,   0.9% sodium chloride infusion   Dose: 25 mL/hr   Freq: ONCE Route: IV      ceFAZolin (ANCEF) 2 g/20 mL in sterile water IV syringe   Dose: 2 g   Freq: EVERY 8 HOURS Route: IV      dronedarone (MULTAQ) tablet tab 400 mg   Dose: 400 mg   Freq: 2 TIMES DAILY WITH MEALS Route: PO      midazolam (VERSED) injection 0.5-2 mg   Dose: 0.5-2 mg   Freq: MULTIPLE Route: IV x2               Urinary Tract Infection (UTI) - Care Day 6 (1/17/2022) by Isabell Humphries RN       Review Status Review Entered   Completed 1/26/2022 09:40      Criteria Review      Care Day: 6 Care Date: 1/17/2022 Level of Care: Telemetry    Guideline Day 3    Level Of Care    (X) Floor to discharge    1/26/2022 09:40:59 EST by Amber Post      Remote telemetry    Clinical Status    ( ) * Hemodynamic stability    ( ) * Mental status at baseline    ( ) * Antibiotic regimen for next level of care established    ( ) * Urine output adequate    ( ) * Renal function at baseline or acceptable for next level of care    ( ) * Pain absent or managed    ( ) * Oral intake adequate    ( ) * Afebrile or temperature acceptable for next level of care    ( ) * Vomiting absent    ( ) * Discharge plans and education understood    Activity    ( ) * Ambulatory or acceptable for next level of care    Routes    (X) * Oral hydration    1/26/2022 09:40:59 EST by Mauri Ny      Taking orals    (X) * Oral medications or regimen acceptable for next level of care    1/26/2022 09:40:59 EST by Ace Matsu      Taking oral medications    (X) * Oral diet or acceptable for next level of care    1/26/2022 09:40:59 EST by Mauri Ny      adult diet regular    Interventions    (X) Renal function tests, urinalysis    1/26/2022 09:40:59 EST by Ace Yanely      Results for Jerie Burkitt (MRN [id number]) as of 1/26/2022 09:40    1/17/2022 21:07  BUN: 18  Creatinine: 1.26    * Milestone   Additional Notes   Subjective (01/17/22): Pt is feeling much better today. His leaking form left nephrostomy tube stopped if he is sitting.  Pt denies chest pain, sob, diarrhea      01/17/22 97.7 °F (36.5 °C) 68 18 131/78 97 %      General:          Well nourished.  No overt distress   Head:               Normocephalic, atraumatic   Eyes:               Sclerae appear normal.  Pupils equally round. ENT:                Nares appear normal, no drainage.  Moist oral mucosa   Neck:               No restricted ROM.  Trachea midline    CV:                  RRR.  No m/r/g.  No jugular venous distension. Lungs:             CTAB.  No wheezing, rhonchi, or rales.  Respirations even, unlabored   Abdomen:        Bowel sounds present.  Soft, nontender, nondistended.    Genitourinary Bilateral Nephrostomy tubes are present   Extremities:     No cyanosis or clubbing.  No edema   Skin:                No rashes and normal coloration.   Warm and dry.     Neuro:             CN II-XII grossly intact.   Sensation intact.  A&Ox3   Psych:             Normal mood and affect.              1/17/2022 14:19   Sodium: 133 (L)   Potassium: 3.9   Chloride: 100   CO2: 25   Anion gap: 8   Glucose: 120 (H)   BUN: 19   Creatinine: 1.41   Calcium: 8.8   GFR est non-AA: 54 (L)      Medications,   acetaminophen (TYLENOL) tablet 650 mg   Dose: 650 mg   Freq: EVERY 6 HOURS AS NEEDED Route: PO      atorvastatin (LIPITOR) tablet 40 mg   Dose: 40 mg   Freq: EVERY BEDTIME Route: PO      ceFAZolin (ANCEF) 2 g/20 mL in sterile water IV syringe   Dose: 2 g   Freq: EVERY 8 HOURS Route: IV      dronedarone (MULTAQ) tablet tab 400 mg   Dose: 400 mg   Freq: 2 TIMES DAILY WITH MEALS Route: PO

## 2022-01-27 ENCOUNTER — HOME CARE VISIT (OUTPATIENT)
Dept: SCHEDULING | Facility: HOME HEALTH | Age: 62
End: 2022-01-27
Payer: COMMERCIAL

## 2022-01-27 ENCOUNTER — TELEPHONE (OUTPATIENT)
Dept: HOME HEALTH SERVICES | Facility: HOME HEALTH | Age: 62
End: 2022-01-27

## 2022-01-27 VITALS
DIASTOLIC BLOOD PRESSURE: 64 MMHG | HEART RATE: 72 BPM | OXYGEN SATURATION: 96 % | TEMPERATURE: 97 F | RESPIRATION RATE: 17 BRPM | SYSTOLIC BLOOD PRESSURE: 98 MMHG

## 2022-01-27 PROCEDURE — G0157 HHC PT ASSISTANT EA 15: HCPCS

## 2022-01-27 NOTE — TELEPHONE ENCOUNTER
Mr. Nell Hill returned my call from earlier today. He got my voice mail and left me a message stating he got my message. He has no medication questions. He did wonder about Home Care coming out. He said he is planning to call them tomorrow if he does not hear from them today. I see on the schedule that Rhonda Maciel PTA is scheduled to see him tomorrow.

## 2022-01-31 ENCOUNTER — HOME CARE VISIT (OUTPATIENT)
Dept: HOME HEALTH SERVICES | Facility: HOME HEALTH | Age: 62
End: 2022-01-31
Payer: COMMERCIAL

## 2022-01-31 ENCOUNTER — HOME CARE VISIT (OUTPATIENT)
Dept: SCHEDULING | Facility: HOME HEALTH | Age: 62
End: 2022-01-31
Payer: COMMERCIAL

## 2022-01-31 VITALS
DIASTOLIC BLOOD PRESSURE: 60 MMHG | RESPIRATION RATE: 17 BRPM | SYSTOLIC BLOOD PRESSURE: 102 MMHG | HEART RATE: 72 BPM | OXYGEN SATURATION: 95 % | TEMPERATURE: 98 F

## 2022-01-31 PROCEDURE — G0157 HHC PT ASSISTANT EA 15: HCPCS

## 2022-02-01 ENCOUNTER — HOME CARE VISIT (OUTPATIENT)
Dept: SCHEDULING | Facility: HOME HEALTH | Age: 62
End: 2022-02-01
Payer: COMMERCIAL

## 2022-02-01 VITALS
DIASTOLIC BLOOD PRESSURE: 58 MMHG | TEMPERATURE: 96.5 F | HEART RATE: 72 BPM | OXYGEN SATURATION: 92 % | SYSTOLIC BLOOD PRESSURE: 102 MMHG | RESPIRATION RATE: 16 BRPM

## 2022-02-01 PROCEDURE — G0299 HHS/HOSPICE OF RN EA 15 MIN: HCPCS

## 2022-02-02 ENCOUNTER — HOME CARE VISIT (OUTPATIENT)
Dept: SCHEDULING | Facility: HOME HEALTH | Age: 62
End: 2022-02-02
Payer: COMMERCIAL

## 2022-02-02 VITALS
RESPIRATION RATE: 17 BRPM | OXYGEN SATURATION: 98 % | TEMPERATURE: 46.9 F | HEART RATE: 76 BPM | DIASTOLIC BLOOD PRESSURE: 60 MMHG | SYSTOLIC BLOOD PRESSURE: 102 MMHG

## 2022-02-02 PROCEDURE — G0157 HHC PT ASSISTANT EA 15: HCPCS

## 2022-02-04 ENCOUNTER — TELEPHONE (OUTPATIENT)
Dept: HOME HEALTH SERVICES | Facility: HOME HEALTH | Age: 62
End: 2022-02-04

## 2022-02-04 NOTE — TELEPHONE ENCOUNTER
Mr. Smith Cole said he was having a bad morning with a lot of pain in his back. I reviewed his medications and he does not have any pain meds ordered. He said he was trying to get in touch with his MD now. He said he has finished his IV Cefazolin and is now taking the oral cefadroxil. No questions at this time. Since MD offices often close at noon on Friday, I did not keep him, but encouraged him to contact his MD before noon.

## 2022-02-07 ENCOUNTER — HOME CARE VISIT (OUTPATIENT)
Dept: SCHEDULING | Facility: HOME HEALTH | Age: 62
End: 2022-02-07
Payer: COMMERCIAL

## 2022-02-07 VITALS
DIASTOLIC BLOOD PRESSURE: 60 MMHG | HEART RATE: 78 BPM | RESPIRATION RATE: 18 BRPM | TEMPERATURE: 97.8 F | SYSTOLIC BLOOD PRESSURE: 102 MMHG | OXYGEN SATURATION: 98 %

## 2022-02-07 VITALS
SYSTOLIC BLOOD PRESSURE: 112 MMHG | HEART RATE: 68 BPM | TEMPERATURE: 97.6 F | DIASTOLIC BLOOD PRESSURE: 64 MMHG | RESPIRATION RATE: 16 BRPM | OXYGEN SATURATION: 97 %

## 2022-02-07 PROCEDURE — G0299 HHS/HOSPICE OF RN EA 15 MIN: HCPCS

## 2022-02-07 PROCEDURE — G0151 HHCP-SERV OF PT,EA 15 MIN: HCPCS

## 2022-02-11 ENCOUNTER — TELEPHONE (OUTPATIENT)
Dept: HOME HEALTH SERVICES | Facility: HOME HEALTH | Age: 62
End: 2022-02-11

## 2022-02-14 ENCOUNTER — HOME CARE VISIT (OUTPATIENT)
Dept: SCHEDULING | Facility: HOME HEALTH | Age: 62
End: 2022-02-14
Payer: COMMERCIAL

## 2022-02-14 PROCEDURE — G0299 HHS/HOSPICE OF RN EA 15 MIN: HCPCS

## 2022-02-16 VITALS
HEART RATE: 78 BPM | TEMPERATURE: 97.8 F | DIASTOLIC BLOOD PRESSURE: 68 MMHG | RESPIRATION RATE: 17 BRPM | OXYGEN SATURATION: 97 % | SYSTOLIC BLOOD PRESSURE: 122 MMHG

## 2022-02-21 ENCOUNTER — HOME CARE VISIT (OUTPATIENT)
Dept: SCHEDULING | Facility: HOME HEALTH | Age: 62
End: 2022-02-21
Payer: COMMERCIAL

## 2022-02-21 VITALS
TEMPERATURE: 97.9 F | SYSTOLIC BLOOD PRESSURE: 124 MMHG | HEART RATE: 89 BPM | OXYGEN SATURATION: 93 % | RESPIRATION RATE: 17 BRPM | DIASTOLIC BLOOD PRESSURE: 68 MMHG

## 2022-02-21 PROCEDURE — 400013 HH SOC

## 2022-02-21 PROCEDURE — G0299 HHS/HOSPICE OF RN EA 15 MIN: HCPCS

## 2022-03-08 ENCOUNTER — HOME HEALTH ADMISSION (OUTPATIENT)
Dept: HOME HEALTH SERVICES | Facility: HOME HEALTH | Age: 62
End: 2022-03-08

## 2022-04-18 ENCOUNTER — HOSPITAL ENCOUNTER (OUTPATIENT)
Dept: INTERVENTIONAL RADIOLOGY/VASCULAR | Age: 62
Discharge: HOME | End: 2022-04-18
Attending: RADIOLOGY

## (undated) DEVICE — MEDI-VAC NON-CONDUCTIVE SUCTION TUBING: Brand: CARDINAL HEALTH

## (undated) DEVICE — GUARDIAN LVC: Brand: GUARDIAN

## (undated) DEVICE — SUTURE ETHBND 2 L30IN NONABSORBABLE GRN WHT LT GRN MO-7 D8793

## (undated) DEVICE — [RESECTOR CUTTER, ARTHROSCOPIC SHAVER BLADE,  DO NOT RESTERILIZE,  DO NOT USE IF PACKAGE IS DAMAGED,  KEEP DRY,  KEEP AWAY FROM SUNLIGHT]: Brand: FORMULA

## (undated) DEVICE — PAD,ABDOMINAL,5"X9",STERILE,LF,1/PK: Brand: MEDLINE INDUSTRIES, INC.

## (undated) DEVICE — (D)STRAP SWATHE ONLY -- DISC BY MFR NO SUB

## (undated) DEVICE — SHOULDER SUSPENSION KIT 6 PER BOX

## (undated) DEVICE — (D)PREP SKN CHLRAPRP APPL 26ML -- CONVERT TO ITEM 371833

## (undated) DEVICE — PUDDLEVAC FLOOR SUCTION DEVICE: Brand: PUDDLEVAC

## (undated) DEVICE — CARDINAL HEALTH FLEXIBLE LIGHT HANDLE COVER: Brand: CARDINAL HEALTH

## (undated) DEVICE — MEDI-VAC YANKAUER SUCTION HANDLE W/BULBOUS TIP: Brand: CARDINAL HEALTH

## (undated) DEVICE — DRAPE,U/SHT,SPLIT,FILM,60X84,STERILE: Brand: MEDLINE

## (undated) DEVICE — GOWN,REINFORCED,POLY,AURORA,XXLARGE,STR: Brand: MEDLINE

## (undated) DEVICE — PACK,SHOULDER,DRAPE,POUCH: Brand: MEDLINE

## (undated) DEVICE — SURGICAL PROCEDURE PACK BASIC ST FRANCIS

## (undated) DEVICE — INFLOW CASSETTE TUBING, DO NOT USE IF PACKAGE IS DAMAGED: Brand: CROSSFLOW

## (undated) DEVICE — SUTURE VCRL SZ 0 L27IN ABSRB UD L36MM CP-1 1/2 CIR REV CUT J267H

## (undated) DEVICE — SUTURE ETHLN SZ 2-0 L18IN NONABSORBABLE BLK L26MM PS 3/8 585H

## (undated) DEVICE — 90-S, SUCTION PROBE, NON-BENDABLE, MAX CUT LEVEL 11: Brand: SERFAS ENERGY

## (undated) DEVICE — BUR SHV CUT HLLW 6 FLUT 5.5MM --

## (undated) DEVICE — OUTFLOW CASSETTE TUBING, DO NOT USE IF PACKAGE IS DAMAGED: Brand: CROSSFLOW

## (undated) DEVICE — NEEDLE HYPO 18GA L1.5IN PNK S STL HUB POLYPR SHLD REG BVL

## (undated) DEVICE — BLADE SHV CUT MENIS AGG + 4MM --

## (undated) DEVICE — NDL SPNE QNCKE 18GX3.5IN LF --

## (undated) DEVICE — SOLUTION IRRIG 3000ML 0.9% SOD CHL FLX CONT 0797208] ICU MEDICAL INC]

## (undated) DEVICE — 3M™ STERI-DRAPE™ INCISE DRAPE 1050 (60CM X 45CM): Brand: STERI-DRAPE™

## (undated) DEVICE — SUTURE PROL SZ 2-0 L18IN NONABSORBABLE BLU FS L26MM 3/8 CIR 8685H

## (undated) DEVICE — (D)STRIP SKN CLSR 0.5X4IN WHT --